# Patient Record
Sex: FEMALE | Race: WHITE | NOT HISPANIC OR LATINO | Employment: OTHER | URBAN - METROPOLITAN AREA
[De-identification: names, ages, dates, MRNs, and addresses within clinical notes are randomized per-mention and may not be internally consistent; named-entity substitution may affect disease eponyms.]

---

## 2017-02-22 ENCOUNTER — HOSPITAL ENCOUNTER (OUTPATIENT)
Dept: BEHAVIORAL HEALTH | Facility: MEDICAL CENTER | Age: 47
End: 2017-02-22
Attending: PSYCHIATRY & NEUROLOGY
Payer: MEDICARE

## 2017-02-22 DIAGNOSIS — F25.0 SCHIZOAFFECTIVE DISORDER, BIPOLAR TYPE (HCC): ICD-10-CM

## 2017-02-22 DIAGNOSIS — F41.9 ANXIETY: ICD-10-CM
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
## 2017-02-27 ENCOUNTER — HOSPITAL ENCOUNTER (OUTPATIENT)
Dept: BEHAVIORAL HEALTH | Facility: MEDICAL CENTER | Age: 47
End: 2017-02-27
Attending: PSYCHIATRY & NEUROLOGY
Payer: MEDICARE

## 2017-02-27 DIAGNOSIS — F41.9 ANXIETY: ICD-10-CM

## 2017-02-27 PROCEDURE — 90853 GROUP PSYCHOTHERAPY: CPT

## 2017-02-27 NOTE — BH THERAPY
Group Therapy Checklist  Attendance: Attended  Attendance Duration (min): 46-60  Number of Participants: 10  Program/Group: Intensive Outpatient Program  Topics Covered: Recovery Planning  Participation: Active verbal participation  Affect/Mood Range: Normal range, Flexible  Affect/Mood Display: Congruent w/content  Cognition: Alert, Oriented  Evidence of Imminent Suicide Risk: No  Evidence of imminent homicide risk: No  Therapeutic Interventions: Relapse prevention  Progress Toward Treatment Goal: Mild improvement

## 2017-03-01 ENCOUNTER — HOSPITAL ENCOUNTER (OUTPATIENT)
Dept: BEHAVIORAL HEALTH | Facility: MEDICAL CENTER | Age: 47
End: 2017-03-01
Attending: PSYCHIATRY & NEUROLOGY
Payer: MEDICARE

## 2017-03-01 DIAGNOSIS — F41.9 ANXIETY: ICD-10-CM

## 2017-03-01 PROCEDURE — 90834 PSYTX W PT 45 MINUTES: CPT | Mod: XU

## 2017-03-01 PROCEDURE — 90853 GROUP PSYCHOTHERAPY: CPT

## 2017-03-01 NOTE — BH THERAPY
Group Therapy Checklist  Attendance: Attended  Attendance Duration (min): Greater than 60  Number of Participants: 9  Program/Group: Intensive Outpatient Program  Topics Covered: Other (Comment): (process group)  Participation: Active verbal participation, Attentive, Supportive to other group members, Open to feedback  Affect/Mood Range: Normal range, Flexible  Affect/Mood Display: Congruent w/content  Cognition: Alert, Oriented  Evidence of Imminent Suicide Risk: No  Evidence of imminent homicide risk: No  Therapeutic Interventions: Emotion clarification, Supportive psychotherapy  Progress Toward Treatment Goal: Mild improvement  Rianna processed the loss of her step-mother on Monday. She states she is distracted with details as she will be traveling to [place] to support her father at this time. She shared with group her struggles as a binge drinker and her recovery; she is attending AA. Receptive to peer support and sympathies.

## 2017-03-01 NOTE — BH THERAPY
" [facility]  Therapy Progress Note    Patient Name: Rianna Solis  Patient MRN: [id number]  Today's Date: 3/1/2017     Type of session:Individual psychotherapy  Length of session: 45  Persons in attendance:Patient    Subjective/New Info: initial individual session. Rianna states her step-mother  on Monday; she is flying back to [place] to support her father. She states she was not particularly close to her father's wife. Her daughter is flying back with her; her  will be staying in [place] for work. She will take a week away from group. Processed early recovery and attending meetings in another city; strategies for sobriety addressed. She denies that flying is a trigger.     Objective/Observations:   Participation: Active verbal participation, Attentive, Open to feedback and distracted by making arrangements to leave town.    Grooming: Casual and Neat   Cognition: Alert and Fully Oriented   Eye contact: Limited   Mood: Anxious   Affect: Flexible, Full range, Congruent with content and Anxious   Thought process: Logical and Goal-directed   Speech: Rate within normal limits and Volume within normal limits   Other:     Diagnoses: No diagnosis found.     Current risk:   SUICIDE: Low   Homicide: Not applicable   Self-harm: Not applicable   Relapse: Low   Other:    Safety Plan reviewed? Not Indicated   If evidence of imminent risk is present, intervention/plan:     Therapeutic Intervention(s): Clarify:  Clarify feelings and Clarify thoughts, Develop/modify treatment plan, Distress tolerance skills, Establish rapport, Parenting/familial roles addressed, Self-care skills, Stressors assessed and Supportive psychotherapy    Treatment Goal(s)/Objective(s) addressed: ineffective coping     Progress toward Treatment Goals: Return to baseline    Plan:  - Continue Intensive Outpatient Program  - \"Homework\" recommendation: ACT intro  - Next appointment scheduled:  3/13/2017  - Patient is in agreement " with the above plan:  YES    Patricia Abrams R.N.  3/1/2017

## 2017-03-01 NOTE — BH THERAPY
Group Therapy Checklist  Attendance: Attended  Attendance Duration (min): 46-60  Number of Participants: 9  Program/Group: Intensive Outpatient Program  Topics Covered: Cognitive distortions  Participation: Active verbal participation  Affect/Mood Range: Normal range, Flexible  Affect/Mood Display: Congruent w/content  Evidence of Imminent Suicide Risk: No  Evidence of imminent homicide risk: No  Therapeutic Interventions: Cognitive clarification, Values clarification  Progress Toward Treatment Goal: Mild improvement

## 2017-03-01 NOTE — CARE PLAN
Problem: Ineffective Coping  Goal: Improved mood and functioning  Intervention: Intensive Outpatient Program  Started 2/27/17  Intervention: Relapse triggers  Processed travel triggers; pt denied triggers. Strategies discussed.   Intervention: Grief and Loss  Processed loss of her step-mother, not close; she feels more needed to support her father at the time of his loss.   Intervention: ACT concepts  Intro to ACT assigned as homework piece as she will not be here on Friday for the education forum.   Intervention: AA  Continue to attend AA twice weekly, consider a meeting while on the road; processed strategies to look up meetings on the Internet.   Intervention: Grief Support  Community support group information provided; will reinforce upon return to [place].

## 2017-03-03 ENCOUNTER — APPOINTMENT (OUTPATIENT)
Dept: BEHAVIORAL HEALTH | Facility: MEDICAL CENTER | Age: 47
End: 2017-03-03
Attending: PSYCHIATRY & NEUROLOGY
Payer: MEDICARE

## 2017-03-09 ENCOUNTER — TELEPHONE (OUTPATIENT)
Dept: BEHAVIORAL HEALTH | Facility: MEDICAL CENTER | Age: 47
End: 2017-03-09

## 2017-03-09 NOTE — TELEPHONE ENCOUNTER
[facility]    Treatment Team Staffing    Patient Name: Rianna Solis Program: IOP Date: 3/9/2017     Attendees: Aleshia Marquez, RN; Cici Griffith, RN; Patricia Abrams, RN and Jarred Carson MD    Patient's Progress toward Goals Listed on the Treatment Plan: engaging in therapies; needed a week off for bereavement as her step-mother  and her father needed her in [place]    1. Client's Participation When in Attendance Was: Active in a Positive Way    2. Counselor's Evaluation of Client's Progress: Positive Movement    3. Patient is attending group and individual sessions and is progressing well toward the treatment goals: yes      YES NO   A. Relapse During Program []  [x]    B. Requires physician review []  [x]    C. Referral to program inappropriate []  [x]    D. Non compliance with Treatment Plan []  []    E. Early treatment termination (lack of attendance) []  [x]     []  []      Comments: will return next week    Treatment Plan Review: - Continue Intensive Outpatient Program  - Patient is in agreement with the above plan:  YES

## 2017-03-10 ENCOUNTER — TELEPHONE (OUTPATIENT)
Dept: BEHAVIORAL HEALTH | Facility: MEDICAL CENTER | Age: 47
End: 2017-03-10

## 2017-03-13 ENCOUNTER — HOSPITAL ENCOUNTER (OUTPATIENT)
Dept: BEHAVIORAL HEALTH | Facility: MEDICAL CENTER | Age: 47
End: 2017-03-13
Attending: PSYCHIATRY & NEUROLOGY
Payer: MEDICARE

## 2017-03-13 DIAGNOSIS — F41.9 ANXIETY: ICD-10-CM

## 2017-03-13 PROCEDURE — 90853 GROUP PSYCHOTHERAPY: CPT

## 2017-03-13 NOTE — BH THERAPY
Group Therapy Checklist  Attendance: Attended  Attendance Duration (min): 46-60  Number of Participants: 12  Program/Group: Intensive Outpatient Program  Topics Covered: Addiction behaviors  Participation: Active verbal participation  Affect/Mood Range: Normal range, Flexible  Affect/Mood Display: Congruent w/content  Cognition: Alert, Oriented  Evidence of Imminent Suicide Risk: No  Evidence of imminent homicide risk: No  Therapeutic Interventions: Cognitive clarification  Progress Toward Treatment Goal: Mild improvement

## 2017-03-14 ENCOUNTER — TELEPHONE (OUTPATIENT)
Dept: BEHAVIORAL HEALTH | Facility: MEDICAL CENTER | Age: 47
End: 2017-03-14

## 2017-03-14 NOTE — TELEPHONE ENCOUNTER
[facility]  TRANSFER/DISCHARGE SUMMARY FORM    PI / SCP: no Other Ins.: Medicare     Patient Name: Rianna Solis  Admission Date: 17  Level of Care Attended:  Intens.OP : 1970  Transfer/Discharge Date: MRN: [id number]  3/14/17       SIGNIFICANT FINDINGS/CLINICAL IMPRESSION:   DSM Codes:   F33.2    ICD10 Codes:   No diagnosis found.    Additional problems identified via assessment: bereavement    Treatment Components in Which Patient Participated (check all that apply):  Education group(s), 1:1 teaching/therapy and Group Therapy    Summary of Course of Treatment: attendance interrupted for bereavement; when she returned, she stated her  said the program was too expensive for them now.     Condition at Time of Transfer/Discharge: Case administratively closed due to non-compliance with treatment plan.    [] Medications Reviewed with Copy to Patient    Referred to: Refer to Primary Care Physician and private therapist     Patient is in agreement with discharge plan: yes    Patricia Abrams R.N.

## 2017-03-15 ENCOUNTER — APPOINTMENT (OUTPATIENT)
Dept: BEHAVIORAL HEALTH | Facility: MEDICAL CENTER | Age: 47
End: 2017-03-15
Attending: PSYCHIATRY & NEUROLOGY
Payer: MEDICARE

## 2017-03-17 ENCOUNTER — APPOINTMENT (OUTPATIENT)
Dept: BEHAVIORAL HEALTH | Facility: MEDICAL CENTER | Age: 47
End: 2017-03-17
Attending: PSYCHIATRY & NEUROLOGY
Payer: MEDICARE

## 2017-05-04 ENCOUNTER — HOSPITAL ENCOUNTER (OUTPATIENT)
Dept: RADIOLOGY | Facility: MEDICAL CENTER | Age: 47
End: 2017-05-04
Attending: NURSE PRACTITIONER
Payer: MEDICARE

## 2017-05-04 DIAGNOSIS — Z13.9 SCREENING: ICD-10-CM

## 2017-05-04 PROCEDURE — 77063 BREAST TOMOSYNTHESIS BI: CPT

## 2017-06-28 ENCOUNTER — OFFICE VISIT (OUTPATIENT)
Dept: URGENT CARE | Facility: PHYSICIAN GROUP | Age: 47
End: 2017-06-28
Payer: MEDICARE

## 2017-06-28 ENCOUNTER — TELEPHONE (OUTPATIENT)
Dept: URGENT CARE | Facility: PHYSICIAN GROUP | Age: 47
End: 2017-06-28

## 2017-06-28 VITALS
HEART RATE: 71 BPM | OXYGEN SATURATION: 98 % | SYSTOLIC BLOOD PRESSURE: 114 MMHG | HEIGHT: 62 IN | RESPIRATION RATE: 16 BRPM | DIASTOLIC BLOOD PRESSURE: 62 MMHG | WEIGHT: 182 LBS | BODY MASS INDEX: 33.49 KG/M2

## 2017-06-28 DIAGNOSIS — S39.012A BACK STRAIN, INITIAL ENCOUNTER: ICD-10-CM

## 2017-06-28 PROCEDURE — 99213 OFFICE O/P EST LOW 20 MIN: CPT | Performed by: EMERGENCY MEDICINE

## 2017-06-28 RX ORDER — KETOROLAC TROMETHAMINE 30 MG/ML
60 INJECTION, SOLUTION INTRAMUSCULAR; INTRAVENOUS ONCE
Status: COMPLETED | OUTPATIENT
Start: 2017-06-28 | End: 2017-06-28

## 2017-06-28 RX ORDER — HYDROCODONE BITARTRATE AND ACETAMINOPHEN 5; 325 MG/1; MG/1
1 TABLET ORAL EVERY 6 HOURS PRN
Qty: 12 TAB | Refills: 0 | Status: SHIPPED | OUTPATIENT
Start: 2017-06-28 | End: 2017-11-21

## 2017-06-28 RX ORDER — METHOCARBAMOL 750 MG/1
1500 TABLET, FILM COATED ORAL 3 TIMES DAILY
Qty: 60 TAB | Refills: 0 | Status: SHIPPED | OUTPATIENT
Start: 2017-06-28 | End: 2018-03-08

## 2017-06-28 RX ADMIN — KETOROLAC TROMETHAMINE 60 MG: 30 INJECTION, SOLUTION INTRAMUSCULAR; INTRAVENOUS at 13:51

## 2017-06-28 ASSESSMENT — ENCOUNTER SYMPTOMS
FALLS: 0
VOMITING: 0
ABDOMINAL PAIN: 0
COUGH: 0
NERVOUS/ANXIOUS: 0
CHILLS: 0
BACK PAIN: 1
LEG PAIN: 0
BOWEL INCONTINENCE: 0
PERIANAL NUMBNESS: 0
NECK PAIN: 0
NAUSEA: 0
SENSORY CHANGE: 0
SPEECH CHANGE: 0
MYALGIAS: 0
FEVER: 0
HEADACHES: 0
NUMBNESS: 0
WEAKNESS: 0

## 2017-06-28 NOTE — MR AVS SNAPSHOT
"        Rianna Solis   2017 1:05 PM   Office Visit   MRN: [id number]    Department:  [facility]   Dept Phone:  [phone]    Description:  Female : 1970   Provider:  NIRALI Suarez M.D.           Reason for Visit     Back Pain Lower back pain following strain while lifting her dog onto his chair yesterday       Allergies as of 2017     Allergen Noted Reactions    Zoloft 2010   Unspecified    \"Blows Up\"      You were diagnosed with     Back strain, initial encounter   [[id number]]         Vital Signs     Blood Pressure Pulse Respirations Height Weight Body Mass Index    114/62 mmHg 71 16 1.575 m (5' 2\") 82.555 kg (182 lb) 33.28 kg/m2    Oxygen Saturation Smoking Status                98% Former Smoker          Basic Information     Date Of Birth Sex Race Ethnicity Preferred Language    1970 Female White Non- English      Problem List              ICD-10-CM Priority Class Noted - Resolved    Anxiety F41.9   2010 - Present    Schizoaffective disorder, bipolar type (CMS-[facility]) F25.0   2011 - Present    Partial seizure disorder (CMS-HCC) G40.109   Unknown - Present    Obesity (BMI 30-39.9) E66.9   2014 - Present    Elevated blood pressure (not hypertension) R03.0   2014 - Present    Hx of amenorrhea Z87.42   2014 - Present    History of suicide attempt Z91.5   2016 - Present      Health Maintenance        Date Due Completion Dates    IMM DTaP/Tdap/Td Vaccine (1 - Tdap) 1989 ---    MAMMOGRAM 2018, 2016, 2015, 3/20/2012    PAP SMEAR 2019, 3/21/2012            Current Immunizations     Influenza TIV (IM) 2011    Influenza Vaccine Quad Inj (Preserved) 10/2/2015, 2014      Below and/or attached are the medications your provider expects you to take. Review all of your home medications and newly ordered medications with your provider and/or pharmacist. Follow medication instructions as directed by " your provider and/or pharmacist. Please keep your medication list with you and share with your provider. Update the information when medications are discontinued, doses are changed, or new medications (including over-the-counter products) are added; and carry medication information at all times in the event of emergency situations     Allergies:  ZOLOFT - Unspecified               Medications  Valid as of: June 28, 2017 -  1:58 PM    Generic Name Brand Name Tablet Size Instructions for use    Cholecalciferol (Cap) Cholecalciferol 2000 UNIT Take  by mouth.        Hydrocodone-Acetaminophen (Tab) NORCO 5-325 MG Take 1 Tab by mouth every 6 hours as needed.        Methocarbamol (Tab) ROBAXIN 750 MG Take 2 Tabs by mouth 3 times a day.        .                 Medicines prescribed today were sent to:     [facility] #[id number] - [facility] [place] - [address]    [address] [address]    Phone: [phone] Fax: [phone]    Open 24 Hours?: No      Medication refill instructions:       If your prescription bottle indicates you have medication refills left, it is not necessary to call your provider’s office. Please contact your pharmacy and they will refill your medication.    If your prescription bottle indicates you do not have any refills left, you may request refills at any time through one of the following ways: The online [employer] system (except Urgent Care), by calling your provider’s office, or by asking your pharmacy to contact your provider’s office with a refill request. Medication refills are processed only during regular business hours and may not be available until the next business day. Your provider may request additional information or to have a follow-up visit with you prior to refilling your medication.   *Please Note: Medication refills are assigned a new Rx number when refilled electronically. Your pharmacy may indicate that no refills were authorized even though a new  prescription for the same medication is available at the pharmacy. Please request the medicine by name with the pharmacy before contacting your provider for a refill.           [employer] Access Code: [email or web address]  Expires: 2017  1:58 PM    [employer]  A secure, online tool to manage your health information     [employer]® is a secure, online tool that connects you to your personalized health information from the privacy of your home -- day or night - making it very easy for you to manage your healthcare. Once the activation process is completed, you can even access your medical information using the [employer] meng, which is available for free in the [employer] Meng store or Google Play store.     [employer] provides the following levels of access (as shown below):   My Chart Features   [facility]  Urgent  Care [facility] Care  Doctor   Email your healthcare team securely and privately  X X X    Manage appointments: schedule your next appointment; view details of past/upcoming appointments X      Request prescription refills. X      View recent personal medical records, including lab and immunizations X X X X   View health record, including health history, allergies, medications X X X X   Read reports about your outpatient visits, procedures, consult and ER notes X X X X   See your discharge summary, which is a recap of your hospital and/or ER visit that includes your diagnosis, lab results, and care plan. X X       How to register for [employer]:  1. Go to  [email or web address].  2. Click on the Sign Up Now box, which takes you to the New Member Sign Up page. You will need to provide the following information:  a. Enter your [employer] Access Code exactly as it appears at the top of this page. (You will not need to use this code after you’ve completed the sign-up process. If you do not sign up before the expiration date, you must request a new code.)    b. Enter your date of birth.   c. Enter your home email address.   d. Click Submit, and follow the next screen’s instructions.  3. Create a [employer] ID. This will be your [employer] login ID and cannot be changed, so think of one that is secure and easy to remember.  4. Create a [email or web address] password. You can change your password at any time.  5. Enter your Password Reset Question and Answer. This can be used at a later time if you forget your password.   6. Enter your e-mail address. This allows you to receive e-mail notifications when new information is available in [employer].  7. Click Sign Up. You can now view your health information.    For assistance activating your [employer] account, call [phone]
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
## 2017-11-21 ENCOUNTER — OFFICE VISIT (OUTPATIENT)
Dept: MEDICAL GROUP | Facility: CLINIC | Age: 47
End: 2017-11-21
Payer: MEDICARE

## 2017-11-21 VITALS
SYSTOLIC BLOOD PRESSURE: 118 MMHG | HEART RATE: 72 BPM | RESPIRATION RATE: 16 BRPM | DIASTOLIC BLOOD PRESSURE: 62 MMHG | TEMPERATURE: 97.7 F | OXYGEN SATURATION: 98 % | HEIGHT: 62 IN

## 2017-11-21 DIAGNOSIS — E78.5 MILD HYPERLIPIDEMIA: ICD-10-CM

## 2017-11-21 DIAGNOSIS — Z13.21 ENCOUNTER FOR VITAMIN DEFICIENCY SCREENING: ICD-10-CM

## 2017-11-21 DIAGNOSIS — E06.3 AUTOIMMUNE THYROIDITIS: ICD-10-CM

## 2017-11-21 DIAGNOSIS — R42 VERTIGO: ICD-10-CM

## 2017-11-21 PROCEDURE — 99214 OFFICE O/P EST MOD 30 MIN: CPT | Performed by: NURSE PRACTITIONER

## 2017-11-21 RX ORDER — TRAMADOL HYDROCHLORIDE 50 MG/1
50 TABLET ORAL
Refills: 0 | COMMUNITY
Start: 2017-11-04 | End: 2018-03-08

## 2017-11-21 ASSESSMENT — PATIENT HEALTH QUESTIONNAIRE - PHQ9: CLINICAL INTERPRETATION OF PHQ2 SCORE: 0

## 2017-11-21 NOTE — PROGRESS NOTES
"CC: Vertigo (x wk off and on )        HPI:     Rianna presents today for the followin. Vertigo  Here with concerns stated that she's had vertigo that's triggered by movement off-and-on for the last week or so. She had difficulty completing her physical therapy exercises today when she was lying down on her side and they encouraged her to follow-up here for treatment. She describes this as being triggered if she bends down the touch her feet or lays down a certain side. She describes it as being very intense however resolving quickly on its own. Mild nausea occasionally associated. Denies any ear pain or changes in hearing. No associated fevers. No associated head trauma.    2. Mild hyperlipidemia  History high cholesterol. This was a mild per the patient. She is not a car medication for.    3. Encounter for vitamin deficiency screening  History of low vitamin D    4. Autoimmune thyroiditis   Diagnosed previously with Hashimoto's. I have her thyroid checked.    Current Outpatient Prescriptions   Medication Sig Dispense Refill   • tramadol (ULTRAM) 50 MG Tab Take 50 mg by mouth.  0   • methocarbamol (ROBAXIN) 750 MG Tab Take 2 Tabs by mouth 3 times a day. 60 Tab 0   • Cholecalciferol 2000 UNIT Cap Take  by mouth.       No current facility-administered medications for this visit.      Social History   Substance Use Topics   • Smoking status: Former Smoker     Packs/day: 0.25     Years: 25.00     Types: Cigarettes     Start date: 2016     Quit date: 2017   • Smokeless tobacco: Never Used   • Alcohol use 42.0 oz/week     60 Glasses of wine, 10 Shots of liquor per week      Comment: in remission;occasional     I reviewed patients allergies, problem list and medications today in [facility].    ROS: Any/all pertinent positives listed in the HPI, otherwise all others reviewed are negative today.      /62   Pulse 72   Temp 36.5 °C (97.7 °F)   Resp 16   Ht 1.575 m (5' 2\")   LMP 10/16/2017   SpO2 98%   " Breastfeeding? No     Exam:    Gen: Alert and oriented, No apparent distress. WDWN  Psych: A+Ox3, normal affect and mood  Skin: Warm, dry and intact. Good turgor   No rashes in visible areas.  Eye: Conjunctiva clear, lids normal  ENMT: Lips without lesions, good dentition   Oropharynx clear.TMs unremarkable bilaterally Neck: No Lymphadenopathy, Thyromegaly, Bruits.   Trachea midline, no masses  Lungs: Clear to auscultation bilaterally, no rales or rhonchi   Unlabored respiratory effort.   CV: Regular rate and rhythm, S1, S2. No murmurs.   No Edema  Intermittent exacerbations of vertigo during the appointment when patient went from sitting to standing.      Assessment and Plan.   47 y.o. female with the following issues.    1. Vertigo  Suspect BPPV. Did discuss with the patient medication treatment options such as meclizine however Patient wishes to avoid medications as necessary and I feel this is fine. Labs as below. Did discuss home vertigo exercises to do on the floor. She'll notify me if this doesn't resolve and we will get her to ENT  - COMP METABOLIC PANEL; Future  - CBC WITH DIFFERENTIAL; Future  - TSH; Future  - T3 FREE; Future  - FREE THYROXINE; Future  - THYROID PEROXIDASE  (TPO) AB; Future    2. Mild hyperlipidemia  Stable monitor lab  - LIPID PROFILE; Future    3. Encounter for vitamin deficiency screening  Monitor lab  - VITAMIN D,25 HYDROXY; Future    4. Autoimmune thyroiditis   Monitor lab  - TSH; Future

## 2017-11-25 ENCOUNTER — HOSPITAL ENCOUNTER (OUTPATIENT)
Dept: LAB | Facility: MEDICAL CENTER | Age: 47
End: 2017-11-25
Attending: NURSE PRACTITIONER
Payer: MEDICARE

## 2017-11-25 DIAGNOSIS — Z13.21 ENCOUNTER FOR VITAMIN DEFICIENCY SCREENING: ICD-10-CM

## 2017-11-25 DIAGNOSIS — R42 VERTIGO: ICD-10-CM

## 2017-11-25 DIAGNOSIS — E78.5 MILD HYPERLIPIDEMIA: ICD-10-CM

## 2017-11-25 LAB
25(OH)D3 SERPL-MCNC: 27 NG/ML (ref 30–100)
ALBUMIN SERPL BCP-MCNC: 4.4 G/DL (ref 3.2–4.9)
ALBUMIN/GLOB SERPL: 1.5 G/DL
ALP SERPL-CCNC: 57 U/L (ref 30–99)
ALT SERPL-CCNC: 43 U/L (ref 2–50)
ANION GAP SERPL CALC-SCNC: 7 MMOL/L (ref 0–11.9)
AST SERPL-CCNC: 24 U/L (ref 12–45)
BASOPHILS # BLD AUTO: 0.6 % (ref 0–1.8)
BASOPHILS # BLD: 0.04 K/UL (ref 0–0.12)
BILIRUB SERPL-MCNC: 0.7 MG/DL (ref 0.1–1.5)
BUN SERPL-MCNC: 14 MG/DL (ref 8–22)
CALCIUM SERPL-MCNC: 9.7 MG/DL (ref 8.5–10.5)
CHLORIDE SERPL-SCNC: 106 MMOL/L (ref 96–112)
CHOLEST SERPL-MCNC: 204 MG/DL (ref 100–199)
CO2 SERPL-SCNC: 24 MMOL/L (ref 20–33)
CREAT SERPL-MCNC: 0.59 MG/DL (ref 0.5–1.4)
EOSINOPHIL # BLD AUTO: 0.06 K/UL (ref 0–0.51)
EOSINOPHIL NFR BLD: 0.9 % (ref 0–6.9)
ERYTHROCYTE [DISTWIDTH] IN BLOOD BY AUTOMATED COUNT: 39.2 FL (ref 35.9–50)
GFR SERPL CREATININE-BSD FRML MDRD: >60 ML/MIN/1.73 M 2
GLOBULIN SER CALC-MCNC: 2.9 G/DL (ref 1.9–3.5)
GLUCOSE SERPL-MCNC: 84 MG/DL (ref 65–99)
HCT VFR BLD AUTO: 42 % (ref 37–47)
HDLC SERPL-MCNC: 48 MG/DL
HGB BLD-MCNC: 14.4 G/DL (ref 12–16)
IMM GRANULOCYTES # BLD AUTO: 0.02 K/UL (ref 0–0.11)
IMM GRANULOCYTES NFR BLD AUTO: 0.3 % (ref 0–0.9)
LDLC SERPL CALC-MCNC: 139 MG/DL
LYMPHOCYTES # BLD AUTO: 1.86 K/UL (ref 1–4.8)
LYMPHOCYTES NFR BLD: 28.4 % (ref 22–41)
MCH RBC QN AUTO: 31 PG (ref 27–33)
MCHC RBC AUTO-ENTMCNC: 34.3 G/DL (ref 33.6–35)
MCV RBC AUTO: 90.5 FL (ref 81.4–97.8)
MONOCYTES # BLD AUTO: 0.39 K/UL (ref 0–0.85)
MONOCYTES NFR BLD AUTO: 6 % (ref 0–13.4)
NEUTROPHILS # BLD AUTO: 4.18 K/UL (ref 2–7.15)
NEUTROPHILS NFR BLD: 63.8 % (ref 44–72)
NRBC # BLD AUTO: 0 K/UL
NRBC BLD AUTO-RTO: 0 /100 WBC
PLATELET # BLD AUTO: 357 K/UL (ref 164–446)
PMV BLD AUTO: 9.6 FL (ref 9–12.9)
POTASSIUM SERPL-SCNC: 4 MMOL/L (ref 3.6–5.5)
PROT SERPL-MCNC: 7.3 G/DL (ref 6–8.2)
RBC # BLD AUTO: 4.64 M/UL (ref 4.2–5.4)
SODIUM SERPL-SCNC: 137 MMOL/L (ref 135–145)
T3FREE SERPL-MCNC: 3.48 PG/ML (ref 2.4–4.2)
T4 FREE SERPL-MCNC: 0.82 NG/DL (ref 0.53–1.43)
THYROPEROXIDASE AB SERPL-ACNC: 355.9 IU/ML (ref 0–9)
TRIGL SERPL-MCNC: 85 MG/DL (ref 0–149)
TSH SERPL DL<=0.005 MIU/L-ACNC: 0.44 UIU/ML (ref 0.3–3.7)
WBC # BLD AUTO: 6.6 K/UL (ref 4.8–10.8)

## 2017-11-25 PROCEDURE — 86376 MICROSOMAL ANTIBODY EACH: CPT

## 2017-11-25 PROCEDURE — 80053 COMPREHEN METABOLIC PANEL: CPT

## 2017-11-25 PROCEDURE — 80061 LIPID PANEL: CPT

## 2017-11-25 PROCEDURE — 82306 VITAMIN D 25 HYDROXY: CPT | Mod: GA

## 2017-11-25 PROCEDURE — 85025 COMPLETE CBC W/AUTO DIFF WBC: CPT

## 2017-11-25 PROCEDURE — 84439 ASSAY OF FREE THYROXINE: CPT

## 2017-11-25 PROCEDURE — 84481 FREE ASSAY (FT-3): CPT

## 2017-11-25 PROCEDURE — 36415 COLL VENOUS BLD VENIPUNCTURE: CPT

## 2017-11-25 PROCEDURE — 84443 ASSAY THYROID STIM HORMONE: CPT

## 2017-12-01 ENCOUNTER — PATIENT MESSAGE (OUTPATIENT)
Dept: MEDICAL GROUP | Facility: CLINIC | Age: 47
End: 2017-12-01

## 2018-01-15 ENCOUNTER — OFFICE VISIT (OUTPATIENT)
Dept: MEDICAL GROUP | Facility: CLINIC | Age: 48
End: 2018-01-15
Payer: MEDICARE

## 2018-01-15 VITALS
WEIGHT: 174 LBS | BODY MASS INDEX: 32.02 KG/M2 | OXYGEN SATURATION: 96 % | DIASTOLIC BLOOD PRESSURE: 76 MMHG | RESPIRATION RATE: 16 BRPM | HEIGHT: 62 IN | TEMPERATURE: 97.9 F | HEART RATE: 74 BPM | SYSTOLIC BLOOD PRESSURE: 118 MMHG

## 2018-01-15 DIAGNOSIS — R06.83 SNORING: ICD-10-CM

## 2018-01-15 DIAGNOSIS — G56.03 BILATERAL CARPAL TUNNEL SYNDROME: ICD-10-CM

## 2018-01-15 DIAGNOSIS — Z23 NEED FOR VACCINATION: ICD-10-CM

## 2018-01-15 PROBLEM — Z86.32 HISTORY OF GESTATIONAL DIABETES: Status: ACTIVE | Noted: 2018-01-15

## 2018-01-15 PROCEDURE — 90715 TDAP VACCINE 7 YRS/> IM: CPT | Performed by: NURSE PRACTITIONER

## 2018-01-15 PROCEDURE — 90472 IMMUNIZATION ADMIN EACH ADD: CPT | Performed by: NURSE PRACTITIONER

## 2018-01-15 PROCEDURE — 99214 OFFICE O/P EST MOD 30 MIN: CPT | Mod: 25 | Performed by: NURSE PRACTITIONER

## 2018-01-15 PROCEDURE — G0008 ADMIN INFLUENZA VIRUS VAC: HCPCS | Performed by: NURSE PRACTITIONER

## 2018-01-15 PROCEDURE — 90686 IIV4 VACC NO PRSV 0.5 ML IM: CPT | Performed by: NURSE PRACTITIONER

## 2018-01-15 RX ORDER — IBUPROFEN 800 MG/1
800 TABLET ORAL EVERY 8 HOURS PRN
Qty: 60 TAB | Refills: 0 | Status: SHIPPED | OUTPATIENT
Start: 2018-01-15 | End: 2018-02-01 | Stop reason: SDUPTHER

## 2018-01-15 NOTE — PROGRESS NOTES
CC: Follow-Up (Crapal tunnel; vaccines; snoring)        HPI:     Rianna presents today for the followin. Snoring  Here today stating that she's been having some issues with snoring. For the last year or so she feels her basic snoring is become something worse. Her  states that she has apneic-like episodes at home. She's never had any testing for sleep apnea    2.  Bilateral carpal tunnel syndrome  States she's had episodes or exacerbations of carpal tunnel bilaterally for 20 something years. Initially started in her pregnancies and would resolve. She previously has had great relief with cortisol injections in the wrists. Currently right is worse than left. She just approximately 2 weeks ago by wrist splints and has been using these at night. This is helping.  She feels that this has worsened since she gained weight. She does occasionally take ibuprofen up to 800 mg. This does help temporarily        Current Outpatient Prescriptions   Medication Sig Dispense Refill   • ibuprofen (MOTRIN) 800 MG Tab Take 1 Tab by mouth every 8 hours as needed (with food) for up to 7 days. 60 Tab 0   • tramadol (ULTRAM) 50 MG Tab Take 50 mg by mouth.  0   • methocarbamol (ROBAXIN) 750 MG Tab Take 2 Tabs by mouth 3 times a day. 60 Tab 0   • Cholecalciferol 2000 UNIT Cap Take  by mouth.       No current facility-administered medications for this visit.      Social History   Substance Use Topics   • Smoking status: Former Smoker     Packs/day: 0.25     Years: 25.00     Types: Cigarettes     Start date: 2016     Quit date: 2017   • Smokeless tobacco: Never Used   • Alcohol use 42.0 oz/week     60 Glasses of wine, 10 Shots of liquor per week      Comment: in remission;occasional     I reviewed patients allergies, problem list and medications today in [facility].    ROS: Any/all pertinent positives listed in the HPI, otherwise all others reviewed are negative today.      /76   Pulse 74   Temp 36.6 °C (97.9 °F)    "Resp 16   Ht 1.575 m (5' 2\")   Wt 78.9 kg (174 lb)   SpO2 96%   BMI 31.83 kg/m²     Exam:   Gen: Alert and oriented, No apparent distress. WDWN  Psych: A+Ox3, normal affect and mood  Skin: Warm, dry and intact. Good turgor   No rashes in visible areas.  Eye: Conjunctiva clear, lids normal  ENMT: Lips without lesions, good dentition  Lungs: Clear to auscultation bilaterally, no rales or rhonchi   Unlabored respiratory effort.   CV: Regular rate and rhythm, S1, S2. No murmurs.   No Edema  Negative Phalmichelle's      Assessment and Plan.   47 y.o. female with the following issues.    1. Snoring  Discussed thoroughly with patient. OPO on RA ordered    2. Bilateral carpal tunnel syndrome  Stable. Continue splints bilaterally at night. We'll do a one-week trial of high-dose ibuprofen. She is historically tolerated ibuprofen well. If she doesn't get significant improvement at that point she will contact me and I will place referral to orthopedics for injections  - ibuprofen (MOTRIN) 800 MG Tab; Take 1 Tab by mouth every 8 hours as needed (with food) for up to 7 days.  Dispense: 60 Tab; Refill: 0    4. Need for vaccination  I have placed the below orders and discussed them with an approved delegating provider. The MA is performing the below orders under the direction of an office MD.  -Given today.    - INFLUENZA VACCINE QUAD INJ >3Y(PF)  - TDAP VACCINE =>8YO IM      "

## 2018-01-19 ENCOUNTER — TELEPHONE (OUTPATIENT)
Dept: MEDICAL GROUP | Facility: CLINIC | Age: 48
End: 2018-01-19

## 2018-01-19 NOTE — TELEPHONE ENCOUNTER
----- Message from Rianna Solis sent at 1/18/2018  7:53 PM PST -----  Regarding: Non-Urgent Medical Question  Contact: [phone]  Edda Howe.  At my appointment, you mentioned that you would check on my insurance as to a home testing machine for my snoring.  Will someone be calling me regarding how this works?  Please advise if I should expect a call, or if I need to follow-up with someone specific.  Thank you...have a good evening.

## 2018-01-19 NOTE — TELEPHONE ENCOUNTER
Please make sure her OPO on RA order from last week was sent and give the patient the DME contact info

## 2018-01-25 NOTE — TELEPHONE ENCOUNTER
I had faxed this to [facility]. They didn't seem to get it so I faxed again on Monday, 1/23/18. I followed up with phone call this morning. Per [employer], they just received fax 30 minutes ago and are processing request. I will send pt a [employer] message.

## 2018-02-01 DIAGNOSIS — G56.03 BILATERAL CARPAL TUNNEL SYNDROME: ICD-10-CM

## 2018-02-01 RX ORDER — IBUPROFEN 800 MG/1
800 TABLET ORAL EVERY 8 HOURS PRN
Qty: 60 TAB | Refills: 0 | Status: SHIPPED | OUTPATIENT
Start: 2018-02-01 | End: 2018-05-19 | Stop reason: SDUPTHER

## 2018-02-12 ENCOUNTER — PATIENT MESSAGE (OUTPATIENT)
Dept: MEDICAL GROUP | Facility: CLINIC | Age: 48
End: 2018-02-12

## 2018-02-12 DIAGNOSIS — G47.33 OSA (OBSTRUCTIVE SLEEP APNEA): ICD-10-CM

## 2018-02-15 NOTE — TELEPHONE ENCOUNTER
I spoke to Kristal @ [place] [facility]. With normal private insurances, they will pay for patient's to be put on nocturnal O2 after sleep study shows HERLINDA. Medicare however absolutely will not pay for the oxygen because they dictate pt must have sleep study completed.   I let Kristal @ [facility] know that pt has a referral and it looks like it is pending still. I will send [employer] message to pt.

## 2018-03-07 ENCOUNTER — HOSPITAL ENCOUNTER (EMERGENCY)
Facility: MEDICAL CENTER | Age: 48
End: 2018-03-08
Attending: EMERGENCY MEDICINE
Payer: MEDICARE

## 2018-03-07 DIAGNOSIS — F69 BEHAVIOR PROBLEM, ADULT: ICD-10-CM

## 2018-03-07 DIAGNOSIS — R44.3 HALLUCINATIONS: ICD-10-CM

## 2018-03-07 LAB
APPEARANCE UR: CLEAR
BILIRUB UR QL STRIP.AUTO: NEGATIVE
COLOR UR: YELLOW
CULTURE IF INDICATED INDCX: NO UA CULTURE
GLUCOSE UR STRIP.AUTO-MCNC: NEGATIVE MG/DL
KETONES UR STRIP.AUTO-MCNC: 15 MG/DL
LEUKOCYTE ESTERASE UR QL STRIP.AUTO: NEGATIVE
MICRO URNS: ABNORMAL
NITRITE UR QL STRIP.AUTO: NEGATIVE
PH UR STRIP.AUTO: 5 [PH]
POC BREATHALIZER: 0 PERCENT (ref 0–0.01)
PROT UR QL STRIP: NEGATIVE MG/DL
RBC UR QL AUTO: NEGATIVE
SP GR UR STRIP.AUTO: 1.01
UROBILINOGEN UR STRIP.AUTO-MCNC: 0.2 MG/DL

## 2018-03-07 PROCEDURE — 80307 DRUG TEST PRSMV CHEM ANLYZR: CPT

## 2018-03-07 PROCEDURE — 81003 URINALYSIS AUTO W/O SCOPE: CPT

## 2018-03-07 PROCEDURE — 302970 POC BREATHALIZER: Performed by: EMERGENCY MEDICINE

## 2018-03-07 PROCEDURE — 99284 EMERGENCY DEPT VISIT MOD MDM: CPT

## 2018-03-08 ENCOUNTER — HOSPITAL ENCOUNTER (EMERGENCY)
Facility: MEDICAL CENTER | Age: 48
End: 2018-03-13
Attending: EMERGENCY MEDICINE
Payer: MEDICARE

## 2018-03-08 VITALS
OXYGEN SATURATION: 94 % | RESPIRATION RATE: 18 BRPM | HEIGHT: 62 IN | WEIGHT: 170 LBS | BODY MASS INDEX: 31.28 KG/M2 | HEART RATE: 90 BPM

## 2018-03-08 DIAGNOSIS — F20.9 SCHIZOPHRENIA, UNSPECIFIED TYPE (HCC): ICD-10-CM

## 2018-03-08 LAB
AMPHET UR QL SCN: NEGATIVE
AMPHET UR QL SCN: NEGATIVE
BARBITURATES UR QL SCN: NEGATIVE
BARBITURATES UR QL SCN: NEGATIVE
BENZODIAZ UR QL SCN: POSITIVE
BENZODIAZ UR QL SCN: POSITIVE
BZE UR QL SCN: NEGATIVE
BZE UR QL SCN: NEGATIVE
CANNABINOIDS UR QL SCN: NEGATIVE
CANNABINOIDS UR QL SCN: NEGATIVE
METHADONE UR QL SCN: NEGATIVE
METHADONE UR QL SCN: NEGATIVE
OPIATES UR QL SCN: NEGATIVE
OPIATES UR QL SCN: NEGATIVE
OXYCODONE UR QL SCN: NEGATIVE
OXYCODONE UR QL SCN: NEGATIVE
PCP UR QL SCN: NEGATIVE
PCP UR QL SCN: NEGATIVE
POC BREATHALIZER: 0 PERCENT (ref 0–0.01)
PROPOXYPH UR QL SCN: NEGATIVE
PROPOXYPH UR QL SCN: NEGATIVE

## 2018-03-08 PROCEDURE — 99285 EMERGENCY DEPT VISIT HI MDM: CPT

## 2018-03-08 PROCEDURE — 90791 PSYCH DIAGNOSTIC EVALUATION: CPT

## 2018-03-08 PROCEDURE — 80307 DRUG TEST PRSMV CHEM ANLYZR: CPT

## 2018-03-08 PROCEDURE — 700102 HCHG RX REV CODE 250 W/ 637 OVERRIDE(OP): Performed by: EMERGENCY MEDICINE

## 2018-03-08 PROCEDURE — A9270 NON-COVERED ITEM OR SERVICE: HCPCS | Performed by: EMERGENCY MEDICINE

## 2018-03-08 PROCEDURE — 302970 POC BREATHALIZER: Performed by: EMERGENCY MEDICINE

## 2018-03-08 PROCEDURE — 36415 COLL VENOUS BLD VENIPUNCTURE: CPT

## 2018-03-08 RX ORDER — DIPHENHYDRAMINE HCL 25 MG
50 TABLET ORAL ONCE
Status: COMPLETED | OUTPATIENT
Start: 2018-03-08 | End: 2018-03-08

## 2018-03-08 RX ORDER — HYDROXYZINE PAMOATE 25 MG/1
25-50 CAPSULE ORAL 2 TIMES DAILY PRN
COMMUNITY
End: 2018-11-05

## 2018-03-08 RX ORDER — LURASIDONE HYDROCHLORIDE 80 MG/1
80 TABLET, FILM COATED ORAL
COMMUNITY
End: 2018-11-05

## 2018-03-08 RX ORDER — OXCARBAZEPINE 300 MG/1
300 TABLET, FILM COATED ORAL 2 TIMES DAILY
COMMUNITY
End: 2018-11-05 | Stop reason: CLARIF

## 2018-03-08 RX ORDER — LORAZEPAM 1 MG/1
1 TABLET ORAL ONCE
Status: COMPLETED | OUTPATIENT
Start: 2018-03-08 | End: 2018-03-08

## 2018-03-08 RX ORDER — LORAZEPAM 1 MG/1
2 TABLET ORAL ONCE
Status: COMPLETED | OUTPATIENT
Start: 2018-03-08 | End: 2018-03-08

## 2018-03-08 RX ADMIN — LORAZEPAM 1 MG: 1 TABLET ORAL at 10:12

## 2018-03-08 RX ADMIN — LORAZEPAM 2 MG: 1 TABLET ORAL at 15:38

## 2018-03-08 RX ADMIN — DIPHENHYDRAMINE HCL 50 MG: 25 TABLET ORAL at 20:51

## 2018-03-08 RX ADMIN — LORAZEPAM 1 MG: 1 TABLET ORAL at 20:51

## 2018-03-08 ASSESSMENT — PAIN SCALES - GENERAL
PAINLEVEL_OUTOF10: 0

## 2018-03-08 NOTE — ED PROVIDER NOTES
"ED Provider Note    Scribed for Shivani Pretty M.D. by Julius Nolen. 3/7/2018, 11:19 PM.    Primary care provider: SHAY Lyman  Means of arrival: Ambulance  History obtained from: Patient  History limited by: None    CHIEF COMPLAINT  Chief Complaint   Patient presents with   • Psych Eval       HPI  Rianna Solis is a 47 y.o. female with schizophrenia who presents to the Emergency Department for a psychiatric evaluation. Per patient's , She is reported to have had a schizophrenic episode after not taking her newly prescribed psychiatric medication. The patient began speaking in tongues and police was called at that time. Patient is reported to have been tazered. She endorses hallucinations in the form of voices. The patient claims she was recently discharged from [facility] 2 days ago. When asked of suicidal ideations, patient states \"I don't know but I dont think so, and I dont want to hurt anyone else.\"     REVIEW OF SYSTEMS  See HPI for further details. All other systems are negative.   C.     PAST MEDICAL HISTORY   has a past medical history of Abnormal mammogram (2006); Acromioclavicular joint separation (5/24/2012); Anxiety; Depression; History of cervical dysplasia (1990); History of suicidal ideation; Homicidal ideations (2014); Hyperthyroidism; Hyperthyroidism; Migraine; Pelvic exam (2008); Polysubstance dependence (CMS-HCC); Schizoaffective disorder (CMS-HCC); Schizoaffective disorder, bipolar type (CMS-HCC) (4/7/2011); Seizure (CMS-HCC) (1999); Seizure disorder (CMS-HCC); and Wrist fracture (2010).    SURGICAL HISTORY   has a past surgical history that includes other; cervical conization (1990); eye surgery (1973,1979); and primary c section (2008).    SOCIAL HISTORY  Social History   Substance Use Topics   • Smoking status: Former Smoker     Packs/day: 0.25     Years: 25.00     Types: Cigarettes     Start date: 6/22/2016     Quit date: 2/22/2017   • Smokeless tobacco: " "Never Used   • Alcohol use 42.0 oz/week     60 Glasses of wine, 10 Shots of liquor per week      Comment: in remission;occasional      History   Drug Use No     Comment: in remission       FAMILY HISTORY  Family History   Problem Relation Age of Onset   • Cancer Mother      breast cancer   • Diabetes Mother    • Hypertension Mother    • Cancer Maternal Grandmother      stomach   • Stroke Maternal Grandfather    • Cancer Paternal Grandfather      lung   • Hypertension Father        CURRENT MEDICATIONS  Reviewed. See Encounter Summary.     ALLERGIES  Allergies   Allergen Reactions   • Zoloft Unspecified     \"Blows Up\"       PHYSICAL EXAM  VITAL SIGNS: Pulse 84   Resp 18   Ht 1.575 m (5' 2\")   Wt 77.1 kg (170 lb)   SpO2 94%   BMI 31.09 kg/m²    Pulse ox interpretation: I interpret this pulse ox as normal.  Constitutional: Alert in no apparent distress.  HENT: Normocephalic atraumatic, MMM  Eyes: PERR, Conjunctiva normal, Non-icteric.   Neck: Normal range of motion, No tenderness, Supple, No stridor.   Lymphatic: No lymphadenopathy noted.   Cardiovascular: Regular rate and rhythm, no murmurs.   Thorax & Lungs: Normal breath sounds, No respiratory distress, No wheezing, No chest tenderness.   Abdomen: Bowel sounds normal, Soft, No tenderness, No pulsatile masses. No peritoneal signs.  Skin: Warm, Dry, No erythema, No rash. No signs of taser marks.   Back: No bony tenderness, No CVA tenderness.   Extremities: Intact distal pulses, No edema, No tenderness, No cyanosis  Musculoskeletal: Good range of motion in all major joints. No tenderness to palpation or major deformities noted.   Neurologic: Alert and oriented, No focal deficits noted.   Psychiatric: denies SI or HI, calm and cooperative, not responding to internal stimuli    DIFFERENTIAL DIAGNOSIS AND WORK UP PLAN    11:19 PM Patient seen and examined at bedside. The patient presents for psychiatric evaluation and the differential diagnosis includes but is not " "limited to schizophrenia, medication non compliance vs malingering, behavioral problems . Ordered for Urine Drug Screen, Urinalysis, and POC Breathalizer to evaluate.     DIAGNOSTIC STUDIES / PROCEDURES     LABS  Urinalysis without signs of infection, positive for benzos which she was given by EMS prior to arrival and a negative breathalyzer  All labs were reviewed by me.    COURSE & MEDICAL DECISION MAKING  Pertinent Labs & Imaging studies reviewed. (See chart for details)    11:26 PM - Obtained and reviewed past medical records which indicate the patient has prior history of schizoaffective disorder and polysubstance abuse.     12:05 AM - Consulted with [facility]    2:59 AM - Consult with [facility] who states the patient began hallucinating after having sexual intercourse with her . She claims her heard voices telling her she had HIV. Patient however since her arrival to the emergency department has been calm and cooperative not responding to internal stimuli she actually has follow-up with Bald[facility] b[facility]rand later today with an appointment at 5 PM. Her  is indeed  her medications and does feel comfortable taking her home is going to come pick her up. They were given strict return precautions for any new or worsening severe behavior changes suicidal or homicidal ideation. At this time however she does not meet criteria for a psychiatric hold     105/70  Pulse 90   Resp 18   Ht 1.575 m (5' 2\")   Wt 77.1 kg (170 lb)   SpO2 94%   BMI 31.09 kg/m²     The patient will return for new or worsening symptoms and is stable at the time of discharge.    DISPOSITION:  Patient will be discharged home in stable condition.    FOLLOW UP:  [facility] - Psych ([facility] POS)  [address] [facility] [address]  [phone]  Go to  for your appt at 5 pm      OUTPATIENT MEDICATIONS:  New Prescriptions    No medications on file         FINAL IMPRESSION  1. Behavior problem, adult    2. " Hallucinations          Julius MICHAEL (Scribe), am scribing for, and in the presence of, Shivani Pretty M.D..    Electronically signed by: Julius Nolen (Brunilda), 3/7/2018    Shivani MICHAEL M.D. personally performed the services described in this documentation, as scribed by Julius Nolen in my presence, and it is both accurate and complete.    The note accurately reflects work and decisions made by me.  Shivani Pretty  3/8/2018  4:34 AM    This dictation has been created using voice recognition software and/or scribes. The accuracy of the dictation is limited by the abilities of the software and the expertise of the scribes. I expect there may be some errors of grammar and possibly content. I made every attempt to manually correct the errors within my dictation. However, errors related to voice recognition software and/or scribes may still exist and should be interpreted within the appropriate context.

## 2018-03-08 NOTE — ED NOTES
Pt more awake at this time. Notified behavorial health RN that pt should be able to be interviewed more clearly at this time.

## 2018-03-08 NOTE — ED NOTES
Discharge instructions given to pt. Questions answered. Prescriptions unchanged.  picked pt up to take home. Pt ambulated out of ED with steady and stable gait.
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
## 2018-03-08 NOTE — ED PROVIDER NOTES
ED Provider Note    Scribed for Leonard Wilkins M.D. by Leslie Schwarz. 3/8/2018, 6:34 AM.    Primary care provider: SHAY Lyman  Means of arrival: Ambulance  History obtained from: Nursing Staff  History limited by: Altered Mental Status    CHIEF COMPLAINT  Chief Complaint   Patient presents with   • Psych Eval       HPI  Rinana Solis is a 47 y.o. female who presents to the Emergency Department by ambulance for a psychiatric evaluation. History of schizophrenia and was recently at [facility]. Patient was found on someone's porch this morning and was yelling for someone to save her. She was administered Versed en route by EMS. Further history is unobtainable at this time secondary to the patient's altered mental status secondary to medication.       REVIEW OF SYSTEMS  Review of Systems   Unable to perform ROS: Mental status change      See HPI for further details. C.      PAST MEDICAL HISTORY  Patient has a past medical history of Abnormal mammogram (2006); Acromioclavicular joint separation (5/24/2012); Anxiety; Depression; History of cervical dysplasia (1990); History of suicidal ideation; Homicidal ideations (2014); Hyperthyroidism; Hyperthyroidism; Migraine; Pelvic exam (2008); Polysubstance dependence (CMS-[facility]); Schizoaffective disorder (CMS-[facility]); Schizoaffective disorder, bipolar type (CMS-[facility]) (4/7/2011); Seizure (CMS-[facility]) (1999); Seizure disorder (CMS-HCC); and Wrist fracture (2010).      SURGICAL HISTORY  Patient has a past surgical history that includes other; cervical conization (1990); eye surgery (1973,1979); and primary c section (2008).      SOCIAL HISTORY  Social History   Substance Use Topics   • Smoking status: Former Smoker     Packs/day: 0.25     Years: 25.00     Types: Cigarettes     Start date: 6/22/2016     Quit date: 2/22/2017   • Smokeless tobacco: Never Used   • Alcohol use 42.0 oz/week     60 Glasses of wine, 10 Shots of liquor per week      Comment: in  "remission;occasional      History   Drug Use No     Comment: in remission       FAMILY HISTORY  Family History   Problem Relation Age of Onset   • Cancer Mother      breast cancer   • Diabetes Mother    • Hypertension Mother    • Cancer Maternal Grandmother      stomach   • Stroke Maternal Grandfather    • Cancer Paternal Grandfather      lung   • Hypertension Father        CURRENT MEDICATIONS  Home Medications     Reviewed by Consuelo Galeano R.N. (Registered Nurse) on 03/08/18 at 0756  Med List Status: Partial   Medication Last Dose Status   Cholecalciferol 2000 UNIT Cap Not Taking Active   hydrOXYzine pamoate (VISTARIL) 25 MG Cap 3/7/2018 Active   lurasidone (LATUDA) 80 MG Tab 3/7/2018 Active   methocarbamol (ROBAXIN) 750 MG Tab  Active   Misc. Devices Misc  Active   OXcarbazepine (TRILEPTAL) 300 MG Tab 3/7/2018 Active   tramadol (ULTRAM) 50 MG Tab  Active                ALLERGIES  Allergies   Allergen Reactions   • Zoloft Unspecified     \"Blows Up\"       PHYSICAL EXAM  VITAL SIGNS: BP (!) 96/59   Pulse 88   Temp 36.1 °C (97 °F)   Resp 18   Ht 1.575 m (5' 2\")   Wt 80.7 kg (178 lb)   BMI 32.56 kg/m²     Constitutional: Mild distress  HENT:  Moist mucous membranes  Eyes:  Conjunctivitis but no drainage. No icterus  Neck:  trachea is midline, no palpable thyroid  Lymphatic:  No cervical lymphadenopathy  Cardiovascular: Regular rate and rhythm, no murmurs  Thorax & Lungs:  Normal breath sounds, no rhonchi  Abdomen:  Soft, Non-tender  Skin:.  no rash  Back:  Non-tender, no CVA tenderness  Extremities:   no edema  Vascular:  symmetric radial pulse  Neurologic: Arousable with clear speech but falls asleep due to medication. Normal gross motor      LABS  Labs Reviewed   URINE DRUG SCREEN - Abnormal; Notable for the following:        Result Value    Benzodiazepines Positive (*)     All other components within normal limits   POC BREATHALIZER - Normal     All labs reviewed by me.      COURSE & MEDICAL DECISION " MAKING  Pertinent Labs & Imaging studies reviewed. (See chart for details)    6:30 AM Obtained and reviewed patient's electronic medical record which indicates an ED visit yesterday for a psychiatric evaluation as the patient would not take her psychiatric medication for schizophrenia. She has auditory hallucinations and had been discharged from [place] two days prior.     6:34 AM Patient seen and examined at bedside. Patient presents for a psychiatric evaluation.  Exam indicates the patient is arousable with clear speech but falls asleep due to medication. She will reassesses when she is alert.      Initial orders in the Emergency Department included laboratory testing: POC breathalizer and urine drug screen.      10:10 AM Patient will be treated with 1 mg of Ativan for anxiety.    11:10 AM Legal hold was initiated by [employer]. The appropriate paperwork was completed.         DISPOSITION  Patient will be discharged home in stable condition.      FOLLOW UP  No follow-up provider specified.    The patient is referred to a primary physician for blood pressure management, diabetic screening, and for all other preventative health concerns.      OUTPATIENT MEDICATIONS  New Prescriptions    No medications on file       DIAGNOSIS  1. Schizophrenia, unspecified type (CMS-[facility])           The note accurately reflects work and decisions made by me.  Leonard Wilkins  3/8/2018  2:27 PM     Leslie MICHAEL (Scribe), am scribing for, and in the presence of, Leonard Wilkins M.D.    Electronically signed by: Leslie Schwarz (Ryanibe), 3/8/2018    Leonard MICHAEL M.D. personally performed the services described in this documentation, as scribed by Leslie Schwarz in my presence, and it is both accurate and complete.

## 2018-03-08 NOTE — DISCHARGE INSTRUCTIONS
Hallucinations and Delusions  You seem to be having hallucinations and/or delusions. You may be hearing voices that no one else can hear. This can seem very real to you. You may be having thoughts and fears that do not make sense to others. This condition can be due to mental disease like schizophrenia. It may be caused by a medical condition, such as an infection or electrolyte disturbance. These symptoms are also seen in drug abusers, especially those who use crack cocaine and amphetamines. Drugs like PCP, LSD, MDMA, peyote, and psilocybin can also cause frightening hallucinations and loss of control.  If your symptoms are due to drug abuse, your mental state should improve as the drug(s) leave your system. Someone you trust should be with you until you are better to protect you and calm your fears. Often tranquilizers are very helpful at controlling hallucinations, anxiety, and destructive behavior. Getting a proper diet and enough sleep is important to recovery. If your symptoms are not due to drugs, or do not improve over several days after stopping drug use, you need further medical or mental health care.  SEEK IMMEDIATE MEDICAL CARE IF:   · Your symptoms get worse, especially if you think your life is in danger  · You have violent or destructive thoughts.  Recovery is possible, but you have to get proper treatment and avoid drugs that are known to cause you trouble.  Document Released: 01/25/2006 Document Revised: 03/11/2013 Document Reviewed: 12/18/2006  [employer]® Patient Information ©2014 [employer].

## 2018-03-08 NOTE — ED NOTES
Called pt's  to update on pt status of being discharged. No answer at this time. Updated pt. Will retry  shortly.

## 2018-03-08 NOTE — CONSULTS
"[facility]   TRIAGE ASSESSMENT    Name: Rianna Solis  MRN: [id number]  : 1970  Age: 47 y.o.  Date of assessment: 3/8/2018  PCP: SHAY Lyman  Persons in attendance: Patient    CHIEF COMPLAINT/PRESENTING ISSUE (as stated by patient): Patient laying in bed.  Pleasant upon approach.  Denies suicidal ideation.  Denies homicidal ideation.  Admits to auditory hallucinations.  Stated she was upset and scared earlier because, \"the voices told me my  gave me AIDS.\"  Patient said she knows it is not true, but, stated \"it felt so real at the time.\"  Patient recently discharged from [facility].  Has IOP appointment today at 5pm (3/8/18).  Agreed she will call [facility] and inform them of tonight's occurrence.  Patient feels safe to go home. Patient does not meet legal hold criteria at this time.  Educated patient to return to the ER if she feels she is a danger to herself or others. Patient verbalized undrstanding  Chief Complaint   Patient presents with   • Psych Eval        CURRENT LIVING SITUATION/SOCIAL SUPPORT: Patient live with .   is caregiver to patient.  Makes sure patient is taking her medication.  Strong family support system.      BEHAVIORAL HEALTH TREATMENT HISTORY  Does patient/parent report a history of prior behavioral health treatment for patient?   Yes:  Long history of psychiatric treatment.  Most recent inpatient hospitalization on 3/5/18 at [facility].  Actively involved in outpatient treatment at [facility].  Has scheduled IOP appointment on 3/8/18 at 5 pm.        SAFETY ASSESSMENT - SELF  Does patient acknowledge current or past symptoms of dangerousness to self? yes  Does parent/significant other report patient has current or past symptoms of dangerousness to self? N\A  Does presenting problem suggest symptoms of dangerousness to self? No    SAFETY ASSESSMENT - OTHERS  Does patient acknowledge current or past symptoms of aggressive behavior " "or risk to others? no  Does parent/significant other report patient has current or past symptoms of aggressive behavior or risk to others?  N\A  Does presenting problem suggest symptoms of dangerousness to others? No    Crisis Safety Plan completed and copy given to patient? yes    ABUSE/NEGLECT SCREENING  Does patient report feeling “unsafe” in his/her home, or afraid of anyone?  no  Does patient report any history of physical, sexual, or emotional abuse?  no  Does parent or significant other report any of the above? N\A  Is there evidence of neglect by self?  no  Is there evidence of neglect by a caregiver? no  Does the patient/parent report any history of CPS/APS/police involvement related to suspected abuse/neglect or domestic violence? no  Based on the information provided during the current assessment, is a mandated report of suspected abuse/neglect being made?  No    SUBSTANCE USE SCREENING  Yes:  Jesus all substances used in the past 30 days: Denies substance use      Last Use Amount   []   Alcohol     []   Marijuana     []   Heroin     []   Prescription Opioids  (used without prescription, for    recreation, or in excess of prescribed amount)     []   Other Prescription  (used without prescription, for    recreation, or in excess of prescribed amount)     []   Cocaine      []   Methamphetamine     []   \"\" drugs (ectasy, MDMA)     []   Other substances        UDS results: Pending  Breathalyzer results:0.0    What consequences does the patient associate with any of the above substance use and or addictive behaviors? None    Risk factors for detox (check all that apply):  []  Seizures   []  Diaphoretic (sweating)   []  Tremors   []  Hallucinations   []  Increased blood pressure   []  Decreased blood pressure   []  Other   []  None      [] Patient education on risk factors for detoxification and instructed to return to ER as needed.      MENTAL STATUS   Participation: Active verbal " participation  Grooming: Neat  Orientation: Alert and Drowsy/Somnolent  Behavior: Calm  Eye contact: Good  Mood: Anxious  Affect: Anxious  Thought process: Logical and Goal-directed  Thought content: Paranoia  Speech: Rate within normal limits and Volume within normal limits  Perception: Evidence of auditory hallucination  Stated she hears voices but has insight into disease.  Memory:  No gross evidence of memory deficits  Insight: Adequate  Judgment:  Limited  Other:    Collateral information:   Source:  [] Significant other present in person:   [] Significant other by telephone  [] [facility]   [] [facility] Nursing Staff  [] [facility]  [] Other:     [] Unable to complete full assessment due to:  [] Acute intoxication  [] Patient declined to participate/engage  [] Patient verbally unresponsive  [] Significant cognitive deficits  [] Significant perceptual distortions or behavioral disorganization  [] Other:      CLINICAL IMPRESSIONS:  Primary:  Paranoid Schizophrenia  Secondary:         IDENTIFIED NEEDS/PLAN:  [Trigger DISPOSITION list for any items marked]    []  Imminent safety risk - self [] Imminent safety risk - others   []  Acute substance withdrawal [x]  Psychosis/Impaired reality testing   []  Mood/anxiety []  Substance use/Addictive behavior   []  Maladaptive behaviro []  Parent/child conflict   []  Family/Couples conflict []  Biomedical   []  Housing []  Financial   []   Legal  Occupational/Educational   []  Domestic violence []  Other:     Disposition: Refer to [facility]    Does patient express agreement with the above plan? yes    Referral appointment(s) scheduled? N\A Patient has appointment scheduled  Alert team only:   I have discussed findings and recommendations with Dr. Burns who is in agreement with these recommendations.     Referral information sent to the following community providers :          Ariel Arreguin R.N.  3/8/2018

## 2018-03-08 NOTE — ED TRIAGE NOTES
Pt BIB EMS for psych eval. Per EMS pt is schizophrenic with recent discharge from [facility]. Reported pt was not taking psych medications at home and had reported episode from significant other. Reported pt was speaking in tongues, was combative with EMS and PD. No SI or HI reported. Pt given 5mg versed and 5mg haldol IM. Pt sedated on arrival, with arousal to verbal communications.

## 2018-03-08 NOTE — ED NOTES
Spoke with pt's  on the phone. Per pt's  pt called 911 herself. Pt was combative at home, believes she is possessed per . Pt was released from [facility] 2days ago per , and  states he has been giving pt her meds at home.

## 2018-03-08 NOTE — ED NOTES
"Pt pacing back & forth to bathroom, covering her ears yelling \"make it stop!\"  Now crying & laying on floor in room.   "

## 2018-03-08 NOTE — ED NOTES
Legal 2000 initiated by ishBowl.  2 bags of belongings removed, labeled & placed in designated area.  All unnecessary medical equipment removed from room.  Pt in direct view of nurses station.  Calm & cooperative at this time, but remains tearful.

## 2018-03-08 NOTE — ED NOTES
Pt continuing to sleep soundly. Pt arouses to verbal, though falls back to sleep while talking with her. VSS. Even and unlabored respirations noted.

## 2018-03-08 NOTE — ED NOTES
Pt has been pacing & intermittently crying, appears to be increasingly anxious.  Medicated as ordered.

## 2018-03-09 LAB
HCG UR QL: NEGATIVE
SP GR UR REFRACTOMETRY: 1

## 2018-03-09 PROCEDURE — 700102 HCHG RX REV CODE 250 W/ 637 OVERRIDE(OP): Performed by: EMERGENCY MEDICINE

## 2018-03-09 PROCEDURE — A9270 NON-COVERED ITEM OR SERVICE: HCPCS | Performed by: EMERGENCY MEDICINE

## 2018-03-09 PROCEDURE — 81025 URINE PREGNANCY TEST: CPT

## 2018-03-09 RX ORDER — OXCARBAZEPINE 300 MG/1
300 TABLET, FILM COATED ORAL 2 TIMES DAILY
Status: DISCONTINUED | OUTPATIENT
Start: 2018-03-09 | End: 2018-03-13 | Stop reason: HOSPADM

## 2018-03-09 RX ORDER — HYDROXYZINE PAMOATE 25 MG/1
25-50 CAPSULE ORAL 2 TIMES DAILY PRN
Status: DISCONTINUED | OUTPATIENT
Start: 2018-03-09 | End: 2018-03-13 | Stop reason: HOSPADM

## 2018-03-09 RX ORDER — NICOTINE 21 MG/24HR
14 PATCH, TRANSDERMAL 24 HOURS TRANSDERMAL
Status: DISCONTINUED | OUTPATIENT
Start: 2018-03-09 | End: 2018-03-13 | Stop reason: HOSPADM

## 2018-03-09 RX ORDER — IBUPROFEN 600 MG/1
600 TABLET ORAL EVERY 6 HOURS PRN
Status: DISCONTINUED | OUTPATIENT
Start: 2018-03-09 | End: 2018-03-13 | Stop reason: HOSPADM

## 2018-03-09 RX ORDER — DIPHENHYDRAMINE HCL 25 MG
50 TABLET ORAL ONCE
Status: COMPLETED | OUTPATIENT
Start: 2018-03-09 | End: 2018-03-09

## 2018-03-09 RX ORDER — DIAZEPAM 5 MG/1
5 TABLET ORAL ONCE
Status: COMPLETED | OUTPATIENT
Start: 2018-03-09 | End: 2018-03-09

## 2018-03-09 RX ORDER — LORAZEPAM 1 MG/1
2 TABLET ORAL ONCE
Status: COMPLETED | OUTPATIENT
Start: 2018-03-09 | End: 2018-03-09

## 2018-03-09 RX ORDER — ZOLPIDEM TARTRATE 5 MG/1
5 TABLET ORAL ONCE
Status: COMPLETED | OUTPATIENT
Start: 2018-03-09 | End: 2018-03-09

## 2018-03-09 RX ORDER — DIPHENHYDRAMINE HCL 25 MG
50 TABLET ORAL EVERY 8 HOURS PRN
Status: DISCONTINUED | OUTPATIENT
Start: 2018-03-09 | End: 2018-03-09

## 2018-03-09 RX ORDER — ZIPRASIDONE MESYLATE 20 MG/ML
20 INJECTION, POWDER, LYOPHILIZED, FOR SOLUTION INTRAMUSCULAR ONCE
Status: DISPENSED | OUTPATIENT
Start: 2018-03-09 | End: 2018-03-10

## 2018-03-09 RX ORDER — LURASIDONE HYDROCHLORIDE 80 MG/1
80 TABLET, FILM COATED ORAL
Status: DISCONTINUED | OUTPATIENT
Start: 2018-03-09 | End: 2018-03-13 | Stop reason: HOSPADM

## 2018-03-09 RX ADMIN — OXCARBAZEPINE 300 MG: 300 TABLET, FILM COATED ORAL at 14:31

## 2018-03-09 RX ADMIN — DIPHENHYDRAMINE HCL 50 MG: 25 TABLET ORAL at 10:29

## 2018-03-09 RX ADMIN — LORAZEPAM 2 MG: 1 TABLET ORAL at 23:00

## 2018-03-09 RX ADMIN — NICOTINE POLACRILEX 2 MG: 2 GUM, CHEWING BUCCAL at 01:42

## 2018-03-09 RX ADMIN — OXCARBAZEPINE 300 MG: 300 TABLET, FILM COATED ORAL at 21:00

## 2018-03-09 RX ADMIN — IBUPROFEN 600 MG: 600 TABLET, FILM COATED ORAL at 10:29

## 2018-03-09 RX ADMIN — LURASIDONE HYDROCHLORIDE 80 MG: 80 TABLET, FILM COATED ORAL at 18:00

## 2018-03-09 RX ADMIN — DIAZEPAM 5 MG: 5 TABLET ORAL at 05:41

## 2018-03-09 RX ADMIN — HYDROXYZINE PAMOATE 25 MG: 25 CAPSULE ORAL at 15:22

## 2018-03-09 RX ADMIN — ZOLPIDEM TARTRATE 5 MG: 5 TABLET, FILM COATED ORAL at 02:49

## 2018-03-09 RX ADMIN — DIPHENHYDRAMINE HCL 50 MG: 25 TABLET ORAL at 23:00

## 2018-03-09 NOTE — ED NOTES
Med Rec complete per  per phone call @[phone]  Allergies Reviewed  No ABX in the last 30 days      reports pt has only been taking her medicationa for 1 week

## 2018-03-09 NOTE — ED NOTES
Pt requesting to talk with her .  Spoke to life skills and agrees pt okay to talk with .    Called pt  and talking with him on phone.

## 2018-03-09 NOTE — ED NOTES
Pt c/o left pinky toe pain and swelling, requesting pain medication for it.  Pt also requesting something for anxiety.  ERP informed and orders rec'd.   Pt ambulated to restroom.  Pt in direct view of sitter at all times.

## 2018-03-09 NOTE — DISCHARGE PLANNING
"Alert team rounding  Pt alert and oriented x4.  Her affect is much improved from yesterday; broad and flexible.  She was able to communicate effectively without thought blocking, panic, or shaking.  A bit labile, she was both tearful and bright during the assessment.  She remembered me from yesterday and requested a hug.  Her speech is pressured and she appears more manic today.  She describes her feeling of santos; bright lights coming together to form oneness of ideas in the universe, like everyone suddenly knows who she is and is staring at her, like she has too much energy and she gets overwhelmed.  \"I almost couldn't stand it yesterday, trying to communicate with you, look you in the eye, without everything closing in on me.\"    She continues to pace and try to entertain herself in her room.  Reading, laying on the floor, watching relaxing [facility] TV channel, ambulating often to the bathroom.  She still endorses AH, but says she feels like \"they are in their place now.\"    She reports poor sleep last night, despite PRN.  Pt was allowed to speak to her  on the phone, but she grew upset when he told her he wasn't trying to get her out of here.  He apparently told her to follow through on our staff's plan for her, and this upset her at that time.  By the time I talked to her, she had calmed and realized that going to an inpt facility could be a good plan for her healing.  She is open to the idea of [facility], saying she has been there before and she trusts them.  "

## 2018-03-09 NOTE — PROGRESS NOTES
Patient's home medications have been reviewed by the pharmacy team.     Past Medical History:   Diagnosis Date   • Abnormal mammogram 2006    nodule in left breast, no follow up   • Acromioclavicular joint separation 5/24/2012   • Anxiety     No [facility] mental health; klonopin, coping mechanisms   • Depression    • History of cervical dysplasia 1990    had cryo tx, resolved with nl paps now   • History of suicidal ideation    • Homicidal ideations 2014    [facility] ER hold   • Hyperthyroidism    • Hyperthyroidism     since age 16, NL labs 03/2012   • Migraine    • Pelvic exam 2008    Dysplasia at age 20s, had cryotherapy   • Polysubstance dependence (CMS-[facility])     in remission as of 11/26/2014   • Schizoaffective disorder (CMS-[facility])    • Schizoaffective disorder, bipolar type (CMS-[facility]) 4/7/2011   • Seizure (CMS-[facility]) 1999    partial complex-no motor seizure; managed by [facility]   • Seizure disorder (CMS-[facility])     partial complex seizure d/o   • Wrist fracture 2010    casted by [facility]       Patient's Medications   New Prescriptions    No medications on file   Previous Medications    HYDROXYZINE PAMOATE (VISTARIL) 25 MG CAP    Take 25-50 mg by mouth 2 times a day as needed for Anxiety.    LURASIDONE (LATUDA) 80 MG TAB    Take 80 mg by mouth with dinner.    OXCARBAZEPINE (TRILEPTAL) 300 MG TAB    Take 300 mg by mouth 2 times a day.   Modified Medications    No medications on file   Discontinued Medications    CHOLECALCIFEROL 2000 UNIT CAP    Take  by mouth.    METHOCARBAMOL (ROBAXIN) 750 MG TAB    Take 2 Tabs by mouth 3 times a day.    MISC. DEVICES MISC    1 Units by Does not apply route every day.    TRAMADOL (ULTRAM) 50 MG TAB    Take 50 mg by mouth.          A:  Medications do not appear to be contributing to current complaints.     P:    No recommendations at this time. Home medications have been reordered as appropriate per discussion with ERP. Requests for any additional orders to be addressed with current ERP  as needed.      Nata Pineda, PharmD

## 2018-03-09 NOTE — ED PROVIDER NOTES
ED PROVIDER NOTE    Scribed for Ulises Sarmiento M.D. by Evin Wilson. 3/9/2018, 11:13 AM.    This is an addendum to the note on Rianna Solis. For further details and full chart entry, see the previously signed ED Provider Note written by Dr. Wilkins ([facility]).      11:13 AM - The patient is resting comfortably and has had no significant issues during the time of my care. Patient is awaiting transfer to psychiatric facility for further psych evaluation.        Evin MICHAEL (Scribe), am scribing for, and in the presence of, Ulises Sarmiento M.D..    Electronically signed by: Evin Wilson (Ryanibjono), 3/9/2018    Ulises MICHAEL M.D. personally performed the services described in this documentation, as scribed by Evin Wilson in my presence, and it is both accurate and complete.    The note accurately reflects work and decisions made by me.  Ulises Sarmiento  3/9/2018  12:54 PM

## 2018-03-09 NOTE — DISCHARGE PLANNING
Medical Social Work     Referral: Legal Hold     Intervention: Legal Hold Paperwork given to SW by Life Skills RN [place]     Legal Hold Initiated: Date: 3/8/2018 Time: 1109     Patient’s Insurance Listed on Face Sheet: Medicare     [facility]     This referral contains the following information:  1. Face sheet _X___  2. Page 1 and Page 2 of Legal Hold __X__  3. Alert Team Assessment/Psych Assessment _X___  4. Head to toe physical exam _X___  5. Urine Drug Screen __X__  6. Blood Alcohol ___X_  7. Vital signs _X___  8. Pregnancy test when applicable _X__  9. Medications list __X__     Plan: Patient will transfer to mental health facility once acceptance is obtained.

## 2018-03-09 NOTE — ED NOTES
Report rec'd from HARSH Teixeira.  Pt up in room and talking with sitter.  No needs voiced at this time.

## 2018-03-09 NOTE — ED NOTES
Pt complaining of difficulty sleeping, ERP notified, orders received, pt medicated per MAR.  Pt given hot tea and called for hospital bed.

## 2018-03-09 NOTE — ED NOTES
Pt resting in hospital bed.  No needs noted at this time.    Pt in direct obs of sitter at all times.

## 2018-03-09 NOTE — ED NOTES
Pt complaining of not being able to sleep, ERP notified, orders received, pt medicated per MAR.  Pt in view of sitter.

## 2018-03-09 NOTE — ED NOTES
Vitals taken.  Pt continues to pace & frequently goes to the bathroom, however calm & cooperative.

## 2018-03-09 NOTE — ED NOTES
"Pt amb out of room. Pt states, \"I'm at my wits end. I don't know what to do. I haven't slept. I can't sleep. I have to go to the bathroom.\"  "

## 2018-03-10 PROCEDURE — A9270 NON-COVERED ITEM OR SERVICE: HCPCS | Performed by: EMERGENCY MEDICINE

## 2018-03-10 PROCEDURE — 700102 HCHG RX REV CODE 250 W/ 637 OVERRIDE(OP): Performed by: EMERGENCY MEDICINE

## 2018-03-10 RX ORDER — LORAZEPAM 1 MG/1
1 TABLET ORAL ONCE
Status: COMPLETED | OUTPATIENT
Start: 2018-03-10 | End: 2018-03-10

## 2018-03-10 RX ORDER — TRAZODONE HYDROCHLORIDE 50 MG/1
100 TABLET ORAL ONCE
Status: COMPLETED | OUTPATIENT
Start: 2018-03-10 | End: 2018-03-10

## 2018-03-10 RX ORDER — LORAZEPAM 1 MG/1
1 TABLET ORAL EVERY 6 HOURS PRN
Status: DISCONTINUED | OUTPATIENT
Start: 2018-03-10 | End: 2018-03-13 | Stop reason: HOSPADM

## 2018-03-10 RX ADMIN — LORAZEPAM 1 MG: 1 TABLET ORAL at 14:35

## 2018-03-10 RX ADMIN — LORAZEPAM 1 MG: 1 TABLET ORAL at 05:45

## 2018-03-10 RX ADMIN — TRAZODONE HYDROCHLORIDE 100 MG: 50 TABLET ORAL at 19:53

## 2018-03-10 RX ADMIN — OXCARBAZEPINE 300 MG: 300 TABLET, FILM COATED ORAL at 10:00

## 2018-03-10 RX ADMIN — HYDROXYZINE PAMOATE 25 MG: 25 CAPSULE ORAL at 13:26

## 2018-03-10 RX ADMIN — OXCARBAZEPINE 300 MG: 300 TABLET, FILM COATED ORAL at 19:53

## 2018-03-10 RX ADMIN — HYDROXYZINE PAMOATE 25 MG: 25 CAPSULE ORAL at 10:03

## 2018-03-10 RX ADMIN — LURASIDONE HYDROCHLORIDE 80 MG: 80 TABLET, FILM COATED ORAL at 18:49

## 2018-03-10 RX ADMIN — IBUPROFEN 600 MG: 600 TABLET, FILM COATED ORAL at 11:03

## 2018-03-10 NOTE — ED NOTES
0900  Received report from [facility] assumed care done.  Pt calm and cooperative. Eating breakfast.   Pt medicated per mar order. Denies any new complaints.

## 2018-03-10 NOTE — DISCHARGE PLANNING
Discussed pt's request for additional phone call with her.  Pt had a phone call earlier with her , per nurse's discretion, as she is on a hold.  The phone call upset her.  Suggested that she focus on herself this evening and that I would suggest to her nurse tomorrow that she be allowed a phone call tomorrow to a friend for support.  Pt agreeable to this plan and thanked me.  Saturday staff should allow, if still appropriate, one phone call tomorrow to a friend.

## 2018-03-10 NOTE — ED NOTES
Pt requesting to talk to  regarding her placement, Martínez from  notified. States will talk to the pt when she gets the chance.  Report to [facility]. Pt transferred to G33 accompanied by staff, pt calm and cooperative.

## 2018-03-10 NOTE — DISCHARGE PLANNING
[facility]  Crisis/Safety Plan    Name:  Rianna Solis  MRN:  [id number]  Date:  3/9/2018    Warning signs that a crisis may be developing for me or I may be at risk:  1) insomnia, nightmares, flashbacks  2) hallucinations, command hallucinations, visual, olfactory, tactile  3) santos, panic    Coping strategies I can use on my own (relaxation, physical activity, etc):  1) prayer/meditation  2) honest and pure communication  3) calling on support    Ways I can make my environment safe:  1) organize  2)the bible  3)soothing myself with prayer    Things I want to tell myself when I feel a crisis developin) this will pass  2) everything is going to return to baseline  3)call for help from support    People I can contact for support or distraction (and their phone numbers):  1) [employer]  2) AA  3)family/friends    If I’m not able to reach my support people, or the above strategies don’t help, I can contact the following professionals, agencies, or hotlines:  1) Crisis Call Center ():  9-866-622-9574 -OR- (273) [phone]  2) Crisis Text Line ():  Text START to 684654  3)   4)     Meghna Castro R.N.

## 2018-03-10 NOTE — DISCHARGE PLANNING
"Alert team note:  Awake Alert orientated to time, place and purpose. Hyperverbal.  Good insight to her psychiatric disorder and how she sees her  as  \"satan\" when she is psychotic.  Labile vacillates between laughing hysterically and being tearful and sobbing in same minute.  Cooperative, tangential, flight of ideas.  Staff and patient report minimal sleep for the past 2 nights.  Up in room with papers taped to walls and doors with inspirational information.  Continue to monitor psychotic behavior.  Waiting for placement.    "

## 2018-03-10 NOTE — ED NOTES
Pt trying to go to sleep in room and work through it still without taking medication as sje really does not want to

## 2018-03-10 NOTE — ED NOTES
Pt requested to call  to update him on plan of care, she was provided with a phone and verbalized understanding that if the conversation should turn inappropriate the phone would be taken away and she had 5 min to speak. Pt started conversation well then began to escalate pt was given a warning she remained calm, then continued to escalate phone was taken and conversation ended. Pt is in room now.

## 2018-03-10 NOTE — ED NOTES
"RN assist- Pt refusing IV Geodon at this time,  Stating that she wants \"Dr Hussein to talk to god about it\" and \"I want to try to sleep on my own first, if I can't I will ask for it\"- informed pt she could not refuse and she can try for 5 minutes.   Pt turned lights out and laid down in bed.    "

## 2018-03-10 NOTE — ED NOTES
Patient updated on plan of care.  Water and crackers provided.  Patient alert, walking around room.  Denies needs at this time

## 2018-03-10 NOTE — ED PROVIDER NOTES
ED PROVIDER NOTE    Scribed for Savage Nelson M.D. by David Joseph. 3/10/2018, 12:00 PM.    This is an addendum to the note on Rianna Solis. For further details and full chart entry, see the previously signed ED Provider Note written by Dr. Wilkins ([facility]).     MEDICAL DECISION MAKIN:00 PM - Patient was reevaluated at bedside. She is very pleasant, happy, and finished coloring a picture to give to the ER staff. She denies any medical complaints at this time.    Disposition:  Patient will be transferred to an appropriate psychiatric facility in stable condition for further psychiatric care and evaluation.  Patient will be placed under the care of my partner awaiting transfer.    FINAL IMPRESSION   1. Schizophrenia, unspecified type (CMS-[facility])         David MICHAEL (Scribe), am scribing for, and in the presence of, Savage Nelson M.D..    Electronically signed by: David Joseph (Scribe), 3/10/2018    ISavage M.D. personally performed the services described in this documentation, as scribed by David Joseph in my presence, and it is both accurate and complete.    The note accurately reflects work and decisions made by me.  Savage Nelson  3/10/2018  2:45 PM

## 2018-03-11 PROCEDURE — A9270 NON-COVERED ITEM OR SERVICE: HCPCS | Performed by: EMERGENCY MEDICINE

## 2018-03-11 PROCEDURE — 700102 HCHG RX REV CODE 250 W/ 637 OVERRIDE(OP): Performed by: EMERGENCY MEDICINE

## 2018-03-11 RX ADMIN — OXCARBAZEPINE 300 MG: 300 TABLET, FILM COATED ORAL at 10:15

## 2018-03-11 RX ADMIN — LORAZEPAM 1 MG: 1 TABLET ORAL at 15:55

## 2018-03-11 RX ADMIN — OXCARBAZEPINE 300 MG: 300 TABLET, FILM COATED ORAL at 21:00

## 2018-03-11 RX ADMIN — NICOTINE POLACRILEX 2 MG: 2 GUM, CHEWING BUCCAL at 00:15

## 2018-03-11 RX ADMIN — LURASIDONE HYDROCHLORIDE 80 MG: 80 TABLET, FILM COATED ORAL at 18:00

## 2018-03-11 RX ADMIN — IBUPROFEN 600 MG: 600 TABLET, FILM COATED ORAL at 19:15

## 2018-03-11 RX ADMIN — HYDROXYZINE PAMOATE 25 MG: 25 CAPSULE ORAL at 10:15

## 2018-03-11 RX ADMIN — NICOTINE POLACRILEX 2 MG: 2 GUM, CHEWING BUCCAL at 16:00

## 2018-03-11 ASSESSMENT — PAIN SCALES - GENERAL: PAINLEVEL_OUTOF10: 0

## 2018-03-11 NOTE — DISCHARGE PLANNING
ALERT team note:  Brief 1:1 with Ms Solis. Still somewhat labile with emotions, but believes she has more control now.  She still believes she needs help to get through this episode.  Has some good insights about her 12 step involvement and plans to continue those connections.  Still awaiting psychiatric hospitalization.

## 2018-03-11 NOTE — ED NOTES
Pt asking when she will be going home and who will come and talk to her about when she can go home. Pt updated on the status of her stay

## 2018-03-11 NOTE — ED NOTES
Patient allowed one time phone call to  for 5 minutes, understands that this is an exception to the rule.  Phone call monitored at RN station.

## 2018-03-11 NOTE — ED NOTES
Patient awake and sitting up in bed; VSS at this time. Pt given decaf coffee; calm and cooperative at this time.

## 2018-03-11 NOTE — ED NOTES
Late entry. Patient walked with staff x1 and security x1 to er [facility] area for shower. Gave her all clean linens and gown.

## 2018-03-11 NOTE — ED NOTES
called for update, would like to speak with SW, SW notified and will return husbands phone call.

## 2018-03-11 NOTE — ED NOTES
Patient up to restroom and back to room with steady gate at this time.  She is requesting a shower

## 2018-03-11 NOTE — ED NOTES
Pt has been remaining calm she has mostly stayed in her room occasionally steps out to use bathroom. Security remains in view of pt at door.

## 2018-03-11 NOTE — DISCHARGE PLANNING
"Alert Team:  Met with patient and she describes in detail the paranoid and delusional thoughts that she had been having since Thursday. States that she thought that her  was the devil and he was trying to kill her or get her pregnant with an \"anti-kareen\". Patient states that she was wishing that someone would kill but on the other hand was concerned that she might stab her  as she thought he was \" Satan himself.\"  Many delusional, auditory and visual hallucinations.  Patient appears to be clearing but still some incongruent behaviors and actions to current events.  Patient aware that she is waiting for bed at [facility].  Spoke with Social  Services and [facility] will take her tomorrow.  Information given to patient and she expresses that she is happy with outcome.   "

## 2018-03-11 NOTE — ED NOTES
Patient requesting nicotine gum; medicated per MAR as requested. Pt denies further needs at this time.

## 2018-03-11 NOTE — ED PROVIDER NOTES
ED Provider Note    Addendum:    The patient has had no complaints, she is awaiting transfer to the psychiatric facility. The patient will be signed out to the oncoming ERP in stable condition.    Electronically signed by Dr. Satish Patel

## 2018-03-11 NOTE — ED NOTES
Pt has ambulated to restroom and back twice w/ steady gait; resting comfortably in bed at this time w/ even, unlabored respirations observed. Sitter in view of pt.

## 2018-03-11 NOTE — ED NOTES
VSS at this time; pt crying and stating she hasn't slept in days and needs medication to help her sleep; ERP notified; will continue to monitor.

## 2018-03-12 LAB
EKG IMPRESSION: NORMAL
TSH SERPL DL<=0.005 MIU/L-ACNC: 0.35 UIU/ML (ref 0.38–5.33)

## 2018-03-12 PROCEDURE — A9270 NON-COVERED ITEM OR SERVICE: HCPCS | Performed by: PSYCHIATRY & NEUROLOGY

## 2018-03-12 PROCEDURE — 84443 ASSAY THYROID STIM HORMONE: CPT

## 2018-03-12 PROCEDURE — 36415 COLL VENOUS BLD VENIPUNCTURE: CPT

## 2018-03-12 PROCEDURE — 93005 ELECTROCARDIOGRAM TRACING: CPT | Performed by: PSYCHIATRY & NEUROLOGY

## 2018-03-12 PROCEDURE — 700102 HCHG RX REV CODE 250 W/ 637 OVERRIDE(OP): Performed by: EMERGENCY MEDICINE

## 2018-03-12 PROCEDURE — 700102 HCHG RX REV CODE 250 W/ 637 OVERRIDE(OP): Performed by: PSYCHIATRY & NEUROLOGY

## 2018-03-12 PROCEDURE — A9270 NON-COVERED ITEM OR SERVICE: HCPCS | Performed by: EMERGENCY MEDICINE

## 2018-03-12 RX ORDER — ARIPIPRAZOLE 10 MG/1
5 TABLET ORAL DAILY
Status: DISCONTINUED | OUTPATIENT
Start: 2018-03-12 | End: 2018-03-13 | Stop reason: HOSPADM

## 2018-03-12 RX ADMIN — HYDROXYZINE PAMOATE 25 MG: 25 CAPSULE ORAL at 11:37

## 2018-03-12 RX ADMIN — LURASIDONE HYDROCHLORIDE 80 MG: 80 TABLET, FILM COATED ORAL at 18:25

## 2018-03-12 RX ADMIN — OXCARBAZEPINE 300 MG: 300 TABLET, FILM COATED ORAL at 21:17

## 2018-03-12 RX ADMIN — LORAZEPAM 1 MG: 1 TABLET ORAL at 22:12

## 2018-03-12 RX ADMIN — OXCARBAZEPINE 300 MG: 300 TABLET, FILM COATED ORAL at 08:29

## 2018-03-12 RX ADMIN — ARIPIPRAZOLE 5 MG: 10 TABLET ORAL at 10:59

## 2018-03-12 ASSESSMENT — PAIN SCALES - GENERAL: PAINLEVEL_OUTOF10: 0

## 2018-03-12 NOTE — ED NOTES
"Pt requesting Visteril bc \"I am feeling a little overwhelmed.\" Pt remains calm, cooperative, and pleasant. Medicated with visteril per PRN orders- see MAR for details. Ongoing monitoring.   "

## 2018-03-12 NOTE — ED NOTES
Pt resting in stretcher. Resp even and unlabored. NAD. Pt in view of sitter. Ongoing monitoring.
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
## 2018-03-12 NOTE — ED NOTES
Pt's  called. Pt made aware and would like to speak with him. Pt has not spoken to him since admission to ER and did not want to last week. Pt has been calm and pleasant all day. Allowed a 5 min phone call monitored at nursing station. Pt aware this is an exception to rule and verbalized understanding. Pt remained calm and pleasant throughout phone call. After phone call, pt gave verbal permission for all staff to release information to . Social work and Alert team made aware

## 2018-03-12 NOTE — ED PROVIDER NOTES
"ED Provider Note Addendum    Scribed for Ramsey Hernandez DO by Jonathan Orr on 3/12/2018 at 8:31 AM.     This is an addendum to the note on Rianna Solis.  For further details and full chart information, see patient's initial note.       6:31 AM - I discussed the patient's case with Dr. Hernandez ([facility]) who will transfer care of the patient to me at this time.        8:32 AM  - Patient evaluated by myself.  Patient is resting comfortably at this time with no complaints.  1315: Patient resting completely.  /77   Pulse 81   Temp 36 °C (96.8 °F)   Resp 18   Ht 1.575 m (5' 2\")   Wt 80.7 kg (178 lb)   SpO2 99%   BMI 32.56 kg/m²     Disposition:  Patient will be transferred to an appropriate psychiatric facility in stable condition for further psychiatric care and evaluation.  Patient will be placed under the care of my partner awaiting transfer.    FINAL IMPRESSION  1. Schizophrenia, unspecified type (CMS-[facility] ED)         Jonathan MICHAEL (Scribjono), am scribing for, and in the presence of, Ramsey Hernandez DO.    Electronically signed by: Jonathan Orr (Scribe), 3/12/2018    Ramsey MICHAEL DO personally performed the services described in this documentation, as scribed by Jonathan Orr in my presence, and it is both accurate and complete.    The note accurately reflects work and decisions made by me.  Ramsey Hernandez  3/12/2018  1:18 PM      "

## 2018-03-12 NOTE — DISCHARGE PLANNING
SW received verified extension petition. Copy provided to ER SW and sent to [facility]. Pt awaiting transfer to psychiatric facility.

## 2018-03-12 NOTE — ED NOTES
Pt sitting on floor in room, calm, cooperative, pleasant. Medicated per orders- see MAR for details. Declined nicotine patch at this time  - states she doesn't feel she needs it. Ongoing monitoring.

## 2018-03-12 NOTE — DISCHARGE PLANNING
"Alert team  Pt's  connected to Alert team phone.  No STEFANI on chart for him.  Pt did request last week that  not be contacted or allowed calls/visit with her \"til I'm ready.\"  Pt given SW phone number.    Will discuss him getting info/STEFANI with pt when I round.  "

## 2018-03-12 NOTE — ED NOTES
Assumed care of pt from HARSH Bella . Pt sleeping in bed. Resp even and unlabored. NAD. Ongoing monitoring.

## 2018-03-12 NOTE — DISCHARGE PLANNING
SW received order to extend pt's legal hold. Extension petition filed with the Court via e[employer]. Waiting on verified petition. Pt awaiting transfer to psychiatric facility.

## 2018-03-13 VITALS
TEMPERATURE: 97.9 F | BODY MASS INDEX: 32.76 KG/M2 | OXYGEN SATURATION: 95 % | SYSTOLIC BLOOD PRESSURE: 137 MMHG | DIASTOLIC BLOOD PRESSURE: 80 MMHG | HEIGHT: 62 IN | HEART RATE: 89 BPM | RESPIRATION RATE: 18 BRPM | WEIGHT: 178 LBS

## 2018-03-13 ASSESSMENT — PAIN SCALES - GENERAL: PAINLEVEL_OUTOF10: 0

## 2018-03-13 NOTE — DISCHARGE PLANNING
Medical Social Work    Referral: Legal hold Transfer to Mental Health Facility    Intervention: FARIDA received call from Shena at [facility] stating that Dr. Mayfield has accepted the patient for admission.     FARIDA arranged for transportation to be set up through [place] with [facility].    The pt will be picked up at 2315.     FARIDA notified the RN of the departure time as well as accepting facility.     FARIDA created transfer packet and placed on chart. Original Legal Hold placed in packet.     Plan: Pt will transfer to [facility] at 2315.

## 2018-03-13 NOTE — DISCHARGE PLANNING
Medical Social Work    MSW received a call from Norma with [facility] stating that they will be late for transport.  Per [facility] pt will be picked up around 0000.  MSW notified Shena with [facility] and unit clerk who will inform bedside RN of transport time.    Plan: Pt to transfer to [facility] via [facility] at 0000.

## 2018-03-13 NOTE — ED NOTES
"Nurse introduced self to pt. Updated on plan of care, what was to be expected. Pt expressed understanding. Pt said, \"I only need to know one thing, whose bed am I sleeping in tonight?\" Nurse notified pt she would be sleeping in the bed in room for now but she is awaiting a bed at [facility]. Pt expressed understanding. Sitter within direct view of pt.      "

## 2018-03-13 NOTE — ED NOTES
Pt wandered out of room and standing in hallway. Pt had asked for some tea, nurse was making some for pt. Nurse asked that pt wait in room for tea because she can't be standing in hallway. Pt agreed without incident.
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
## 2018-03-13 NOTE — DISCHARGE PLANNING
Patient laying in bed.  Pleasant when spoken with.  Pressured speech.  Poor historian.  Feels she has been overwhelmed lately.  Complains of poor sleep and feels it has contributed to her hospitalizations.  Reports being medication and treatment compliant.  Denies suicidal/ homicidal ideation.  Reports auditory hallucination.  No command hallucinations at this time.  Verbally contracted for safety.  Agreed to inform staff if mood or auditory hallucinations become worse.  Awaiting placement to [facility] for further treatment.  Patient sitter outside of room and in view of patient.  Will continue to monitor.

## 2018-03-13 NOTE — ED NOTES
Gave report to Ilda, receiving nurse at [facility]. She will be in touch with SW when pt can be transferred.

## 2018-03-13 NOTE — ED NOTES
"Pt came to nursing station stating. \"I think it's time for another ativan, I'm feeling a little stressed.\" It's been 6 hours since last administration. Nurse went to administer med, pt sitting in bed crying. Nurse encouraged pt to rest, pt expressed understanding.   "

## 2018-03-13 NOTE — DISCHARGE PLANNING
Medical Social Work     MSW placed a copy of pt's legal hold extension in pt's chart.     Pending acceptance to a mental health facility.

## 2018-03-13 NOTE — ED NOTES
"EMS here now to transport pt to [place]. Last set of VS given, paperwork given, two bags of belongings given. Explained situation to pt. Pt said \"that ativan is working!\" Pt has no complaints at this time.   "

## 2018-03-13 NOTE — DISCHARGE PLANNING
SW received verified notice of transfer. Copy of notice sent to medical records to scan into pt's chart.

## 2018-04-10 ENCOUNTER — PATIENT MESSAGE (OUTPATIENT)
Dept: MEDICAL GROUP | Facility: CLINIC | Age: 48
End: 2018-04-10

## 2018-04-10 NOTE — TELEPHONE ENCOUNTER
From: Rianna Solis  To: SHAY Lyman  Sent: 4/10/2018 3:13 AM PDT  Subject: Procedure Question    Good morning, Edda:    I have just returned home from the hospital after a rather horrific psychotic break. I am still struggling, but I am confident that I am, finally, at the tail end of a 2-3 month ortiz. Sweet success!!!  This being said, I believe I need to begin again for a referral to arrange a pulmonary sleep test and oxygen...both for my newly diagnosed sleep apnea.   Please confirm/direct me on what I must do to address this health issue; i.e., must I schedule an appointment with you, wait for a new referral, etc.  Thank you so much for your time and consideration of these matters!  Have a blessed day,  Rianna Solis

## 2018-05-18 ENCOUNTER — HOSPITAL ENCOUNTER (OUTPATIENT)
Dept: RADIOLOGY | Facility: MEDICAL CENTER | Age: 48
End: 2018-05-18
Attending: NURSE PRACTITIONER
Payer: MEDICARE

## 2018-05-18 DIAGNOSIS — Z12.31 VISIT FOR SCREENING MAMMOGRAM: ICD-10-CM

## 2018-05-18 PROCEDURE — 77063 BREAST TOMOSYNTHESIS BI: CPT

## 2018-05-19 DIAGNOSIS — G56.03 BILATERAL CARPAL TUNNEL SYNDROME: ICD-10-CM

## 2018-05-20 RX ORDER — IBUPROFEN 800 MG/1
800 TABLET ORAL EVERY 8 HOURS PRN
Qty: 60 TAB | Refills: 0 | Status: SHIPPED | OUTPATIENT
Start: 2018-05-20 | End: 2018-05-27

## 2018-11-05 ENCOUNTER — OFFICE VISIT (OUTPATIENT)
Dept: MEDICAL GROUP | Facility: MEDICAL CENTER | Age: 48
End: 2018-11-05
Payer: MEDICARE

## 2018-11-05 VITALS
DIASTOLIC BLOOD PRESSURE: 70 MMHG | BODY MASS INDEX: 35.7 KG/M2 | HEIGHT: 62 IN | TEMPERATURE: 98.6 F | OXYGEN SATURATION: 98 % | RESPIRATION RATE: 14 BRPM | WEIGHT: 194 LBS | HEART RATE: 87 BPM | SYSTOLIC BLOOD PRESSURE: 124 MMHG

## 2018-11-05 DIAGNOSIS — Z13.220 SCREENING, LIPID: ICD-10-CM

## 2018-11-05 DIAGNOSIS — F31.9 BIPOLAR 1 DISORDER (HCC): ICD-10-CM

## 2018-11-05 DIAGNOSIS — E66.9 OBESITY (BMI 35.0-39.9 WITHOUT COMORBIDITY): ICD-10-CM

## 2018-11-05 DIAGNOSIS — Z86.32 HISTORY OF GESTATIONAL DIABETES: ICD-10-CM

## 2018-11-05 DIAGNOSIS — R79.89 ABNORMAL THYROID BLOOD TEST: ICD-10-CM

## 2018-11-05 DIAGNOSIS — G47.33 OSA (OBSTRUCTIVE SLEEP APNEA): ICD-10-CM

## 2018-11-05 PROCEDURE — 99213 OFFICE O/P EST LOW 20 MIN: CPT | Performed by: NURSE PRACTITIONER

## 2018-11-05 RX ORDER — BREXPIPRAZOLE 4 MG/1
1 TABLET ORAL
Refills: 3 | COMMUNITY
Start: 2018-08-08 | End: 2018-11-05

## 2018-11-05 RX ORDER — GABAPENTIN 300 MG/1
300 CAPSULE ORAL 3 TIMES DAILY
Refills: 3 | COMMUNITY
Start: 2018-10-10 | End: 2019-10-08

## 2018-11-05 RX ORDER — ZIPRASIDONE HYDROCHLORIDE 80 MG/1
80 CAPSULE ORAL EVERY EVENING
Refills: 3 | COMMUNITY
Start: 2018-10-10 | End: 2022-09-16

## 2018-11-05 RX ORDER — QUETIAPINE FUMARATE 300 MG/1
150-300 TABLET, FILM COATED ORAL
Refills: 3 | COMMUNITY
Start: 2018-10-10 | End: 2021-04-01

## 2018-11-05 RX ORDER — CARBAMAZEPINE 100 MG/1
TABLET, CHEWABLE ORAL
Refills: 3 | Status: ON HOLD | COMMUNITY
Start: 2018-10-14 | End: 2019-03-04

## 2018-11-05 RX ORDER — CLONAZEPAM 1 MG/1
1 TABLET ORAL
Refills: 3 | COMMUNITY
Start: 2018-10-03 | End: 2022-08-17

## 2018-11-05 NOTE — PROGRESS NOTES
CC: Follow-Up (request sleep apnea referral)        HPI:     Rianna presents today for the followin. HERLINDA (obstructive sleep apnea)    Here for referral to sleep clinic to work on her sleep apnea.  She did have a positive O p.o. on room air done approximately 8 months ago.  There was an order placed for supplemental oxygen however patient never received it.  He still states that her  reports her snoring loudly and holding her breath while she sleeps    2. Bipolar 1 disorder with psychosis episodes  New psychiatrist.  Dr. Mayfield.  Medication adjustment.  She is gained 20 pounds with her antipsychotics and then weaning her down on these in hopes of helping her with her issues with weight gain.  She states her mood has been stable and no more psychosis    3. Abnormal thyroid blood test  Did have a slightly low TSH when she was psychotic in the ER.    4. History of gestational diabetes  History of gestational diabetes.  20 pound weight gain.  No recent labs to check sugar lipids    5. Obesity (BMI 35.0-39.9 without comorbidity)  Patient has gained 20 pounds with her psychiatric medications.  She is working on this with psychiatry    Current Outpatient Prescriptions   Medication Sig Dispense Refill   • Misc. Devices Misc 1 Units by Does not apply route every day. 1 Units 0   • carBAMazepine (TEGRETOL) 200 MG Tab TAKE 1 TABLET BY MOUTH IN THE MORNING TAKE 2 TABLETS AT BEDTIME  3   • clonazePAM (KLONOPIN) 1 MG Tab Take 1 mg by mouth every day.  3   • gabapentin (NEURONTIN) 300 MG Cap Take 300 mg by mouth 3 times a day.  3   • quetiapine (SEROQUEL) 300 MG tablet Take 300 mg by mouth.  3   • ziprasidone (GEODON) 80 MG Cap Take 80 mg by mouth.  3     No current facility-administered medications for this visit.      Social History   Substance Use Topics   • Smoking status: Former Smoker     Packs/day: 0.25     Years: 25.00     Types: Cigarettes     Start date: 2016     Quit date: 2017   • Smokeless  "tobacco: Never Used   • Alcohol use 42.0 oz/week     60 Glasses of wine, 10 Shots of liquor per week      Comment: in remission;occasional     I reviewed patients allergies, problem list and medications today in [facility].    ROS: Any/all pertinent positives listed in the HPI, otherwise all others reviewed are negative today.      /70 (BP Location: Left arm, Patient Position: Sitting, BP Cuff Size: Adult)   Pulse 87   Temp 37 °C (98.6 °F) (Temporal)   Resp 14   Ht 1.575 m (5' 2\")   Wt 88 kg (194 lb)   SpO2 98%   BMI 35.48 kg/m²     Exam:    Gen: Alert and oriented, No apparent distress. WDWN  Psych: A+Ox3, normal affect and mood  Skin: Warm, dry and intact. Good turgor   No rashes in visible areas.  Eye: Conjunctiva clear, lids normal  ENMT: Lips without lesions, good dentition  Lungs: Clear to auscultation bilaterally, no rales or rhonchi   Unlabored respiratory effort.   CV: Regular rate and rhythm, S1, S2. No murmurs.   No Edema        Assessment and Plan.   48 y.o. female with the following issues.    1. HERLINDA (obstructive sleep apnea)  We will get her some overnight oxygen to use as well as referral for sleep study.  We did discuss PFTs  - REFERRAL TO SLEEP STUDIES  - Misc. Devices Misc; 1 Units by Does not apply route every day.  Dispense: 1 Units; Refill: 0    2. Bipolar 1 disorder with psychosis episodes  Stable.  Compliant with medications.  Followed by outside psychiatry, Dr. Mayfield.    3. Abnormal thyroid blood test  Monitor level  - THYROID CASCADE PROFILE; Future    4. History of gestational diabetes  Monitor sugars  - COMP METABOLIC PANEL; Future  - HEMOGLOBIN A1C; Future    5. Screening, lipid  Ordered  - LIPID PROFILE; Future    6. Obesity (BMI 35.0-39.9 without comorbidity)  Healthy lifestyle encouraged  - Patient identified as having weight management issue.  Appropriate orders and counseling given.      "

## 2018-11-06 ENCOUNTER — PATIENT MESSAGE (OUTPATIENT)
Dept: MEDICAL GROUP | Facility: MEDICAL CENTER | Age: 48
End: 2018-11-06

## 2018-11-06 RX ORDER — IBUPROFEN 800 MG/1
800 TABLET ORAL
Qty: 30 TAB | Refills: 2 | Status: SHIPPED | OUTPATIENT
Start: 2018-11-06 | End: 2019-02-06 | Stop reason: SDUPTHER

## 2018-11-06 NOTE — TELEPHONE ENCOUNTER
From: Rianna Solis  To: SHAY Lyman  Sent: 11/6/2018 11:02 AM PST  Subject: Prescription Question    Is it possible to get a refill on my Ibuprofen 800mg?

## 2018-11-09 ENCOUNTER — HOSPITAL ENCOUNTER (OUTPATIENT)
Dept: LAB | Facility: MEDICAL CENTER | Age: 48
End: 2018-11-09
Attending: NURSE PRACTITIONER
Payer: MEDICARE

## 2018-11-09 DIAGNOSIS — Z13.220 SCREENING, LIPID: ICD-10-CM

## 2018-11-09 DIAGNOSIS — E06.3 AUTOIMMUNE THYROIDITIS: ICD-10-CM

## 2018-11-09 DIAGNOSIS — Z86.32 HISTORY OF GESTATIONAL DIABETES: ICD-10-CM

## 2018-11-09 DIAGNOSIS — R79.89 ABNORMAL THYROID BLOOD TEST: ICD-10-CM

## 2018-11-09 LAB
ALBUMIN SERPL BCP-MCNC: 4.7 G/DL (ref 3.2–4.9)
ALBUMIN/GLOB SERPL: 1.6 G/DL
ALP SERPL-CCNC: 65 U/L (ref 30–99)
ALT SERPL-CCNC: 24 U/L (ref 2–50)
ANION GAP SERPL CALC-SCNC: 4 MMOL/L (ref 0–11.9)
AST SERPL-CCNC: 18 U/L (ref 12–45)
BILIRUB SERPL-MCNC: 0.3 MG/DL (ref 0.1–1.5)
BUN SERPL-MCNC: 14 MG/DL (ref 8–22)
CALCIUM SERPL-MCNC: 9.4 MG/DL (ref 8.5–10.5)
CHLORIDE SERPL-SCNC: 104 MMOL/L (ref 96–112)
CHOLEST SERPL-MCNC: 233 MG/DL (ref 100–199)
CO2 SERPL-SCNC: 28 MMOL/L (ref 20–33)
CREAT SERPL-MCNC: 0.62 MG/DL (ref 0.5–1.4)
EST. AVERAGE GLUCOSE BLD GHB EST-MCNC: 120 MG/DL
FASTING STATUS PATIENT QL REPORTED: NORMAL
GLOBULIN SER CALC-MCNC: 2.9 G/DL (ref 1.9–3.5)
GLUCOSE SERPL-MCNC: 94 MG/DL (ref 65–99)
HBA1C MFR BLD: 5.8 % (ref 0–5.6)
HDLC SERPL-MCNC: 62 MG/DL
LDLC SERPL CALC-MCNC: 140 MG/DL
POTASSIUM SERPL-SCNC: 4.3 MMOL/L (ref 3.6–5.5)
PROT SERPL-MCNC: 7.6 G/DL (ref 6–8.2)
SODIUM SERPL-SCNC: 136 MMOL/L (ref 135–145)
TRIGL SERPL-MCNC: 155 MG/DL (ref 0–149)
TSH SERPL DL<=0.005 MIU/L-ACNC: 0.37 UIU/ML (ref 0.38–5.33)

## 2018-11-09 PROCEDURE — 80053 COMPREHEN METABOLIC PANEL: CPT

## 2018-11-09 PROCEDURE — 80061 LIPID PANEL: CPT

## 2018-11-09 PROCEDURE — 84443 ASSAY THYROID STIM HORMONE: CPT

## 2018-11-09 PROCEDURE — 36415 COLL VENOUS BLD VENIPUNCTURE: CPT

## 2018-11-09 PROCEDURE — 83036 HEMOGLOBIN GLYCOSYLATED A1C: CPT | Mod: GA

## 2018-11-13 ENCOUNTER — TELEPHONE (OUTPATIENT)
Dept: MEDICAL GROUP | Facility: MEDICAL CENTER | Age: 48
End: 2018-11-13

## 2018-11-13 NOTE — TELEPHONE ENCOUNTER
Per Kayce Mendez @ [facility], since patient has Medicare, it is required that patient have a sleep study completed in order to pay for nocturnal oxygen. I let her know I would reach out to patient. Ref was placed but I don't see that patient has been contacted.  Left voicemail for patient regarding the need to have her schedule with Pulmonary & Sleep Medicine + phone number to schedule with them.

## 2018-11-17 LAB — TEST NAME 95000: NORMAL

## 2018-12-06 ENCOUNTER — SLEEP CENTER VISIT (OUTPATIENT)
Dept: SLEEP MEDICINE | Facility: MEDICAL CENTER | Age: 48
End: 2018-12-06
Payer: MEDICARE

## 2018-12-06 VITALS
HEART RATE: 93 BPM | OXYGEN SATURATION: 95 % | WEIGHT: 197 LBS | RESPIRATION RATE: 16 BRPM | BODY MASS INDEX: 36.25 KG/M2 | SYSTOLIC BLOOD PRESSURE: 118 MMHG | HEIGHT: 62 IN | DIASTOLIC BLOOD PRESSURE: 80 MMHG

## 2018-12-06 DIAGNOSIS — G47.10 HYPERSOMNOLENCE: ICD-10-CM

## 2018-12-06 DIAGNOSIS — F17.200 SMOKER: ICD-10-CM

## 2018-12-06 DIAGNOSIS — G47.30 SLEEP APNEA, UNSPECIFIED TYPE: ICD-10-CM

## 2018-12-06 DIAGNOSIS — R06.83 SNORING: ICD-10-CM

## 2018-12-06 PROCEDURE — 99204 OFFICE O/P NEW MOD 45 MIN: CPT | Performed by: INTERNAL MEDICINE

## 2018-12-06 RX ORDER — ZOLPIDEM TARTRATE 5 MG/1
TABLET ORAL
Qty: 3 TAB | Refills: 0 | Status: SHIPPED | OUTPATIENT
Start: 2018-12-06 | End: 2018-12-06

## 2018-12-16 ENCOUNTER — SLEEP STUDY (OUTPATIENT)
Dept: SLEEP MEDICINE | Facility: MEDICAL CENTER | Age: 48
End: 2018-12-16
Attending: INTERNAL MEDICINE
Payer: MEDICARE

## 2018-12-16 DIAGNOSIS — G47.10 HYPERSOMNOLENCE: ICD-10-CM

## 2018-12-16 DIAGNOSIS — G47.30 SLEEP APNEA, UNSPECIFIED TYPE: ICD-10-CM

## 2018-12-16 PROCEDURE — 95810 POLYSOM 6/> YRS 4/> PARAM: CPT | Performed by: INTERNAL MEDICINE

## 2018-12-17 NOTE — PROCEDURES
Clinical Comments:  The patient underwent a comprehensive polysomnogram using the standard montage for measurement of parameters of sleep, respiratory events, movement abnormalities, heart rate and rhythm. A microphone was used to monitor snoring.    ANALYSIS:  Testing began at 8:43:49 PM.  The total recording time was 477.8 minutes with a sleep period of 427.9 minutes and the total sleep time was 413.0 minutes with a sleep efficiency of 86.4%.  The sleep latency was 49.9 minutes, and REM latency was 70.5 minutes.  The patient experienced 71 arousals in total, for an arousal index of 10.3    RESPIRATORY: The patient had 62 apneas in total.  Of these, 53 were obstructive apneas, and 8 were central apneas.  This resulted in an apnea index (AI) of 9.0.  The patient had 109 hypopneas, for a hypopnea index of 15.8.  The overall AHI was 24.8, while the AHI during Stage R sleep was 56.1.  AHI while supine was N/A.    OXIMETRY: Oxygen saturation monitoring showed a mean SpO2 of 92.4%, with a minimum oxygen saturation of 85.0%.  Oxygen saturations were less than or = 89% for 4.7 minutes of sleep time.    CARDIAC: The highest heart rate during the recording was 108.0 beats per minute.  The average heart rate during sleep was 89.9 bpm.    LIMB MOVEMENTS: There were a total of 6 PLMs during sleep, of which 0 were PLMs arousals.  This resulted in a PLMS index of 0.9.      Interpretation:    Ms. Solis resented with symptoms suggesting sleep disordered breathing.  This is a diagnostic study.    There is some fragmentation of sleep with prolonged sleep onset latency but significant slow-wave sleep and REM sleep time is included.  There are rare periodic limb movements that do not impact sleep continuity.    The apnea hypopnea index is 24.8 events per hour including hypopnea and obstructive apnea episodes with rare central apneas.  The index rises to 56.1 events per hour in rem sleep and no supine sleep time was recorded.  The  lowest arterial oxygen saturation is 85% on room air and she spends about 7% of the study with a saturation below 90%.  The heart rate varies from  bpm in sinus rhythm.  Frequent snoring was noted.    Assessment:  Moderately severe obstructive sleep apnea hypopnea with an apnea hypopnea index of 24.8 events per hour and a lowest arterial oxygen saturation of 85% on room air.  Fragmentation of sleep related to the breathing events and probably to laboratory effect as well.    Recommendations:  CPAP therapy, either initiated through a titration polysomnogram or with the use of auto titrating CPAP. Alternative treatments for sleep-disordered breathing include reconstructive otolaryngologic surgery, dental appliances and weight loss. If any these latter treatment options are selected, a follow-up polysomnogram is suggested to assess the efficacy of therapy. Behavioral measures including avoidance of sedatives, alcohol and supine body position may be of additional benefit. Patients with severe sleep apnea are typically advised not to drive a motor vehicle or operate hazardous machinery until their daytime somnolence has been corrected.

## 2018-12-28 NOTE — PROGRESS NOTES
CC:  Snoring, witnessed nocturnal apnea, nocturnal hypoxemia and daytime hypersomnolence, suspected sleep apnea hypopnea syndrome.    HPI:   Ms. Solis is a 48-year-old woman referred by JASMEET Shi, to assist in the evaluation and management of suspected sleep-disordered breathing.    She is followed for history of hypothyroidism and bipolar disorder.  In the course of her ongoing evaluation, symptoms suggesting sleep disordered breathing were identified, prompting the current consultation.    She has a fairly regular sleep schedule with bedtime between 8 and 10 PM and she falls asleep in a variable amount of time ranging from 10-60 minutes.  She may awaken several times on average night and arises between 4 and 6 AM without grogginess or morning headaches.  The spousal questionnaire identifies loud snoring, pauses in breathing and sleep talking.  She is quite sleepy during the day and regularly dozes off during quiet moments while reading or watching television.  Her [place] sleepiness score is 11 points.  She naps when the opportunity presents.  She drinks caffeinated beverages moderately and does not use substances to induce sleep or to maintain wakefulness.  She is on Klonopin 1 mg a day as well as Geodon, gabapentin and Tegretol.  She does not have symptoms suggesting narcolepsy, parasomnia or restless leg syndrome.  She notes that her father and brother both snore and have some daytime somnolence but there is no documented family history of sleep apnea hypopnea.    Nocturnal oximetry on February 8, 2018 demonstrated a basal arterial oxygen saturation of 92% on room air but cyclic drops in saturation to a minimum of 83%.  She spent 26 minutes of the study with a saturation below 90%.        Patient Active Problem List    Diagnosis Date Noted   • Partial seizure disorder (HCC)      Priority: Medium   • History of suicide attempt 02/18/2016     Priority: Low   • Obesity (BMI 30-39.9) 12/05/2014      "Priority: Low   • Elevated blood pressure (not hypertension) 12/05/2014     Priority: Low   • Hx of amenorrhea 12/05/2014     Priority: Low   • Bipolar 1 disorder with psychosis episodes 04/07/2011     Priority: Low   • Anxiety 04/30/2010     Priority: Low   • Obesity (BMI 35.0-39.9 without comorbidity) ([facility]) 11/05/2018   • HERLINDA (obstructive sleep apnea) 02/12/2018   • History of gestational diabetes 01/15/2018       Past Medical History:   Diagnosis Date   • Abnormal mammogram 2006    nodule in left breast, no follow up   • Acromioclavicular joint separation 5/24/2012   • Anxiety     No [facility] mental health; klonopin, coping mechanisms   • Depression    • History of cervical dysplasia 1990    had cryo tx, resolved with nl paps now   • History of suicidal ideation    • Homicidal ideations 2014    [facility] hold   • Hyperthyroidism    • Hyperthyroidism     since age 16, NL labs 03/2012   • Migraine    • Pelvic exam 2008    Dysplasia at age 20s, had cryotherapy   • Polysubstance dependence ([facility])     in remission as of 11/26/2014   • Schizoaffective disorder ([facility])    • Schizoaffective disorder, bipolar type ([facility]) 4/7/2011   • Seizure ([facility]) 1999    partial complex-no motor seizure; managed by [facility]   • Seizure disorder ([facility])     partial complex seizure d/o   • Wrist fracture 2010    casted by [facility]       Past Surgical History:   Procedure Laterality Date   • PRIMARY C SECTION  2008   • CERVICAL CONIZATION  1990    cryo for abnl pap   • EYE SURGERY  1973,1979    to repair \"lazy eye\"   • OTHER      eye surgeries as a child       Family History   Problem Relation Age of Onset   • Cancer Mother         breast cancer   • Diabetes Mother    • Hypertension Mother    • Cancer Maternal Grandmother         stomach   • Stroke Maternal Grandfather    • Cancer Paternal Grandfather         lung   • Hypertension Father        Social History     Social History   • Marital status:      Spouse name: N/A   • Number of " "children: N/A   • Years of education: N/A     Occupational History   • not working Other     Social History Main Topics   • Smoking status: Current Every Day Smoker     Packs/day: 0.25     Years: 25.00     Types: Cigarettes     Start date: 6/22/2016     Last attempt to quit: 2/22/2017   • Smokeless tobacco: Never Used   • Alcohol use 42.0 oz/week     60 Glasses of wine, 10 Shots of liquor per week      Comment: in remission;occasional   • Drug use: No      Comment: in remission   • Sexual activity: Yes     Partners: Male     Birth control/ protection: Condom      Comment:  x5 years     Other Topics Concern   • Not on file     Social History Narrative    ** Merged History Encounter **            Current Outpatient Prescriptions   Medication Sig Dispense Refill   • carBAMazepine (TEGRETOL) 200 MG Tab TAKE 1 TABLET BY MOUTH IN THE MORNING TAKE 2 TABLETS AT BEDTIME  3   • clonazePAM (KLONOPIN) 1 MG Tab Take 1 mg by mouth every day.  3   • gabapentin (NEURONTIN) 300 MG Cap Take 300 mg by mouth 3 times a day.  3   • ziprasidone (GEODON) 80 MG Cap Take 80 mg by mouth.  3   • ibuprofen (MOTRIN) 800 MG Tab Take 1 Tab by mouth 1 time daily as needed (do not take daily. take with food). 30 Tab 2   • quetiapine (SEROQUEL) 300 MG tablet Take 300 mg by mouth.  3   • Misc. Devices Misc 1 Units by Does not apply route every day. 1 Units 0     No current facility-administered medications for this visit.     \"CURRENT RX\"      Allergies: Zoloft      ROS  Positive for the sleep, neurologic and mood issues reviewed above.  She wears corrective lenses but has no diplopia.  She denies tinnitus or rhinitis, chest pain or palpitations, unusual cough or dyspnea, heartburn or abdominal discomfort.  She does have some chronic back pain.  All other aspects of the CPT review of systems process are negative as documented in the attached self history form.      Physical Exam:   /80 (BP Location: Right arm, Patient Position: Sitting, " "BP Cuff Size: Large adult)   Pulse 93   Resp 16   Ht 1.575 m (5' 2\")   Wt 89.4 kg (197 lb)   SpO2 95%   BMI 36.03 kg/m²    Head and neck examination demonstrates no mucosal lesion, purulent drainage or evident polyps. The pharynx is benign with a Mallampati III presentation. The neck is supple without thyromegaly. On chest examination there are symmetrical bilateral breath sounds without rales, wheezing or consolidation. On cardiac examination, the apical impulse and heart sounds are normal and the rhythm is regular. There is no murmur, gallop or rub and no jugular venous distention. The abdomen is soft with active bowel sounds and no palpable hepatosplenomegaly, mass, guarding or rebound. The extremities show no clubbing, cyanosis or edema and no signs of deep venous thrombosis. There is no warmth, redness, tenderness or palpable venous cord in the calves. The skin is clear, warm and dry. There is no unusual peripheral lymphadenopathy. Peripheral pulses are palpable in all 4 extremities. On neurologic examination, cranial nerve function is intact, motor tone is symmetrical, and the patient is alert, oriented and responsive.       Problems:  1. Sleep apnea, unspecified type  She presents with snoring, witnessed nocturnal apnea, excessive daytime somnolence and documented cyclic nocturnal hypoxemia.  She has a crowded posterior pharyngeal airway and a body mass index of 36.  The clinical probability of sleep apnea hypopnea syndrome is significant. Accurate diagnosis and effective treatment are required not only to improve levels of daytime alertness but also to reduce the cardiac and neurologic risks associated with untreated sleep apnea. We have discussed diagnostic options including in-laboratory, attended polysomnography and home sleep testing. We have also discussed treatment options including airway pressurization, reconstructive otolaryngologic surgery, dental appliances and weight management.      2. " Hypersomnolence  Probably related to the suspected sleep-disordered breathing.  She is spending sufficient nightly time in bed and does not seem to have problems with sleep hygiene.    3. Snoring    4. Smoker  She is encouraged to discontinue smoking completely and permanently and we talked about the ways in which that might be accomplished.    5. BMI 36.0-36.9,adult  We have also talked about the role of weight management in the treatment of sleep-disordered breathing and ways in which that might be done as well.  - Height And Weight      Plan:   Polysomnogram, possible split-night study.    Return visit afterwards to discuss test results and treatment options.    We appreciate the opportunity to assist in her care.

## 2019-01-04 ENCOUNTER — SLEEP CENTER VISIT (OUTPATIENT)
Dept: SLEEP MEDICINE | Facility: MEDICAL CENTER | Age: 49
End: 2019-01-04
Payer: MEDICARE

## 2019-01-04 VITALS
RESPIRATION RATE: 16 BRPM | HEIGHT: 62 IN | SYSTOLIC BLOOD PRESSURE: 120 MMHG | BODY MASS INDEX: 36.07 KG/M2 | DIASTOLIC BLOOD PRESSURE: 64 MMHG | WEIGHT: 196 LBS | OXYGEN SATURATION: 97 % | HEART RATE: 85 BPM

## 2019-01-04 DIAGNOSIS — R53.83 FATIGUE, UNSPECIFIED TYPE: ICD-10-CM

## 2019-01-04 DIAGNOSIS — E66.9 OBESITY (BMI 35.0-39.9 WITHOUT COMORBIDITY): ICD-10-CM

## 2019-01-04 DIAGNOSIS — G47.33 OSA (OBSTRUCTIVE SLEEP APNEA): ICD-10-CM

## 2019-01-04 PROCEDURE — 99213 OFFICE O/P EST LOW 20 MIN: CPT | Performed by: NURSE PRACTITIONER

## 2019-01-04 RX ORDER — ZOLPIDEM TARTRATE 5 MG/1
TABLET ORAL
Refills: 0 | COMMUNITY
Start: 2018-12-10 | End: 2019-01-03

## 2019-01-04 ASSESSMENT — ENCOUNTER SYMPTOMS
WEAKNESS: 0
NERVOUS/ANXIOUS: 0
CARDIOVASCULAR NEGATIVE: 1
MEMORY LOSS: 0
DIAPHORESIS: 0
BRUISES/BLEEDS EASILY: 0
CHILLS: 0
INSOMNIA: 1
HALLUCINATIONS: 0
WEIGHT LOSS: 0
GASTROINTESTINAL NEGATIVE: 1
DEPRESSION: 0
EYE PAIN: 0
MUSCULOSKELETAL NEGATIVE: 1
RESPIRATORY NEGATIVE: 1
FEVER: 0
EYE DISCHARGE: 0
NEUROLOGICAL NEGATIVE: 1

## 2019-01-04 ASSESSMENT — LIFESTYLE VARIABLES: SUBSTANCE_ABUSE: 0

## 2019-01-04 NOTE — PROGRESS NOTES
Chief Complaint   Patient presents with   • Results     sleep study results         HPI: This patient is a 48 y.o. female, who presents for sleep study results.    She was seen in consultation with Dr. Reid December 6, 2018.  Please see his note for details.  She reports loud snoring, her  notes apneic events, she reports daytime fatigue and frequent nocturnal awakenings.    PSG indicates severe sleep apnea with an AHI of 24.8, REM index 56.1, minimum oxygen saturation of 85 %.  Most of her events occurred in the early morning hours thus titration was not able to be completed.  I reviewed testing in detail with the patient.      Past Medical History:   Diagnosis Date   • Abnormal mammogram 2006    nodule in left breast, no follow up   • Acromioclavicular joint separation 5/24/2012   • Anxiety     No [facility] mental health; klonopin, coping mechanisms   • Depression    • History of cervical dysplasia 1990    had cryo tx, resolved with nl paps now   • History of suicidal ideation    • Homicidal ideations 2014    [facility] hold   • Hyperthyroidism    • Hyperthyroidism     since age 16, NL labs 03/2012   • Migraine    • Pelvic exam 2008    Dysplasia at age 20s, had cryotherapy   • Polysubstance dependence (HCC)     in remission as of 11/26/2014   • Schizoaffective disorder (HCC)    • Schizoaffective disorder, bipolar type (HCC) 4/7/2011   • Seizure (HCC) 1999    partial complex-no motor seizure; managed by [facility] mental health   • Seizure disorder (HCC)     partial complex seizure d/o   • Wrist fracture 2010    casted by [facility]       Social History   Substance Use Topics   • Smoking status: Former Smoker     Packs/day: 0.25     Years: 25.00     Types: Cigarettes     Start date: 6/22/2016     Quit date: 12/28/2018   • Smokeless tobacco: Never Used   • Alcohol use 42.0 oz/week     60 Glasses of wine, 10 Shots of liquor per week      Comment: in remission;occasional       Family History   Problem Relation Age of  "Onset   • Cancer Mother         breast cancer   • Diabetes Mother    • Hypertension Mother    • Cancer Maternal Grandmother         stomach   • Stroke Maternal Grandfather    • Cancer Paternal Grandfather         lung   • Hypertension Father        Immunization History   Administered Date(s) Administered   • Influenza (IM) Preservative Free 10/04/2018   • Influenza Seasonal Injectable 11/05/2011   • Influenza TIV (IM) 11/05/2011   • Influenza Vaccine Quad Inj (Pf) 01/15/2018, 10/04/2018   • Influenza Vaccine Quad Inj (Preserved) 12/05/2014, 10/02/2015   • Tdap Vaccine 01/15/2018       Current medications as of today   Current Outpatient Prescriptions   Medication Sig Dispense Refill   • carBAMazepine (TEGRETOL) 200 MG Tab TAKE 1 TABLET BY MOUTH IN THE MORNING TAKE 2 TABLETS AT BEDTIME  3   • gabapentin (NEURONTIN) 300 MG Cap Take 300 mg by mouth 3 times a day.  3   • ziprasidone (GEODON) 80 MG Cap Take 80 mg by mouth.  3   • Misc. Devices Misc 1 Units by Does not apply route every day. 1 Units 0   • ibuprofen (MOTRIN) 800 MG Tab Take 1 Tab by mouth 1 time daily as needed (do not take daily. take with food). 30 Tab 2   • clonazePAM (KLONOPIN) 1 MG Tab Take 1 mg by mouth every day.  3   • quetiapine (SEROQUEL) 300 MG tablet Take 300 mg by mouth.  3     No current facility-administered medications for this visit.        Allergies: Zoloft    Blood pressure 120/64, pulse 85, resp. rate 16, height 1.575 m (5' 2\"), weight 88.9 kg (196 lb), SpO2 97 %, not currently breastfeeding.      Review of Systems   Constitutional: Positive for malaise/fatigue. Negative for chills, diaphoresis, fever and weight loss.   HENT: Negative.    Eyes: Negative for pain and discharge.   Respiratory: Negative.    Cardiovascular: Negative.    Gastrointestinal: Negative.    Musculoskeletal: Negative.    Skin: Negative.    Neurological: Negative.  Negative for weakness.   Endo/Heme/Allergies: Negative for environmental allergies. Does not " bruise/bleed easily.   Psychiatric/Behavioral: Negative for depression, hallucinations, memory loss, substance abuse and suicidal ideas. The patient has insomnia. The patient is not nervous/anxious.        Physical Exam   Constitutional: She is oriented to person, place, and time and well-developed, well-nourished, and in no distress.   HENT:   Head: Normocephalic and atraumatic.   Eyes: Pupils are equal, round, and reactive to light.   Neck: Normal range of motion. Neck supple. No tracheal deviation present.   Pulmonary/Chest: Effort normal.   Musculoskeletal: Normal range of motion.   Neurological: She is alert and oriented to person, place, and time.   Skin: Skin is warm and dry.   Psychiatric: Mood, memory, affect and judgment normal.   Vitals reviewed.      Diagnoses/Plan:    1. HERLINDA (obstructive sleep apnea)  We reviewed testing.  I reviewed treatment options for sleep apnea including CPAP therapy, ENT referral, dental appliance.  Given the severity of her apnea CPAP therapy is recommended.  She is agreeable to this.  She will require an in lab titration study.  She has Ambien from her previous study that she will bring with her to this study.  - Polysomnography Titration; Future    2. Obesity (BMI 35.0-39.9 without comorbidity) ([facility])  She is actively working on weight loss and has started exercising again.Patient's body mass index is 35.85 kg/m². Exercise and nutrition counseling were performed at this visit.    3. Fatigue, unspecified type  Most likely related to untreated severe sleep apnea and should improve on therapy.    She will follow-up after titration study to review results.    This dictation was created using voice recognition software. The accuracy of the dictation is limited to the abilities of the software. I expect there may be some errors of grammar and possibly content.

## 2019-01-21 ENCOUNTER — APPOINTMENT (OUTPATIENT)
Dept: RADIOLOGY | Facility: IMAGING CENTER | Age: 49
End: 2019-01-21
Attending: PHYSICIAN ASSISTANT
Payer: MEDICARE

## 2019-01-21 ENCOUNTER — OFFICE VISIT (OUTPATIENT)
Dept: MEDICAL GROUP | Facility: PHYSICIAN GROUP | Age: 49
End: 2019-01-21
Payer: MEDICARE

## 2019-01-21 VITALS
SYSTOLIC BLOOD PRESSURE: 142 MMHG | HEART RATE: 136 BPM | DIASTOLIC BLOOD PRESSURE: 70 MMHG | WEIGHT: 194 LBS | HEIGHT: 62 IN | BODY MASS INDEX: 35.7 KG/M2 | OXYGEN SATURATION: 95 % | TEMPERATURE: 97.5 F

## 2019-01-21 DIAGNOSIS — M79.671 ACUTE FOOT PAIN, RIGHT: ICD-10-CM

## 2019-01-21 DIAGNOSIS — M25.571 ACUTE RIGHT ANKLE PAIN: ICD-10-CM

## 2019-01-21 DIAGNOSIS — S93.401A SPRAIN OF RIGHT ANKLE, UNSPECIFIED LIGAMENT, INITIAL ENCOUNTER: ICD-10-CM

## 2019-01-21 PROCEDURE — 73610 X-RAY EXAM OF ANKLE: CPT | Mod: 26,RT | Performed by: PHYSICIAN ASSISTANT

## 2019-01-21 PROCEDURE — 99214 OFFICE O/P EST MOD 30 MIN: CPT | Performed by: PHYSICIAN ASSISTANT

## 2019-01-21 PROCEDURE — 73630 X-RAY EXAM OF FOOT: CPT | Mod: 26,RT | Performed by: PHYSICIAN ASSISTANT

## 2019-01-21 RX ORDER — IBUPROFEN 200 MG
800 TABLET ORAL ONCE
Status: COMPLETED | OUTPATIENT
Start: 2019-01-21 | End: 2019-01-21

## 2019-01-21 RX ORDER — IBUPROFEN 200 MG
800 TABLET ORAL ONCE
Status: DISCONTINUED | OUTPATIENT
Start: 2019-01-21 | End: 2019-01-21

## 2019-01-21 RX ADMIN — Medication 800 MG: at 15:44

## 2019-01-21 NOTE — PROGRESS NOTES
Subjective:   Rianna Solis is a 48 y.o. female here today for right ankle/foot pain. Is an established patient of JASMEET Shi.    HPI:    Patient presents to the office today with complaints of right ankle and foot pain with swelling. Onset this morning after slipping on the ice. Was going up some steps and fell onto bilateral elbows and twisted right foot. Denies any head trauma. Denies any elbow pain but having severe pain of ankle/foot since the fall. She is especially worried given that she has history of surgery on that foot a little over a year ago (Dr. Lyon - [facility]). States has Lisfranc separation and fracture of toes 3-5.     After incident today, she states that she couldn't initially weight bear on that foot, so put old post-op boot on right away. Pain has slightly improved since that time. Is now able to bear slight weight, but still very painful. Has most pain on the side of the ankle which radiates to the top of her foot and back of her calf. She has been elevating/icing for 20 minutes at a time. Took one dose of ibuprofen this morning.      Current medicines (including changes today)  Current Outpatient Prescriptions   Medication Sig Dispense Refill   • ibuprofen (MOTRIN) 800 MG Tab Take 1 Tab by mouth 1 time daily as needed (do not take daily. take with food). 30 Tab 2   • carBAMazepine (TEGRETOL) 200 MG Tab TAKE 1 TABLET BY MOUTH IN THE MORNING TAKE 2 TABLETS AT BEDTIME  3   • clonazePAM (KLONOPIN) 1 MG Tab Take 1 mg by mouth every day.  3   • gabapentin (NEURONTIN) 300 MG Cap Take 300 mg by mouth 3 times a day.  3   • quetiapine (SEROQUEL) 300 MG tablet Take 300 mg by mouth.  3   • ziprasidone (GEODON) 80 MG Cap Take 80 mg by mouth.  3   • Misc. Devices Misc 1 Units by Does not apply route every day. 1 Units 0     No current facility-administered medications for this visit.      She  has a past medical history of Abnormal mammogram (2006); Acromioclavicular joint  "separation (5/24/2012); Anxiety; Depression; History of cervical dysplasia (1990); History of suicidal ideation; Homicidal ideations (2014); Hyperthyroidism; Hyperthyroidism; Lisfranc's dislocation (10/2017); Migraine; Pelvic exam (2008); Polysubstance dependence ([facility]); Schizoaffective disorder ([facility]); Schizoaffective disorder, bipolar type ([facility]) (4/7/2011); Seizure ([facility]) (1999); Seizure disorder ([facility]); Toe fracture (10/2017); and Wrist fracture (2010). She also has no past medical history of Arrhythmia; Asthma; Blood transfusion, without reported diagnosis; CHF (congestive heart failure) ([facility]); Chronic airway obstruction, not elsewhere classified; Diabetic neuropathy ([facility]); Emphysema; GERD (gastroesophageal reflux disease); Heart attack ([facility]); Heart murmur; Hyperlipidemia; Hypertension; Kidney disease; Stroke ([facility]); Type II or unspecified type diabetes mellitus without mention of complication, not stated as uncontrolled; or Ulcer.    ROS  As per HPI.       Objective:     Blood pressure 142/70, pulse (!) 136, temperature 36.4 °C (97.5 °F), height 1.575 m (5' 2\"), weight 88 kg (194 lb), SpO2 95 %, not currently breastfeeding. Body mass index is 35.48 kg/m².     Physical Exam:  Constitutional: Alert, appears anxious/in pain  Skin: Warm, dry, good turgor, no rashes in visible areas.  Eye: Pupils are equal and round, conjunctiva clear, lids normal.  ENMT: Lips without lesions, moist mucus membranes.  Musculoskeletal: Focused exam performed to the right lower extremity. There is visible edema noted to the right foot/lateral ankle. Maximum tenderness just inferior to the lateral malleolus but is tender over distal dorsal foot as well. She is able to slightly dorsal/plantar flex, abduct/adduct but limited secondary to pain.      Assessment and Plan:   The following treatment plan was discussed    1. Sprain of right ankle, unspecified ligament, initial encounter  New problem, uncontrolled. Foot x-rays obtained today which " show postsurgical and degenerative change but no evidence of acute fracture. Ankle x-rays were negative for fracture as well. Patient advised of these findings. Discussed that she suffered ankle sprain. We discussed conservative management of this. I recommend that she continues wearing post-op shoe for comfort until is able to weight bear without significant pain. Should continue frequent icing/elevating with NSAIDs as needed. She was given 800 mg dose of ibuprofen today in the office. Discussed that sprains can take 6-8 weeks or longer to fully heal. I recommend that she follow-up with her PCP if no significant improvement by 4-week ada.    2. Acute foot pain, right  - DX-ANKLE 3+ VIEWS RIGHT; Future  - DX-FOOT-COMPLETE 3+ RIGHT; Future  - ibuprofen (MOTRIN) tablet 800 mg; Take 4 Tabs by mouth Once.    3. Acute right ankle pain  - DX-ANKLE 3+ VIEWS RIGHT; Future  - DX-FOOT-COMPLETE 3+ RIGHT; Future  - ibuprofen (MOTRIN) tablet 800 mg; Take 4 Tabs by mouth Once.      Followup: Return if symptoms worsen or fail to improve.    Norma Hahn P.A.-C.

## 2019-01-22 ENCOUNTER — OFFICE VISIT (OUTPATIENT)
Dept: NEUROLOGY | Facility: MEDICAL CENTER | Age: 49
End: 2019-01-22
Payer: MEDICARE

## 2019-01-22 VITALS
DIASTOLIC BLOOD PRESSURE: 88 MMHG | OXYGEN SATURATION: 98 % | WEIGHT: 201 LBS | TEMPERATURE: 97.6 F | BODY MASS INDEX: 36.99 KG/M2 | RESPIRATION RATE: 14 BRPM | SYSTOLIC BLOOD PRESSURE: 132 MMHG | HEIGHT: 62 IN | HEART RATE: 94 BPM

## 2019-01-22 DIAGNOSIS — H53.9 VISUAL CHANGES: ICD-10-CM

## 2019-01-22 DIAGNOSIS — Z13.31 SCREENING FOR DEPRESSION: ICD-10-CM

## 2019-01-22 DIAGNOSIS — R51.9 CHRONIC DAILY HEADACHE: ICD-10-CM

## 2019-01-22 DIAGNOSIS — G40.209: ICD-10-CM

## 2019-01-22 DIAGNOSIS — F39 MOOD DISORDER (HCC): ICD-10-CM

## 2019-01-22 PROCEDURE — 99215 OFFICE O/P EST HI 40 MIN: CPT | Performed by: PSYCHIATRY & NEUROLOGY

## 2019-01-23 ENCOUNTER — TELEPHONE (OUTPATIENT)
Dept: NEUROLOGY | Facility: MEDICAL CENTER | Age: 49
End: 2019-01-23

## 2019-01-24 NOTE — PROGRESS NOTES
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 She is here because her episodes are worse. She has a h/o abnormal EEG but has never had a convulsion. She states her past visual spells have gotten more frequent and florid. She describes having a change in visual perception, then a recurrent thought or parts of words repeat in her head and has trouble with words at times. She has not have episode of confusion or loss of consciousness. Has about 4 headaches per month, described as moderate, migraine type, not necessarily associated with other spells.     Her mood is stable.     She is on Tegretol and Neurontin prescribed by psych for her bipolar. Denies hallucinations.     She is concerned about these spells and would like further work up.     She is driving and denies any issues with that.       Past medical history:   Past Medical History:   Diagnosis Date   • Abnormal mammogram 2006    nodule in left breast, no follow up   • Acromioclavicular joint separation 5/24/2012   • Anxiety     No [place] mental health; klonopin, coping mechanisms   • Depression    • History of cervical dysplasia 1990    had cryo tx, resolved with nl paps now   • History of suicidal ideation    • [facility]ations [facility]   • Hyperthyroidism    • Hyperthyroidism     since age 16, NL labs 03/2012   • Lisfranc's dislocation 10/2017    right   • Migraine    • Pelvic exam 2008    Dysplasia at age 20s, had cryotherapy   • Polysubstance dependence (HCC)     in remission as of 11/26/2014   • Schizoaffective disorder (HCC)    • Schizoaffective disorder, bipolar type (HCC) 4/7/2011   • Seizure (HCC) 1999    partial complex-no motor seizure; managed by [facility]   • Seizure disorder (HCC)     partial complex seizure d/o   • Toe fracture 10/2017    right 3rd-5th toes   • Wrist fracture 2010    casted by [facility]       Past surgical history:   Past Surgical History:   Procedure Laterality Date   • PRIMARY C SECTION  2008   • CERVICAL CONIZATION  1990    cryo for abnl pap   • EYE SURGERY  1973,1979    to repair \"lazy eye\"   • OTHER      eye surgeries as a child       Family history:   Family History   Problem Relation Age of Onset   • Cancer Mother         breast cancer   • Diabetes Mother    • Hypertension Mother    • Cancer Maternal Grandmother         stomach   • Stroke Maternal Grandfather    • Cancer Paternal Grandfather         lung   • Hypertension Father        Social history:   Social History     Social History   • Marital status:      Spouse name: N/A   • Number of children: N/A   • Years of education: N/A     Occupational History   • not working Other     Social History Main Topics   • Smoking status: Former Smoker     Packs/day: 0.25     Years: 25.00     Types: Cigarettes     Start date: 6/22/2016     Quit date: 12/28/2018   • Smokeless tobacco: Never Used   • Alcohol use 42.0 oz/week     60 Glasses of wine, 10 Shots of liquor per week      Comment: in remission;occasional   • Drug use: No      Comment: in remission   • Sexual activity: Yes     Partners: Male     Birth control/ protection: Condom      Comment:  x5 years     Other Topics Concern   • Not on file     Social History Narrative    ** Merged " "History Encounter **            Current medications:   Current Outpatient Prescriptions   Medication   • carBAMazepine (TEGRETOL) 200 MG Tab   • gabapentin (NEURONTIN) 300 MG Cap   • ziprasidone (GEODON) 80 MG Cap   • ibuprofen (MOTRIN) 800 MG Tab   • clonazePAM (KLONOPIN) 1 MG Tab   • quetiapine (SEROQUEL) 300 MG tablet   • Misc. Devices Misc     No current facility-administered medications for this visit.        Medication Allergy:  Allergies   Allergen Reactions   • Zoloft Unspecified     \"Blows Up\"       Review of systems:   Constitutional: denies fever, night sweats, weight loss.   Eyes: denies acute eye pain or secretion.   Ears, Nose, Mouth, Throat: denies nasal secretion, nasal bleeding, difficulty swallowing, hearing loss, tinnitus, vertigo, ear pain, acute dental problems, oral ulcers or lesions.   Endocrine: denies recent weight changes, heat or cold intolerance, polyuria, polydypsia, polyphagia,abnormal hair growth.  Cardiovascular: denies new onset of chest pain, palpitations, syncope, or dyspnea of exertion.  Pulmonary: denies shortness of breath, new onset of cough, hemoptysis, wheezing, chest pain or flu-like symptoms.   GI: denies nausea, vomiting, diarrhea, GI bleeding, change in appetite, abdominal pain, and change in bowel habits.  : denies dysuria, urinary incontinence, hematuria.  Heme/oncology: denies history of easy bruising or bleeding. No history of cancer, DVTor PE.  Allergy/immunology: denies hives/urticaria, or itching.   Dermatologic: denies new rash, or new skin lesions.  Musculoskeletal:denies joint swelling or pain, muscle pain, neck and back pain.   Neurologic: denies facial droopiness, muscle weakness (focal or generalized), paresthesias, anesthesia, ataxia, change in speech or language, memory loss, abnormal movements, seizures, loss of consciousness, or episodes of confusion.   Psychiatric: denies symptoms of worsening depression, hallucinations, mood swings or changes, " "suicidal or homicidal thoughts.     Physical examination:   Vitals:    01/22/19 0935   BP: 132/88   BP Location: Left arm   Patient Position: Sitting   BP Cuff Size: Adult   Pulse: 94   Resp: 14   Temp: 36.4 °C (97.6 °F)   TempSrc: Temporal   SpO2: 98%   Weight: 91.2 kg (201 lb)   Height: 1.575 m (5' 2\")     General: Patient in no acute distress, pleasant and cooperative.  HEENT: Normocephalic, no signs of acute trauma.   Neck: supple, no meningeal signs or carotid bruits. There is normal range of motion. No tenderness on exam.   Chest: clear to auscultation. No cough.   CV: RRR, no murmurs.   Abdomen: soft, non distended or tender.   Skin: no signs of acute rashes or trauma.   Musculoskeletal: joints exhibit full range of motion, without any pain to palpation. There are no signs of joint or muscle swelling. There is no tenderness to deep palpation of muscles.   Psychiatric: No hallucinatory behavior. Denies symptoms of depression or suicidal ideation. Mood and affect appear normal on exam.      NEUROLOGICAL EXAM:   Mental status, orientation: Awake, alert and fully oriented.   Speech and language: speech is clear and fluent. The patient is able to name, repeat and comprehend.   Memory: There is intact recollection of recent and remote events.   Cranial nerve exam: Pupils are 3-4 mm bilaterally and equally reactive to light and accommodation. Visual fields are intact by confrontation. Fundoscopic exam was unremarkable. There is no nystagmus on primary or secondary gaze. Intact full EOM in all directions of gaze. Face appears symmetric. Sensation in the face is intact to light touch. Uvula is midline. Palate elevates symmetrically. Tongue is midline and without any signs of tongue biting or fasciculations.Sternocleidomastoid muscles exhibit is normal strength bilaterally. Shoulder shrug is intact bilaterally.   Motor exam: Strength is 5/5 in all extremities. Tone is normal. No abnormal movements were seen on exam. "   Sensory exam reveals normal sense of light touch in all extremities.   Deep tendon reflexes: 2+ throughout. Plantar responses are flexor. There is no clonus.   Coordination: shows a normal finger-nose-finger. Normal rapidly alternating movements.   Gait: The patient was able to get up from seated position on first attempt without requiring assistance. Found to be steady when walking. Movements were fluid with normal arm swing. The patient was able to turn without difficulties or tendency to fall. Romberg exam unremarkable.       ANCILLARY DATA REVIEWED:     Lab Data Review:  Reviewed in chart.       Records reviewed:   Chart reviewed.       Imaging:   MRI brain w/wo, 5/21/15:   1.  There is no evidence of acute tube microgram macroadenoma.  2.  Unremarkable MR examination of the brain except for a punctate nonspecific right frontal white matter T2 hyperintensity of no clinical significance.        EEG:   EMU/VEEG 5/9/16-5/13/16:   This is an abnormal 5 days continuous video electroencephalogram recording (epilepsy monitoring unit admission) in the awake, drowsy and sleep state. Rare left temporal sharps (T3) were seen post sleep deprivation. The findings increase risk for seizures, however no seizures or typical events were captured during this prolonged admission. An underlying area of cortical irritability and or structural abnormality is suggested. The nature of her events remains unknown. Clinical and imaging correlation is recommended.      ASSESSMENT AND PLAN:    1. 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 Visual changes  - REFERRAL TO NEURODIAGNOSTICS (EEG,EP,EMG/NCS/DBS) Modality Requested: EEG-Video (5 days emu / veeg )  - MR-BRAIN-WITH & W/O; Future    3. Mood disorder (HCC)    4. Screening for depression    5. Chronic daily  headache  - MR-BRAIN-WITH & W/O; Future        CLINICAL DISCUSSION:   Complex visual representations of unclear etiology. Not clearly associated with headaches. Has now more frequent spells and also part of words repeat in her head during the spells and states she has trouble talking too during spells.    Abnormal EEG but no seizures captured during last EMU. No clear evidence for epilepsy.   Headaches are infrequent.   She would like to continue to defer any AED trials at this time.   She wants to return to the EMU for further monitoring, perhaps we can capture the spells and provide management recommendations. The spells may not be epileptic but rather psychiatric in nature. She understands that and is excited about finding out cause.   She has no driving restrictions as spells do not impair her awareness or consciousness and the etiology is not clear. She understands she must stop driving immediately and report to us should she has any changes in awareness or consciousness. She agrees.         FOLLOW-UP:   Return in about 3 months (around 4/22/2019).          EDUCATION AND COUNSELING:  -Discussed regular exercise program and prevention of cardiovascular disease, including stroke.   -Discussed healthy lifestyle, including: healthy diet (rich in fruits, vegetables, nuts and healthy oils); proper hydration, and adequate sleep hygiene (allowing 7-8 hrs of overnight sleep).    The patient understands and agrees that due to the complexity of his/her diagnosis, results of any testing and further recommendations will typically be discussed/made during a face to face encounter in my office. The patient and/or family further understands it is their responsibility to keep proper follow up.       Venkata Reid MD   Epilepsy and Neurodiagnostics.   Clinical  of Neurology [facility] [place] [facility].   Diplomate in Neurology, Epilepsy, and Electrodiagnostic Medicine.   Office:  [phone]  Fax: [phone]      o BILLING DOCUMENTATION:   Counseling:  I spent greater than 50% time face-to-face time of a total of 57 mins visit. Over 50% of the time of the visit today was spent on counseling and or coordination of care wtih the patient/family, with greater than 50% of the total time discussing:   o Diagnostic results, impressions, and/or recommended diagnostic studies, and coordination of care.   o Prognosis.  o Treatment recommendations, including risks, benefits, & alternatives.  o Instructions for treatment/management and/or follow-up.  o Importance of compliance with chosen treatment/management options.  o Risk factor modification.   o Patient & family education.  o Provided business card and/or instructions for follow-up & emergencies.

## 2019-02-03 ENCOUNTER — SLEEP STUDY (OUTPATIENT)
Dept: SLEEP MEDICINE | Facility: MEDICAL CENTER | Age: 49
End: 2019-02-03
Attending: NURSE PRACTITIONER
Payer: MEDICARE

## 2019-02-03 DIAGNOSIS — G47.33 OSA (OBSTRUCTIVE SLEEP APNEA): ICD-10-CM

## 2019-02-03 PROCEDURE — 95811 POLYSOM 6/>YRS CPAP 4/> PARM: CPT | Performed by: FAMILY MEDICINE

## 2019-02-04 NOTE — PROCEDURES
Technical summary: The patient underwent a CPAP titration.  This was a 16 channel montage study to include a 6 channel EEG, a 2 channel EOG, and chin EMG, left and right leg EMG, a snore channel, and a CFLOW pressure transducer.   Respiratory effort was assessed with the use of a thoracic and abdominal monitor and overnight oximetry was obtained. Audio and video recordings were reviewed. This was a fully attended study and sleep stage scoring was performed. The test was technically adequate.    General sleep summary:  During the overnight study, the sleep latency was 3 min which is normal. The REM latency was 187 min which is increased. The total sleep time was 385 min and sleep efficiency was 963 % which is increased. Sleep stage proportions showed decreased REM and increased WASO.  In regards to sleep quality there was a normal degree of sleep fragmentation as shown by the arousal index of 4 an hour. The arousals were due to spontaneous arousal.    CPAP Titration:  The PAP titration was initiated with CPAP 5 cm of water and the pressure which was slowly titrated up in an attempt to eliminate sleep disordered breathing and snoring. CPAP was increased to 15 cm before switching to BiPAP. The BiPAP was titrated between 16/12 cm to 15/11 cm. At this final pressure the patient was observed in non supine REM sleep stage.The apnea hypopnea index improved to 0.4 per hour and O2 deshaun 89%. The average O2 stauration was 86%. He spent 9 min of sleep time below 89% O2 saturation. Snoring was resolved. There were no significant periodic limb movements.  The patient demonstrated NSR and an average heart rate of 87 beats per minute.  There was no ventricular ectopy or tachyarrhythmias. The patient utilized medium dreamwear FFM with heated humidification. The CPAP was well-tolerated and there were minimal air leaks. No supplemental oxygen was required.    Impression:  1.  HERLINDA  2.  Successful BIPAP titration        Recommendations:  I recommend BiPAP 15/11 cm with dreamwear FFM. Recommended 30 day compliance download to assess the efficacy of the recommended pressure and compliance for further outpatient monitoring and management of CPAP therapy. In some cases alternative treatment options may prove effective in resolving sleep apnea and these options include upper airway surgery, the use of a dental orthotic or weight loss and positional therapy. Clinical correlation is required. In general patients with sleep apnea are advised to avoid alcohol and sedatives and to not operate a motor vehicle while drowsy and are at a greater risk for cardiovascular disease.

## 2019-02-07 RX ORDER — IBUPROFEN 800 MG/1
800 TABLET ORAL
Qty: 90 TAB | Refills: 0 | Status: ON HOLD | OUTPATIENT
Start: 2019-02-07 | End: 2019-03-04 | Stop reason: CLARIF

## 2019-02-12 ENCOUNTER — OFFICE VISIT (OUTPATIENT)
Dept: MEDICAL GROUP | Facility: MEDICAL CENTER | Age: 49
End: 2019-02-12
Payer: MEDICARE

## 2019-02-12 VITALS
SYSTOLIC BLOOD PRESSURE: 132 MMHG | RESPIRATION RATE: 14 BRPM | WEIGHT: 202 LBS | OXYGEN SATURATION: 98 % | DIASTOLIC BLOOD PRESSURE: 72 MMHG | TEMPERATURE: 97.8 F | BODY MASS INDEX: 37.17 KG/M2 | HEIGHT: 62 IN | HEART RATE: 88 BPM

## 2019-02-12 DIAGNOSIS — R07.9 CHEST PAIN, UNSPECIFIED TYPE: ICD-10-CM

## 2019-02-12 DIAGNOSIS — K21.9 GASTROESOPHAGEAL REFLUX DISEASE, ESOPHAGITIS PRESENCE NOT SPECIFIED: ICD-10-CM

## 2019-02-12 PROCEDURE — 99214 OFFICE O/P EST MOD 30 MIN: CPT | Performed by: NURSE PRACTITIONER

## 2019-02-12 RX ORDER — OMEPRAZOLE 20 MG/1
20 CAPSULE, DELAYED RELEASE ORAL DAILY
Qty: 90 CAP | Refills: 1 | Status: ON HOLD | OUTPATIENT
Start: 2019-02-12 | End: 2019-03-04 | Stop reason: CLARIF

## 2019-02-12 NOTE — PROGRESS NOTES
CC: Heartburn (has been having it since beginning of last month, with chest tightness at night. Not chest pain but uncomfortable.In early November had only 1 episode where she had chest pain radiating to left arm)        HPI:     Rianna presents today for the followin. Chest pain, unspecified type/Gastroesophageal reflux disease, esophagitis presence not specified  Here today stating for about the last 6 weeks she is been having some pressure in the substernal area.  She states it feels like she swallowed food and it got stuck.  Often times this sensation will improve if she drinks a couple sips of water.  It also improves that she takes over-the-counter Tums.  She has not been able to notice a correlation between if it is more prevalent before she eats or after she eats.  She has not tried anything other than Tums.  It is not specifically present with physical exertion in fact often times when she is at the gym she does not have any symptoms at all.  Symptoms may last up to 30 minutes at a time.    Reminds her of heartburn she had when she was pregnant    Current Outpatient Prescriptions   Medication Sig Dispense Refill   • ibuprofen (MOTRIN) 800 MG Tab Take 1 Tab by mouth 1 time daily as needed (do not take daily. take with food). 90 Tab 0   • carBAMazepine (TEGRETOL) 200 MG Tab TAKE 1 TABLET BY MOUTH IN THE MORNING TAKE 2 TABLETS AT BEDTIME  3   • clonazePAM (KLONOPIN) 1 MG Tab Take 1 mg by mouth every day.  3   • gabapentin (NEURONTIN) 300 MG Cap Take 300 mg by mouth 3 times a day.  3   • quetiapine (SEROQUEL) 300 MG tablet Take 300 mg by mouth.  3   • ziprasidone (GEODON) 80 MG Cap Take 80 mg by mouth.  3   • Misc. Devices Misc 1 Units by Does not apply route every day. 1 Units 0     No current facility-administered medications for this visit.      Social History   Substance Use Topics   • Smoking status: Former Smoker     Packs/day: 0.25     Years: 25.00     Types: Cigarettes     Start date: 2016  "    Quit date: 12/28/2018   • Smokeless tobacco: Never Used   • Alcohol use 42.0 oz/week     60 Glasses of wine, 10 Shots of liquor per week      Comment: in remission;occasional     I reviewed patients allergies, problem list and medications today in [facility].    ROS: Any/all pertinent positives listed in the HPI, otherwise all others reviewed are negative today.      /72 (BP Location: Left arm, Patient Position: Sitting, BP Cuff Size: Adult)   Pulse 88   Temp 36.6 °C (97.8 °F) (Temporal)   Resp 14   Ht 1.575 m (5' 2\")   Wt 91.6 kg (202 lb)   SpO2 98%   BMI 36.95 kg/m²      Exam:    Gen: Alert and oriented, No apparent distress. WDWN  Psych: A+Ox3, normal affect and mood  Skin: Warm, dry and intact. Good turgor   No rashes in visible areas.  Eye: Conjunctiva clear, lids normal  ENMT: Lips without lesions, good dentition  Lungs: Clear to auscultation bilaterally, no rales or rhonchi   Unlabored respiratory effort.   CV: Regular rate and rhythm, S1, S2. No murmurs.   No Edema  Abd: Soft non tender, non distended. Normal active bowel sounds.    No Hepatosplenomegaly, No pulsatile masses.   Ext: No clubbing, cyanosis, edema.   EKG Interpretation-HR is 78 normal EKG, normal sinus rhythm, unchanged from previous tracings          Assessment and Plan.   48 y.o. female with the following issues.    1. Chest pain, unspecified type// Gastroesophageal reflux disease, esophagitis presence not specified  Clinically stable.  She is also currently asymptomatic.  I do suspect this is related to GERD.  We will do a trial of a PPI, we did discuss diet changes including reducing acidic foods, greasy foods and caffeinated beverages.  Encourage small frequent meals.  Encourage no food several hours before bedtime.  Elevation of the head of the bed.  If no improvement she will go for the H. pylori test-and was given a printout of this order today.  We discussed that the symptoms may be present because she had a change in weight " recently.  She is actively working on reducing her weight  Given patient information on GERD from up-to-date.  Here with a low titer and was told that states after 2 or so weeks with the PPI and diet changes she can do a trial without  - EKG - Clinic Performed  - omeprazole (PRILOSEC) 20 MG delayed-release capsule; Take 1 Cap by mouth every day.  Dispense: 90 Cap; Refill: 1  -h pylori

## 2019-02-14 ENCOUNTER — HOSPITAL ENCOUNTER (OUTPATIENT)
Dept: RADIOLOGY | Facility: MEDICAL CENTER | Age: 49
End: 2019-02-14
Attending: PSYCHIATRY & NEUROLOGY
Payer: MEDICARE

## 2019-02-14 DIAGNOSIS — H53.9 VISUAL CHANGES: ICD-10-CM

## 2019-02-14 DIAGNOSIS — R51.9 CHRONIC DAILY HEADACHE: ICD-10-CM

## 2019-02-14 DIAGNOSIS — G40.209: ICD-10-CM

## 2019-02-14 PROCEDURE — A9585 GADOBUTROL INJECTION: HCPCS | Performed by: PSYCHIATRY & NEUROLOGY

## 2019-02-14 PROCEDURE — 70553 MRI BRAIN STEM W/O & W/DYE: CPT

## 2019-02-14 PROCEDURE — 700117 HCHG RX CONTRAST REV CODE 255: Performed by: PSYCHIATRY & NEUROLOGY

## 2019-02-14 RX ORDER — GADOBUTROL 604.72 MG/ML
10 INJECTION INTRAVENOUS ONCE
Status: COMPLETED | OUTPATIENT
Start: 2019-02-14 | End: 2019-02-14

## 2019-02-14 RX ADMIN — GADOBUTROL 10 ML: 604.72 INJECTION INTRAVENOUS at 11:16

## 2019-02-26 ENCOUNTER — TELEPHONE (OUTPATIENT)
Dept: NEUROLOGY | Facility: MEDICAL CENTER | Age: 49
End: 2019-02-26

## 2019-02-26 NOTE — TELEPHONE ENCOUNTER
Left VM for pt to remind pt of her EMU admission Monday 3/4 at 6 am. Left direct line for pt to call with any questions.

## 2019-02-28 DIAGNOSIS — G40.109 PARTIAL EPILEPSY (HCC): ICD-10-CM

## 2019-02-28 RX ORDER — ACETAMINOPHEN 325 MG/1
650 TABLET ORAL EVERY 6 HOURS PRN
Status: CANCELLED | OUTPATIENT
Start: 2019-02-28

## 2019-02-28 RX ORDER — LORAZEPAM 2 MG/ML
1 INJECTION INTRAMUSCULAR
Status: CANCELLED | OUTPATIENT
Start: 2019-02-28

## 2019-02-28 RX ORDER — LORAZEPAM 2 MG/ML
2 INJECTION INTRAMUSCULAR
Status: CANCELLED | OUTPATIENT
Start: 2019-02-28

## 2019-03-01 NOTE — PROGRESS NOTES
Future Direct Admit from [facility]. Dr. Reid is the referring and Accepting Physician for Epilepsy. ADT signed and held and will need to be released upon patient arrival on 3/4/19 at 0600. Patient arriving via personal vehicle.

## 2019-03-01 NOTE — ED TRIAGE NOTES
"Chief Complaint   Patient presents with   • Psych Eval     Found on someone's porch screaming for someone to save her. Per report patient was recently at [facility] and was diagnosed schizophrenia.      BIB with ems for above complaint. Patient continued to scream during transport.  Patient given 2.5 versed IM and 2.5 versed IV. Patient placed on monitor.     Blood Pressure: (!) 96/59, Pulse: 88, Respiration: 18, Temperature: 36.1 °C (97 °F), Height: 157.5 cm (5' 2\"), Weight: 80.7 kg (178 lb), BMI (Calculated): 32.56, BSA (Calculated): 1.9, O2 Delivery: None (Room Air)      " Cardiology Office Visit 3/1/2019    Chief Complaint/Reason for Visit:   Chief Complaint   Patient presents with   • Follow-up       HISTORY OF PRESENT ILLNESS:     Olayinka Dozier is a 74 year old male who presents for outpatient follow up and cardiovascular evaluation.    He is well known to us with known history of coronary artery disease, stroke, orthostatic hypotension, abnormal tilt study in the past. He had blood test done on 9/10/2018. Blood sugar was 182, cholesterol is 108, LDL was 44, HDL was 35, and triglycerides 144. He is taking care of his wife who recently had CVA and has residual memory issues.     He reports experiencing palpitations.  BP was 120/60, HR was 110, and weight was 176 lb in office today. He missed morning dose of metoprolol  and drank one 8 ounce cup of coffee today.      EKG done today showed sinus tachycardia. No evidence of ischemia or SVT noted. Results were discussed with patient.     Patient has no other complaints. No chest pain,  shortness of breath, PND, syncope, presyncope, dizziness, or orthopnea. No leg edema.     There is no overt evidence for ischemia, heart failure, or VT.    REVIEW OF SYSTEMS:  Review of Systems   Constitution: Negative.   HENT: Negative.    Eyes: Negative.    Cardiovascular: Negative for chest pain, dyspnea on exertion, irregular heartbeat, leg swelling, orthopnea, palpitations, paroxysmal nocturnal dyspnea and syncope.   Respiratory: Negative.  Negative for cough and shortness of breath.    Endocrine: Negative.    Hematologic/Lymphatic: Negative.    Skin: Negative.    Musculoskeletal: Negative.    Gastrointestinal: Negative.    Genitourinary: Negative.    Neurological: Negative.    Psychiatric/Behavioral: Negative.    Allergic/Immunologic: Negative.        PAST MEDICAL HISTORY:   No past medical history on file.    PAST SURGICAL HISTORY:   Past Surgical History:   Procedure Laterality Date   • Cyst removal      Buttock cyst removal   • Hemorrhoidectomy      • Pancreatic cyst excision     • Removal gallbladder         FAMILY HISTORY:   Family History   Problem Relation Age of Onset   • Myocardial Infarction Other    • Coronary Artery Disease Other    • Cancer Other        SOCIAL HISTORY:   Social History     Tobacco Use   • Smoking status: Former Smoker     Last attempt to quit:      Years since quittin.1   • Smokeless tobacco: Never Used   Substance Use Topics   • Alcohol use: No     Frequency: Never   • Drug use: No       Drug Use:    No                ALLERGIES:   ALLERGIES:  No Known Allergies    MEDICATIONS:   Current Outpatient Medications   Medication Sig Dispense Refill   • finasteride (PROSCAR) 5 MG tablet 1 tablet.     • glipiZIDE (GLUCOTROL) 5 MG tablet Take two tablets twice daily with food     • metFORMIN (GLUCOPHAGE) 500 MG tablet TAKE 2 TABLETS BY MOUTH TWICE DAILY     • rosuvastatin (CRESTOR) 5 MG tablet TAKE 1 TABLET BY MOUTH DAILY     • aspirin 81 MG tablet Take by mouth daily.     • Omega-3 Fatty Acids (FISH OIL) 1200 MG capsule Take by mouth daily.      • clopidogrel (PLAVIX) 75 MG tablet      • famotidine (PEPCID) 20 MG tablet Take by mouth every 12 hours.     • traMADol (ULTRAM) 50 MG tablet      • HYDROcodone-acetaminophen (NORCO) 7.5-325 MG per tablet TAKE 1 TABLET EVERY 6 HOURS AS NEEDED FOR PAIN.     • metoPROLOL tartrate (LOPRESSOR) 25 MG tablet 50 mg q AM and 25 mg q PM     • nitroGLYcerin (NITROSTAT) 0.4 MG sublingual tablet Dissolve one tablet under tongue   as needed for pain       No current facility-administered medications for this visit.        PHYSICAL EXAMINATION  Visit Vitals  /60   Pulse 110   Ht 6' (1.829 m)   Wt 79.8 kg (176 lb)   BMI 23.87 kg/m²       Physical Exam   Constitutional: He appears well-developed and well-nourished.   HENT:   Head: Normocephalic.   Eyes: Conjunctivae and EOM are normal. Pupils are equal, round, and reactive to light.   Neck: Normal range of motion. Neck supple.   Cardiovascular:  Normal rate, regular rhythm, normal heart sounds and intact distal pulses. Exam reveals no gallop and no friction rub.   No murmur heard.  Pulmonary/Chest: Effort normal and breath sounds normal.   Abdominal: Soft. Bowel sounds are normal. He exhibits no mass. There is no tenderness.   Musculoskeletal: Normal range of motion. He exhibits no edema or tenderness.       Testing: The following were personally visualized and interpreted by me:    EKG performed in office today - Findings: Sinus tachycardia       Labs:IMPRESSION/PLAN:    Olayinka was seen today for follow-up.    Diagnoses and all orders for this visit:    Cerebrovascular accident (CVA), unspecified mechanism (CMS/HCC)    Coronary artery disease involving native heart without angina pectoris, unspecified vessel or lesion type    Orthostatic hypotension    Atrial tachycardia (CMS/HCC)  -     ELECTROCARDIOGRAM 12-LEAD    Hyperlipidemia, unspecified hyperlipidemia type         1. Patient's medications and most recent lab work was reviewed with patient and family.   2. Continue current management.  3. Discussed risk factor reduction.   4. EKG performed in office today was interpreted and discussed with patient  5. Increase metoprolol to 75 mg from 50 mg for better HR control   6. Continue to closely  monitor BP at home. If BP is <100, he should call office and cut down metoprolol dose  7. May need stress test/ Holter monitor if HR remains high.  8. Recommended to increase fluid intake.    Close clinical follow up is recommended.    Scribe Attestation: Entered by Kylie Ross, acting as scribe for Dr. Edwin Lizarraga    Provider Attestation: The documentation recorded by the scribe accurately reflects the service I personally performed and the decisions made by me, MD Edwin Littlejohn MD  Cardiology

## 2019-03-04 ENCOUNTER — HOSPITAL ENCOUNTER (OUTPATIENT)
Facility: MEDICAL CENTER | Age: 49
DRG: 101 | End: 2019-03-04
Admitting: PSYCHIATRY & NEUROLOGY
Payer: MEDICARE

## 2019-03-04 ENCOUNTER — HOSPITAL ENCOUNTER (INPATIENT)
Facility: MEDICAL CENTER | Age: 49
LOS: 4 days | DRG: 101 | End: 2019-03-08
Attending: PSYCHIATRY & NEUROLOGY | Admitting: PSYCHIATRY & NEUROLOGY
Payer: MEDICARE

## 2019-03-04 DIAGNOSIS — R56.9 SEIZURES (HCC): ICD-10-CM

## 2019-03-04 DIAGNOSIS — G40.109 PARTIAL EPILEPSY (HCC): ICD-10-CM

## 2019-03-04 DIAGNOSIS — F39 MOOD DISORDER (HCC): ICD-10-CM

## 2019-03-04 PROCEDURE — 95951 PR EEG MONITORING/VIDEORECORD: CPT | Mod: 26 | Performed by: PSYCHIATRY & NEUROLOGY

## 2019-03-04 PROCEDURE — 4A10X4Z MONITORING OF CENTRAL NERVOUS ELECTRICAL ACTIVITY, EXTERNAL APPROACH: ICD-10-PCS | Performed by: PSYCHIATRY & NEUROLOGY

## 2019-03-04 PROCEDURE — 700102 HCHG RX REV CODE 250 W/ 637 OVERRIDE(OP): Performed by: PSYCHIATRY & NEUROLOGY

## 2019-03-04 PROCEDURE — A9270 NON-COVERED ITEM OR SERVICE: HCPCS | Performed by: PSYCHIATRY & NEUROLOGY

## 2019-03-04 PROCEDURE — 99222 1ST HOSP IP/OBS MODERATE 55: CPT | Mod: 25,AI | Performed by: PSYCHIATRY & NEUROLOGY

## 2019-03-04 PROCEDURE — 95951 HCHG EEG-VIDEO-24HR: CPT | Performed by: PSYCHIATRY & NEUROLOGY

## 2019-03-04 PROCEDURE — 770020 HCHG ROOM/CARE - TELE (206)

## 2019-03-04 RX ORDER — LORAZEPAM 2 MG/ML
2 INJECTION INTRAMUSCULAR
Status: DISCONTINUED | OUTPATIENT
Start: 2019-03-04 | End: 2019-03-08 | Stop reason: HOSPADM

## 2019-03-04 RX ORDER — CARBAMAZEPINE 200 MG/1
200 TABLET ORAL 2 TIMES DAILY
Status: DISCONTINUED | OUTPATIENT
Start: 2019-03-04 | End: 2019-03-06

## 2019-03-04 RX ORDER — ZIPRASIDONE HYDROCHLORIDE 80 MG/1
80 CAPSULE ORAL
Status: DISCONTINUED | OUTPATIENT
Start: 2019-03-04 | End: 2019-03-08 | Stop reason: HOSPADM

## 2019-03-04 RX ORDER — ACETAMINOPHEN 325 MG/1
650 TABLET ORAL EVERY 6 HOURS PRN
Status: DISCONTINUED | OUTPATIENT
Start: 2019-03-04 | End: 2019-03-08 | Stop reason: HOSPADM

## 2019-03-04 RX ORDER — ZIPRASIDONE HYDROCHLORIDE 80 MG/1
80 CAPSULE ORAL DAILY
Status: DISCONTINUED | OUTPATIENT
Start: 2019-03-04 | End: 2019-03-04

## 2019-03-04 RX ORDER — IBUPROFEN 800 MG/1
800 TABLET ORAL
Status: DISCONTINUED | OUTPATIENT
Start: 2019-03-04 | End: 2019-03-04

## 2019-03-04 RX ORDER — CARBAMAZEPINE 200 MG/1
200 TABLET ORAL 2 TIMES DAILY
COMMUNITY
End: 2021-04-01

## 2019-03-04 RX ORDER — LORAZEPAM 2 MG/ML
1 INJECTION INTRAMUSCULAR
Status: DISCONTINUED | OUTPATIENT
Start: 2019-03-04 | End: 2019-03-08 | Stop reason: HOSPADM

## 2019-03-04 RX ORDER — CLONAZEPAM 1 MG/1
1 TABLET ORAL
Status: DISCONTINUED | OUTPATIENT
Start: 2019-03-04 | End: 2019-03-08 | Stop reason: HOSPADM

## 2019-03-04 RX ADMIN — ZIPRASIDONE HYDROCHLORIDE 80 MG: 80 CAPSULE ORAL at 19:37

## 2019-03-04 RX ADMIN — CARBAMAZEPINE 200 MG: 200 TABLET ORAL at 17:17

## 2019-03-04 RX ADMIN — CLONAZEPAM 1 MG: 1 TABLET ORAL at 19:37

## 2019-03-04 ASSESSMENT — COGNITIVE AND FUNCTIONAL STATUS - GENERAL
SUGGESTED CMS G CODE MODIFIER DAILY ACTIVITY: CH
DAILY ACTIVITIY SCORE: 24
SUGGESTED CMS G CODE MODIFIER MOBILITY: CH
MOBILITY SCORE: 24

## 2019-03-04 ASSESSMENT — LIFESTYLE VARIABLES: EVER_SMOKED: YES

## 2019-03-04 NOTE — CARE PLAN
Problem: Safety  Goal: Will remain free from falls  Appropriate precautions in place.    Problem: Knowledge Deficit  Goal: Knowledge of disease process/condition, treatment plan, diagnostic tests, and medications will improve  Pt updated on plan of care.

## 2019-03-04 NOTE — PROCEDURES
VIDEO ELECTROENCEPHALOGRAM / EPILEPSY MONITORING UNIT REPORT      Referring provider: Dr. Vane Hines.     DOS:   3/4/2019 - 3/8/2019.    INDICATION:  Rianna Solis 48 y.o. female presenting with recurrent seizure like spells.     CURRENT ANTIEPILEPTIC REGIMEN: Carbamazepine 200 mg po bid, Clonazepam 1 mg po qhs, Gabapentin 300 mg po tid.      TECHNIQUE: A 30-channel, 5 days video electroencephalogram (VEEG) was performed in accordance with the international 10-20 system. This digital study was reviewed in bipolar and referential montages. The recording examined the patient during wakeful, drowsy and sleep states.     The patient was sleep deprived to 4 hrs overnight sleep only and no day time naps on: 3/4/2019, 3/5/2019, 3/6/2019.  There was supervised withdrawal of the following medications: Gabapentin held since admission. Carbamazepine halved on day two and held since day three of admission.     DESCRIPTION OF THE RECORD:  During the awake state, background shows symmetrical 10 Hz alpha activity posteriorly with amplitude of 70 mV.  There was reactivity with eye opening/closure.  Normal anterior-posterior gradient was noted with faster beta frequencies seen anteriorly.  During drowsiness, high-amplitude delta frequencies were seen.    During the sleep state, background shows diffuse high-amplitude 4-5 Hz delta activity.  Symmetrical high-amplitude sleep spindles and vertex sharp activities were seen in the central leads.    ACTIVATION PROCEDURES:   Hyperventilation was performed by the patient for a total of 3 minutes. The technician performing the test noted good effort. This technique failed to produce any significant electroencephalographic changes.     Intermittent Photic stimulation was performed in a stepwise fashion from 1 to 30 Hz and elicited a normal response (photic driving), most noticeable in the posterior leads.      ICTAL AND/OR INTERICTAL FINDINGS:   Rare left temporal sharps were  seen. No seizures were recorded during the study.     EVENT(S):    3/4/2019: Patient marked event for review. Family member in room with patient. Felt a heavy feeling to have a spell. Remained interactive with family, able to talk, laugh. Had some mild visual changes. No VEEG changes associated with spell.     EKG: sampling review of EKG recording demonstrated sinus rhythm.       INTERPRETATION:   This is an abnormal 5 days video electroencephalogram recording in the awake, drowsy and sleep state.  Rare left temporal sharps were seen. The findings increase risk for seizures, however no seizures were captured during this prolonged admission. An underlying area of cortical irritability and or structural abnormality is suggested. The nature of her events remains unknown, however one event of mild visual changes was captured during the study, and it had no eeg correlation and was felt to not be epileptic in nature.  Clinical and imaging correlation is recommended.      Venkata Reid MD   Epilepsy and Neurodiagnostics.   Clinical  of Neurology [facility].   Diplomate in Neurology, Epilepsy, and Electrodiagnostic Medicine.   Office: [phone]  Fax: [phone]    Total daily recordings:   3/4/2019: 22 hours and 5 minutes.   3/5/2019: 23 hours and 43 minutes.   3/6/2019: 23 hours and 7 minutes.   3/7/2019: 23 hours and 30 minutes.   3/8/2019: 6 hours and 23 minutes.

## 2019-03-04 NOTE — PROGRESS NOTES
Pt A&Ox4, denies any numbness/tingling/pain.    Bed alarm on, call light within reach, pt educated on importance of using the call light when she needs assistance, pt verbalized understanding.

## 2019-03-04 NOTE — H&P
Cc: seizures.       Problem List Items Addressed This Visit     None      Visit Diagnoses     Partial epilepsy (HCC)        Relevant Medications    LORazepam (ATIVAN) injection 1 mg    LORazepam (ATIVAN) injection 2 mg    acetaminophen (TYLENOL) tablet 650 mg    clonazePAM (KLONOPIN) tablet 1 mg (Start on 3/4/2019  6:00 PM)    carBAMazepine (TEGRETOL) tablet 200 mg          History of present illness:  Rianna Solis is a 48 y.o. female presents today for elective admission to the EMU. She has history of recurrent spells with visual changes, and sometimes confusion. She follows with psychiatry for a mood disorder and takes Geodon, Tegretol and neurontin for that reason. She is also on daily clonazepam 1 mg qhs.     She continues to have spells despite being on multiple meds which serve as antiepileptics as well. Denies hallucinations.     Her mood is stable and denies suicidal or homicidal ideation.     Last spell on a few days ago, had bright visual representations, states she could not talk and may have been confused.     Had repeat MRI brain.       Past medical history:   Past Medical History:   Diagnosis Date   • Abnormal mammogram 2006    nodule in left breast, no follow up   • Acromioclavicular joint separation 5/24/2012   • Anxiety     [facility] mental health; klonopin, coping mechanisms   • Depression    • History of cervical dysplasia 1990    had cryo tx, resolved with nl paps now   • History of suicidal ideation    • Homicidal ideations 2014    [facility] hold   • Hyperthyroidism    • Hyperthyroidism     since age 16, NL labs 03/2012   • Lisfranc's dislocation 10/2017    right   • Migraine    • Pelvic exam 2008    Dysplasia at age 20s, had cryotherapy   • Polysubstance dependence (HCC)     in remission as of 11/26/2014   • Schizoaffective disorder (HCC)    • Schizoaffective disorder, bipolar type (HCC) 4/7/2011   • Seizure ([facility]) 1999    partial complex-no motor seizure; managed by [facility] mental  "health   • Seizure disorder (HCC)     partial complex seizure d/o   • Toe fracture 10/2017    right 3rd-5th toes   • Wrist fracture 2010    casted by [facility]       Past surgical history:   Past Surgical History:   Procedure Laterality Date   • PRIMARY C SECTION  2008   • CERVICAL CONIZATION  1990    cryo for abnl pap   • EYE SURGERY  1973,1979    to repair \"lazy eye\"   • OTHER      eye surgeries as a child       Family history:   Family History   Problem Relation Age of Onset   • Cancer Mother         breast cancer   • Diabetes Mother    • Hypertension Mother    • Cancer Maternal Grandmother         stomach   • Stroke Maternal Grandfather    • Cancer Paternal Grandfather         lung   • Hypertension Father        Social history:   Social History     Social History   • Marital status:      Spouse name: N/A   • Number of children: N/A   • Years of education: N/A     Occupational History   • not working Other     Social History Main Topics   • Smoking status: Former Smoker     Packs/day: 0.25     Years: 25.00     Types: Cigarettes     Start date: 6/22/2016     Quit date: 12/28/2018   • Smokeless tobacco: Never Used   • Alcohol use 42.0 oz/week     60 Glasses of wine, 10 Shots of liquor per week      Comment: in remission;occasional   • Drug use: No      Comment: in remission   • Sexual activity: Yes     Partners: Male     Birth control/ protection: Condom      Comment:  x5 years     Other Topics Concern   • Not on file     Social History Narrative    ** Merged History Encounter **            Current medications:   Current Facility-Administered Medications   Medication Dose   • LORazepam (ATIVAN) injection 1 mg  1 mg    Or   • LORazepam (ATIVAN) injection 2 mg  2 mg   • acetaminophen (TYLENOL) tablet 650 mg  650 mg   • clonazePAM (KLONOPIN) tablet 1 mg  1 mg   • carBAMazepine (TEGRETOL) tablet 200 mg  200 mg   • ibuprofen (MOTRIN) tablet 800 mg  800 mg   • ziprasidone (GEODON) capsule 80 mg  80 mg " "      Medication Allergy:  Allergies   Allergen Reactions   • Zoloft Unspecified     \"Blows Up\"       Review of systems:   Constitutional: denies fever, night sweats, weight loss.   Eyes: denies acute eye pain or secretion.   Ears, Nose, Mouth, Throat: denies nasal secretion, nasal bleeding, difficulty swallowing, hearing loss, tinnitus, vertigo, ear pain, acute dental problems, oral ulcers or lesions.   Endocrine: denies recent weight changes, heat or cold intolerance, polyuria, polydypsia, polyphagia,abnormal hair growth.  Cardiovascular: denies new onset of chest pain, palpitations, syncope, or dyspnea of exertion.  Pulmonary: denies shortness of breath, new onset of cough, hemoptysis, wheezing, chest pain or flu-like symptoms.   GI: denies nausea, vomiting, diarrhea, GI bleeding, change in appetite, abdominal pain, and change in bowel habits.  : denies dysuria, urinary incontinence, hematuria.  Heme/oncology: denies history of easy bruising or bleeding. No history of cancer, DVTor PE.  Allergy/immunology: denies hives/urticaria, or itching.   Dermatologic: denies new rash, or new skin lesions.  Musculoskeletal:denies joint swelling or pain, muscle pain, neck and back pain.   Neurologic: denies facial droopiness, muscle weakness (focal or generalized), paresthesias, anesthesia, ataxia, change in speech or language, memory loss, abnormal movements, seizures, loss of consciousness, or episodes of confusion.   Psychiatric: denies symptoms of worsening depression, hallucinations, mood swings or changes, suicidal or homicidal thoughts.     Physical examination:   Vitals:    03/04/19 0700   BP: 139/95   Pulse: 82   Resp: 16   Temp: 36.8 °C (98.2 °F)   TempSrc: Temporal   SpO2: 100%   Weight: 92.4 kg (203 lb 11.3 oz)   Height: 1.575 m (5' 2\")     General: Patient in no acute distress, pleasant and cooperative.  HEENT: Normocephalic, no signs of acute trauma.   Neck: supple, no meningeal signs or carotid bruits. There " is normal range of motion. No tenderness on exam.   Chest: clear to auscultation. No cough.   CV: RRR, no murmurs.   Abdomen: soft, non distended or tender.   Skin: no signs of acute rashes or trauma.   Musculoskeletal: joints exhibit full range of motion, without any pain to palpation. There are no signs of joint or muscle swelling. There is no tenderness to deep palpation of muscles.   Psychiatric: No hallucinatory behavior. Denies symptoms of depression or suicidal ideation. Mood and affect appear normal on exam.      NEUROLOGICAL EXAM:   Mental status, orientation: Awake, alert and fully oriented.   Speech and language: speech is clear and fluent. The patient is able to name, repeat and comprehend.   Memory: There is intact recollection of recent and remote events.   Cranial nerve exam: Pupils are 3-4 mm bilaterally and equally reactive to light and accommodation. Visual fields are intact by confrontation. Fundoscopic exam was unremarkable. There is no nystagmus on primary or secondary gaze. Intact full EOM in all directions of gaze. Face appears symmetric. Sensation in the face is intact to light touch. Uvula is midline. Palate elevates symmetrically. Tongue is midline and without any signs of tongue biting or fasciculations.Sternocleidomastoid muscles exhibit is normal strength bilaterally. Shoulder shrug is intact bilaterally.   Motor exam: Strength is 5/5 in all extremities. Tone is normal. No abnormal movements were seen on exam.   Sensory exam reveals normal sense of light touch in all extremities.   Deep tendon reflexes: 2+ throughout. Plantar responses are flexor. There is no clonus.   Coordination: shows a normal finger-nose-finger. Normal rapidly alternating movements.   Gait: The patient was able to get up from seated position on first attempt without requiring assistance. Found to be steady when walking. Movements were fluid with normal arm swing. The patient was able to turn without difficulties or  tendency to fall. Romberg exam unremarkable.         ANCILLARY DATA REVIEWED:      Lab Data Review:  Reviewed in chart.         Records reviewed:   Chart reviewed.         Imaging:   MRI brain w/wo, 5/21/15:   1.  There is no evidence of acute tube microgram macroadenoma.  2.  Unremarkable MR examination of the brain except for a punctate nonspecific right frontal white matter T2 hyperintensity of no clinical significance.     MRI brain w/wo, 2/14/2019:  1.  Minimal supratentorial white matter disease most consistent with microvascular ischemic change versus demyelination or gliosis.  2.  No evidence of heterotopic gray matter, gross cortical dysplasia, hippocampal sclerosis, or mass lesion. No epileptogenic FOCUS is identified.          EEG:   EMU/VEEG 5/9/16-5/13/16:   This is an abnormal 5 days continuous video electroencephalogram recording (epilepsy monitoring unit admission) in the awake, drowsy and sleep state. Rare left temporal sharps (T3) were seen post sleep deprivation. The findings increase risk for seizures, however no seizures or typical events were captured during this prolonged admission. An underlying area of cortical irritability and or structural abnormality is suggested. The nature of her events remains unknown. Clinical and imaging correlation is recommended.            ASSESSMENT AND PLAN:     1. Partial epileptic seizure of occipital lobe with impairment of consciousness (HCC)  - REFERRAL TO NEURODIAGNOSTICS (EEG,EP,EMG/NCS/DBS) Modality Requested: EEG-Video (5 days emu / veeg )  - MR-BRAIN-WITH & W/O; Future     2. Visual changes  - REFERRAL TO NEURODIAGNOSTICS (EEG,EP,EMG/NCS/DBS) Modality Requested: EEG-Video (5 days emu / veeg )  - MR-BRAIN-WITH & W/O; Future     3. Mood disorder (HCC)     4. Screening for depression     5. Chronic daily headache  - MR-BRAIN-WITH & W/O; Future           CLINICAL DISCUSSION:   Complex visual representations of unclear etiology. Not clearly associated with  headaches. Has now more frequent spells and also part of words repeat in her head during the spells and states she has trouble talking too during spells. Has visual changes during spells.    Abnormal EEG but no seizures captured during last EMU. No clear evidence for epilepsy. Agrees to readmit to EMU, will attempt to capture spells.    Headaches are infrequent and not associated with spells.   On multiple AEDs from psych standpoint, bipolar.   We will attempt to capture the spells and provide management recommendations. The spells may not be epileptic but rather psychiatric in nature. She understands that and is excited about finding out cause.   She has no driving restrictions as spells do not impair her awareness or consciousness and the etiology is not clear. She understands she must stop driving immediately and report to us should she has any changes in awareness or consciousness. She agrees.            FOLLOW-UP:   In my office after discharge.               EDUCATION AND COUNSELING:  -Discussed regular exercise program and prevention of cardiovascular disease, including stroke.   -Discussed healthy lifestyle, including: healthy diet (rich in fruits, vegetables, nuts and healthy oils); proper hydration, and adequate sleep hygiene (allowing 7-8 hrs of overnight sleep).     The patient understands and agrees that due to the complexity of his/her diagnosis, results of any testing and further recommendations will typically be discussed/made during a face to face encounter in my office. The patient and/or family further understands it is their responsibility to keep proper follow up.         Venkata Reid MD   Epilepsy and Neurodiagnostics.   Clinical  of Neurology [facility].   Diplomate in Neurology, Epilepsy, and Electrodiagnostic Medicine.   Office: [phone]  Fax: [phone]       I spent a total of 60 minutes in the care of this patient  today.

## 2019-03-05 PROCEDURE — A9270 NON-COVERED ITEM OR SERVICE: HCPCS | Performed by: PSYCHIATRY & NEUROLOGY

## 2019-03-05 PROCEDURE — 700102 HCHG RX REV CODE 250 W/ 637 OVERRIDE(OP): Performed by: PSYCHIATRY & NEUROLOGY

## 2019-03-05 PROCEDURE — 95951 PR EEG MONITORING/VIDEORECORD: CPT | Mod: 26 | Performed by: PSYCHIATRY & NEUROLOGY

## 2019-03-05 PROCEDURE — 99231 SBSQ HOSP IP/OBS SF/LOW 25: CPT | Mod: 25 | Performed by: PSYCHIATRY & NEUROLOGY

## 2019-03-05 PROCEDURE — 770020 HCHG ROOM/CARE - TELE (206)

## 2019-03-05 PROCEDURE — 95951 HCHG EEG-VIDEO-24HR: CPT | Performed by: PSYCHIATRY & NEUROLOGY

## 2019-03-05 RX ADMIN — CARBAMAZEPINE 200 MG: 200 TABLET ORAL at 05:59

## 2019-03-05 RX ADMIN — CLONAZEPAM 1 MG: 1 TABLET ORAL at 19:57

## 2019-03-05 RX ADMIN — ZIPRASIDONE HYDROCHLORIDE 80 MG: 80 CAPSULE ORAL at 19:57

## 2019-03-05 NOTE — DISCHARGE PLANNING
Care Transition Team Assessment    Information Source  Orientation : Oriented x 4  Information Given By: Patient  Informant's Name: Luisa  Who is responsible for making decisions for patient? : Spouse         Elopement Risk  Legal Hold: No  Ambulatory or Self Mobile in Wheelchair: Yes  Disoriented: No  Psychiatric Symptoms: None  History of Wandering: No  Elopement this Admit: No  Vocalizing Wanting to Leave: No  Displays Behaviors, Body Language Wanting to Leave: No-Not at Risk for Elopement  Elopement Risk: Not at Risk for Elopement    Interdisciplinary Discharge Planning  Does Admitting Nurse Feel This Could be a Complex Discharge?: No  Primary Care Physician: JASMEET Hines  Lives with - Patient's Self Care Capacity: Spouse  Support Systems: Spouse / Significant Other  Housing / Facility: 1 Story House (5 steps to go in from front of house and 6 steps to go back.)  Do You Take your Prescribed Medications Regularly: No  Able to Return to Previous ADL's: Yes  Mobility Issues: No  Prior Services: None  Patient Expects to be Discharged to:: Home  Assistance Needed: No  Durable Medical Equipment: Not Applicable    Discharge Preparedness  What is your plan after discharge?: Home with help  What are your discharge supports?: Spouse  Prior Functional Level: Ambulatory, Drives Self, Independent with Activities of Daily Living  Difficulity with ADLs: None  Difficulity with IADLs: Driving    Functional Assesment  Prior Functional Level: Ambulatory, Drives Self, Independent with Activities of Daily Living    Finances  Financial Barriers to Discharge: No  Prescription Coverage: Yes    Vision / Hearing Impairment  Vision Impairment : Yes  Right Eye Vision: Impaired, Wears Glasses  Left Eye Vision: Impaired, Wears Glasses  Hearing Impairment : No              Domestic Abuse  Have you ever been the victim of abuse or violence?: No  Physical Abuse or Sexual Abuse: No  Verbal Abuse or Emotional Abuse: No  Possible Abuse Reported  to:: Not Applicable              Anticipated Discharge Information  Anticipated discharge disposition: Home

## 2019-03-05 NOTE — DISCHARGE PLANNING
Anticipated Discharge Disposition:   Home    Action:   Met with pt .  She lives with .   She normally does not need his assistance.     Assessment completed.     Barriers to Discharge:   Medical clearance    Plan:   RN kindly notify CM if there are any discharge concerns.

## 2019-03-05 NOTE — PROGRESS NOTES
Cc: seizures.       Problem List Items Addressed This Visit     None      Visit Diagnoses     Partial epilepsy (HCC)        Relevant Medications    LORazepam (ATIVAN) injection 1 mg    LORazepam (ATIVAN) injection 2 mg    acetaminophen (TYLENOL) tablet 650 mg    clonazePAM (KLONOPIN) tablet 1 mg    carBAMazepine (TEGRETOL) tablet 200 mg    carBAMazepine (TEGRETOL) 200 MG Tab          Interim history:  Rianna Solis was admitted to the emu. She had a mild spell last night of feeling heavy and mild visual spots but not full blown episode. She was not confused. We are holding neurontin but she remains on Tegretol and is willing to hold it tonight. Mood is great. Denies any complaints. Eating and drinking fluids no issues. Going to bathroom.     She was sleep deprived last night.       Past medical history:   Past Medical History:   Diagnosis Date   • Abnormal mammogram 2006    nodule in left breast, no follow up   • Acromioclavicular joint separation 5/24/2012   • Anxiety     No [facility] mental health; klonopin, coping mechanisms   • Depression    • History of cervical dysplasia 1990    had cryo tx, resolved with nl paps now   • History of suicidal ideation    • Homicidal ideations 2014    [facility] hold   • Hyperthyroidism    • Hyperthyroidism     since age 16, NL labs 03/2012   • Lisfranc's dislocation 10/2017    right   • Migraine    • Pelvic exam 2008    Dysplasia at age 20s, had cryotherapy   • Polysubstance dependence (HCC)     in remission as of 11/26/2014   • Schizoaffective disorder (HCC)    • Schizoaffective disorder, bipolar type (HCC) 4/7/2011   • Seizure (HCC) 1999    partial complex-no motor seizure; managed by [facility]   • Seizure disorder (HCC)     partial complex seizure d/o   • Toe fracture 10/2017    right 3rd-5th toes   • Wrist fracture 2010    casted by [facility]       Past surgical history:   Past Surgical History:   Procedure Laterality Date   • PRIMARY C SECTION  2008   • CERVICAL  "CONIZATION  1990    cryo for abnl pap   • EYE SURGERY  1973,1979    to repair \"lazy eye\"   • OTHER      eye surgeries as a child       Family history:   Family History   Problem Relation Age of Onset   • Cancer Mother         breast cancer   • Diabetes Mother    • Hypertension Mother    • Cancer Maternal Grandmother         stomach   • Stroke Maternal Grandfather    • Cancer Paternal Grandfather         lung   • Hypertension Father        Social history:   Social History     Social History   • Marital status:      Spouse name: N/A   • Number of children: N/A   • Years of education: N/A     Occupational History   • not working Other     Social History Main Topics   • Smoking status: Former Smoker     Packs/day: 0.25     Years: 25.00     Types: Cigarettes     Start date: 6/22/2016     Quit date: 12/28/2018   • Smokeless tobacco: Never Used   • Alcohol use 42.0 oz/week     60 Glasses of wine, 10 Shots of liquor per week      Comment: in remission;occasional   • Drug use: No      Comment: in remission   • Sexual activity: Yes     Partners: Male     Birth control/ protection: Condom      Comment:  x5 years     Other Topics Concern   • Not on file     Social History Narrative    ** Merged History Encounter **            Current medications:   Current Facility-Administered Medications   Medication Dose   • LORazepam (ATIVAN) injection 1 mg  1 mg    Or   • LORazepam (ATIVAN) injection 2 mg  2 mg   • acetaminophen (TYLENOL) tablet 650 mg  650 mg   • clonazePAM (KLONOPIN) tablet 1 mg  1 mg   • carBAMazepine (TEGRETOL) tablet 200 mg  200 mg   • ziprasidone (GEODON) capsule 80 mg  80 mg       Medication Allergy:  Allergies   Allergen Reactions   • Zoloft Unspecified     \"Blows Up\"       Review of systems:   Constitutional: denies fever, night sweats, weight loss.   Eyes: denies acute eye pain or secretion.   Ears, Nose, Mouth, Throat: denies nasal secretion, nasal bleeding, difficulty swallowing, hearing loss, " tinnitus, vertigo, ear pain, acute dental problems, oral ulcers or lesions.   Endocrine: denies recent weight changes, heat or cold intolerance, polyuria, polydypsia, polyphagia,abnormal hair growth.  Cardiovascular: denies new onset of chest pain, palpitations, syncope, or dyspnea of exertion.  Pulmonary: denies shortness of breath, new onset of cough, hemoptysis, wheezing, chest pain or flu-like symptoms.   GI: denies nausea, vomiting, diarrhea, GI bleeding, change in appetite, abdominal pain, and change in bowel habits.  : denies dysuria, urinary incontinence, hematuria.  Heme/oncology: denies history of easy bruising or bleeding. No history of cancer, DVTor PE.  Allergy/immunology: denies hives/urticaria, or itching.   Dermatologic: denies new rash, or new skin lesions.  Musculoskeletal:denies joint swelling or pain, muscle pain, neck and back pain.   Neurologic: denies facial droopiness, muscle weakness (focal or generalized), paresthesias, anesthesia, ataxia, change in speech or language, memory loss, abnormal movements, seizures, loss of consciousness, or episodes of confusion.   Psychiatric: denies symptoms of worsening depression, hallucinations, mood swings or changes, suicidal or homicidal thoughts.       Physical examination:   Vitals:    03/05/19 0000 03/05/19 0400 03/05/19 0750 03/05/19 1200   BP: 139/94 123/69 (!) 178/98 128/82   Pulse: 87 81 88 79   Resp: 17 17 16 18   Temp: 36.4 °C (97.5 °F) 36.4 °C (97.5 °F) 36.1 °C (97 °F) 37.4 °C (99.4 °F)   TempSrc: Temporal Temporal Temporal    SpO2: 95% 95% 94% 100%   Weight:       Height:         General: Patient in no acute distress, pleasant and cooperative.  HEENT: Normocephalic, no signs of acute trauma.   Neck: supple, no meningeal signs or carotid bruits. There is normal range of motion. No tenderness on exam.   Chest: clear to auscultation. No cough.   CV: RRR, no murmurs.   Abdomen: soft, non distended or tender.   Skin: no signs of acute rashes or  trauma.   Musculoskeletal: joints exhibit full range of motion, without any pain to palpation. There are no signs of joint or muscle swelling. There is no tenderness to deep palpation of muscles.   Psychiatric: No hallucinatory behavior. Denies symptoms of depression or suicidal ideation. Mood and affect appear normal on exam.      NEUROLOGICAL EXAM:   Mental status, orientation: Awake, alert and fully oriented.   Speech and language: speech is clear and fluent. The patient is able to name, repeat and comprehend.   Memory: There is intact recollection of recent and remote events.   Cranial nerve exam: Pupils are 3-4 mm bilaterally and equally reactive to light and accommodation. Visual fields are intact by confrontation. Fundoscopic exam was unremarkable. There is no nystagmus on primary or secondary gaze. Intact full EOM in all directions of gaze. Face appears symmetric. Sensation in the face is intact to light touch. Uvula is midline. Palate elevates symmetrically. Tongue is midline and without any signs of tongue biting or fasciculations.Sternocleidomastoid muscles exhibit is normal strength bilaterally. Shoulder shrug is intact bilaterally.   Motor exam: Strength is 5/5 in all extremities. Tone is normal. No abnormal movements were seen on exam.   Sensory exam reveals normal sense of light touch in all extremities.   Deep tendon reflexes: 2+ throughout. Plantar responses are flexor. There is no clonus.   Coordination: shows a normal finger-nose-finger. Normal rapidly alternating movements.   Gait: The patient was able to get up from seated position on first attempt without requiring assistance. Found to be steady when walking. Movements were fluid with normal arm swing. The patient was able to turn without difficulties or tendency to fall. Romberg exam unremarkable.         ANCILLARY DATA REVIEWED:      Lab Data Review:  Reviewed in chart.         Records reviewed:   Chart reviewed.         Imaging:   MRI brain  w/wo, 5/21/15:   1.  There is no evidence of acute tube microgram macroadenoma.  2.  Unremarkable MR examination of the brain except for a punctate nonspecific right frontal white matter T2 hyperintensity of no clinical significance.     MRI brain w/wo, 2/14/2019:  1.  Minimal supratentorial white matter disease most consistent with microvascular ischemic change versus demyelination or gliosis.  2.  No evidence of heterotopic gray matter, gross cortical dysplasia, hippocampal sclerosis, or mass lesion. No epileptogenic FOCUS is identified.           EEG:   EMU/VEEG 5/9/16-5/13/16:   This is an abnormal 5 days continuous video electroencephalogram recording (epilepsy monitoring unit admission) in the awake, drowsy and sleep state. Rare left temporal sharps (T3) were seen post sleep deprivation. The findings increase risk for seizures, however no seizures or typical events were captured during this prolonged admission. An underlying area of cortical irritability and or structural abnormality is suggested. The nature of her events remains unknown. Clinical and imaging correlation is recommended.              ASSESSMENT AND PLAN:     1. 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 Visual changes     3. Mood disorder (HCC)     4. Screening for depression     5. Chronic daily headache           CLINICAL DISCUSSION:   Complex visual representations of unclear etiology. Not clearly associated with headaches. Has now more frequent spells and also part of words repeat in her head during the spells and states she has trouble talking too during spells. Has visual changes during spells.    Abnormal EEG but no seizures captured during last EMU. No clear evidence for epilepsy. Agrees to readmit to EMU, will attempt to capture spells, so far a minor spell yesterday was not associated with any eeg changes. She agrees on sleep deprivation tonight and holding Tegretol tonight.    Headaches  are infrequent and not associated with spells.   On multiple AEDs from psych standpoint, bipolar.   We will attempt to capture the spells and provide management recommendations. The spells may not be epileptic but rather psychiatric in nature. She understands that and is excited about finding out cause.   She has no driving restrictions as spells do not impair her awareness or consciousness and the etiology is not clear. She understands she must stop driving immediately and report to us should she has any changes in awareness or consciousness. She agrees.            FOLLOW-UP:   In my office after discharge.               EDUCATION AND COUNSELING:  -Discussed regular exercise program and prevention of cardiovascular disease, including stroke.   -Discussed healthy lifestyle, including: healthy diet (rich in fruits, vegetables, nuts and healthy oils); proper hydration, and adequate sleep hygiene (allowing 7-8 hrs of overnight sleep).     The patient understands and agrees that due to the complexity of his/her diagnosis, results of any testing and further recommendations will typically be discussed/made during a face to face encounter in my office. The patient and/or family further understands it is their responsibility to keep proper follow up.         Venkata Reid MD   Epilepsy and Neurodiagnostics.   Clinical  of Neurology [employer].   Diplomate in Neurology, Epilepsy, and Electrodiagnostic Medicine.   Office: [phone]  Fax: [phone]        I spent a total of 37 minutes in the care of this patient today.

## 2019-03-05 NOTE — CARE PLAN
Problem: Safety  Goal: Will remain free from falls    Intervention: Assess risk factors for falls  Pt instructed to use call light when needing to get out of bed.       Problem: Venous Thromboembolism (VTW)/Deep Vein Thrombosis (DVT) Prevention:  Goal: Patient will participate in Venous Thrombosis (VTE)/Deep Vein Thrombosis (DVT)Prevention Measures    Intervention: Ensure patient wears graduated elastic stockings (ZULEYKA hose) and/or SCDs, if ordered, when in bed or chair (Remove at least once per shift for skin check)  Pt has SCD's on to prevent DVT's

## 2019-03-05 NOTE — PROGRESS NOTES
RN MOBILITY NOTE    Surgery patient?: n/a  Date of surgery: n/a  Ambulated 50 ft on day of surgery? (N/A if today is not date of surgery): n/a  Number of times ambulated 50 feet or greater today: 3  Patient has been up to chair, edge of bed or HOB 90 degrees for all meals?: 2/2  Goal met? (goal is ambulating at least 50 feet 2 times on day shift, one time on night shift): yes  If patient did not meet mobility goal, why?: n/a

## 2019-03-06 PROCEDURE — 95951 HCHG EEG-VIDEO-24HR: CPT | Performed by: PSYCHIATRY & NEUROLOGY

## 2019-03-06 PROCEDURE — 95951 PR EEG MONITORING/VIDEORECORD: CPT | Mod: 26 | Performed by: PSYCHIATRY & NEUROLOGY

## 2019-03-06 PROCEDURE — A9270 NON-COVERED ITEM OR SERVICE: HCPCS | Performed by: PSYCHIATRY & NEUROLOGY

## 2019-03-06 PROCEDURE — 770020 HCHG ROOM/CARE - TELE (206)

## 2019-03-06 PROCEDURE — 99231 SBSQ HOSP IP/OBS SF/LOW 25: CPT | Mod: 25 | Performed by: PSYCHIATRY & NEUROLOGY

## 2019-03-06 PROCEDURE — 700102 HCHG RX REV CODE 250 W/ 637 OVERRIDE(OP): Performed by: PSYCHIATRY & NEUROLOGY

## 2019-03-06 RX ADMIN — CARBAMAZEPINE 200 MG: 200 TABLET ORAL at 05:58

## 2019-03-06 RX ADMIN — ZIPRASIDONE HYDROCHLORIDE 80 MG: 80 CAPSULE ORAL at 21:23

## 2019-03-06 NOTE — PROGRESS NOTES
Pt is A&Ox4. Denies any n/v, n/t, or pain. No seizure activity reported over night. Pt updated on POC. All needs met at this time. Pt is instructed to call when in need of assistance. Bed in lowest and locked position. Call light within reach. Bed alarm and hourly rounding in place.

## 2019-03-06 NOTE — PROGRESS NOTES
Received report, no seizure event, pt calm, no distress, breaths normal, continent B/B, swallows normal, precautions in place, bed alarm ref, calls appropriately, call light in reach, pt educated, will continue to educate

## 2019-03-06 NOTE — CARE PLAN
Problem: Communication  Goal: The ability to communicate needs accurately and effectively will improve  Outcome: PROGRESSING AS EXPECTED  Pt communicates needs effectively.   Call light w/in reach. Hourly rounding in place.     Problem: Venous Thromboembolism (VTW)/Deep Vein Thrombosis (DVT) Prevention:  Goal: Patient will participate in Venous Thrombosis (VTE)/Deep Vein Thrombosis (DVT)Prevention Measures  Outcome: PROGRESSING AS EXPECTED  SCDs in place.

## 2019-03-06 NOTE — CARE PLAN
Problem: Venous Thromboembolism (VTW)/Deep Vein Thrombosis (DVT) Prevention:  Goal: Patient will participate in Venous Thrombosis (VTE)/Deep Vein Thrombosis (DVT)Prevention Measures  SCDs in place.    Problem: Knowledge Deficit  Goal: Knowledge of disease process/condition, treatment plan, diagnostic tests, and medications will improve  Pt updated on plan of care.

## 2019-03-07 PROCEDURE — 700102 HCHG RX REV CODE 250 W/ 637 OVERRIDE(OP): Performed by: PSYCHIATRY & NEUROLOGY

## 2019-03-07 PROCEDURE — 95951 PR EEG MONITORING/VIDEORECORD: CPT | Mod: 26 | Performed by: PSYCHIATRY & NEUROLOGY

## 2019-03-07 PROCEDURE — A9270 NON-COVERED ITEM OR SERVICE: HCPCS | Performed by: PSYCHIATRY & NEUROLOGY

## 2019-03-07 PROCEDURE — 95951 HCHG EEG-VIDEO-24HR: CPT | Performed by: PSYCHIATRY & NEUROLOGY

## 2019-03-07 PROCEDURE — 770020 HCHG ROOM/CARE - TELE (206)

## 2019-03-07 PROCEDURE — 99231 SBSQ HOSP IP/OBS SF/LOW 25: CPT | Mod: 25 | Performed by: PSYCHIATRY & NEUROLOGY

## 2019-03-07 RX ADMIN — CLONAZEPAM 1 MG: 1 TABLET ORAL at 22:03

## 2019-03-07 RX ADMIN — ZIPRASIDONE HYDROCHLORIDE 80 MG: 80 CAPSULE ORAL at 22:03

## 2019-03-07 ASSESSMENT — PATIENT HEALTH QUESTIONNAIRE - PHQ9
2. FEELING DOWN, DEPRESSED, IRRITABLE, OR HOPELESS: NOT AT ALL
1. LITTLE INTEREST OR PLEASURE IN DOING THINGS: NOT AT ALL
SUM OF ALL RESPONSES TO PHQ9 QUESTIONS 1 AND 2: 0

## 2019-03-07 ASSESSMENT — LIFESTYLE VARIABLES: ALCOHOL_USE: NO

## 2019-03-07 NOTE — PROGRESS NOTES
Assumed care of patient from night shift RN  48 year old female  CODE: full  ALLERGIES: zoloft  ADMITTED: 3/4 for video EEG monitoring  PAIN: 2/10 denying pain medications at this time, repositioned  A&O: 4  RESP: RA, suction and O2 available at bedside  CARDIAC: WNL, tele monitoring  LBM: 3/7  DIET: regular  : voiding  SKIN: intact  PIV: 20 g R arm- SL  MOBILITY: standby  FALL RISK: none, calls appropriately  PLAN: continuous EEG monitoring, d/c friday  All questions answered and all needs met at this time. Bed in low, locked position. Call light within reach. Patient educated on importance of calling before getting OOB. Seizure precautions in place. RN will implement hourly rounding and answer call lights appropriately.

## 2019-03-07 NOTE — PROGRESS NOTES
"Cc: seizures      Problem List Items Addressed This Visit     None      Visit Diagnoses     Partial epilepsy (HCC)        Relevant Medications    LORazepam (ATIVAN) injection 1 mg    LORazepam (ATIVAN) injection 2 mg    acetaminophen (TYLENOL) tablet 650 mg    clonazePAM (KLONOPIN) tablet 1 mg    carBAMazepine (TEGRETOL) 200 MG Tab             Interim history:  Rianna Solis remains in the EMU. No more spells since first day of admission. We are holding gabapentin and tegretol. Had a nightmare last night but no spells. She was sleep deprived last night to only 4 hrs of sleep. She is tired. Mood is great. She is eating and drinking fluids without any issues.      She has no complaints.        Past medical history:   Past Medical History:   Diagnosis Date   • Abnormal mammogram 2006    nodule in left breast, no follow up   • Acromioclavicular joint separation 5/24/2012   • Anxiety     No [place] mental health; klonopin, coping mechanisms   • Depression    • History of cervical dysplasia 1990    had cryo tx, resolved with nl paps now   • History of suicidal ideation    • Homicidal ideations 2014    [facility] hold   • Hyperthyroidism    • Hyperthyroidism     since age 16, NL labs 03/2012   • Lisfranc's dislocation 10/2017    right   • Migraine    • Pelvic exam 2008    Dysplasia at age 20s, had cryotherapy   • Polysubstance dependence (HCC)     in remission as of 11/26/2014   • Schizoaffective disorder (HCC)    • Schizoaffective disorder, bipolar type (HCC) 4/7/2011   • Seizure (HCC) 1999    partial complex-no motor seizure; managed by [facility]   • Seizure disorder (HCC)     partial complex seizure d/o   • Toe fracture 10/2017    right 3rd-5th toes   • Wrist fracture 2010    casted by [facility]       Past surgical history:   Past Surgical History:   Procedure Laterality Date   • PRIMARY C SECTION  2008   • CERVICAL CONIZATION  1990    cryo for abnl pap   • EYE SURGERY  1973,1979    to repair \"lazy eye\" " "  • OTHER      eye surgeries as a child       Family history:   Family History   Problem Relation Age of Onset   • Cancer Mother         breast cancer   • Diabetes Mother    • Hypertension Mother    • Cancer Maternal Grandmother         stomach   • Stroke Maternal Grandfather    • Cancer Paternal Grandfather         lung   • Hypertension Father        Social history:   Social History     Social History   • Marital status:      Spouse name: N/A   • Number of children: N/A   • Years of education: N/A     Occupational History   • not working Other     Social History Main Topics   • Smoking status: Former Smoker     Packs/day: 0.25     Years: 25.00     Types: Cigarettes     Start date: 6/22/2016     Quit date: 12/28/2018   • Smokeless tobacco: Never Used   • Alcohol use 42.0 oz/week     60 Glasses of wine, 10 Shots of liquor per week      Comment: in remission;occasional   • Drug use: No      Comment: in remission   • Sexual activity: Yes     Partners: Male     Birth control/ protection: Condom      Comment:  x5 years     Other Topics Concern   • Not on file     Social History Narrative    ** Merged History Encounter **            Current medications:   Current Facility-Administered Medications   Medication Dose   • LORazepam (ATIVAN) injection 1 mg  1 mg    Or   • LORazepam (ATIVAN) injection 2 mg  2 mg   • acetaminophen (TYLENOL) tablet 650 mg  650 mg   • clonazePAM (KLONOPIN) tablet 1 mg  1 mg   • ziprasidone (GEODON) capsule 80 mg  80 mg       Medication Allergy:  Allergies   Allergen Reactions   • Zoloft Unspecified     \"Blows Up\"       Review of systems:   Constitutional: denies fever, night sweats, weight loss.   Eyes: denies acute eye pain or secretion.   Ears, Nose, Mouth, Throat: denies nasal secretion, nasal bleeding, difficulty swallowing, hearing loss, tinnitus, vertigo, ear pain, acute dental problems, oral ulcers or lesions.   Endocrine: denies recent weight changes, heat or cold " intolerance, polyuria, polydypsia, polyphagia,abnormal hair growth.  Cardiovascular: denies new onset of chest pain, palpitations, syncope, or dyspnea of exertion.  Pulmonary: denies shortness of breath, new onset of cough, hemoptysis, wheezing, chest pain or flu-like symptoms.   GI: denies nausea, vomiting, diarrhea, GI bleeding, change in appetite, abdominal pain, and change in bowel habits.  : denies dysuria, urinary incontinence, hematuria.  Heme/oncology: denies history of easy bruising or bleeding. No history of cancer, DVTor PE.  Allergy/immunology: denies hives/urticaria, or itching.   Dermatologic: denies new rash, or new skin lesions.  Musculoskeletal:denies joint swelling or pain, muscle pain, neck and back pain.   Neurologic: denies facial droopiness, muscle weakness (focal or generalized), paresthesias, anesthesia, ataxia, change in speech or language, memory loss, abnormal movements, seizures, loss of consciousness, or episodes of confusion.   Psychiatric: denies symptoms of worsening depression, hallucinations, mood swings or changes, suicidal or homicidal thoughts.          Physical examination:   Vitals:    03/06/19 2015 03/07/19 0000 03/07/19 0400 03/07/19 0800   BP: 129/70 123/82 130/88 120/82   Pulse: 71 81 77 78   Resp: 17 16 17 17   Temp: 36.7 °C (98 °F) 36.1 °C (97 °F) 36.1 °C (97 °F) 36.4 °C (97.5 °F)   TempSrc: Temporal Temporal Temporal Temporal   SpO2: 94% 94% 96% 98%   Weight:       Height:         General: Patient in no acute distress, pleasant and cooperative.  HEENT: Normocephalic, no signs of acute trauma.   Neck: supple, no meningeal signs or carotid bruits. There is normal range of motion. No tenderness on exam.   Chest: clear to auscultation. No cough.   CV: RRR, no murmurs.   Abdomen: soft, non distended or tender.   Skin: no signs of acute rashes or trauma.   Musculoskeletal: joints exhibit full range of motion, without any pain to palpation. There are no signs of joint or  muscle swelling. There is no tenderness to deep palpation of muscles.   Psychiatric: No hallucinatory behavior. Denies symptoms of depression or suicidal ideation. Mood and affect appear normal on exam.      NEUROLOGICAL EXAM:   Mental status, orientation: Awake, alert and fully oriented.   Speech and language: speech is clear and fluent. The patient is able to name, repeat and comprehend.   Memory: There is intact recollection of recent and remote events.   Cranial nerve exam: Pupils are 3-4 mm bilaterally and equally reactive to light and accommodation. Visual fields are intact by confrontation. Fundoscopic exam was unremarkable. There is no nystagmus on primary or secondary gaze. Intact full EOM in all directions of gaze. Face appears symmetric. Sensation in the face is intact to light touch. Uvula is midline. Palate elevates symmetrically. Tongue is midline and without any signs of tongue biting or fasciculations.Sternocleidomastoid muscles exhibit is normal strength bilaterally. Shoulder shrug is intact bilaterally.   Motor exam: Strength is 5/5 in all extremities. Tone is normal. No abnormal movements were seen on exam.   Sensory exam reveals normal sense of light touch in all extremities.   Deep tendon reflexes: 2+ throughout. Plantar responses are flexor. There is no clonus.   Coordination: shows a normal finger-nose-finger. Normal rapidly alternating movements.   Gait: The patient was able to get up from seated position on first attempt without requiring assistance. Found to be steady when walking. Movements were fluid with normal arm swing. The patient was able to turn without difficulties or tendency to fall. Romberg exam unremarkable.         ANCILLARY DATA REVIEWED:      Lab Data Review:  Reviewed in chart.         Records reviewed:   Chart reviewed.         Imaging:   MRI brain w/wo, 5/21/15:   1.  There is no evidence of acute tube microgram macroadenoma.  2.  Unremarkable MR examination of the brain  except for a punctate nonspecific right frontal white matter T2 hyperintensity of no clinical significance.     MRI brain w/wo, 2/14/2019:  1.  Minimal supratentorial white matter disease most consistent with microvascular ischemic change versus demyelination or gliosis.  2.  No evidence of heterotopic gray matter, gross cortical dysplasia, hippocampal sclerosis, or mass lesion. No epileptogenic FOCUS is identified.           EEG:   EMU/VEEG 5/9/16-5/13/16:   This is an abnormal 5 days continuous video electroencephalogram recording (epilepsy monitoring unit admission) in the awake, drowsy and sleep state. Rare left temporal sharps (T3) were seen post sleep deprivation. The findings increase risk for seizures, however no seizures or typical events were captured during this prolonged admission. An underlying area of cortical irritability and or structural abnormality is suggested. The nature of her events remains unknown. Clinical and imaging correlation is recommended.              ASSESSMENT AND PLAN:     1. 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 Visual changes     3. Mood disorder (HCC)     4. Screening for depression     5. Chronic daily headache           CLINICAL DISCUSSION:   Complex visual representations of unclear etiology. Not clearly associated with headaches. Reports more frequent spells over the last few months, and also part of words repeat in her head during the spells and states she has trouble talking too during spells. Has visual changes during spells.    Abnormal EEG but no seizures captured during last EMU. No clear evidence for epilepsy. Continue with VEEG/EMU, will attempt to capture spells, so far a minor spell first day of admission was not associated with any eeg changes. She has been sleep deprived for 3 nights in a row, ok to sleep tonight, anticipate d/c home tomorrow. We are holding neurontin and Tegretol for now, but will resume  tomorrow. She continues on Clonazepam.    Headaches are infrequent and not associated with spells.   On multiple AEDs from psych standpoint, bipolar.   We will attempt to capture the spells and provide management recommendations. The spells may not be epileptic but rather psychiatric in nature. She understands that and is excited about finding out cause.   She has no driving restrictions as spells do not impair her awareness or consciousness and the etiology is not clear. She understands she must stop driving immediately and report to us should she has any changes in awareness or consciousness. She agrees.      Fall precautions.      D/w rn.         FOLLOW-UP:   In my office after discharge.               EDUCATION AND COUNSELING:  -Discussed regular exercise program and prevention of cardiovascular disease, including stroke.   -Discussed healthy lifestyle, including: healthy diet (rich in fruits, vegetables, nuts and healthy oils); proper hydration, and adequate sleep hygiene (allowing 7-8 hrs of overnight sleep).     The patient understands and agrees that due to the complexity of his/her diagnosis, results of any testing and further recommendations will typically be discussed/made during a face to face encounter in my office. The patient and/or family further understands it is their responsibility to keep proper follow up.         Venkata Reid MD   Epilepsy and Neurodiagnostics.   Clinical  of Neurology [employer] [facility] [employer].   Diplomate in Neurology, Epilepsy, and Electrodiagnostic Medicine.   Office: [phone]  Fax: [phone]        I spent a total of 36 minutes in the care of this patient today.

## 2019-03-07 NOTE — PROGRESS NOTES
"Cc: seizures.       Problem List Items Addressed This Visit     None      Visit Diagnoses     Partial epilepsy (HCC)        Relevant Medications    LORazepam (ATIVAN) injection 1 mg    LORazepam (ATIVAN) injection 2 mg    acetaminophen (TYLENOL) tablet 650 mg    clonazePAM (KLONOPIN) tablet 1 mg    carBAMazepine (TEGRETOL) 200 MG Tab          Interim history:  Rianna Solis remains in the EMU. No more spells. We are holding gabapentin and held tegretol last night. She was sleep deprived last night to only 4 hrs of sleep. She is tired. Mood is great. She has a friend visiting her today. She is eating and drinking fluids without any issues.     She has no complaints.       Past medical history:   Past Medical History:   Diagnosis Date   • Abnormal mammogram 2006    nodule in left breast, no follow up   • Acromioclavicular joint separation 5/24/2012   • Anxiety     No [place] mental health; klonopin, coping mechanisms   • Depression    • History of cervical dysplasia 1990    had cryo tx, resolved with nl paps now   • History of suicidal ideation    • Homicidal ideations 2014    [facility] hold   • Hyperthyroidism    • Hyperthyroidism     since age 16, NL labs 03/2012   • Lisfranc's dislocation 10/2017    right   • Migraine    • Pelvic exam 2008    Dysplasia at age 20s, had cryotherapy   • Polysubstance dependence (HCC)     in remission as of 11/26/2014   • Schizoaffective disorder (HCC)    • Schizoaffective disorder, bipolar type (HCC) 4/7/2011   • Seizure (HCC) 1999    partial complex-no motor seizure; managed by [facility] mental health   • Seizure disorder (HCC)     partial complex seizure d/o   • Toe fracture 10/2017    right 3rd-5th toes   • Wrist fracture 2010    casted by [facility]       Past surgical history:   Past Surgical History:   Procedure Laterality Date   • PRIMARY C SECTION  2008   • CERVICAL CONIZATION  1990    cryo for abnl pap   • EYE SURGERY  1973,1979    to repair \"lazy eye\"   • OTHER      eye " "surgeries as a child       Family history:   Family History   Problem Relation Age of Onset   • Cancer Mother         breast cancer   • Diabetes Mother    • Hypertension Mother    • Cancer Maternal Grandmother         stomach   • Stroke Maternal Grandfather    • Cancer Paternal Grandfather         lung   • Hypertension Father        Social history:   Social History     Social History   • Marital status:      Spouse name: N/A   • Number of children: N/A   • Years of education: N/A     Occupational History   • not working Other     Social History Main Topics   • Smoking status: Former Smoker     Packs/day: 0.25     Years: 25.00     Types: Cigarettes     Start date: 6/22/2016     Quit date: 12/28/2018   • Smokeless tobacco: Never Used   • Alcohol use 42.0 oz/week     60 Glasses of wine, 10 Shots of liquor per week      Comment: in remission;occasional   • Drug use: No      Comment: in remission   • Sexual activity: Yes     Partners: Male     Birth control/ protection: Condom      Comment:  x5 years     Other Topics Concern   • Not on file     Social History Narrative    ** Merged History Encounter **            Current medications:   Current Facility-Administered Medications   Medication Dose   • LORazepam (ATIVAN) injection 1 mg  1 mg    Or   • LORazepam (ATIVAN) injection 2 mg  2 mg   • acetaminophen (TYLENOL) tablet 650 mg  650 mg   • clonazePAM (KLONOPIN) tablet 1 mg  1 mg   • ziprasidone (GEODON) capsule 80 mg  80 mg       Medication Allergy:  Allergies   Allergen Reactions   • Zoloft Unspecified     \"Blows Up\"       Review of systems:   Constitutional: denies fever, night sweats, weight loss.   Eyes: denies acute eye pain or secretion.   Ears, Nose, Mouth, Throat: denies nasal secretion, nasal bleeding, difficulty swallowing, hearing loss, tinnitus, vertigo, ear pain, acute dental problems, oral ulcers or lesions.   Endocrine: denies recent weight changes, heat or cold intolerance, polyuria, " polydypsia, polyphagia,abnormal hair growth.  Cardiovascular: denies new onset of chest pain, palpitations, syncope, or dyspnea of exertion.  Pulmonary: denies shortness of breath, new onset of cough, hemoptysis, wheezing, chest pain or flu-like symptoms.   GI: denies nausea, vomiting, diarrhea, GI bleeding, change in appetite, abdominal pain, and change in bowel habits.  : denies dysuria, urinary incontinence, hematuria.  Heme/oncology: denies history of easy bruising or bleeding. No history of cancer, DVTor PE.  Allergy/immunology: denies hives/urticaria, or itching.   Dermatologic: denies new rash, or new skin lesions.  Musculoskeletal:denies joint swelling or pain, muscle pain, neck and back pain.   Neurologic: denies facial droopiness, muscle weakness (focal or generalized), paresthesias, anesthesia, ataxia, change in speech or language, memory loss, abnormal movements, seizures, loss of consciousness, or episodes of confusion.   Psychiatric: denies symptoms of worsening depression, hallucinations, mood swings or changes, suicidal or homicidal thoughts.       Physical examination:   Vitals:    03/06/19 0000 03/06/19 0400 03/06/19 0751 03/06/19 1228   BP: 135/80 132/95 133/86 135/86   Pulse: 84 83 76 71   Resp: 17 17 16 17   Temp: 36.6 °C (97.8 °F) 36.2 °C (97.2 °F) 36.1 °C (96.9 °F) 36.1 °C (97 °F)   TempSrc: Temporal Temporal Temporal Temporal   SpO2: 94% 93% 94% 93%   Weight:       Height:         General: Patient in no acute distress, pleasant and cooperative.  HEENT: Normocephalic, no signs of acute trauma.   Neck: supple, no meningeal signs or carotid bruits. There is normal range of motion. No tenderness on exam.   Chest: clear to auscultation. No cough.   CV: RRR, no murmurs.   Abdomen: soft, non distended or tender.   Skin: no signs of acute rashes or trauma.   Musculoskeletal: joints exhibit full range of motion, without any pain to palpation. There are no signs of joint or muscle swelling. There is  no tenderness to deep palpation of muscles.   Psychiatric: No hallucinatory behavior. Denies symptoms of depression or suicidal ideation. Mood and affect appear normal on exam.      NEUROLOGICAL EXAM:   Mental status, orientation: Awake, alert and fully oriented.   Speech and language: speech is clear and fluent. The patient is able to name, repeat and comprehend.   Memory: There is intact recollection of recent and remote events.   Cranial nerve exam: Pupils are 3-4 mm bilaterally and equally reactive to light and accommodation. Visual fields are intact by confrontation. Fundoscopic exam was unremarkable. There is no nystagmus on primary or secondary gaze. Intact full EOM in all directions of gaze. Face appears symmetric. Sensation in the face is intact to light touch. Uvula is midline. Palate elevates symmetrically. Tongue is midline and without any signs of tongue biting or fasciculations.Sternocleidomastoid muscles exhibit is normal strength bilaterally. Shoulder shrug is intact bilaterally.   Motor exam: Strength is 5/5 in all extremities. Tone is normal. No abnormal movements were seen on exam.   Sensory exam reveals normal sense of light touch in all extremities.   Deep tendon reflexes: 2+ throughout. Plantar responses are flexor. There is no clonus.   Coordination: shows a normal finger-nose-finger. Normal rapidly alternating movements.   Gait: The patient was able to get up from seated position on first attempt without requiring assistance. Found to be steady when walking. Movements were fluid with normal arm swing. The patient was able to turn without difficulties or tendency to fall. Romberg exam unremarkable.         ANCILLARY DATA REVIEWED:      Lab Data Review:  Reviewed in chart.         Records reviewed:   Chart reviewed.         Imaging:   MRI brain w/wo, 5/21/15:   1.  There is no evidence of acute tube microgram macroadenoma.  2.  Unremarkable MR examination of the brain except for a punctate  nonspecific right frontal white matter T2 hyperintensity of no clinical significance.     MRI brain w/wo, 2/14/2019:  1.  Minimal supratentorial white matter disease most consistent with microvascular ischemic change versus demyelination or gliosis.  2.  No evidence of heterotopic gray matter, gross cortical dysplasia, hippocampal sclerosis, or mass lesion. No epileptogenic FOCUS is identified.           EEG:   EMU/VEEG 5/9/16-5/13/16:   This is an abnormal 5 days continuous video electroencephalogram recording (epilepsy monitoring unit admission) in the awake, drowsy and sleep state. Rare left temporal sharps (T3) were seen post sleep deprivation. The findings increase risk for seizures, however no seizures or typical events were captured during this prolonged admission. An underlying area of cortical irritability and or structural abnormality is suggested. The nature of her events remains unknown. Clinical and imaging correlation is recommended.              ASSESSMENT AND PLAN:     1. 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 Visual changes     3. Mood disorder (HCC)     4. Screening for depression     5. Chronic daily headache           CLINICAL DISCUSSION:   Complex visual representations of unclear etiology. Not clearly associated with headaches. Reports more frequent spells over the last few months, and also part of words repeat in her head during the spells and states she has trouble talking too during spells. Has visual changes during spells.    Abnormal EEG but no seizures captured during last EMU. No clear evidence for epilepsy. Continue with VEEG/EMU, will attempt to capture spells, so far a minor spell first day of admission was not associated with any eeg changes. She agrees on sleep deprivation tonight and holding Tegretol for now. We are also holding neurontin.    Headaches are infrequent and not associated with spells.   On multiple AEDs from psych  standpoint, bipolar.   We will attempt to capture the spells and provide management recommendations. The spells may not be epileptic but rather psychiatric in nature. She understands that and is excited about finding out cause.   She has no driving restrictions as spells do not impair her awareness or consciousness and the etiology is not clear. She understands she must stop driving immediately and report to us should she has any changes in awareness or consciousness. She agrees.      Fall precautions.     D/w rn.        FOLLOW-UP:   In my office after discharge.               EDUCATION AND COUNSELING:  -Discussed regular exercise program and prevention of cardiovascular disease, including stroke.   -Discussed healthy lifestyle, including: healthy diet (rich in fruits, vegetables, nuts and healthy oils); proper hydration, and adequate sleep hygiene (allowing 7-8 hrs of overnight sleep).     The patient understands and agrees that due to the complexity of his/her diagnosis, results of any testing and further recommendations will typically be discussed/made during a face to face encounter in my office. The patient and/or family further understands it is their responsibility to keep proper follow up.         Venkata Reid MD   Epilepsy and Neurodiagnostics.   Clinical  of Neurology [facility].   Diplomate in Neurology, Epilepsy, and Electrodiagnostic Medicine.   Office: [phone]  Fax: [phone]        I spent a total of 35 minutes in the care of this patient today.

## 2019-03-08 VITALS
HEIGHT: 62 IN | HEART RATE: 76 BPM | OXYGEN SATURATION: 93 % | SYSTOLIC BLOOD PRESSURE: 138 MMHG | RESPIRATION RATE: 18 BRPM | DIASTOLIC BLOOD PRESSURE: 88 MMHG | BODY MASS INDEX: 37.49 KG/M2 | TEMPERATURE: 97.3 F | WEIGHT: 203.71 LBS

## 2019-03-08 PROCEDURE — 95951 EEG: CPT | Mod: 26,52 | Performed by: PSYCHIATRY & NEUROLOGY

## 2019-03-08 PROCEDURE — 95951 EEG: CPT | Mod: 52 | Performed by: PSYCHIATRY & NEUROLOGY

## 2019-03-08 PROCEDURE — 99239 HOSP IP/OBS DSCHRG MGMT >30: CPT | Mod: 25 | Performed by: PSYCHIATRY & NEUROLOGY

## 2019-03-08 PROCEDURE — A9270 NON-COVERED ITEM OR SERVICE: HCPCS | Performed by: PSYCHIATRY & NEUROLOGY

## 2019-03-08 PROCEDURE — 700102 HCHG RX REV CODE 250 W/ 637 OVERRIDE(OP): Performed by: PSYCHIATRY & NEUROLOGY

## 2019-03-08 RX ORDER — CARBAMAZEPINE 200 MG/1
200 TABLET ORAL 2 TIMES DAILY
Status: DISCONTINUED | OUTPATIENT
Start: 2019-03-08 | End: 2019-03-08 | Stop reason: HOSPADM

## 2019-03-08 RX ORDER — GABAPENTIN 300 MG/1
300 CAPSULE ORAL 3 TIMES DAILY
Status: DISCONTINUED | OUTPATIENT
Start: 2019-03-08 | End: 2019-03-08 | Stop reason: HOSPADM

## 2019-03-08 RX ADMIN — GABAPENTIN 300 MG: 300 CAPSULE ORAL at 11:45

## 2019-03-08 RX ADMIN — CARBAMAZEPINE 200 MG: 200 TABLET ORAL at 08:27

## 2019-03-08 RX ADMIN — GABAPENTIN 300 MG: 300 CAPSULE ORAL at 08:27

## 2019-03-08 NOTE — DISCHARGE INSTRUCTIONS
Discharge Instructions    Discharged to home by car with relative. Discharged via wheelchair, hospital escort: Yes.  Special equipment needed: Not Applicable    Be sure to schedule a follow-up appointment with your primary care doctor or any specialists as instructed.     Discharge Plan:   Diet Plan: Discussed  Activity Level: Discussed  Smoking Cessation Offered: Patient Refused  Confirmed Follow up Appointment: Appointment Scheduled  Confirmed Symptoms Management: Discussed  Medication Reconciliation Updated: Yes  Influenza Vaccine Indication: Not indicated: Previously immunized this influenza season and > 8 years of age    I understand that a diet low in cholesterol, fat, and sodium is recommended for good health. Unless I have been given specific instructions below for another diet, I accept this instruction as my diet prescription.   Other diet: Regular    Special Instructions: None    · Is patient discharged on Warfarin / Coumadin?   No       Seizure, Adult  A seizure is a sudden burst of abnormal electrical activity in the brain. The abnormal activity temporarily interrupts normal brain function, causing a person to experience any of the following:  · Involuntary movements.  · Changes in awareness or consciousness.  · Uncontrollable shaking (convulsions).  Seizures usually last from 30 seconds to 2 minutes. They usually do not cause permanent brain damage unless they are prolonged.  What can cause a seizure to happen?  Seizures can happen for many reasons including:  · A fever.  · Low blood sugar.  · A medicine.  · An illnesses.  · A brain injury.  Some people who have a seizure never have another one. People who have repeated seizures have a condition called epilepsy.  What are the symptoms of a seizure?  Symptoms of a seizure vary greatly from person to person. They include:  · Convulsions.  · Stiffening of the body.  · Involuntary movements of the arms or legs.  · Loss of consciousness.  · Breathing  problems.  · Falling suddenly.  · Confusion.  · Head nodding.  · Eye blinking or fluttering.  · Lip smacking.  · Drooling.  · Rapid eye movements.  · Grunting.  · Loss of bladder control and bowel control.  · Staring.  · Unresponsiveness.  Some people have symptoms right before a seizure happens (aura) and right after a seizure happens. Symptoms of an aura include:  · Fear or anxiety.  · Nausea.  · Feeling like the room is spinning (vertigo).  · A feeling of having seen or heard something before (marisol vu).  · Odd tastes or smells.  · Changes in vision, such as seeing flashing lights or spots.  Symptoms that may follow a seizure include:  · Confusion.  · Sleepiness.  · Headache.  · Weakness of one side of the body.  Follow these instructions at home:  Medicines  · Take over-the-counter and prescription medicines only as told by your health care provider.  · Avoid any substances that may prevent your medicine from working properly, such as alcohol.  Activity  · Do not drive, swim, or do any other activities that would be dangerous if you had another seizure. Wait until your health care provider approves.  · If you live in the [place] check with your local DMV (department of motor vehicles) to find out about the local driving laws. Each state has specific rules about when you can legally return to driving.  · Get enough rest. Lack of sleep can make seizures more likely to occur.  Educating others  Teach friends and family what to do if you have a seizure. They should:  · Lay you on the ground to prevent a fall.  · Cushion your head and body.  · Loosen any tight clothing around your neck.  · Turn you on your side. If vomiting occurs, this helps keep your airway clear.  · Stay with you until you recover.  · Not hold you down. Holding you down will not stop the seizure.  · Not put anything in your mouth.  · Know whether or not you need emergency care.  General instructions  · Contact your health care provider each time you  have a seizure.  · Avoid anything that has ever triggered a seizure for you.  · Keep a seizure diary. Record what you remember about each seizure, especially anything that might have triggered the seizure.  · Keep all follow-up visits as told by your health care provider. This is important.  Contact a health care provider if:  · You have another seizure.  · You have seizures more often.  · Your seizure symptoms change.  · You continue to have seizures with treatment.  · You have symptoms of an infection or illness. They might increase your risk of having a seizure.  Get help right away if:  · You have a seizure:  ¨ That lasts longer than 5 minutes.  ¨ That is different than previous seizures.  ¨ That leaves you unable to speak or use a part of your body.  ¨ That makes it harder to breathe.  ¨ After a head injury.  · You have:  ¨ Multiple seizures in a row.  ¨ Confusion or a severe headache right after a seizure.  · You are having seizures more often.  · You do not wake up immediately after a seizure.  · You injure yourself during a seizure.  These symptoms may represent a serious problem that is an emergency. Do not wait to see if the symptoms will go away. Get medical help right away. Call your local emergency services (911 in the [place] Do not drive yourself to the hospital.   This information is not intended to replace advice given to you by your health care provider. Make sure you discuss any questions you have with your health care provider.  Document Released: 12/15/2001 Document Revised: 08/13/2017 Document Reviewed: 07/21/2017  [employer] Interactive Patient Education © 2017 [employer]      Depression / Suicide Risk    As you are discharged from this [facility] facility, it is important to learn how to keep safe from harming yourself.    Recognize the warning signs:  · Abrupt changes in personality, positive or negative- including increase in energy   · Giving away possessions  · Change in eating  patterns- significant weight changes-  positive or negative  · Change in sleeping patterns- unable to sleep or sleeping all the time   · Unwillingness or inability to communicate  · Depression  · Unusual sadness, discouragement and loneliness  · Talk of wanting to die  · Neglect of personal appearance   · Rebelliousness- reckless behavior  · Withdrawal from people/activities they love  · Confusion- inability to concentrate     If you or a loved one observes any of these behaviors or has concerns about self-harm, here's what you can do:  · Talk about it- your feelings and reasons for harming yourself  · Remove any means that you might use to hurt yourself (examples: pills, rope, extension cords, firearm)  · Get professional help from the community (Mental Health, Substance Abuse, psychological counseling)  · Do not be alone:Call your Safe Contact- someone whom you trust who will be there for you.  · Call your local CRISIS HOTLINE 919-2463 or 361-576-8405  · Call your local Children's Mobile Crisis Response Team [place] (745) 543-6856 or www.VOSS  · Call the toll free National Suicide Prevention Hotlines   · National Suicide Prevention Lifeline 953-738-TMPW (1559)  · National Hope Line Network 800-SUICIDE (702-5723)

## 2019-03-08 NOTE — PROGRESS NOTES
Monitor summary: SR:74-97, NV:.16, QRS:.08, QT:.40 with a 1.2 second pause per strip from monitor room.

## 2019-03-08 NOTE — PROGRESS NOTES
Surgery patient?: No  Date of surgery: N/A  Ambulated 50 ft on day of surgery? (N/A if today is not date of surgery): N/A  Number of times ambulated 50 feet or greater today: 2  Patient has been up to chair, edge of bed or HOB 90 degrees for all meals?: Yes  Goal met? (goal is ambulating at least 50 feet 2 times on day shift, one time on night shift): No  If patient did not meet mobility goal, why?: Only 1 walk on dayshift and 1 on nightshift

## 2019-03-08 NOTE — DISCHARGE SUMMARY
Discharge Summary    CHIEF COMPLAINT ON ADMISSION  Seizures.     Reason for Admission  Epilepsy     Admission Date  3/4/2019    CODE STATUS  Full Code    HPI & HOSPITAL COURSE  This is a 48 y.o. female who was electively admitted to the emu from 3/4/19 through 3/8/2019. The patient complaints of recurrent spells that include visual changes, aphasia, maybe confusion. She was sleep deprived three nights in a row to a maximum of 4 hrs overnight sleep. We held Neurontin since admission and Tegretol on day two of admission. She remained on Clonazepam which she uses for psychiatric reasons. Despite sleep deprivation and holding of AEDs, only one mild spell of visual changes was captured, which was not epileptic in nature. Her EEG is otherwise abnormal with rare left temporal sharps but no seizures were captured despite the provoking maneuvers employed.     There were no complications of her stay.     She will remain on Neurontin, Tegretol and clonazepam which she uses for her bipolar. She will continue to fu with psychiatry.     She will entertain obtaining a 48 hrs amb eeg should spells recur.     I have no evidence so far that she suffers from epilepsy. Spell captured was not epileptic. She has no driving restrictions at this time.     She will keep us posted of any further spells and will fu with me in the near future.        Therefore, she is discharged in good and stable condition to home with close outpatient follow-up.    Discharge Date  3/8/2019    FOLLOW UP ITEMS POST DISCHARGE  In my office.     DISCHARGE DIAGNOSES  Active Problems:    * No active hospital problems. *  Resolved Problems:    * No resolved hospital problems. *      FOLLOW UP  Future Appointments  Date Time Provider Department Center   4/26/2019 1:40 PM SHAY BrownR None   7/22/2019 9:40 AM JAVID Liao None     No follow-up provider specified.    MEDICATIONS ON DISCHARGE     Medication List      CONTINUE taking  "these medications      Instructions   carBAMazepine 200 MG Tabs  Commonly known as:  TEGRETOL   Take 200 mg by mouth 2 Times a Day.  Dose:  200 mg     clonazePAM 1 MG Tabs  Commonly known as:  KLONOPIN   Take 1 mg by mouth 1 time daily as needed (seizures).  Dose:  1 mg     gabapentin 300 MG Caps  Commonly known as:  NEURONTIN   Take 300 mg by mouth 3 times a day.  Dose:  300 mg     quetiapine 300 MG tablet  Commonly known as:  SEROQUEL   Take 150-300 mg by mouth 1 time daily as needed (psychosis).  Dose:  150-300 mg     ziprasidone 80 MG Caps  Commonly known as:  GEODON   Take 80 mg by mouth every evening.  Dose:  80 mg            Allergies  Allergies   Allergen Reactions   • Zoloft Unspecified     \"Blows Up\"       DIET  Orders Placed This Encounter   Procedures   • Diet Order Regular     Standing Status:   Standing     Number of Occurrences:   1     Order Specific Question:   Diet:     Answer:   Regular [1]       ACTIVITY  As tolerated.  Weight bearing as tolerated    CONSULTATIONS  None.     PROCEDURES  Veeg.     LABORATORY  Lab Results   Component Value Date    SODIUM 136 11/09/2018    POTASSIUM 4.3 11/09/2018    CHLORIDE 104 11/09/2018    CO2 28 11/09/2018    GLUCOSE 94 11/09/2018    BUN 14 11/09/2018    CREATININE 0.62 11/09/2018        Lab Results   Component Value Date    WBC 6.6 11/25/2017    HEMOGLOBIN 14.4 11/25/2017    HEMATOCRIT 42.0 11/25/2017    PLATELETCT 357 11/25/2017        Total time of the discharge process exceeds 43 minutes.  "

## 2019-03-11 ENCOUNTER — PATIENT OUTREACH (OUTPATIENT)
Dept: HEALTH INFORMATION MANAGEMENT | Facility: OTHER | Age: 49
End: 2019-03-11

## 2019-03-11 NOTE — PROGRESS NOTES
3/11/19 11:00am:  post discharge outreach call first attempt.   left requesting return call to  at [phone].

## 2019-04-03 ENCOUNTER — PATIENT MESSAGE (OUTPATIENT)
Dept: MEDICAL GROUP | Facility: MEDICAL CENTER | Age: 49
End: 2019-04-03

## 2019-04-03 DIAGNOSIS — R10.13 DYSPEPSIA: ICD-10-CM

## 2019-04-03 NOTE — TELEPHONE ENCOUNTER
From: Rianna Solis  To: SHAY Lyman  Sent: 4/3/2019 8:45 AM PDT  Subject: Non-Urgent Medical Question    Good morning! My  had an endoscopy recently, and the results came back positive for h-pylori. His doctor says that it is very contagious, and that I may need to be treated with antibiotics as well. Is this something for which I would need to come in for a visit to test, or could you simply prescribe medication? I notice that I need to come in for a pap smear...should I just schedule a visit for both? Please advise...thank you.

## 2019-04-15 ENCOUNTER — HOSPITAL ENCOUNTER (OUTPATIENT)
Dept: LAB | Facility: MEDICAL CENTER | Age: 49
End: 2019-04-15
Attending: NURSE PRACTITIONER
Payer: MEDICARE

## 2019-04-15 PROCEDURE — 83013 H PYLORI (C-13) BREATH: CPT

## 2019-04-16 LAB — UREA BREATH TEST QL: NEGATIVE

## 2019-04-26 ENCOUNTER — SLEEP CENTER VISIT (OUTPATIENT)
Dept: SLEEP MEDICINE | Facility: MEDICAL CENTER | Age: 49
End: 2019-04-26
Payer: MEDICARE

## 2019-04-26 VITALS
DIASTOLIC BLOOD PRESSURE: 74 MMHG | WEIGHT: 210.3 LBS | SYSTOLIC BLOOD PRESSURE: 124 MMHG | HEIGHT: 62 IN | RESPIRATION RATE: 16 BRPM | BODY MASS INDEX: 38.7 KG/M2 | OXYGEN SATURATION: 95 % | HEART RATE: 110 BPM

## 2019-04-26 DIAGNOSIS — G47.33 OSA (OBSTRUCTIVE SLEEP APNEA): ICD-10-CM

## 2019-04-26 PROBLEM — Z87.891 FORMER SMOKER: Status: ACTIVE | Noted: 2019-04-26

## 2019-04-26 PROCEDURE — 99213 OFFICE O/P EST LOW 20 MIN: CPT | Performed by: NURSE PRACTITIONER

## 2019-04-26 ASSESSMENT — ENCOUNTER SYMPTOMS
EYE DISCHARGE: 0
GASTROINTESTINAL NEGATIVE: 1
INSOMNIA: 0
NEUROLOGICAL NEGATIVE: 1
DIAPHORESIS: 0
FEVER: 0
MUSCULOSKELETAL NEGATIVE: 1
DEPRESSION: 0
CARDIOVASCULAR NEGATIVE: 1
WEIGHT LOSS: 0
HALLUCINATIONS: 0
MEMORY LOSS: 0
EYE PAIN: 0
WEAKNESS: 0
NERVOUS/ANXIOUS: 1
RESPIRATORY NEGATIVE: 1
BRUISES/BLEEDS EASILY: 0
CHILLS: 0

## 2019-04-26 ASSESSMENT — LIFESTYLE VARIABLES: SUBSTANCE_ABUSE: 0

## 2019-04-26 NOTE — PROGRESS NOTES
Chief Complaint   Patient presents with   • Apnea     Last Seen 01/04/19   • Results     Sleep Study 02/03/19         HPI: This patient is a 48 y.o. female, who presents for titration results.     She was seen in consultation with Dr. Reid December 6, 2018.  She reports loud snoring, her  notes apneic events, she reports daytime fatigue and frequent nocturnal awakenings.     PSG indicates severe sleep apnea with an AHI of 24.8, REM index 56.1, minimum oxygen saturation of 85 %.  Most of her events occurred in the early morning hours thus titration was not able to be completed.  She return for dedicated night titration.  She did best on BiPAP 15/11 cm H2O.  This resulted in a brief period of REM, AHI is reduced to 0.4 and mean saturation was 94%.  Testing reviewed with the patient today.  She is eager to initiate therapy.      She has a significant psychiatric history including depression, anxiety, schizoaffective disorder/bipolar.      Past Medical History:   Diagnosis Date   • Abnormal mammogram 2006    nodule in left breast, no follow up   • Acromioclavicular joint separation 5/24/2012   • Anxiety     [facility]; klonopin, coping mechanisms   • Depression    • History of cervical dysplasia 1990    had cryo tx, resolved with nl paps now   • History of suicidal ideation    • Homicidal ideations 2014    [facility] hold   • Hyperthyroidism    • Hyperthyroidism     since age 16, NL labs 03/2012   • Lisfranc's dislocation 10/2017    right   • Migraine    • Pelvic exam 2008    Dysplasia at age 20s, had cryotherapy   • Polysubstance dependence (HCC)     in remission as of 11/26/2014   • Schizoaffective disorder (HCC)    • Schizoaffective disorder, bipolar type (HCC) 4/7/2011   • Seizure (HCC) 1999    partial complex-no motor seizure; managed by [facility]   • Seizure disorder (HCC)     partial complex seizure d/o   • Toe fracture 10/2017    right 3rd-5th toes   • Wrist fracture 2010     "casted by [facility]       Social History   Substance Use Topics   • Smoking status: Former Smoker     Packs/day: 0.25     Years: 25.00     Types: Cigarettes     Start date: 6/22/2016     Quit date: 12/28/2018   • Smokeless tobacco: Never Used   • Alcohol use Yes      Comment: in remission;occasional       Family History   Problem Relation Age of Onset   • Cancer Mother         breast cancer   • Diabetes Mother    • Hypertension Mother    • Cancer Maternal Grandmother         stomach   • Stroke Maternal Grandfather    • Cancer Paternal Grandfather         lung   • Hypertension Father        Immunization History   Administered Date(s) Administered   • Influenza (IM) Preservative Free 10/04/2018   • Influenza Seasonal Injectable 11/05/2011   • Influenza TIV (IM) 11/05/2011   • Influenza Vaccine Quad Inj (Pf) 01/15/2018, 10/04/2018   • Influenza Vaccine Quad Inj (Preserved) 12/05/2014, 10/02/2015   • Tdap Vaccine 01/15/2018       Current medications as of today   Current Outpatient Prescriptions   Medication Sig Dispense Refill   • carBAMazepine (TEGRETOL) 200 MG Tab Take 200 mg by mouth 2 Times a Day.     • clonazePAM (KLONOPIN) 1 MG Tab Take 1 mg by mouth 1 time daily as needed (seizures).  3   • gabapentin (NEURONTIN) 300 MG Cap Take 300 mg by mouth 3 times a day.  3   • quetiapine (SEROQUEL) 300 MG tablet Take 150-300 mg by mouth 1 time daily as needed (psychosis).  3   • ziprasidone (GEODON) 80 MG Cap Take 80 mg by mouth every evening.  3     No current facility-administered medications for this visit.        Allergies: Zoloft    /74 (BP Location: Left arm, Patient Position: Sitting, BP Cuff Size: Adult)   Pulse (!) 110   Resp 16   Ht 1.575 m (5' 2\")   Wt 95.4 kg (210 lb 4.8 oz)   SpO2 95%       Review of Systems   Constitutional: Positive for malaise/fatigue. Negative for chills, diaphoresis, fever and weight loss.   HENT: Negative.    Eyes: Negative for pain and discharge.   Respiratory: Negative.  "   Cardiovascular: Negative.    Gastrointestinal: Negative.    Musculoskeletal: Negative.    Skin: Negative.    Neurological: Negative.  Negative for weakness.   Endo/Heme/Allergies: Negative for environmental allergies. Does not bruise/bleed easily.   Psychiatric/Behavioral: Negative for depression, hallucinations, memory loss, substance abuse and suicidal ideas. The patient is nervous/anxious. The patient does not have insomnia.        Physical Exam   Constitutional: She is oriented to person, place, and time and well-developed, well-nourished, and in no distress.   HENT:   Head: Normocephalic and atraumatic.   Eyes: Pupils are equal, round, and reactive to light.   Neck: Normal range of motion. Neck supple. No tracheal deviation present.   Cardiovascular: Normal rate, regular rhythm and normal heart sounds.    Pulmonary/Chest: Effort normal and breath sounds normal. No respiratory distress. She has no wheezes. She has no rales.   Musculoskeletal: Normal range of motion.   Neurological: She is alert and oriented to person, place, and time.   Skin: Skin is warm and dry.   Psychiatric: Mood, memory, affect and judgment normal.   Vitals reviewed.      Diagnoses/Plan:    1. HERLINDA (obstructive sleep apnea)  [place] BiPAP 15/11 cm H2O.  I would like her to follow-up in 3 months time to review complaints, sooner if needed.  - [facility] BiPAP      This dictation was created using voice recognition software. The accuracy of the dictation is limited to the abilities of the software. I expect there may be some errors of grammar and possibly content.

## 2019-05-06 ENCOUNTER — HOSPITAL ENCOUNTER (OUTPATIENT)
Facility: MEDICAL CENTER | Age: 49
End: 2019-05-06
Attending: NURSE PRACTITIONER
Payer: MEDICARE

## 2019-05-06 ENCOUNTER — OFFICE VISIT (OUTPATIENT)
Dept: MEDICAL GROUP | Facility: MEDICAL CENTER | Age: 49
End: 2019-05-06
Payer: MEDICARE

## 2019-05-06 VITALS
TEMPERATURE: 96.8 F | WEIGHT: 205.25 LBS | HEIGHT: 61 IN | OXYGEN SATURATION: 95 % | HEART RATE: 100 BPM | SYSTOLIC BLOOD PRESSURE: 102 MMHG | DIASTOLIC BLOOD PRESSURE: 58 MMHG | BODY MASS INDEX: 38.75 KG/M2

## 2019-05-06 DIAGNOSIS — Z01.419 WELL WOMAN EXAM: ICD-10-CM

## 2019-05-06 DIAGNOSIS — E66.9 OBESITY (BMI 35.0-39.9 WITHOUT COMORBIDITY): ICD-10-CM

## 2019-05-06 DIAGNOSIS — Z12.4 CERVICAL CANCER SCREENING: ICD-10-CM

## 2019-05-06 DIAGNOSIS — F31.9 BIPOLAR 1 DISORDER (HCC): ICD-10-CM

## 2019-05-06 DIAGNOSIS — R79.89 LOW TSH LEVEL: ICD-10-CM

## 2019-05-06 DIAGNOSIS — R73.03 PREDIABETES: ICD-10-CM

## 2019-05-06 DIAGNOSIS — R73.02 IGT (IMPAIRED GLUCOSE TOLERANCE): ICD-10-CM

## 2019-05-06 DIAGNOSIS — Z11.51 ENCOUNTER FOR SCREENING FOR HUMAN PAPILLOMAVIRUS (HPV): ICD-10-CM

## 2019-05-06 PROCEDURE — 369999 HCHG MISC LAB CHARGE

## 2019-05-06 PROCEDURE — G0101 CA SCREEN;PELVIC/BREAST EXAM: HCPCS | Performed by: NURSE PRACTITIONER

## 2019-05-06 PROCEDURE — 88142 CYTOPATH C/V THIN LAYER: CPT

## 2019-05-06 ASSESSMENT — ENCOUNTER SYMPTOMS: GENERAL WELL-BEING: GOOD

## 2019-05-06 ASSESSMENT — ACTIVITIES OF DAILY LIVING (ADL): BATHING_REQUIRES_ASSISTANCE: 0

## 2019-05-06 ASSESSMENT — PATIENT HEALTH QUESTIONNAIRE - PHQ9: CLINICAL INTERPRETATION OF PHQ2 SCORE: 0

## 2019-05-06 NOTE — PROGRESS NOTES
Chief Complaint   Patient presents with   • Annual Exam     pap         HPI:  Rianna is a 48 y.o. here for Medicare Annual Wellness Visit    Patient Active Problem List    Diagnosis Date Noted   • Partial seizure disorder (HCC)      Priority: Medium   • History of suicide attempt 02/18/2016     Priority: Low   • Elevated blood pressure (not hypertension) 12/05/2014     Priority: Low   • Hx of amenorrhea 12/05/2014     Priority: Low   • Bipolar 1 disorder with psychosis episodes 04/07/2011     Priority: Low   • Anxiety 04/30/2010     Priority: Low   • Former smoker 04/26/2019   • Obesity (BMI 35.0-39.9 without comorbidity) (HCC) 11/05/2018   • HERLINDA (obstructive sleep apnea) 02/12/2018   • History of gestational diabetes 01/15/2018       Current Outpatient Prescriptions   Medication Sig Dispense Refill   • carBAMazepine (TEGRETOL) 200 MG Tab Take 200 mg by mouth 2 Times a Day.     • clonazePAM (KLONOPIN) 1 MG Tab Take 1 mg by mouth 1 time daily as needed (seizures).  3   • gabapentin (NEURONTIN) 300 MG Cap Take 300 mg by mouth 3 times a day.  3   • quetiapine (SEROQUEL) 300 MG tablet Take 150-300 mg by mouth 1 time daily as needed (psychosis).  3   • ziprasidone (GEODON) 80 MG Cap Take 80 mg by mouth every evening.  3     No current facility-administered medications for this visit.         Patient is taking medications as noted in medication list.  Current supplements as per medication list.     Allergies: Zoloft    Current social contact/activities: PT living with  and a cat, visit with family and friends, Jewish, lunch with friends, movies, hiking, camping, swiming     Is patient current with immunizations? Yes.    She  reports that she quit smoking about 4 months ago. Her smoking use included Cigarettes. She started smoking about 2 years ago. She has a 15.00 pack-year smoking history. She has never used smokeless tobacco. She reports that she does not drink alcohol or use drugs.  Counseling given: Not  Answered        DPA/Advanced directive: Patient does not have an Advanced Directive.  A packet and workshop information was given on Advanced Directives.    ROS:    Gait: Uses no assistive device   Ostomy: No   Other tubes: No   Amputations: No   Chronic oxygen use No   Last eye exam 2017   Wears hearing aids: No   : Reports urinary leakage during the last 6 months that has somewhat interfered with their daily activities or sleep.     Screening:    DEPRESSION     Is patient seeing a counselor, psychiatrist or other healthcare provider regarding their mental health? Yes, CareTeam was updated.    Depression Screen (PHQ-2/PHQ-9) 11/21/2017 3/7/2019 5/6/2019   PHQ-2 Total Score - 0 -   PHQ-2 Total Score - - -   PHQ-2 Total Score 0 - 0   PHQ-9 Total Score - - -       Depression Screening    Little interest or pleasure in doing things?  0 - not at all  Feeling down, depressed, or hopeless? 0 - not at all  Patient Health Questionnaire Score: 0    If depressive symptoms identified deferred to follow up visit unless specifically addressed in assessment and plan.    Interpretation of PHQ-9 Total Score   Score Severity   1-4 No Depression   5-9 Mild Depression   10-14 Moderate Depression   15-19 Moderately Severe Depression   20-27 Severe Depression    Screening for Cognitive Impairment    Three Minute Recall (village, kitchen, baby)   3/3    Delmer clock face with all 12 numbers and set the hands to show 10 past 10.  Yes 5/5  If cognitive concerns identified, deferred for follow up unless specifically addressed in assessment and plan.    Fall Risk Assessment    Has the patient had two or more falls in the last year or any fall with injury in the last year?  Yes  If fall risk identified, deferred for follow up unless specifically addressed in assessment and plan.    Safety Assessment    Throw rugs on floor.  No  Handrails on all stairs.  Yes  Good lighting in all hallways.  Yes  Difficulty hearing.  No  Patient counseled about  all safety risks that were identified.    Functional Assessment ADLs    Are there any barriers preventing you from cooking for yourself or meeting nutritional needs?  No.    Are there any barriers preventing you from driving safely or obtaining transportation?  No.    Are there any barriers preventing you from using a telephone or calling for help?  No.    Are there any barriers preventing you from shopping?  No.    Are there any barriers preventing you from taking care of your own finances?  No.    Are there any barriers preventing you from managing your medications?  No.    Are there any barriers preventing you from showering, bathing or dressing yourself?  No.    Are you currently engaging in any exercise or physical activity?  Yes.     What is your perception of your health?  Good.    Health Maintenance Summary                Annual Wellness Visit Overdue 12/15/2017      Done 12/14/2016 Visit Dx: Medicare annual wellness visit, subsequent     Patient has more history with this topic...    PAP SMEAR Overdue 2/12/2019      Done 2/12/2016 PATHOLOGY GYN SPECIMEN     Patient has more history with this topic...    MAMMOGRAM Next Due 5/18/2019      Done 5/18/2018 MA-MAMMO SCREENING BILAT W/TOMOSYNTHESIS W/CAD     Patient has more history with this topic...    IMM DTaP/Tdap/Td Vaccine Next Due 1/15/2028      Done 1/15/2018 Imm Admin: Tdap Vaccine          Patient Care Team:  SHAY Lyman as PCP - General (Family Medicine)    Social History   Substance Use Topics   • Smoking status: Former Smoker     Packs/day: 0.50     Years: 30.00     Types: Cigarettes     Start date: 6/22/2016     Quit date: 12/28/2018   • Smokeless tobacco: Never Used      Comment: started at 18   • Alcohol use No      Comment: quit 01/27/2018     Family History   Problem Relation Age of Onset   • Cancer Mother         breast cancer   • Diabetes Mother    • Hypertension Mother    • Cancer Maternal Grandmother         stomach   • Stroke  "Maternal Grandfather    • Cancer Paternal Grandfather         lung   • Hypertension Father      She  has a past medical history of Abnormal mammogram (2006); Acromioclavicular joint separation (5/24/2012); Anxiety; Depression; History of cervical dysplasia (1990); History of suicidal ideation; Homicidal ideations (2014); Hyperthyroidism; Hyperthyroidism; Lisfranc's dislocation (10/2017); Migraine; Pelvic exam (2008); Polysubstance dependence ([facility]); Schizoaffective disorder ([facility]); Schizoaffective disorder, bipolar type ([facility]) (4/7/2011); Seizure ([facility]) (1999); Seizure disorder ([facility]); Toe fracture (10/2017); and Wrist fracture (2010). She also has no past medical history of Arrhythmia; Asthma; Blood transfusion, without reported diagnosis; CHF (congestive heart failure) ([facility]); Chronic airway obstruction, not elsewhere classified; Diabetic neuropathy ([facility]); Emphysema; GERD (gastroesophageal reflux disease); Heart attack ([facility]); Heart murmur; Hyperlipidemia; Hypertension; Kidney disease; Stroke ([facility]); Type II or unspecified type diabetes mellitus without mention of complication, not stated as uncontrolled; or Ulcer.   Past Surgical History:   Procedure Laterality Date   • PRIMARY C SECTION  2008   • CERVICAL CONIZATION  1990    cryo for abnl pap   • EYE SURGERY  1973,1979    to repair \"lazy eye\"   • OTHER      eye surgeries as a child           Exam:     /58 (BP Location: Left arm, Patient Position: Sitting, BP Cuff Size: Adult)   Pulse 100   Temp 36 °C (96.8 °F) (Temporal)   Ht 1.55 m (5' 1.02\")   Wt 93.1 kg (205 lb 4 oz)   SpO2 95%  Body mass index is 38.75 kg/m².    Hearing excellent.    Dentition good  Alert, oriented in no acute distress.  Eye contact is good, speech goal directed, affect calm  RRR  CTAB  Breasts: Patient declined reviewed SBEs  Vulva:       No lesions/erythema Vault: No erythema/odor/discharge  Cervix:     No lesions/ No CMT      Uterus: 7 cm; midline; nontender to bimanual; no adnexal " masses or tenderness      Assessment and Plan. The following treatment and monitoring plan is recommended:      Services suggested: No services needed at this time  Health Care Screening recommendations as per orders if indicated.  Referrals offered: PT/OT/Nutrition counseling/Behavioral Health/Smoking cessation as per orders if indicated.    Discussion today about general wellness and lifestyle habits:    · Prevent falls and reduce trip hazards; Cautioned about securing or removing rugs.  · Have a working fire alarm and carbon monoxide detector;   · Engage in regular physical activity and social activities       Follow-up: No Follow-up on file.

## 2019-05-08 DIAGNOSIS — Z11.51 ENCOUNTER FOR SCREENING FOR HUMAN PAPILLOMAVIRUS (HPV): ICD-10-CM

## 2019-05-08 DIAGNOSIS — Z01.419 WELL WOMAN EXAM: ICD-10-CM

## 2019-05-08 DIAGNOSIS — Z12.4 CERVICAL CANCER SCREENING: ICD-10-CM

## 2019-05-15 ENCOUNTER — HOSPITAL ENCOUNTER (OUTPATIENT)
Dept: LAB | Facility: MEDICAL CENTER | Age: 49
End: 2019-05-15
Attending: NURSE PRACTITIONER
Payer: MEDICARE

## 2019-05-15 DIAGNOSIS — F31.9 BIPOLAR 1 DISORDER (HCC): ICD-10-CM

## 2019-05-15 DIAGNOSIS — R73.02 IGT (IMPAIRED GLUCOSE TOLERANCE): ICD-10-CM

## 2019-05-15 DIAGNOSIS — R79.89 LOW TSH LEVEL: ICD-10-CM

## 2019-05-15 DIAGNOSIS — R73.03 PREDIABETES: ICD-10-CM

## 2019-05-15 LAB
ALBUMIN SERPL BCP-MCNC: 4.1 G/DL (ref 3.2–4.9)
ALBUMIN/GLOB SERPL: 1.5 G/DL
ALP SERPL-CCNC: 56 U/L (ref 30–99)
ALT SERPL-CCNC: 21 U/L (ref 2–50)
ANION GAP SERPL CALC-SCNC: 9 MMOL/L (ref 0–11.9)
AST SERPL-CCNC: 16 U/L (ref 12–45)
BASOPHILS # BLD AUTO: 0.7 % (ref 0–1.8)
BASOPHILS # BLD: 0.06 K/UL (ref 0–0.12)
BILIRUB SERPL-MCNC: 0.2 MG/DL (ref 0.1–1.5)
BUN SERPL-MCNC: 14 MG/DL (ref 8–22)
CALCIUM SERPL-MCNC: 9.1 MG/DL (ref 8.5–10.5)
CHLORIDE SERPL-SCNC: 106 MMOL/L (ref 96–112)
CO2 SERPL-SCNC: 23 MMOL/L (ref 20–33)
CREAT SERPL-MCNC: 0.62 MG/DL (ref 0.5–1.4)
EOSINOPHIL # BLD AUTO: 0.05 K/UL (ref 0–0.51)
EOSINOPHIL NFR BLD: 0.6 % (ref 0–6.9)
ERYTHROCYTE [DISTWIDTH] IN BLOOD BY AUTOMATED COUNT: 41.7 FL (ref 35.9–50)
EST. AVERAGE GLUCOSE BLD GHB EST-MCNC: 128 MG/DL
FASTING STATUS PATIENT QL REPORTED: NORMAL
GLOBULIN SER CALC-MCNC: 2.7 G/DL (ref 1.9–3.5)
GLUCOSE SERPL-MCNC: 114 MG/DL (ref 65–99)
HBA1C MFR BLD: 6.1 % (ref 0–5.6)
HCT VFR BLD AUTO: 42.3 % (ref 37–47)
HGB BLD-MCNC: 14.4 G/DL (ref 12–16)
IMM GRANULOCYTES # BLD AUTO: 0.02 K/UL (ref 0–0.11)
IMM GRANULOCYTES NFR BLD AUTO: 0.2 % (ref 0–0.9)
LYMPHOCYTES # BLD AUTO: 2.7 K/UL (ref 1–4.8)
LYMPHOCYTES NFR BLD: 29.9 % (ref 22–41)
MCH RBC QN AUTO: 32.4 PG (ref 27–33)
MCHC RBC AUTO-ENTMCNC: 34 G/DL (ref 33.6–35)
MCV RBC AUTO: 95.1 FL (ref 81.4–97.8)
MONOCYTES # BLD AUTO: 0.57 K/UL (ref 0–0.85)
MONOCYTES NFR BLD AUTO: 6.3 % (ref 0–13.4)
NEUTROPHILS # BLD AUTO: 5.64 K/UL (ref 2–7.15)
NEUTROPHILS NFR BLD: 62.3 % (ref 44–72)
NRBC # BLD AUTO: 0 K/UL
NRBC BLD-RTO: 0 /100 WBC
PLATELET # BLD AUTO: 291 K/UL (ref 164–446)
PMV BLD AUTO: 9.3 FL (ref 9–12.9)
POTASSIUM SERPL-SCNC: 3.7 MMOL/L (ref 3.6–5.5)
PROT SERPL-MCNC: 6.8 G/DL (ref 6–8.2)
RBC # BLD AUTO: 4.45 M/UL (ref 4.2–5.4)
SODIUM SERPL-SCNC: 138 MMOL/L (ref 135–145)
T3FREE SERPL-MCNC: 3.48 PG/ML (ref 2.4–4.2)
T4 FREE SERPL-MCNC: 0.75 NG/DL (ref 0.53–1.43)
TSH SERPL DL<=0.005 MIU/L-ACNC: 0.39 UIU/ML (ref 0.38–5.33)
WBC # BLD AUTO: 9 K/UL (ref 4.8–10.8)

## 2019-05-15 PROCEDURE — 84443 ASSAY THYROID STIM HORMONE: CPT

## 2019-05-15 PROCEDURE — 80053 COMPREHEN METABOLIC PANEL: CPT

## 2019-05-15 PROCEDURE — 84439 ASSAY OF FREE THYROXINE: CPT

## 2019-05-15 PROCEDURE — 83036 HEMOGLOBIN GLYCOSYLATED A1C: CPT | Mod: GA

## 2019-05-15 PROCEDURE — 36415 COLL VENOUS BLD VENIPUNCTURE: CPT

## 2019-05-15 PROCEDURE — 84481 FREE ASSAY (FT-3): CPT

## 2019-05-15 PROCEDURE — 85025 COMPLETE CBC W/AUTO DIFF WBC: CPT

## 2019-05-24 ENCOUNTER — TELEPHONE (OUTPATIENT)
Dept: MEDICAL GROUP | Facility: MEDICAL CENTER | Age: 49
End: 2019-05-24

## 2019-05-24 DIAGNOSIS — R87.810 ASCUS WITH POSITIVE HIGH RISK HPV CERVICAL: ICD-10-CM

## 2019-05-24 DIAGNOSIS — R87.610 ASCUS WITH POSITIVE HIGH RISK HPV CERVICAL: ICD-10-CM

## 2019-05-24 LAB — TEST NAME 95000: NORMAL

## 2019-05-24 NOTE — TELEPHONE ENCOUNTER
Please call patient regarding abnl pap smear    (I also sent this on her mychart so she may already have received the message)    Shyam Blanca-    Your pap smear showed a mild  abnormality with some atypical cells and that you are positive for HPV.  This is not uncommon however does warrant further evaluation (colposcopy) with Gynecology. I will place a referral so you can make an appointment at your convenience.

## 2019-05-29 ENCOUNTER — APPOINTMENT (OUTPATIENT)
Dept: RADIOLOGY | Facility: MEDICAL CENTER | Age: 49
End: 2019-05-29
Attending: NURSE PRACTITIONER
Payer: MEDICARE

## 2019-05-30 NOTE — TELEPHONE ENCOUNTER
Cannot reach patient. Phone has been disconnected/ changed according to automated recording. I tried calling her spouse and it says his voicemail is full and cannot accept new messages.

## 2019-07-10 ENCOUNTER — HOSPITAL ENCOUNTER (OUTPATIENT)
Dept: RADIOLOGY | Facility: MEDICAL CENTER | Age: 49
End: 2019-07-10
Attending: NURSE PRACTITIONER
Payer: MEDICARE

## 2019-07-10 ENCOUNTER — PATIENT MESSAGE (OUTPATIENT)
Dept: OBGYN | Facility: CLINIC | Age: 49
End: 2019-07-10

## 2019-07-10 ENCOUNTER — GYNECOLOGY VISIT (OUTPATIENT)
Dept: OBGYN | Facility: CLINIC | Age: 49
End: 2019-07-10
Payer: MEDICARE

## 2019-07-10 VITALS
DIASTOLIC BLOOD PRESSURE: 90 MMHG | SYSTOLIC BLOOD PRESSURE: 138 MMHG | RESPIRATION RATE: 20 BRPM | WEIGHT: 200 LBS | HEIGHT: 61 IN | HEART RATE: 84 BPM | BODY MASS INDEX: 37.76 KG/M2

## 2019-07-10 DIAGNOSIS — R87.810 ASCUS WITH POSITIVE HIGH RISK HPV CERVICAL: ICD-10-CM

## 2019-07-10 DIAGNOSIS — R87.610 ASCUS WITH POSITIVE HIGH RISK HPV CERVICAL: ICD-10-CM

## 2019-07-10 DIAGNOSIS — Z12.31 VISIT FOR SCREENING MAMMOGRAM: ICD-10-CM

## 2019-07-10 PROCEDURE — 99212 OFFICE O/P EST SF 10 MIN: CPT | Performed by: ADVANCED PRACTICE MIDWIFE

## 2019-07-10 PROCEDURE — 77063 BREAST TOMOSYNTHESIS BI: CPT

## 2019-07-10 NOTE — PROGRESS NOTES
Rianna Solis is a 49 y.o. female who presents for consultation regarding abnormal pap.        HPI Comments: Pt presents for consultation regarding abnormal pap testing in 6/2019.  Patient's last menstrual period was 06/15/2019 (approximate).. She reports history of abnormal paps over 20 years ago. She had cryotherapy and has had normal annual paps since that time. During a prior colposcopy, she did pass out due to pain.     Review of Systems    Pertinent positives documented in HPI and all other systems reviewed & are negative      Past Medical History:   Diagnosis Date   • Abnormal mammogram 2006    nodule in left breast, no follow up   • Acromioclavicular joint separation 5/24/2012   • Anxiety     No [place] mental health; klonopin, coping mechanisms   • Depression    • History of cervical dysplasia 1990    had cryo tx, resolved with nl paps now   • History of suicidal ideation    • Homicidal ideations 2014    [facility] hold   • Hyperthyroidism    • Hyperthyroidism     since age 16, NL labs 03/2012   • Lisfranc's dislocation 10/2017    right   • Migraine    • Pelvic exam 2008    Dysplasia at age 20s, had cryotherapy   • Polysubstance dependence (HCC)     in remission as of 11/26/2014   • Schizoaffective disorder (HCC)    • Schizoaffective disorder, bipolar type (HCC) 4/7/2011   • Seizure (HCC) 1999    partial complex-no motor seizure; managed by [facility]   • Seizure disorder (HCC)     partial complex seizure d/o   • Toe fracture 10/2017    right 3rd-5th toes   • Urinary tract infection    • Wrist fracture 2010    casted by [facility]       Medications:   Current Outpatient Prescriptions Ordered in [facility]   Medication Sig Dispense Refill   • carBAMazepine (TEGRETOL) 200 MG Tab Take 200 mg by mouth 2 Times a Day.     • clonazePAM (KLONOPIN) 1 MG Tab Take 1 mg by mouth 1 time daily as needed (seizures).  3   • gabapentin (NEURONTIN) 300 MG Cap Take 300 mg by mouth 3 times a day.  3   • quetiapine  "(SEROQUEL) 300 MG tablet Take 150-300 mg by mouth 1 time daily as needed (psychosis).  3   • ziprasidone (GEODON) 80 MG Cap Take 80 mg by mouth every evening.  3     No current [facility]-ordered facility-administered medications on file.           Objective:   Vital measurements:  /90   Pulse 84   Resp 20   Ht 1.549 m (5' 1\")   Wt 90.7 kg (200 lb)   Body mass index is 37.79 kg/m². (Goal BM I>18 <25)    Physical Exam   Nursing note and vitals reviewed.  Constitutional: She is oriented to person, place, and time. She appears well-developed and well-nourished. No distress.     Musculoskeletal: Normal range of motion. She exhibits no edema and no tenderness.     Skin: Skin is warm and dry. No rash noted. She is not diaphoretic. No erythema. No pallor.     Psychiatric: She has a normal mood and affect. Her behavior is normal. Judgment and thought content normal.               Assessment:     1. ASCUS with positive high risk HPV cervical  REFERRAL TO GYNECOLOGY       Plan:   1. Discussed colposcopy in detail with patient. All questions answered. I have discussed possible complication post colposcopy including moderate pain and discomfort as well as bleeding. Will send procedure authorization request for patient. We did briefly discuss that results will help dictate plan of care regarding additional interventions.   2. Due to her history of syncopal episode, may not be unreasonable for block but it seems that patient just needs anticipatory touch.         "

## 2019-07-26 ENCOUNTER — APPOINTMENT (OUTPATIENT)
Dept: SLEEP MEDICINE | Facility: MEDICAL CENTER | Age: 49
End: 2019-07-26
Payer: MEDICARE

## 2019-08-09 ENCOUNTER — GYNECOLOGY VISIT (OUTPATIENT)
Dept: OBGYN | Facility: CLINIC | Age: 49
End: 2019-08-09
Payer: MEDICARE

## 2019-08-09 ENCOUNTER — HOSPITAL ENCOUNTER (OUTPATIENT)
Dept: LAB | Facility: MEDICAL CENTER | Age: 49
End: 2019-08-09
Attending: OBSTETRICS & GYNECOLOGY
Payer: MEDICARE

## 2019-08-09 VITALS — WEIGHT: 203 LBS | DIASTOLIC BLOOD PRESSURE: 80 MMHG | BODY MASS INDEX: 38.36 KG/M2 | SYSTOLIC BLOOD PRESSURE: 122 MMHG

## 2019-08-09 DIAGNOSIS — Z32.02 PREGNANCY EXAMINATION OR TEST, NEGATIVE RESULT: ICD-10-CM

## 2019-08-09 DIAGNOSIS — R87.810 ASCUS WITH POSITIVE HIGH RISK HPV CERVICAL: ICD-10-CM

## 2019-08-09 DIAGNOSIS — R87.610 ASCUS WITH POSITIVE HIGH RISK HPV CERVICAL: ICD-10-CM

## 2019-08-09 LAB
INT CON NEG: NEGATIVE
INT CON POS: POSITIVE
PATHOLOGY CONSULT NOTE: NORMAL
POC URINE PREGNANCY TEST: NEGATIVE

## 2019-08-09 PROCEDURE — 81025 URINE PREGNANCY TEST: CPT | Performed by: OBSTETRICS & GYNECOLOGY

## 2019-08-09 PROCEDURE — 88305 TISSUE EXAM BY PATHOLOGIST: CPT

## 2019-08-09 PROCEDURE — 57454 BX/CURETT OF CERVIX W/SCOPE: CPT | Performed by: OBSTETRICS & GYNECOLOGY

## 2019-08-09 NOTE — LETTER
COLPOSCOPY / LEEP DATA SHEET    Rianna Solis     1970      49 y.o.      No LMP recorded. Patient is perimenopausal.     @EDC@       Medical history:   o MVP    o Blood dyscrasia    o Rh     o Other: .    STD history:    o HPV     o HSV     o HIV     o GC     o Chlamydia     o None     o Other: .    HIV:   o Positive     o Negative                            IV Drug User:   o  Yes    o  No .    Age of first coitus:                                                Number of sex partners in lifetime:  .    Reason for exam:.    Prior abnormal PAPS?    o  Yes  o  No        Treatment: .    Prior history of condyloma?    o  Yes  o  No        Treatment:.     Colposcopic view - description:                                 Satisfactory?    o  Yes  o  No                    Squamo-columnar junction seen?  o  Yes  o  No.    Entire lesion seen?     o  Yes  o  No          PAP done?  o  Yes  o  No           Biopsies done?  o  Yes  o  No.    Biopsy sites:.    ECC done?    o  Yes  o  No      If no, reason:.    Impression:.    Recommendations:.             Colposcopist: ______________________________________________ Date: ___________________

## 2019-08-09 NOTE — PROGRESS NOTES
Pt here for Colposcopy   # [phone]  Pt states no complaints.   Last Mammo 7/10/19  Negative pregnancy test obtained in clinic today.

## 2019-08-09 NOTE — PROGRESS NOTES
Patient here for colposcopy. States she fainted with her last colposcopy. Will use lidocaine today before biopsy is taken.

## 2019-08-09 NOTE — PROCEDURES
Rianna Solis  49 y.o.  Female  here today for colposcopy to evaluate her for abnormal pap:     ASCUS.    HPV+.       Sexual hx:   denies history of gc.      external genitalia normal, no cervical lesions, vault clean with normal discharge        External genitalia,urethral meatus, urethra, bladder and vagina normal. Cervix, uterus and adnexa intact and normal.  Anus and perineum normal. Bimanual and rectovaginal without masses or tenderness.}        cervix swabbed with acetic acid and then with Lugols to further delineate any abnormalities. Cervical block performed using 1% lidocaine approx 3 cc. Very small area of acetowhite change noted closet o endocervical canal at 7 o clock. Biopsy taken of this area. ECC performed as well. Silver nitrate used for hemostasis.           Ass:   ASCUS  Colposcopy assessment: : Adequate: unknown    FRANCO I : yes    CINII: no    CINIIIno  doing well without problems  P.   Cervical Bx  Performed: 0700  ECC done  Pelvic Rest  Anticipate Vaginal discharge and spotting  RTC: will call patient with results and determine next steps.

## 2019-08-13 ENCOUNTER — OFFICE VISIT (OUTPATIENT)
Dept: MEDICAL GROUP | Facility: MEDICAL CENTER | Age: 49
End: 2019-08-13
Payer: MEDICARE

## 2019-08-13 VITALS
BODY MASS INDEX: 37.95 KG/M2 | OXYGEN SATURATION: 98 % | HEIGHT: 61 IN | DIASTOLIC BLOOD PRESSURE: 80 MMHG | TEMPERATURE: 97.8 F | WEIGHT: 201 LBS | SYSTOLIC BLOOD PRESSURE: 130 MMHG | HEART RATE: 80 BPM | RESPIRATION RATE: 14 BRPM

## 2019-08-13 DIAGNOSIS — G47.00 INSOMNIA, UNSPECIFIED TYPE: ICD-10-CM

## 2019-08-13 DIAGNOSIS — G43.809 OTHER MIGRAINE WITHOUT STATUS MIGRAINOSUS, NOT INTRACTABLE: ICD-10-CM

## 2019-08-13 DIAGNOSIS — G40.109 PARTIAL SEIZURE DISORDER (HCC): ICD-10-CM

## 2019-08-13 DIAGNOSIS — R03.0 ELEVATED BLOOD PRESSURE READING WITHOUT DIAGNOSIS OF HYPERTENSION: ICD-10-CM

## 2019-08-13 DIAGNOSIS — Z86.32 HISTORY OF GESTATIONAL DIABETES: ICD-10-CM

## 2019-08-13 DIAGNOSIS — R73.02 IGT (IMPAIRED GLUCOSE TOLERANCE): ICD-10-CM

## 2019-08-13 DIAGNOSIS — F31.9 BIPOLAR 1 DISORDER (HCC): ICD-10-CM

## 2019-08-13 DIAGNOSIS — Z00.00 MEDICARE ANNUAL WELLNESS VISIT, SUBSEQUENT: ICD-10-CM

## 2019-08-13 DIAGNOSIS — Z13.220 SCREENING, LIPID: ICD-10-CM

## 2019-08-13 PROCEDURE — G0439 PPPS, SUBSEQ VISIT: HCPCS | Performed by: NURSE PRACTITIONER

## 2019-08-13 ASSESSMENT — ENCOUNTER SYMPTOMS: GENERAL WELL-BEING: GOOD

## 2019-08-13 ASSESSMENT — PATIENT HEALTH QUESTIONNAIRE - PHQ9
5. POOR APPETITE OR OVEREATING: 1 - SEVERAL DAYS
CLINICAL INTERPRETATION OF PHQ2 SCORE: 1
SUM OF ALL RESPONSES TO PHQ QUESTIONS 1-9: 9

## 2019-08-13 ASSESSMENT — ACTIVITIES OF DAILY LIVING (ADL): BATHING_REQUIRES_ASSISTANCE: 0

## 2019-08-13 NOTE — PROGRESS NOTES
CC: Annual Exam        HPI:   Rianna presents today for the followin. Medicare annual wellness visit, subsequent      2. Partial seizure disorder (HCC)  Followed [facility], Dr. Reid, this is currently controlled.  Recent MRI done approximately 6 months ago normal    3. Other migraine without status migrainosus, not intractable  States since she started having some issues with insomnia and waking up early he has had a recurrence in terms of her migraines.  Describes them as coming in the daytime that on occasion she will get a visual aura which she describes as distortion and sometimes seeing lights, then that will be followed by nausea and occasional vomiting and then a followed by pain over the right eye that moves posteriorly.  She does get good relief with over-the-counter Excedrin and states that she takes it as early as she can when her symptoms present it will resolve it.  Equally it helps that she lays down with her BiPAP machine and takes a nap.  No reports of new symptoms  MRI normal this year  Historically has done injections, Botox, different prescription medications for migraines.  Currently wishes to continue just with Excedrin    4. Insomnia, unspecified type  Has been having some issues with insomnia.  She states she sleeps about 5 hours number wake up between the hours of 2 and 4.  Typically she will get up and go to the gym and exercise and she is been unable historically to go back to sleep at this point.  In the past this has occurred in spells and usually will resolve after some time.  Does not feel like her BiPAP is causing any issues    5. Bipolar 1 disorder with psychosis episodes  Managed by her outside psychiatrist, stable    6. Elevated blood pressure (not hypertension)  Normotensive today    7. IGT (impaired glucose tolerance)/ History of gestational diabetes  With Mediterranean diet and gym she has lost about 12 pounds.  She is continuing these activities.  Last A1c 6.1  done in May of this year      Current Outpatient Medications   Medication Sig Dispense Refill   • carBAMazepine (TEGRETOL) 200 MG Tab Take 200 mg by mouth 2 Times a Day.     • clonazePAM (KLONOPIN) 1 MG Tab Take 1 mg by mouth 1 time daily as needed (seizures).  3   • gabapentin (NEURONTIN) 300 MG Cap Take 300 mg by mouth 3 times a day.  3   • quetiapine (SEROQUEL) 300 MG tablet Take 150-300 mg by mouth 1 time daily as needed (psychosis).  3   • ziprasidone (GEODON) 80 MG Cap Take 80 mg by mouth every evening.  3     No current facility-administered medications for this visit.      Social History     Tobacco Use   • Smoking status: Former Smoker     Packs/day: 0.50     Years: 30.00     Pack years: 15.00     Types: Cigarettes     Start date: 2016     Last attempt to quit: 2018     Years since quittin.6   • Smokeless tobacco: Never Used   • Tobacco comment: started at 18   Substance Use Topics   • Alcohol use: No     Comment: quit 2018   • Drug use: No     I reviewed patients allergies, problem list and medications today in EPIC.    Depression Screening    Little interest or pleasure in doing things?  0 - not at all  Feeling down, depressed , or hopeless? 1 - several days  Trouble falling or staying asleep, or sleeping too much?  3 - nearly every day  Feeling tired or having little energy?  3 - nearly every day  Poor appetite or overeating?  1 - several days  Feeling bad about yourself - or that you are a failure or have let yourself or your family down? 0 - not at all  Trouble concentrating on things, such as reading the newspaper or watching television? 1 - several days  Moving or speaking so slowly that other people could have noticed.  Or the opposite - being so fidgety or restless that you have been moving around a lot more than usual?  0 - not at all  Thoughts that you would be better off dead, or of hurting yourself?  0 - not at all  Patient Health Questionnaire Score: 9    If depressive  symptoms identified deferred to follow up visit unless specifically addressed in assessment and plan.    Interpretation of PHQ-9 Total Score   Score Severity   1-4 No Depression   5-9 Mild Depression   10-14 Moderate Depression   15-19 Moderately Severe Depression   20-27 Severe Depression      Screening for Cognitive Impairment    Three Minute Recall (village, kitchen, baby) 3/3    Delmer clock face with all 12 numbers and set the hands to show 10 past 10.  Yes    Cognitive concerns identified deferred for follow up unless specifically addressed in assessment and plan.    Fall Risk Assessment    Has the patient had two or more falls in the last year or any fall with injury in the last year?  No    Safety Assessment    Throw rugs on floor.     Handrails on all stairs.  Yes  Good lighting in all hallways.  Yes  Difficulty hearing.  No  Patient counseled about all safety risks that were identified.    Functional Assessment ADLs    Are there any barriers preventing you from cooking for yourself or meeting nutritional needs?  No.    Are there any barriers preventing you from driving safely or obtaining transportation?  No.    Are there any barriers preventing you from using a telephone or calling for help?  No.    Are there any barriers preventing you from shopping?  No.    Are there any barriers preventing you from taking care of your own finances?  No.    Are there any barriers preventing you from managing your medications?  No.    Are there any barriers preventing you from showering, bathing or dressing yourself? No.    Are you currently engaging in any exercise or physical activity?  Yes.     What is your perception of your health?  Good.      Health Maintenance Summary                IMM INFLUENZA Next Due 9/1/2019      Done 10/4/2018 Imm Admin: Influenza Vaccine Quad Inj (Pf)     Patient has more history with this topic...    Annual Wellness Visit Next Due 5/6/2020      Done 5/6/2019      Patient has more history  "with this topic...    MAMMOGRAM Next Due 7/10/2020      Done 7/10/2019 MA-SCREENING MAMMO BILAT W/TOMOSYNTHESIS W/CAD     Patient has more history with this topic...    PAP SMEAR Next Due 2022      Done 2019 PAP IG (IMAGE GUIDED)     Patient has more history with this topic...    IMM DTaP/Tdap/Td Vaccine Next Due 1/15/2028      Done 1/15/2018 Imm Admin: Tdap Vaccine          Patient Care Team:  SHAY Lyman as PCP - General (Family Medicine)  Villa Mayfield M.D. as Consulting Physician (Psychiatry)  Venkata Reid M.D. as Consulting Physician (Neurology)        ROS: Any/all pertinent positives listed in the HPI, otherwise all others reviewed are negative today.        /80 (BP Location: Left arm, Patient Position: Sitting, BP Cuff Size: Adult)   Pulse 80   Temp 36.6 °C (97.8 °F) (Temporal)   Resp 14   Ht 1.549 m (5' 1\")   Wt 91.2 kg (201 lb)   SpO2 98%   BMI 37.98 kg/m²   Physical Exam:    Gen:         Alert and oriented, No apparent distress. WDWN  Psych:     A+Ox3, normal affect and mood  Skin:     Warm, dry and intact. Good turgor       No rashes in visible areas.  Eye:         Conjunctiva clear, lids normal  ENMT:     Lips without lesions, good dentition  Lungs:     Clear to auscultation bilaterally, no rales or rhonchi       Unlabored respiratory effort  CV:          Regular rate and rhythm, S1, S2. No murmurs.       No Edema          Assessment and Plan.   49 y.o. female here for the followin. Medicare annual wellness visit, subsequent  Reviewed screenings the patient.    - Subsequent Annual Wellness Visit - Includes PPPS ()    2. Partial seizure disorder (HCC)  Controlled, continue care with neurology    3. Other migraine without status migrainosus, not intractable  Current exacerbation.  Does well with over-the-counter Excedrin and a nap.  No changes in symptoms.    4. Insomnia, unspecified type  We will follow-up with her psychiatrist for medication intervention " she feels like if needed.  Currently she is not interested in any medication intervention.    5. Bipolar 1 disorder with psychosis episodes  Continue follow-up with outside psychiatrist and medication compliance    6. Elevated blood pressure (not hypertension)  Normotensive    7. IGT (impaired glucose tolerance)/ History of gestational diabetes  Continue diet and exercise  - HEMOGLOBIN A1C; Future  - Comp Metabolic Panel; Future    9. Screening, lipid  Ordered for me  - Lipid Profile; Future

## 2019-09-17 ENCOUNTER — OFFICE VISIT (OUTPATIENT)
Dept: URGENT CARE | Facility: PHYSICIAN GROUP | Age: 49
End: 2019-09-17
Payer: MEDICARE

## 2019-09-17 VITALS
RESPIRATION RATE: 16 BRPM | DIASTOLIC BLOOD PRESSURE: 98 MMHG | TEMPERATURE: 98.3 F | WEIGHT: 207 LBS | BODY MASS INDEX: 39.08 KG/M2 | SYSTOLIC BLOOD PRESSURE: 146 MMHG | HEART RATE: 113 BPM | HEIGHT: 61 IN | OXYGEN SATURATION: 96 %

## 2019-09-17 DIAGNOSIS — J20.9 ACUTE BRONCHITIS, UNSPECIFIED ORGANISM: ICD-10-CM

## 2019-09-17 PROCEDURE — 99214 OFFICE O/P EST MOD 30 MIN: CPT | Performed by: PHYSICIAN ASSISTANT

## 2019-09-17 RX ORDER — ALBUTEROL SULFATE 90 UG/1
1-2 AEROSOL, METERED RESPIRATORY (INHALATION) EVERY 6 HOURS PRN
Qty: 1 INHALER | Refills: 0 | Status: SHIPPED | OUTPATIENT
Start: 2019-09-17 | End: 2021-04-01

## 2019-09-17 RX ORDER — AZITHROMYCIN 250 MG/1
TABLET, FILM COATED ORAL
Qty: 6 TAB | Refills: 0 | Status: SHIPPED | OUTPATIENT
Start: 2019-09-17 | End: 2019-10-08

## 2019-09-17 RX ORDER — BENZONATATE 100 MG/1
100 CAPSULE ORAL 3 TIMES DAILY PRN
Qty: 20 CAP | Refills: 0 | Status: SHIPPED | OUTPATIENT
Start: 2019-09-17 | End: 2019-10-08

## 2019-09-17 ASSESSMENT — ENCOUNTER SYMPTOMS
DIZZINESS: 1
HEADACHES: 1
NAUSEA: 0
DOUBLE VISION: 0
DIARRHEA: 0
VOMITING: 0
COUGH: 1
BLURRED VISION: 0
SPUTUM PRODUCTION: 1
SINUS PAIN: 1
SHORTNESS OF BREATH: 1
SORE THROAT: 0
MYALGIAS: 1
ABDOMINAL PAIN: 0
FEVER: 1
CHILLS: 1
EYE REDNESS: 0

## 2019-09-17 NOTE — PROGRESS NOTES
Subjective:      Rianna Solis is a 49 y.o. female who presents with Cough (productive, chest burns, short of breath, eyes running, fever, chills, body aches, sore throat from coughing x4days)            HPI   49-year-old female presents to urgent care with new problem of productive cough and congestion   Onset 4 days ago.  Reports associated subjective fevers and chills, sinus pain, and generalized fatigue.  Patient also reports productive cough with associated shortness of breath and burning chest pain.  OTC ibuprofen and cold medications with some symptoms relief.   Sick contacts at home with similar symptoms.   Denies other associated aggravating or alleviating factors.     Review of Systems   Constitutional: Positive for chills, fever and malaise/fatigue.   HENT: Positive for congestion and sinus pain. Negative for ear pain and sore throat.    Eyes: Negative for blurred vision, double vision and redness.   Respiratory: Positive for cough, sputum production and shortness of breath.    Cardiovascular: Negative for leg swelling.        Burning chest discomfort    Gastrointestinal: Negative for abdominal pain, diarrhea, nausea and vomiting.   Musculoskeletal: Positive for myalgias.   Skin: Negative for rash.   Neurological: Positive for dizziness and headaches.   Endo/Heme/Allergies: Positive for environmental allergies.       Past Medical History:   Diagnosis Date   • Abnormal mammogram 2006    nodule in left breast, no follow up   • Acromioclavicular joint separation 5/24/2012   • Anxiety     No [place] mental health; klonopin, coping mechanisms   • Depression    • History of cervical dysplasia 1990    had cryo tx, resolved with nl paps now   • History of suicidal ideation    • Homicidal ideations 2014    [facility]   • Hyperthyroidism    • Hyperthyroidism     since age 16, NL labs 03/2012   • Lisfranc's dislocation 10/2017    right   • Migraine    • Pelvic exam 2008    Dysplasia at age 20s, had  "cryotherapy   • Polysubstance dependence (HCC)     in remission as of 2014   • Schizoaffective disorder (HCC)    • Schizoaffective disorder, bipolar type (HCC) 2011   • Seizure (HCC)     partial complex-no motor seizure; managed by [facility]   • Seizure disorder (HCC)     partial complex seizure d/o   • Toe fracture 10/2017    right 3rd-5th toes   • Urinary tract infection    • Wrist fracture     casted by [facility]     Current Outpatient Medications on File Prior to Visit   Medication Sig Dispense Refill   • carBAMazepine (TEGRETOL) 200 MG Tab Take 200 mg by mouth 2 Times a Day.     • clonazePAM (KLONOPIN) 1 MG Tab Take 1 mg by mouth 1 time daily as needed (seizures).  3   • gabapentin (NEURONTIN) 300 MG Cap Take 300 mg by mouth 3 times a day.  3   • quetiapine (SEROQUEL) 300 MG tablet Take 150-300 mg by mouth 1 time daily as needed (psychosis).  3   • ziprasidone (GEODON) 80 MG Cap Take 80 mg by mouth every evening.  3     No current facility-administered medications on file prior to visit.      Allergies   Allergen Reactions   • Zoloft Unspecified     \"Blows Up\"     Social History     Tobacco Use   • Smoking status: Former Smoker     Packs/day: 0.50     Years: 30.00     Pack years: 15.00     Types: Cigarettes     Start date: 2016     Last attempt to quit: 2018     Years since quittin.7   • Smokeless tobacco: Never Used   • Tobacco comment: started at 18   Substance Use Topics   • Alcohol use: No     Comment: quit 2018      Objective:     /98 (BP Location: Left arm, Patient Position: Sitting, BP Cuff Size: Adult)   Pulse (!) 113   Temp 36.8 °C (98.3 °F) (Temporal)   Resp 16   Ht 1.549 m (5' 1\")   Wt 93.9 kg (207 lb)   SpO2 96%   BMI 39.11 kg/m²      Physical Exam   Constitutional: She is oriented to person, place, and time. She appears well-developed and well-nourished.   HENT:   Head: Normocephalic and atraumatic.   Right Ear: Tympanic membrane and ear " canal normal.   Left Ear: Tympanic membrane and ear canal normal.   Nose: Mucosal edema and rhinorrhea present. Right sinus exhibits no maxillary sinus tenderness and no frontal sinus tenderness. Left sinus exhibits no maxillary sinus tenderness and no frontal sinus tenderness.   Mouth/Throat: Uvula is midline and mucous membranes are normal. Posterior oropharyngeal erythema present. No oropharyngeal exudate. No tonsillar exudate.   Eyes: Pupils are equal, round, and reactive to light. Conjunctivae and EOM are normal.   Neck: Normal range of motion. Neck supple.   Cardiovascular: Normal rate, regular rhythm and normal heart sounds.   Pulmonary/Chest: Effort normal and breath sounds normal. No respiratory distress.   Abdominal: Soft.   Musculoskeletal: Normal range of motion.   Lymphadenopathy:     She has no cervical adenopathy.   Neurological: She is alert and oriented to person, place, and time.   Skin: Skin is warm and dry. No rash noted.   Psychiatric: She has a normal mood and affect. Her behavior is normal. Judgment and thought content normal.   Vitals reviewed.              Assessment/Plan:     1. Acute bronchitis, unspecified organism  benzonatate (TESSALON) 100 MG Cap    albuterol 108 (90 Base) MCG/ACT Aero Soln inhalation aerosol    azithromycin (ZITHROMAX) 250 MG Tab     Advised patient symptoms are most likely viral in etiology, recommend supportive care. Increased fluids and rest. Discussed use of nedi-pot, humidifier, and Flonase nasal spray for symptomatic relief. Contingent course of antibiotics given if symptoms persist/worsen.  Patient given instructions and precautions on use of albuterol inhaler.  Follow-up with PCP or return for reevaluation if symptoms persist or worsen.  The patient demonstrated a good understanding and agreed with the treatment plan.

## 2019-10-08 ENCOUNTER — SLEEP CENTER VISIT (OUTPATIENT)
Dept: SLEEP MEDICINE | Facility: MEDICAL CENTER | Age: 49
End: 2019-10-08
Payer: MEDICARE

## 2019-10-08 VITALS
RESPIRATION RATE: 16 BRPM | BODY MASS INDEX: 36.62 KG/M2 | WEIGHT: 199 LBS | OXYGEN SATURATION: 94 % | HEIGHT: 62 IN | DIASTOLIC BLOOD PRESSURE: 74 MMHG | HEART RATE: 103 BPM | SYSTOLIC BLOOD PRESSURE: 120 MMHG

## 2019-10-08 DIAGNOSIS — G47.33 OSA (OBSTRUCTIVE SLEEP APNEA): ICD-10-CM

## 2019-10-08 PROCEDURE — 99213 OFFICE O/P EST LOW 20 MIN: CPT | Performed by: NURSE PRACTITIONER

## 2019-10-08 ASSESSMENT — ENCOUNTER SYMPTOMS
PSYCHIATRIC NEGATIVE: 1
EYE DISCHARGE: 0
RESPIRATORY NEGATIVE: 1
EYE PAIN: 0
CONSTITUTIONAL NEGATIVE: 1
BRUISES/BLEEDS EASILY: 0
CARDIOVASCULAR NEGATIVE: 1

## 2019-10-08 NOTE — PROGRESS NOTES
Chief Complaint   Patient presents with   • Apnea     Last seen 04/26/19         HPI: This patient is a 49 y.o. female, who presents for six-month follow-up of HERLINDA.     She was seen in consultation with Dr. Reid December 6, 2018.  She reports loud snoring, her  notes apneic events, she reports daytime fatigue and frequent nocturnal awakenings.     PSG indicates severe sleep apnea with an AHI of 24.8, REM index 56.1, minimum oxygen saturation of 85 %.  She return for dedicated night titration.  She did best on BiPAP 15/11 cm H2O.  This resulted in a brief period of REM, AHI is reduced to 0.4 and mean saturation was 94%.    She was ordered therapy after her last visit in April.   Compliance download over the past 30 days indicates 90 % compliance, average use of 6 hours 59 minutes per night, AHI of 2.6. Patient reports benefiting greatly from therapy.  She is sleeping well through the night.  At first it was difficult for her to adjust to but she feels she is fully acclimated.  She still reports headaches but not morning headaches.  She feels severity and duration of her headaches has improved.  She is frustrated with lack of weight.  She has been working out more regularly.  She feels her weight gain is attributed to her psychiatric meds.    She has a significant psychiatric history including depression, anxiety, schizoaffective disorder/bipolar.        Past Medical History:   Diagnosis Date   • Abnormal mammogram 2006    nodule in left breast, no follow up   • Acromioclavicular joint separation 5/24/2012   • Anxiety     No [place] mental health; klonopin, coping mechanisms   • Depression    • History of cervical dysplasia 1990    had cryo tx, resolved with nl paps now   • History of suicidal ideation    • Homicidal ideations 2014    [facility]   • Hyperthyroidism    • Hyperthyroidism     since age 16, NL labs 03/2012   • Lisfranc's dislocation 10/2017    right   • Migraine    • Pelvic exam 2008    Dysplasia  at age 20s, had cryotherapy   • Polysubstance dependence (HCC)     in remission as of 2014   • Schizoaffective disorder (HCC)    • Schizoaffective disorder, bipolar type (HCC) 2011   • Seizure (HCC)     partial complex-no motor seizure; managed by [facility]   • Seizure disorder (HCC)     partial complex seizure d/o   • Toe fracture 10/2017    right 3rd-5th toes   • Urinary tract infection    • Wrist fracture     casted by [facility]       Social History     Tobacco Use   • Smoking status: Former Smoker     Packs/day: 0.50     Years: 30.00     Pack years: 15.00     Types: Cigarettes     Start date: 2016     Last attempt to quit: 2018     Years since quittin.7   • Smokeless tobacco: Never Used   • Tobacco comment: started at 18   Substance Use Topics   • Alcohol use: No     Comment: quit 2018   • Drug use: No       Family History   Problem Relation Age of Onset   • Cancer Mother         breast cancer   • Diabetes Mother    • Hypertension Mother    • Cancer Maternal Grandmother         stomach   • Stroke Maternal Grandfather    • Cancer Paternal Grandfather         lung   • Hypertension Father        Immunization History   Administered Date(s) Administered   • Influenza (IM) Preservative Free 10/04/2018   • Influenza Seasonal Injectable 2011   • Influenza TIV (IM) 2011   • Influenza Vaccine Quad Inj (Pf) 01/15/2018, 10/04/2018   • Influenza Vaccine Quad Inj (Preserved) 2014, 10/02/2015   • Tdap Vaccine 01/15/2018       Current medications as of today   Current Outpatient Medications   Medication Sig Dispense Refill   • albuterol 108 (90 Base) MCG/ACT Aero Soln inhalation aerosol Inhale 1-2 Puffs by mouth every 6 hours as needed for Shortness of Breath. 1 Inhaler 0   • carBAMazepine (TEGRETOL) 200 MG Tab Take 200 mg by mouth 2 Times a Day.     • clonazePAM (KLONOPIN) 1 MG Tab Take 1 mg by mouth 1 time daily as needed (seizures).  3   • quetiapine  (SEROQUEL) 300 MG tablet Take 150-300 mg by mouth 1 time daily as needed (psychosis).  3   • ziprasidone (GEODON) 80 MG Cap Take 80 mg by mouth every evening.  3     No current facility-administered medications for this visit.        Allergies: Zoloft    There were no vitals taken for this visit.      Review of Systems   Constitutional: Negative.    HENT: Negative.    Eyes: Negative for pain and discharge.   Respiratory: Negative.    Cardiovascular: Negative.    Endo/Heme/Allergies: Negative for environmental allergies. Does not bruise/bleed easily.   Psychiatric/Behavioral: Negative.        Physical Exam   Constitutional: She is oriented to person, place, and time and well-developed, well-nourished, and in no distress.   HENT:   Head: Normocephalic and atraumatic.   Eyes: Pupils are equal, round, and reactive to light.   Neck: Normal range of motion. Neck supple. No tracheal deviation present.   Cardiovascular: Normal rate, regular rhythm and normal heart sounds.   Pulmonary/Chest: Effort normal and breath sounds normal.   Neurological: She is alert and oriented to person, place, and time.   Skin: Skin is warm and dry.   Psychiatric: Mood, memory, affect and judgment normal.       Diagnoses/Plan:    1. HERLINDA (obstructive sleep apnea)   Continue BIPAP nightly, Clean mask & tubing weekly, Replace supplies as insurance will allow, RX for new supplies to DME.  Follow-up annually.  - DME Mask and Supplies    This dictation was created using voice recognition software. The accuracy of the dictation is limited to the abilities of the software. I expect there may be some errors of grammar and possibly content.

## 2020-05-29 ENCOUNTER — PATIENT OUTREACH (OUTPATIENT)
Dept: HEALTH INFORMATION MANAGEMENT | Facility: OTHER | Age: 50
End: 2020-05-29

## 2020-05-29 NOTE — PROGRESS NOTES
Called patient to schedule for AWV.    Outcome:   Left Message Please transfer to Patient Outreach Team at [phone] when patient returns call.     Attempt # 1    -aep

## 2020-08-17 ENCOUNTER — HOSPITAL ENCOUNTER (OUTPATIENT)
Dept: RADIOLOGY | Facility: MEDICAL CENTER | Age: 50
End: 2020-08-17
Attending: NURSE PRACTITIONER
Payer: MEDICARE

## 2020-08-17 DIAGNOSIS — Z12.31 VISIT FOR SCREENING MAMMOGRAM: ICD-10-CM

## 2020-08-17 DIAGNOSIS — N63.20 LEFT BREAST MASS: ICD-10-CM

## 2020-08-17 PROCEDURE — 77067 SCR MAMMO BI INCL CAD: CPT

## 2020-08-24 ENCOUNTER — HOSPITAL ENCOUNTER (OUTPATIENT)
Dept: RADIOLOGY | Facility: MEDICAL CENTER | Age: 50
End: 2020-08-24
Attending: NURSE PRACTITIONER
Payer: MEDICARE

## 2020-08-24 DIAGNOSIS — N63.20 LEFT BREAST MASS: ICD-10-CM

## 2020-08-24 PROCEDURE — 76642 ULTRASOUND BREAST LIMITED: CPT | Mod: LT

## 2021-04-01 ENCOUNTER — OFFICE VISIT (OUTPATIENT)
Dept: MEDICAL GROUP | Facility: PHYSICIAN GROUP | Age: 51
End: 2021-04-01
Payer: MEDICARE

## 2021-04-01 VITALS
RESPIRATION RATE: 18 BRPM | BODY MASS INDEX: 38.83 KG/M2 | TEMPERATURE: 98.1 F | WEIGHT: 211 LBS | OXYGEN SATURATION: 95 % | HEIGHT: 62 IN | HEART RATE: 95 BPM | DIASTOLIC BLOOD PRESSURE: 82 MMHG | SYSTOLIC BLOOD PRESSURE: 128 MMHG

## 2021-04-01 DIAGNOSIS — F31.9 BIPOLAR 1 DISORDER (HCC): ICD-10-CM

## 2021-04-01 DIAGNOSIS — Z00.00 WELLNESS EXAMINATION: ICD-10-CM

## 2021-04-01 DIAGNOSIS — G40.109 PARTIAL SEIZURE DISORDER (HCC): ICD-10-CM

## 2021-04-01 DIAGNOSIS — Z12.11 SCREENING FOR COLON CANCER: ICD-10-CM

## 2021-04-01 DIAGNOSIS — F41.9 ANXIETY: ICD-10-CM

## 2021-04-01 DIAGNOSIS — N63.20 LUMP OF LEFT BREAST: ICD-10-CM

## 2021-04-01 PROBLEM — Z86.32 HISTORY OF GESTATIONAL DIABETES: Status: RESOLVED | Noted: 2018-01-15 | Resolved: 2021-04-01

## 2021-04-01 PROBLEM — Z87.891 FORMER SMOKER: Status: RESOLVED | Noted: 2019-04-26 | Resolved: 2021-04-01

## 2021-04-01 PROCEDURE — 99213 OFFICE O/P EST LOW 20 MIN: CPT | Performed by: NURSE PRACTITIONER

## 2021-04-01 RX ORDER — ZOLPIDEM TARTRATE 10 MG/1
10 TABLET ORAL
COMMUNITY
Start: 2021-03-09 | End: 2022-01-13

## 2021-04-01 RX ORDER — QUETIAPINE FUMARATE 200 MG/1
200 TABLET, FILM COATED ORAL
COMMUNITY
Start: 2021-03-13 | End: 2022-02-12

## 2021-04-01 RX ORDER — TOPIRAMATE 100 MG/1
100 TABLET, FILM COATED ORAL 2 TIMES DAILY
COMMUNITY
Start: 2021-03-22 | End: 2023-01-01

## 2021-04-01 RX ORDER — CITALOPRAM 20 MG/1
20 TABLET ORAL
COMMUNITY
Start: 2021-02-04 | End: 2021-04-01

## 2021-04-01 ASSESSMENT — FIBROSIS 4 INDEX: FIB4 SCORE: 0.6

## 2021-04-01 NOTE — ASSESSMENT & PLAN NOTE
Chronic and ongoing. Currently taking Clonazepam 1 mg daily. She states that the medication does help a little bit because of her other health issues. She is fine with the current medication regimen. She does she a psychiatrist.

## 2021-04-01 NOTE — PROGRESS NOTES
Subjective  Chief Complaint  Establish care to manage her chronic conditions    History of Present Illness  Rianna Solis is a 50 y.o. female. This patient is here today to establish care.  Her prior PCP was Vane ALAMO.    Anxiety  Chronic and ongoing. Currently taking Clonazepam 1 mg daily. She states that the medication does help a little bit because of her other health issues. She is fine with the current medication regimen. She does she a psychiatrist.    Bipolar 1 disorder with psychosis episodes  Chronic and ongoing. Currently taking Ziprasidone 80 mg every evening. She states that the medication is working well. She has had four psychotic breakdowns in the past. Currently working with her psychiatrist.    Partial seizure disorder (CMS-HCC) (HCC)  Chronic and stable. Currently taking Topiramate 100 mg twice a day to help. Was seeing a Neurologist in the past.    Lump of left breast  Chronic and ongoing. August 2020 she had a mammo and ultrasounds that showed a lump in her breast. She was told that it looked like a cyst and that she should have another mammo in 6 months. She has not had that mammo yet.    Past Medical History    Allergies: Zoloft  Past Medical History:   Diagnosis Date   • Abnormal mammogram 2006    nodule in left breast, no follow up   • Acromioclavicular joint separation 5/24/2012   • Anxiety     No [place] mental health; klonopin, coping mechanisms   • Depression    • Former smoker 4/26/2019   • History of cervical dysplasia 1990    had cryo tx, resolved with nl paps now   • History of gestational diabetes 1/15/2018   • History of suicidal ideation    • Homicidal ideations 2014    [facility]   • Hyperthyroidism    • Hyperthyroidism     since age 16, NL labs 03/2012   • Lisfranc's dislocation 10/2017    right   • Migraine    • Pelvic exam 2008    Dysplasia at age 20s, had cryotherapy   • Polysubstance dependence (HCC)     in remission as of 11/26/2014   • Schizoaffective  "disorder ([facility])    • Schizoaffective disorder, bipolar type ([facility]) 2011   • Seizure ([facility])     partial complex-no motor seizure; managed by [facility]   • Seizure disorder ([facility])     partial complex seizure d/o   • Toe fracture 10/2017    right 3rd-5th toes   • Urinary tract infection    • Wrist fracture     casted by [facility]     Past Surgical History:   Procedure Laterality Date   • PRIMARY C SECTION     • CERVICAL CONIZATION      cryo for abnl pap   • EYE SURGERY  ,    to repair \"lazy eye\"   • OTHER      eye surgeries as a child     Current Outpatient Medications Ordered in Epic   Medication Sig Dispense Refill   • QUEtiapine (SEROQUEL) 200 MG Tab Take 200 mg by mouth.     • zolpidem (AMBIEN) 10 MG Tab Take 10 mg by mouth.     • topiramate (TOPAMAX) 100 MG Tab TAKE 1 TABLET BY MOUTH TWICE A DAY     • clonazePAM (KLONOPIN) 1 MG Tab Take 1 mg by mouth 1 time daily as needed (seizures).  3   • ziprasidone (GEODON) 80 MG Cap Take 80 mg by mouth every evening.  3     No current [facility]-ordered facility-administered medications on file.     Family History:    Family History   Problem Relation Age of Onset   • Cancer Mother         breast cancer   • Diabetes Mother    • Hypertension Mother    • Cancer Maternal Grandmother         stomach   • Stroke Maternal Grandfather    • Cancer Paternal Grandfather         lung   • Hypertension Father       Personal/Social History:    Social History     Tobacco Use   • Smoking status: Former Smoker     Packs/day: 0.50     Years: 30.00     Pack years: 15.00     Types: Cigarettes     Start date: 2016     Quit date: 2018     Years since quittin.2   • Smokeless tobacco: Never Used   • Tobacco comment: started at 18   Substance Use Topics   • Alcohol use: No     Comment: quit 2018   • Drug use: No     Social History     Social History Narrative    ** Merged History Encounter **           Review of Systems:     General: Negative for " "fever/chills and unexpected weight change.    Eyes:  Negative for vision changes, eye pain.   ENT:  Negative for hearing changes, ear pain, congestion, sore throat, and neck pain.    Respiratory:  Negative for cough and dyspnea.     Cardiovascular:  Negative for chest pain and palpitations.   Gastrointestinal:  Negative for nausea/vomiting, changes in bowel habits, and abdominal pain.    Genitourinary:  Negative for dysuria and hematuria.    Musculoskeletal:  Negative for myalgias.    Skin:  Negative for rash.    Neurological:  Negative for numbness/tingling and headaches.    Heme/Lymph:  Does not bruise/bleed easily.     Objective  Physical Exam:   /82 (BP Location: Right arm, Patient Position: Sitting, BP Cuff Size: Adult)   Pulse 95   Temp 36.7 °C (98.1 °F) (Temporal)   Resp 18   Ht 1.575 m (5' 2\")   Wt 95.7 kg (211 lb)   SpO2 95%  Body mass index is 38.59 kg/m².  General:  Alert and oriented.  Well appearing.  NAD.  Head:  Normocephalic.   Eyes:  Eyes conjunctiva clear lids without ptosis, pupils equal and reactive to light accommodation.    ENT: Ears normal shape and contour, canals are clear bilaterally, tympanic membranes are benign.  Oropharynx is without erythema, edema or exudates.   Neck: Supple without JVD. No lymphadenopathy.  Pulmonary:  Normal effort.  Clear to ausculation without rales, ronchi, or wheezing.  Cardiovascular:  Regular rate and rhythm without murmur, rubs or gallop.  Radial pulses are intact and equal bilaterally.  Gastrointestinal: Abdomen soft, nontender, nondistended. Normal bowel sounds.   Musculoskeletal:  No extremity cyanosis, clubbing, or edema.  Skin:  Warm and dry.  No obvious lesions.  Psych: Normal mood and affect. Alert and oriented x3. Judgment and insight is normal.      Assessment/Plan   1. Anxiety  Chronic and ongoing.  Continue to take Clonazepam 1 mg daily.    2. Bipolar 1 disorder with psychosis episodes  Chronic and ongoing.  Continue to take Ziprasidone " 80 mg every evening.  Continue to follow up with her Psychiatrist.    3. Partial seizure disorder (HCC)  Chronic and stable.  Continue to take Topiramate 100 mg twice a day.    4. Lump of left breast  Chronic and ongoing.  She had a mammogram and ultrasound of her breast in August 2020. Was told that she had a cyst in her left breast. She was told to get an ultrasound to follow up on in it in 6 months. Order placed at this time.  - US-LOCALIZATION BREAST SINGLE LEFT; Future    5. Screening for colon cancer  - REFERRAL TO GI FOR COLONOSCOPY    6. Wellness examination  Due for updated labs.  - Comp Metabolic Panel; Future  - Lipid Profile; Future  - TSH WITH REFLEX TO FT4; Future      Health Maintenance: Completed    Return in about 1 year (around 4/1/2022), or if symptoms worsen or fail to improve.    Please note that this dictation was created using voice recognition software. I have made every reasonable attempt to correct obvious errors, but I expect that there are errors of grammar and possibly content that I did not discover before finalizing the note.    JASMEET Galdamez  [facility]

## 2021-04-01 NOTE — ASSESSMENT & PLAN NOTE
Chronic and stable. Currently taking Topiramate 100 mg twice a day to help. Was seeing a Neurologist in the past.

## 2021-04-01 NOTE — ASSESSMENT & PLAN NOTE
Chronic and ongoing. Currently taking Ziprasidone 80 mg every evening. She states that the medication is working well. She has had four psychotic breakdowns in the past. Currently working with her psychiatrist.

## 2021-04-21 ENCOUNTER — HOSPITAL ENCOUNTER (OUTPATIENT)
Dept: LAB | Facility: MEDICAL CENTER | Age: 51
End: 2021-04-21
Attending: NURSE PRACTITIONER
Payer: MEDICARE

## 2021-04-21 DIAGNOSIS — Z00.00 WELLNESS EXAMINATION: ICD-10-CM

## 2021-06-17 ENCOUNTER — HOSPITAL ENCOUNTER (OUTPATIENT)
Dept: RADIOLOGY | Facility: MEDICAL CENTER | Age: 51
End: 2021-06-17
Attending: NURSE PRACTITIONER
Payer: MEDICARE

## 2021-06-17 DIAGNOSIS — N63.20 LUMP OF LEFT BREAST: ICD-10-CM

## 2021-06-17 DIAGNOSIS — N64.4 BREAST PAIN, LEFT: ICD-10-CM

## 2021-06-17 PROCEDURE — G0279 TOMOSYNTHESIS, MAMMO: HCPCS

## 2021-06-17 PROCEDURE — 76642 ULTRASOUND BREAST LIMITED: CPT | Mod: LT

## 2022-01-13 ENCOUNTER — OFFICE VISIT (OUTPATIENT)
Dept: SLEEP MEDICINE | Facility: MEDICAL CENTER | Age: 52
End: 2022-01-13
Payer: MEDICARE

## 2022-01-13 VITALS
OXYGEN SATURATION: 94 % | HEIGHT: 62 IN | SYSTOLIC BLOOD PRESSURE: 128 MMHG | BODY MASS INDEX: 37.73 KG/M2 | HEART RATE: 96 BPM | DIASTOLIC BLOOD PRESSURE: 76 MMHG | WEIGHT: 205 LBS

## 2022-01-13 DIAGNOSIS — F31.9 BIPOLAR 1 DISORDER (HCC): ICD-10-CM

## 2022-01-13 DIAGNOSIS — F51.04 CHRONIC INSOMNIA: ICD-10-CM

## 2022-01-13 DIAGNOSIS — G47.33 OSA (OBSTRUCTIVE SLEEP APNEA): ICD-10-CM

## 2022-01-13 DIAGNOSIS — F41.9 ANXIETY: ICD-10-CM

## 2022-01-13 PROCEDURE — 99213 OFFICE O/P EST LOW 20 MIN: CPT | Performed by: NURSE PRACTITIONER

## 2022-01-13 NOTE — PATIENT INSTRUCTIONS
Sleep Apnea  Sleep apnea affects breathing during sleep. It causes breathing to stop for a short time or to become shallow. It can also increase the risk of:  · Heart attack.  · Stroke.  · Being very overweight (obese).  · Diabetes.  · Heart failure.  · Irregular heartbeat.  The goal of treatment is to help you breathe normally again.  What are the causes?  There are three kinds of sleep apnea:  · Obstructive sleep apnea. This is caused by a blocked or collapsed airway.  · Central sleep apnea. This happens when the brain does not send the right signals to the muscles that control breathing.  · Mixed sleep apnea. This is a combination of obstructive and central sleep apnea.  The most common cause of this condition is a collapsed or blocked airway. This can happen if:  · Your throat muscles are too relaxed.  · Your tongue and tonsils are too large.  · You are overweight.  · Your airway is too small.  What increases the risk?  · Being overweight.  · Smoking.  · Having a small airway.  · Being older.  · Being male.  · Drinking alcohol.  · Taking medicines to calm yourself (sedatives or tranquilizers).  · Having family members with the condition.  What are the signs or symptoms?  · Trouble staying asleep.  · Being sleepy or tired during the day.  · Getting angry a lot.  · Loud snoring.  · Headaches in the morning.  · Not being able to focus your mind (concentrate).  · Forgetting things.  · Less interest in sex.  · Mood swings.  · Personality changes.  · Feelings of sadness (depression).  · Waking up a lot during the night to pee (urinate).  · Dry mouth.  · Sore throat.  How is this diagnosed?  · Your medical history.  · A physical exam.  · A test that is done when you are sleeping (sleep study). The test is most often done in a sleep lab but may also be done at home.  How is this treated?    · Sleeping on your side.  · Using a medicine to get rid of mucus in your nose (decongestant).  · Avoiding the use of alcohol,  medicines to help you relax, or certain pain medicines (narcotics).  · Losing weight, if needed.  · Changing your diet.  · Not smoking.  · Using a machine to open your airway while you sleep, such as:  ? An oral appliance. This is a mouthpiece that shifts your lower jaw forward.  ? A CPAP device. This device blows air through a mask when you breathe out (exhale).  ? An EPAP device. This has valves that you put in each nostril.  ? A BPAP device. This device blows air through a mask when you breathe in (inhale) and breathe out.  · Having surgery if other treatments do not work.  It is important to get treatment for sleep apnea. Without treatment, it can lead to:  · High blood pressure.  · Coronary artery disease.  · In men, not being able to have an erection (impotence).  · Reduced thinking ability.  Follow these instructions at home:  Lifestyle  · Make changes that your doctor recommends.  · Eat a healthy diet.  · Lose weight if needed.  · Avoid alcohol, medicines to help you relax, and some pain medicines.  · Do not use any products that contain nicotine or tobacco, such as cigarettes, e-cigarettes, and chewing tobacco. If you need help quitting, ask your doctor.  General instructions  · Take over-the-counter and prescription medicines only as told by your doctor.  · If you were given a machine to use while you sleep, use it only as told by your doctor.  · If you are having surgery, make sure to tell your doctor you have sleep apnea. You may need to bring your device with you.  · Keep all follow-up visits as told by your doctor. This is important.  Contact a doctor if:  · The machine that you were given to use during sleep bothers you or does not seem to be working.  · You do not get better.  · You get worse.  Get help right away if:  · Your chest hurts.  · You have trouble breathing in enough air.  · You have an uncomfortable feeling in your back, arms, or stomach.  · You have trouble talking.  · One side of your  body feels weak.  · A part of your face is hanging down.  These symptoms may be an emergency. Do not wait to see if the symptoms will go away. Get medical help right away. Call your local emergency services (911 in the [place] Do not drive yourself to the hospital.  Summary  · This condition affects breathing during sleep.  · The most common cause is a collapsed or blocked airway.  · The goal of treatment is to help you breathe normally while you sleep.  This information is not intended to replace advice given to you by your health care provider. Make sure you discuss any questions you have with your health care provider.  Document Released: 09/26/2009 Document Revised: 10/04/2019 Document Reviewed: 08/13/2019  [employer] Patient Education © 2020 [employer]    Obesity, Adult  Obesity is having too much body fat. Being obese means that your weight is more than what is healthy for you.  BMI is a number that explains how much body fat you have. If you have a BMI of 30 or more, you are obese. Obesity is often caused by eating or drinking more calories than your body uses. Changing your lifestyle can help you lose weight.  Obesity can cause serious health problems, such as:  · Stroke.  · Coronary artery disease (CAD).  · Type 2 diabetes.  · Some types of cancer, including cancers of the colon, breast, uterus, and gallbladder.  · Osteoarthritis.  · High blood pressure (hypertension).  · High cholesterol.  · Sleep apnea.  · Gallbladder stones.  · Infertility problems.  What are the causes?  · Eating meals each day that are high in calories, sugar, and fat.  · Being born with genes that may make you more likely to become obese.  · Having a medical condition that causes obesity.  · Taking certain medicines.  · Sitting a lot (having a sedentary lifestyle).  · Not getting enough sleep.  · Drinking a lot of drinks that have sugar in them.  What increases the risk?  · Having a family history of obesity.  · Being an   American woman.  · Being a  man.  · Living in an area with limited access to:  ? [place], recreation centers, or sidewalks.  ? Healthy food choices, such as grocery stores and [employer] markets.  What are the signs or symptoms?  The main sign is having too much body fat.  How is this treated?  · Treatment for this condition often includes changing your lifestyle. Treatment may include:  ? Changing your diet. This may include making a healthy meal plan.  ? Exercise. This may include activity that causes your heart to beat faster (aerobic exercise) and strength training. Work with your doctor to design a program that works for you.  ? Medicine to help you lose weight. This may be used if you are not able to lose 1 pound a week after 6 weeks of healthy eating and more exercise.  ? Treating conditions that cause the obesity.  ? Surgery. Options may include gastric banding and gastric bypass. This may be done if:  § Other treatments have not helped to improve your condition.  § You have a BMI of 40 or higher.  § You have life-threatening health problems related to obesity.  Follow these instructions at home:  Eating and drinking    · Follow advice from your doctor about what to eat and drink. Your doctor may tell you to:  ? Limit fast food, sweets, and processed snack foods.  ? Choose low-fat options. For example, choose low-fat milk instead of whole milk.  ? Eat 5 or more servings of fruits or vegetables each day.  ? Eat at home more often. This gives you more control over what you eat.  ? Choose healthy foods when you eat out.  ? Learn to read food labels. This will help you learn how much food is in 1 serving.  ? Keep low-fat snacks available.  ? Avoid drinks that have a lot of sugar in them. These include soda, fruit juice, iced tea with sugar, and flavored milk.  · Drink enough water to keep your pee (urine) pale yellow.  · Do not go on fad diets.  Physical activity  · Exercise often, as told by your doctor.  Most adults should get up to 150 minutes of moderate-intensity exercise every week.Ask your doctor:  ? What types of exercise are safe for you.  ? How often you should exercise.  · Warm up and stretch before being active.  · Do slow stretching after being active (cool down).  · Rest between times of being active.  Lifestyle  · Work with your doctor and a food expert (dietitian) to set a weight-loss goal that is best for you.  · Limit your screen time.  · Find ways to reward yourself that do not involve food.  · Do not drink alcohol if:  ? Your doctor tells you not to drink.  ? You are pregnant, may be pregnant, or are planning to become pregnant.  · If you drink alcohol:  ? Limit how much you use to:  § 0-1 drink a day for women.  § 0-2 drinks a day for men.  ? Be aware of how much alcohol is in your drink. In the [place] one drink equals one 12 oz bottle of beer (355 mL), one 5 oz glass of wine (148 mL), or one 1½ oz glass of hard liquor (44 mL).  General instructions  · Keep a weight-loss journal. This can help you keep track of:  ? The food that you eat.  ? How much exercise you get.  · Take over-the-counter and prescription medicines only as told by your doctor.  · Take vitamins and supplements only as told by your doctor.  · Think about joining a support group.  · Keep all follow-up visits as told by your doctor. This is important.  Contact a doctor if:  · You cannot meet your weight loss goal after you have changed your diet and lifestyle for 6 weeks.  Get help right away if you:  · Are having trouble breathing.  · Are having thoughts of harming yourself.  Summary  · Obesity is having too much body fat.  · Being obese means that your weight is more than what is healthy for you.  · Work with your doctor to set a weight-loss goal.  · Get regular exercise as told by your doctor.  This information is not intended to replace advice given to you by your health care provider. Make sure you discuss any questions you  have with your health care provider.  Document Released: 03/11/2013 Document Revised: 08/22/2019 Document Reviewed: 08/22/2019  Elsevier Patient Education © 2020 [employer]

## 2022-01-13 NOTE — PROGRESS NOTES
Chief Complaint   Patient presents with   • Follow-Up     HERLINDA- Last seen 10/08/2019       HPI:       Mrs. Solis is a 50 y/o female patient who is in today for annual HERLINDA f/u, overdue. PMH includes partial seizure disorder, anxiety, HERLINDA, bipolar 1 disorder, obesity, former smoker, depression, hyperthyroidism, migraine.    Patient was set up with a [employer] BiPAP machine on 4/26/2019.  Compliance report is not available for download as the patient has not used the machine for quite some time due to the recent recall.  Patient is treated with auto BiPAP IPAP/EPAP 15/14 cm of water pressure, pressure support 4/2 cm of water pressure.  She has registered the device.  She is tolerating the pressure and mask well. She goes to bed between 9-11 pm and wakes up at 2 am. She reports morning headache and snoring when she does not use the CPAP.  Patient has had to sleep in another room due to snoring.  She does struggle with sleep and is prescribed Belsomra 20 mg nightly which is managed by her psychiatrist Dr. Mayfield.  Patient will plan to resume BiPAP therapy.  She recently joined a gym and uses the treadmill 3 times a week ranging from 15 to 20 minutes.  She denies any new health problems or medications.    Sleep Study History:   PSG titration from 2/3/19 indicated HERLINDA. Successful BIPAP titration. The PAP titration was initiated with CPAP 5 cm of water and the pressure which was slowly titrated up in an attempt to eliminate sleep disordered breathing and snoring. CPAP was increased to 15 cm before switching to BiPAP. The BiPAP was titrated between 16/12 cm to 15/11 cm. At this final pressure the patient was observed in non supine REM sleep stage.The apnea hypopnea index improved to 0.4 per hour and O2 deshaun 89%. The average O2 stauration was 86%. She spent 9 min of sleep time below 89% O2 saturation.     PSG study from 12/16/18 indicated moderately severe obstructive sleep apnea hypopnea with an apnea hypopnea index  "of 24.8 events per hour and a lowest arterial oxygen saturation of 85% on room air.     ROS:    Constitutional: Denies fevers, Denies weight changes  Eyes: Denies changes in vision, no eye pain  Ears/Nose/Throat/Mouth: Denies nasal congestion or sore throat   Cardiovascular: Denies chest pain or palpitations   Respiratory: Denies shortness of breath , Denies cough  Gastrointestinal/Hepatic: Denies abdominal pain, nausea, vomiting,   Skin/Breast: Denies rash,   Neurological: Denies headache, confusion,   Psychiatric: denies mood disorder   Sleep: denies snoring       Past Medical History:   Diagnosis Date   • Abnormal mammogram 2006    nodule in left breast, no follow up   • Acromioclavicular joint separation 5/24/2012   • Anxiety     No [place] mental health; klonopin, coping mechanisms   • Depression    • Former smoker 4/26/2019   • History of cervical dysplasia 1990    had cryo tx, resolved with nl paps now   • History of gestational diabetes 1/15/2018   • History of suicidal ideation    • Homicidal ideations 2014    [facility] hold   • Hyperthyroidism    • Hyperthyroidism     since age 16, NL labs 03/2012   • Lisfranc's dislocation 10/2017    right   • Migraine    • Pelvic exam 2008    Dysplasia at age 20s, had cryotherapy   • Polysubstance dependence (HCC)     in remission as of 11/26/2014   • Schizoaffective disorder (HCC)    • Schizoaffective disorder, bipolar type (HCC) 4/7/2011   • Seizure (HCC) 1999    partial complex-no motor seizure; managed by [facility] mental health   • Seizure disorder (HCC)     partial complex seizure d/o   • Toe fracture 10/2017    right 3rd-5th toes   • Urinary tract infection    • Wrist fracture 2010    casted by [facility]       Past Surgical History:   Procedure Laterality Date   • PRIMARY C SECTION  2008   • CERVICAL CONIZATION  1990    cryo for abnl pap   • EYE SURGERY  1973,1979    to repair \"lazy eye\"   • OTHER      eye surgeries as a child       Family History   Problem Relation Age of " Onset   • Cancer Mother         breast cancer   • Diabetes Mother    • Hypertension Mother    • Cancer Maternal Grandmother         stomach   • Stroke Maternal Grandfather    • Cancer Paternal Grandfather         lung   • Hypertension Father        Social History     Socioeconomic History   • Marital status:      Spouse name: Not on file   • Number of children: Not on file   • Years of education: Not on file   • Highest education level: Not on file   Occupational History   • Occupation: not working     Employer: OTHER   Tobacco Use   • Smoking status: Former Smoker     Packs/day: 0.50     Years: 30.00     Pack years: 15.00     Types: Cigarettes     Start date: 6/22/2016     Quit date: 12/28/2018     Years since quitting: 3.0   • Smokeless tobacco: Never Used   • Tobacco comment: started at 18   Vaping Use   • Vaping Use: Former   Substance and Sexual Activity   • Alcohol use: No     Comment: quit 01/27/2018   • Drug use: No   • Sexual activity: Yes     Partners: Male     Comment:  x5 years   Other Topics Concern   • Not on file   Social History Narrative    ** Merged History Encounter **          Social Determinants of Health     Financial Resource Strain:    • Difficulty of Paying Living Expenses: Not on file   Food Insecurity:    • Worried About Running Out of Food in the Last Year: Not on file   • Ran Out of Food in the Last Year: Not on file   Transportation Needs:    • Lack of Transportation (Medical): Not on file   • Lack of Transportation (Non-Medical): Not on file   Physical Activity:    • Days of Exercise per Week: Not on file   • Minutes of Exercise per Session: Not on file   Stress:    • Feeling of Stress : Not on file   Social Connections:    • Frequency of Communication with Friends and Family: Not on file   • Frequency of Social Gatherings with Friends and Family: Not on file   • Attends Buddhism Services: Not on file   • Active Member of Clubs or Organizations: Not on file   • Attends  Club or Organization Meetings: Not on file   • Marital Status: Not on file   Intimate Partner Violence:    • Fear of Current or Ex-Partner: Not on file   • Emotionally Abused: Not on file   • Physically Abused: Not on file   • Sexually Abused: Not on file   Housing Stability:    • Unable to Pay for Housing in the Last Year: Not on file   • Number of Places Lived in the Last Year: Not on file   • Unstable Housing in the Last Year: Not on file       Allergies as of 01/13/2022 - Reviewed 01/13/2022   Allergen Reaction Noted   • Zoloft Unspecified 04/30/2010        Vitals:  Vitals:    01/13/22 0949   BP: 128/76   Pulse: 96   SpO2: 94%       Current medications as of today   Current Outpatient Medications   Medication Sig Dispense Refill   • BELSOMRA 20 MG Tab      • venlafaxine XR (EFFEXOR XR) 75 MG CAPSULE SR 24 HR      • QUEtiapine (SEROQUEL) 200 MG Tab Take 200 mg by mouth.     • topiramate (TOPAMAX) 100 MG Tab TAKE 1 TABLET BY MOUTH TWICE A DAY     • clonazePAM (KLONOPIN) 1 MG Tab Take 1 mg by mouth 1 time daily as needed (seizures).  3   • ziprasidone (GEODON) 80 MG Cap Take 80 mg by mouth every evening.  3     No current facility-administered medications for this visit.         Physical Exam: Limited by COVID-19 precautions.  Appearance: Well developed, well nourished, no acute distress  Eyes: PERRL, EOM intact, sclera white, conjunctiva moist  Ears: no lesions or deformities  Hearing: grossly intact  Nose: no lesions or deformities  Respiratory effort: no intercostal retractions or use of accessory muscles  Extremities: no cyanosis or edema  Abdomen: soft   Gait and Station: normal  Digits and nails: no clubbing, cyanosis, petechiae or nodes.  Cranial nerves: grossly intact  Skin: no visible rashes, lesions or ulcers noted  Orientation: Oriented to time, person and place  Mood and affect: mood and affect appropriate, normal interaction with examiner  Judgement: Intact    Assessment:  1. HERLINDA (obstructive sleep  apnea)     2. Bipolar 1 disorder with psychosis episodes     3. Anxiety     4. Chronic insomnia     5. BMI 37.0-37.9, adult           Plan  Discussed the cardiovascular and neuropsychiatric risks of untreated HERLINDA; including but not limited to: HTN, DM, MI, ASCVD, CVA, CHF, traffic accidents.     1. Compliance report is not available for download as the patient has not used the machine for quite some time due to the recent recall.  Patient is treated with auto BiPAP IPAP/EPAP 15/14 cm of water pressure, pressure support 4/2 cm of water pressure.  Patient will resume BiPAP therapy.    *DME order ([facility]) for mask (dreamwear FFM or MOC) and supplies was placed today. Continue to clean mask and supplies weekly with soap and water, and change supplies per insurance guidelines.     **Recall of [employer] BiPAP machines was reviewed with the patient today.    What I have been recommending to the patients is to call their supplying companies to figure out exact model of their machine. You can self register your machine on [employer] website listed below. [employer] has already been replacing the machines. The recent recall from [employer] is due to disintegration of the polyurethane foam. This is a 0.03% risk of break down of an inner foam in device, which is a small risk. Currently I recommend to continue using your machine until or unless you experience black debris/particles within the air path or experiencing headaches, upper airway irritation, cough, chest pressure, sinus infection, irritation to skin/eyes/respiratory tract, inflammatory response, asthma, nausea/vomiting to stop using the PAP therapy immediately and contact the office. It is ultimately your decision on whether you choose to continue using the BiPAP machine. Patient is also recommended to reach out to their DME to let them know that they are using [employer] machine, therefore when and if needed those machines can be replaced by  their DME's. We will keep you posted as we get more information from the  for the DME. Advised patient to not use So-Clean or any other type of Ozone .  Patient may also obtain an inline filter through CP[employer] and more which is a local company or on [employer].  These filters last for about 3 months but tend to decrease the device pressure.  If this should occur we would need to increase the device pressure.    [place] your device on the recall website at www.ii4b/src-update     2-4. Continue to f/u with psychiatry Dr. Mayfield.   Sleep hygiene discussed. Recommend keeping a set sleep/wake schedule. Logging enough hours of sleep. Limiting/Avoiding naps. No caffeine after noon and no heavy meals in the evening.   Chronic insomnia: Currently on Belsomra 20 mg nightly which is prescribed by psychiatry.  5. Continue to stay active.   If your BMI is 25-29.9 you are overweight. If your BMI is 30 or greater you are obese. To lose weight eat less, move more, or both. Any diet that reduces caloric intake can help with weight loss. Extra weight may reduce your lifespan. Avoid dramatic unsustainable dietary changes that result in the yo-yo effect (down then back up.)  Usually small modifications in diet and exercise are easier to stick with.  6. Follow up with appropriate healthcare providers for all other medical problems.  7. F/u in 6 months for HERLINDA per patient request, or sooner if needed.       HANK Rizo.      This dictation was created using voice recognition software. The accuracy of the dictation is limited to the abilities of the software. I expect there may be some errors of grammar and possibly content.

## 2022-01-25 ENCOUNTER — HOSPITAL ENCOUNTER (OUTPATIENT)
Facility: MEDICAL CENTER | Age: 52
End: 2022-01-25
Attending: OBSTETRICS & GYNECOLOGY
Payer: MEDICARE

## 2022-01-25 ENCOUNTER — GYNECOLOGY VISIT (OUTPATIENT)
Dept: OBGYN | Facility: CLINIC | Age: 52
End: 2022-01-25
Payer: MEDICARE

## 2022-01-25 VITALS
WEIGHT: 209.8 LBS | HEIGHT: 62 IN | DIASTOLIC BLOOD PRESSURE: 84 MMHG | SYSTOLIC BLOOD PRESSURE: 133 MMHG | BODY MASS INDEX: 38.61 KG/M2

## 2022-01-25 DIAGNOSIS — Z01.419 ENCOUNTER FOR ANNUAL ROUTINE GYNECOLOGICAL EXAMINATION: ICD-10-CM

## 2022-01-25 DIAGNOSIS — Z12.11 COLON CANCER SCREENING: ICD-10-CM

## 2022-01-25 DIAGNOSIS — Z30.09 FAMILY PLANNING: ICD-10-CM

## 2022-01-25 DIAGNOSIS — N63.0 BREAST MASS IN FEMALE: ICD-10-CM

## 2022-01-25 DIAGNOSIS — R92.8 OTHER ABNORMAL AND INCONCLUSIVE FINDINGS ON DIAGNOSTIC IMAGING OF BREAST: ICD-10-CM

## 2022-01-25 DIAGNOSIS — Z12.4 CERVICAL CANCER SCREENING: ICD-10-CM

## 2022-01-25 PROCEDURE — 88175 CYTOPATH C/V AUTO FLUID REDO: CPT

## 2022-01-25 PROCEDURE — 87624 HPV HI-RISK TYP POOLED RSLT: CPT

## 2022-01-25 PROCEDURE — 99999 PR NO CHARGE: CPT | Performed by: OBSTETRICS & GYNECOLOGY

## 2022-01-25 NOTE — PROGRESS NOTES
Well woman visit     Rianna Solis is a 51 y.o.  who presents to [facility] care for her Annual Exam.      GYN History:  LMP: 21, irregular  Menses:  Every other month to every few months  Menopause: no hot flashes or nightsweats  Last pap: 2019  Sexually active: yes  History of STDs: no  Fam Hx of breast, ovarian, or colon cancer: mother had breast cancer     OB History:        Health Maintenance:  Last mammogram: last mammo   Last colorectal screening: hasnt had yet  Immunizations: UTD    Review of Systems:   ROS:  Constitutional: No fever, weight loss, weight gain, or fatigue  Skin/breast: No breast lump, nipple discharge, acne  Cardiovascular: No chest pain, leg swelling, palpitations  Respiratory: No shortness of breath, wheezing, or cough  Genitourinary: No pelvic pain, vaginal discharge, painful urination, abnormal periods, involuntary loss of urine, or vaginal bulge.  GI: No diarrhea, constipation, blood in stool, vomiting, abdominal pain  Endocrine: No hot flashes, abnormal thirst  Psychiatric: No depression, anxiety, or thoughts of self-harm  Neurologic: No headaches or seizures  Heme/lymph: No enlarged lymph nodes, denies bruising/bleeding that will not stop  Allergic: Denies allergies  MSK: Denies muscle weakness    All PMH, PSH, allergies, social history and FH reviewed and updated today:  Past Medical History:  Past Medical History:   Diagnosis Date   • Abnormal mammogram 2006    nodule in left breast, no follow up   • Acromioclavicular joint separation 2012   • Anxiety     No [place] mental health; klonopin, coping mechanisms   • Depression    • Former smoker 2019   • History of cervical dysplasia     had cryo tx, resolved with nl paps now   • History of gestational diabetes 1/15/2018   • History of suicidal ideation    • Homicidal ideations     [facility] hold   • Hyperthyroidism    • Hyperthyroidism     since age 16, NL labs 2012   • Kaylah  "dislocation 10/2017    right   • Migraine    • Pelvic exam 2008    Dysplasia at age 20s, had cryotherapy   • Polysubstance dependence (HCC)     in remission as of 11/26/2014   • Schizoaffective disorder (HCC)    • Schizoaffective disorder, bipolar type (HCC) 4/7/2011   • Seizure (HCC) 1999    partial complex-no motor seizure; managed by [facility]   • Seizure disorder (HCC)     partial complex seizure d/o   • Toe fracture 10/2017    right 3rd-5th toes   • Urinary tract infection    • Wrist fracture 2010    casted by [facility]       Past Surgical History:  Past Surgical History:   Procedure Laterality Date   • PRIMARY C SECTION  2008   • CERVICAL CONIZATION  1990    cryo for abnl pap   • EYE SURGERY  1973,1979    to repair \"lazy eye\"   • OTHER      eye surgeries as a child       Medications:   Current Outpatient Medications Ordered in Epic   Medication Sig Dispense Refill   • BELSOMRA 20 MG Tab      • venlafaxine XR (EFFEXOR XR) 75 MG CAPSULE SR 24 HR      • QUEtiapine (SEROQUEL) 200 MG Tab Take 200 mg by mouth.     • topiramate (TOPAMAX) 100 MG Tab TAKE 1 TABLET BY MOUTH TWICE A DAY     • clonazePAM (KLONOPIN) 1 MG Tab Take 1 mg by mouth 1 time daily as needed (seizures).  3   • ziprasidone (GEODON) 80 MG Cap Take 80 mg by mouth every evening.  3     No current [facility]-ordered facility-administered medications on file.       Allergies: Zoloft    Social History:  Social History     Socioeconomic History   • Marital status:      Spouse name: Not on file   • Number of children: Not on file   • Years of education: Not on file   • Highest education level: Not on file   Occupational History   • Occupation: not working     Employer: OTHER   Tobacco Use   • Smoking status: Former Smoker     Packs/day: 0.50     Years: 30.00     Pack years: 15.00     Types: Cigarettes     Start date: 6/22/2016     Quit date: 12/28/2018     Years since quitting: 3.0   • Smokeless tobacco: Never Used   • Tobacco comment: started at " 18   Vaping Use   • Vaping Use: Former   Substance and Sexual Activity   • Alcohol use: No     Comment: quit 01/27/2018   • Drug use: No   • Sexual activity: Yes     Partners: Male     Comment:  x5 years   Other Topics Concern   • Not on file   Social History Narrative    ** Merged History Encounter **          Social Determinants of Health     Financial Resource Strain:    • Difficulty of Paying Living Expenses: Not on file   Food Insecurity:    • Worried About Running Out of Food in the Last Year: Not on file   • Ran Out of Food in the Last Year: Not on file   Transportation Needs:    • Lack of Transportation (Medical): Not on file   • Lack of Transportation (Non-Medical): Not on file   Physical Activity:    • Days of Exercise per Week: Not on file   • Minutes of Exercise per Session: Not on file   Stress:    • Feeling of Stress : Not on file   Social Connections:    • Frequency of Communication with Friends and Family: Not on file   • Frequency of Social Gatherings with Friends and Family: Not on file   • Attends [employer] Services: Not on file   • Active Member of Clubs or Organizations: Not on file   • Attends Club or Organization Meetings: Not on file   • Marital Status: Not on file   Intimate Partner Violence:    • Fear of Current or Ex-Partner: Not on file   • Emotionally Abused: Not on file   • Physically Abused: Not on file   • Sexually Abused: Not on file   Housing Stability:    • Unable to Pay for Housing in the Last Year: Not on file   • Number of Places Lived in the Last Year: Not on file   • Unstable Housing in the Last Year: Not on file       Family History:  Family History   Problem Relation Age of Onset   • Cancer Mother         breast cancer   • Diabetes Mother    • Hypertension Mother    • Cancer Maternal Grandmother         stomach   • Stroke Maternal Grandfather    • Cancer Paternal Grandfather         lung   • Hypertension Father            Objective:   Vitals:  /84 (BP Location:  "Left arm, Patient Position: Sitting, BP Cuff Size: Adult)   Ht 1.575 m (5' 2\")   Wt 95.2 kg (209 lb 12.8 oz)   Body mass index is 38.37 kg/m². (Goal BM I>18 <25)    Physical Exam:   Nursing note and vitals reviewed.  Physical Exam   Constitutional: She is oriented to person, place, and time and well-developed, well-nourished, and in no distress.   HENT:   Head: Normocephalic and atraumatic.   Right Ear: External ear normal.   Eyes: Pupils are equal, round, and reactive to light. Conjunctivae are normal.   Neck: No thyromegaly present.   Cardiovascular: Intact distal pulses.   Pulmonary/Chest: Effort normal and breath sounds normal.   Abdominal: Soft. Bowel sounds are normal.   Musculoskeletal:         General: Normal range of motion.      Cervical back: Normal range of motion and neck supple.   Neurological: She is alert and oriented to person, place, and time. Gait normal.   Skin: Skin is warm and dry.   Psychiatric: Mood, memory, affect and judgment normal.   Genitourinary:  Normal appearing external female genitalia without any rashes, lesions, labial fusion or tenderness.  Vagina is pink moist and well rugated. Physiologic discharge present within vaginal vault.  Cervix well visualized without masses or lesions.  On bimanual exam there is no cervical motion tenderness, uterus is midline, not enlarged, fixed, or tender.  No adnexal masses or tenderness.   Breast: No masses, skin dimpling, or lymphadenopathy noted bilaterally.  No nipple discharge.  Nipples everted.      Assessment/Plan:     1. Cervical cancer screening  THINPREP PAP W/HPV AND CTNG   2. Family planning  Referral to Infertility   3. Colon cancer screening  Referral to GI for Colonoscopy   4. Encounter for annual routine gynecological examination     5. Breast mass in female  MA-DIAGNOSTIC MAMMO BILAT W/TOMOSYNTHESIS W/CAD   6. Other abnormal and inconclusive findings on diagnostic imaging of breast   MA-DIAGNOSTIC MAMMO BILAT W/TOMOSYNTHESIS " W/CAD       Rianna Solis is a 51 y.o.  female who presents for her annual gynecologic exam.   # Preventative health care:   --Breast self awareness discussed. Mammogram ordered (annually starting at age 40).  --Cervical cancer screening. Last Pap dysplastic. Collected today. HPV sent. I will follow up with patient once results are back as per ASCCP guidelines.   --Smoking - not a smoker.   --Colorectal screening indicated.   --Immunizations are up to date.  --Diet, exercise, vitamin supplementation, and hydration discussed.  # Contraception: declines  # STD screening: not requested  #Patient states that she has been trying to conceive with her partner.  I discussed that at her age, she is at greater risk for preeclampsia, gestational diabetes, gestational hypertension,  labor, low birthweight, and overall a higher risk patient.  Patient expresses understanding.  She still wants to meet with the reproductive endocrinologist to discuss options.  She states her daughter is willing to provide and a donor egg.  # Follow up annually or prn.

## 2022-01-26 DIAGNOSIS — Z12.4 CERVICAL CANCER SCREENING: ICD-10-CM

## 2022-01-27 PROCEDURE — 87624 HPV HI-RISK TYP POOLED RSLT: CPT

## 2022-01-28 PROCEDURE — 88175 CYTOPATH C/V AUTO FLUID REDO: CPT

## 2022-01-31 DIAGNOSIS — Z12.4 CERVICAL CANCER SCREENING: ICD-10-CM

## 2022-02-01 LAB
CYTOLOGY REG CYTOL: NORMAL
HPV HR 12 DNA CVX QL NAA+PROBE: NEGATIVE
HPV16 DNA SPEC QL NAA+PROBE: NEGATIVE
HPV18 DNA SPEC QL NAA+PROBE: NEGATIVE
SPECIMEN SOURCE: NORMAL

## 2022-02-12 ENCOUNTER — APPOINTMENT (OUTPATIENT)
Dept: RADIOLOGY | Facility: MEDICAL CENTER | Age: 52
End: 2022-02-12
Attending: EMERGENCY MEDICINE
Payer: MEDICARE

## 2022-02-12 ENCOUNTER — OFFICE VISIT (OUTPATIENT)
Dept: URGENT CARE | Facility: PHYSICIAN GROUP | Age: 52
End: 2022-02-12
Payer: MEDICARE

## 2022-02-12 ENCOUNTER — HOSPITAL ENCOUNTER (EMERGENCY)
Facility: MEDICAL CENTER | Age: 52
End: 2022-02-12
Attending: EMERGENCY MEDICINE
Payer: MEDICARE

## 2022-02-12 VITALS
WEIGHT: 204 LBS | OXYGEN SATURATION: 98 % | HEIGHT: 62 IN | RESPIRATION RATE: 16 BRPM | DIASTOLIC BLOOD PRESSURE: 72 MMHG | HEART RATE: 80 BPM | SYSTOLIC BLOOD PRESSURE: 120 MMHG | BODY MASS INDEX: 37.54 KG/M2 | TEMPERATURE: 97.6 F

## 2022-02-12 VITALS
RESPIRATION RATE: 16 BRPM | TEMPERATURE: 97.3 F | OXYGEN SATURATION: 96 % | SYSTOLIC BLOOD PRESSURE: 136 MMHG | DIASTOLIC BLOOD PRESSURE: 82 MMHG | HEIGHT: 62 IN | WEIGHT: 203.93 LBS | HEART RATE: 69 BPM | BODY MASS INDEX: 37.53 KG/M2

## 2022-02-12 DIAGNOSIS — R10.32 LEFT LOWER QUADRANT ABDOMINAL PAIN: ICD-10-CM

## 2022-02-12 DIAGNOSIS — K57.92 DIVERTICULITIS: ICD-10-CM

## 2022-02-12 LAB
ALBUMIN SERPL BCP-MCNC: 4.4 G/DL (ref 3.2–4.9)
ALBUMIN/GLOB SERPL: 1.4 G/DL
ALP SERPL-CCNC: 80 U/L (ref 30–99)
ALT SERPL-CCNC: 29 U/L (ref 2–50)
ANION GAP SERPL CALC-SCNC: 12 MMOL/L (ref 7–16)
APPEARANCE UR: CLEAR
AST SERPL-CCNC: 19 U/L (ref 12–45)
BASOPHILS # BLD AUTO: 0.5 % (ref 0–1.8)
BASOPHILS # BLD: 0.05 K/UL (ref 0–0.12)
BILIRUB SERPL-MCNC: 0.3 MG/DL (ref 0.1–1.5)
BILIRUB UR STRIP-MCNC: NEGATIVE MG/DL
BUN SERPL-MCNC: 12 MG/DL (ref 8–22)
CALCIUM SERPL-MCNC: 9.6 MG/DL (ref 8.4–10.2)
CHLORIDE SERPL-SCNC: 106 MMOL/L (ref 96–112)
CO2 SERPL-SCNC: 19 MMOL/L (ref 20–33)
COLOR UR AUTO: YELLOW
CREAT SERPL-MCNC: 0.66 MG/DL (ref 0.5–1.4)
EOSINOPHIL # BLD AUTO: 0.12 K/UL (ref 0–0.51)
EOSINOPHIL NFR BLD: 1.2 % (ref 0–6.9)
ERYTHROCYTE [DISTWIDTH] IN BLOOD BY AUTOMATED COUNT: 40.5 FL (ref 35.9–50)
GLOBULIN SER CALC-MCNC: 3.1 G/DL (ref 1.9–3.5)
GLUCOSE SERPL-MCNC: 173 MG/DL (ref 65–99)
GLUCOSE UR STRIP.AUTO-MCNC: NEGATIVE MG/DL
HCT VFR BLD AUTO: 41.6 % (ref 37–47)
HGB BLD-MCNC: 14.3 G/DL (ref 12–16)
IMM GRANULOCYTES # BLD AUTO: 0.05 K/UL (ref 0–0.11)
IMM GRANULOCYTES NFR BLD AUTO: 0.5 % (ref 0–0.9)
INT CON NEG: NORMAL
INT CON POS: NORMAL
KETONES UR STRIP.AUTO-MCNC: NEGATIVE MG/DL
LEUKOCYTE ESTERASE UR QL STRIP.AUTO: NEGATIVE
LIPASE SERPL-CCNC: 47 U/L (ref 7–58)
LYMPHOCYTES # BLD AUTO: 2.69 K/UL (ref 1–4.8)
LYMPHOCYTES NFR BLD: 26.1 % (ref 22–41)
MCH RBC QN AUTO: 31.2 PG (ref 27–33)
MCHC RBC AUTO-ENTMCNC: 34.4 G/DL (ref 33.6–35)
MCV RBC AUTO: 90.8 FL (ref 81.4–97.8)
MONOCYTES # BLD AUTO: 0.52 K/UL (ref 0–0.85)
MONOCYTES NFR BLD AUTO: 5 % (ref 0–13.4)
NEUTROPHILS # BLD AUTO: 6.87 K/UL (ref 2–7.15)
NEUTROPHILS NFR BLD: 66.7 % (ref 44–72)
NITRITE UR QL STRIP.AUTO: NEGATIVE
NRBC # BLD AUTO: 0 K/UL
NRBC BLD-RTO: 0 /100 WBC
PH UR STRIP.AUTO: 7 [PH] (ref 5–8)
PLATELET # BLD AUTO: 294 K/UL (ref 164–446)
PMV BLD AUTO: 9.1 FL (ref 9–12.9)
POC URINE PREGNANCY TEST: NEGATIVE
POTASSIUM SERPL-SCNC: 3.7 MMOL/L (ref 3.6–5.5)
PROT SERPL-MCNC: 7.5 G/DL (ref 6–8.2)
PROT UR QL STRIP: NEGATIVE MG/DL
RBC # BLD AUTO: 4.58 M/UL (ref 4.2–5.4)
RBC UR QL AUTO: NEGATIVE
SODIUM SERPL-SCNC: 137 MMOL/L (ref 135–145)
SP GR UR STRIP.AUTO: 1.01
UROBILINOGEN UR STRIP-MCNC: 0.2 MG/DL
WBC # BLD AUTO: 10.3 K/UL (ref 4.8–10.8)

## 2022-02-12 PROCEDURE — 80053 COMPREHEN METABOLIC PANEL: CPT

## 2022-02-12 PROCEDURE — 83690 ASSAY OF LIPASE: CPT

## 2022-02-12 PROCEDURE — 700117 HCHG RX CONTRAST REV CODE 255: Performed by: EMERGENCY MEDICINE

## 2022-02-12 PROCEDURE — 74177 CT ABD & PELVIS W/CONTRAST: CPT | Mod: ME

## 2022-02-12 PROCEDURE — 99284 EMERGENCY DEPT VISIT MOD MDM: CPT

## 2022-02-12 PROCEDURE — 81025 URINE PREGNANCY TEST: CPT | Performed by: PHYSICIAN ASSISTANT

## 2022-02-12 PROCEDURE — 81002 URINALYSIS NONAUTO W/O SCOPE: CPT | Performed by: PHYSICIAN ASSISTANT

## 2022-02-12 PROCEDURE — 96376 TX/PRO/DX INJ SAME DRUG ADON: CPT

## 2022-02-12 PROCEDURE — 96365 THER/PROPH/DIAG IV INF INIT: CPT

## 2022-02-12 PROCEDURE — 700111 HCHG RX REV CODE 636 W/ 250 OVERRIDE (IP): Performed by: EMERGENCY MEDICINE

## 2022-02-12 PROCEDURE — 700105 HCHG RX REV CODE 258: Performed by: EMERGENCY MEDICINE

## 2022-02-12 PROCEDURE — 96375 TX/PRO/DX INJ NEW DRUG ADDON: CPT

## 2022-02-12 PROCEDURE — 99215 OFFICE O/P EST HI 40 MIN: CPT | Performed by: PHYSICIAN ASSISTANT

## 2022-02-12 PROCEDURE — 85025 COMPLETE CBC W/AUTO DIFF WBC: CPT

## 2022-02-12 RX ORDER — ONDANSETRON 2 MG/ML
4 INJECTION INTRAMUSCULAR; INTRAVENOUS ONCE
Status: COMPLETED | OUTPATIENT
Start: 2022-02-12 | End: 2022-02-12

## 2022-02-12 RX ORDER — MORPHINE SULFATE 4 MG/ML
4 INJECTION INTRAVENOUS ONCE
Status: COMPLETED | OUTPATIENT
Start: 2022-02-12 | End: 2022-02-12

## 2022-02-12 RX ORDER — HYDROCODONE BITARTRATE AND ACETAMINOPHEN 5; 325 MG/1; MG/1
1 TABLET ORAL EVERY 6 HOURS PRN
Qty: 12 TABLET | Refills: 0 | Status: SHIPPED | OUTPATIENT
Start: 2022-02-12 | End: 2022-02-15

## 2022-02-12 RX ORDER — IBUPROFEN 200 MG
400 TABLET ORAL EVERY 6 HOURS PRN
Status: SHIPPED | COMMUNITY
End: 2022-05-06

## 2022-02-12 RX ORDER — AMOXICILLIN AND CLAVULANATE POTASSIUM 875; 125 MG/1; MG/1
1 TABLET, FILM COATED ORAL 2 TIMES DAILY
Qty: 20 TABLET | Refills: 0 | Status: SHIPPED | OUTPATIENT
Start: 2022-02-12 | End: 2022-02-22

## 2022-02-12 RX ADMIN — MORPHINE SULFATE 4 MG: 4 INJECTION INTRAVENOUS at 12:38

## 2022-02-12 RX ADMIN — MORPHINE SULFATE 4 MG: 4 INJECTION INTRAVENOUS at 11:45

## 2022-02-12 RX ADMIN — IOHEXOL 90 ML: 350 INJECTION, SOLUTION INTRAVENOUS at 11:19

## 2022-02-12 RX ADMIN — ONDANSETRON 4 MG: 2 INJECTION INTRAMUSCULAR; INTRAVENOUS at 11:45

## 2022-02-12 RX ADMIN — SODIUM CHLORIDE 3 G: 900 INJECTION INTRAVENOUS at 12:13

## 2022-02-12 NOTE — ED PROVIDER NOTES
ED Provider Note    CHIEF COMPLAINT  Chief Complaint   Patient presents with   • LUQ Pain   • Sent from Urgent Care       HPI  Rianna Solis is a 51 y.o. female who presents to the emergency department complaining of left lower quadrant abdominal pain.  The pain began yesterday initially as a pain in the left flank but it is migrated forward and is now mostly in the left lower quadrant and just laterally to that it is sharp in sensation and comes and goes.  The patient says it hurts to press on that area.  She has never had a pain like this before and does not recognize any precipitating events or exacerbating or alleviating factors.    REVIEW OF SYSTEMS no fever or chills no chest pain cough or difficulty breathing no pain or discomfort with urination no black or bloody bowel movements.  All other systems negative    PAST MEDICAL HISTORY  Past Medical History:   Diagnosis Date   • Abnormal mammogram 2006    nodule in left breast, no follow up   • Acromioclavicular joint separation 5/24/2012   • Anxiety     No [place] mental health; klonopin, coping mechanisms   • Depression    • Former smoker 4/26/2019   • History of cervical dysplasia 1990    had cryo tx, resolved with nl paps now   • History of gestational diabetes 1/15/2018   • History of suicidal ideation    • Homicidal ideations 2014    [facility] hold   • Hyperthyroidism    • Hyperthyroidism     since age 16, NL labs 03/2012   • Lisfranc's dislocation 10/2017    right   • Migraine    • Pelvic exam 2008    Dysplasia at age 20s, had cryotherapy   • Polysubstance dependence (HCC)     in remission as of 11/26/2014   • Schizoaffective disorder (HCC)    • Schizoaffective disorder, bipolar type (HCC) 4/7/2011   • Seizure (HCC) 1999    partial complex-no motor seizure; managed by [facility]   • Seizure disorder (HCC)     partial complex seizure d/o   • Toe fracture 10/2017    right 3rd-5th toes   • Urinary tract infection    • Wrist fracture 2010     casted by [facility]       FAMILY HISTORY  Family History   Problem Relation Age of Onset   • Cancer Mother         breast cancer   • Diabetes Mother    • Hypertension Mother    • Cancer Maternal Grandmother         stomach   • Stroke Maternal Grandfather    • Cancer Paternal Grandfather         lung   • Hypertension Father        SOCIAL HISTORY  Social History     Socioeconomic History   • Marital status:      Spouse name: Not on file   • Number of children: Not on file   • Years of education: Not on file   • Highest education level: Not on file   Occupational History   • Occupation: not working     Employer: OTHER   Tobacco Use   • Smoking status: Former Smoker     Packs/day: 0.50     Years: 30.00     Pack years: 15.00     Types: Cigarettes     Start date: 6/22/2016     Quit date: 12/28/2018     Years since quitting: 3.1   • Smokeless tobacco: Never Used   • Tobacco comment: started at 18   Vaping Use   • Vaping Use: Former   Substance and Sexual Activity   • Alcohol use: Not Currently   • Drug use: No   • Sexual activity: Yes     Partners: Male     Comment:  x5 years   Other Topics Concern   • Not on file   Social History Narrative    ** Merged History Encounter **          Social Determinants of Health     Financial Resource Strain:    • Difficulty of Paying Living Expenses: Not on file   Food Insecurity:    • Worried About Running Out of Food in the Last Year: Not on file   • Ran Out of Food in the Last Year: Not on file   Transportation Needs:    • Lack of Transportation (Medical): Not on file   • Lack of Transportation (Non-Medical): Not on file   Physical Activity:    • Days of Exercise per Week: Not on file   • Minutes of Exercise per Session: Not on file   Stress:    • Feeling of Stress : Not on file   Social Connections:    • Frequency of Communication with Friends and Family: Not on file   • Frequency of Social Gatherings with Friends and Family: Not on file   • Attends [employer] Services: Not  "on file   • Active Member of Clubs or Organizations: Not on file   • Attends Club or Organization Meetings: Not on file   • Marital Status: Not on file   Intimate Partner Violence:    • Fear of Current or Ex-Partner: Not on file   • Emotionally Abused: Not on file   • Physically Abused: Not on file   • Sexually Abused: Not on file   Housing Stability:    • Unable to Pay for Housing in the Last Year: Not on file   • Number of Places Lived in the Last Year: Not on file   • Unstable Housing in the Last Year: Not on file       SURGICAL HISTORY  Past Surgical History:   Procedure Laterality Date   • PRIMARY C SECTION  2008   • CERVICAL CONIZATION  1990    cryo for abnl pap   • EYE SURGERY  1973,1979    to repair \"lazy eye\"   • OTHER      eye surgeries as a child       CURRENT MEDICATIONS  Home Medications     Reviewed by Tra Shane (Pharmacy Tech) on 02/12/22 at 1139  Med List Status: Complete   Medication Last Dose Status   clonazePAM (KLONOPIN) 1 MG Tab >3 days Active   ibuprofen (MOTRIN) 200 MG Tab 2/12/2022 Active   NAPROXEN PO 2/12/2022 Active   topiramate (TOPAMAX) 100 MG Tab 2/12/2022 Active   venlafaxine XR (EFFEXOR XR) 75 MG CAPSULE SR 24 HR 2/11/2022 Active   ziprasidone (GEODON) 80 MG Cap 2/11/2022 Active                ALLERGIES  Allergies   Allergen Reactions   • Zoloft Unspecified     \"Blows Up\"       PHYSICAL EXAM  VITAL SIGNS: /90   Pulse 84   Temp 36.3 °C (97.3 °F) (Temporal)   Resp 20   Ht 1.575 m (5' 2\")   Wt 92.5 kg (203 lb 14.8 oz)   SpO2 98%   BMI 37.30 kg/m²    Oxygen saturation is interpreted as adequate  Constitutional: Awake lucid verbal pleasant individual she does not appear toxic or distressed  Eyes: No erythema discharge or jaundice  Neck: Trachea midline no JVD  Cardiovascular: Regular rate and rhythm  Lungs: Clear and equal bilaterally with no apparent difficulty breathing  Abdomen/Back: Soft and nondistended she is mildly tender to palpation in the left lower " quadrant, no rebound guarding or peritoneal findings bowel sounds are present.  I do not see any rashes or vesicles overlying the left flank.  Skin: Warm and dry  Musculoskeletal: No acute bony deformity  Neurologic: Awake verbal moving all extremities without difficulty    CHART REVIEW  The patient was seen at an urgent care just before coming to the ER and the performed a urinalysis which was negative for nitrite leukocyte Estrace and blood and also a urine pregnancy test which was negative.    Laboratory  Here in the emergency department CBC shows white blood cell count of 10.3 hemoglobin is adequate at 14.4 basic metabolic panel shows a slightly elevated blood glucose of 173 and a slightly depressed bicarb of 19 LFTs and lipase are unremarkable.    Radiology  CT-ABDOMEN-PELVIS WITH   Final Result      1.  Findings are consistent with diverticulitis involving the left colon.      2.  Diverticulosis is present.        MEDICAL DECISION MAKING and DISPOSITION  In the emergency department an IV was established and the patient was kept NPO.  She was given intravenous morphine and Zofran for discomfort and this has been helpful.  The patient was given intravenous Unasyn.  I have reviewed all the findings with the patient and her  and at this point in time I think it will be safe to continue treatment on an outpatient basis and have written prescription for a 10-day course of Augmentin and also a 3-day course of Norco.  I discussed risks and benefits of narcotic pain medication use with the patient and consent was obtained.  I have advised the patient that if she feels she is developing new or worsening symptoms she is to return at once for recheck and we have discussed potential complications of diverticulitis.  Otherwise the patient is to call her primary care doctor Monday and arrange office recheck during the week.  Also the patient tells me that her primary care doctor has referred her to a  gastroenterologist for routine screening tests and I have encouraged her to pursue that follow-up appointment.    IMPRESSION  1.  Acute diverticulitis with no evidence of abscess or perforation         Electronically signed by: Tha Nicole M.D., 2/12/2022 12:56 PM

## 2022-02-12 NOTE — ED NOTES
Discharged home in good condition with prescriptions and follow up instructions, pt verbalizes understanding of all, ambulates out with steady gait accompanied by .  Pt instructed not to drive while taking narcotics, verbalizes understanding.

## 2022-02-12 NOTE — ED TRIAGE NOTES
"Pt presents from [facility] for further evaluation and management of acute onset of LUQ abdominal pain recurring since yesterday.  She denies N/V/D, or fever.  Chief Complaint   Patient presents with   • LUQ Pain   • Sent from Urgent Care     /90   Pulse 84   Temp 36.3 °C (97.3 °F) (Temporal)   Resp 20   Ht 1.575 m (5' 2\")   Wt 92.5 kg (203 lb 14.8 oz)   SpO2 98%   BMI 37.30 kg/m²   Has this patient been vaccinated for COVID YES  If not, would they like to be vaccinated while in the ER if eligible?  N/A  Would the patient like to speak with the ERP about the possibility of receiving the COVID vaccine today before making a decision? N/A    "

## 2022-02-12 NOTE — ED NOTES
Med Rec complete per Pt w/family at bedside.  Allergies reviewed.    Home Pharmacy:  [facility]/[place]

## 2022-02-12 NOTE — DISCHARGE INSTRUCTIONS
Return here at once if you feel you are developing new or worsening symptoms.  Call your primary care doctor and arrange office recheck during the week.

## 2022-02-12 NOTE — PROGRESS NOTES
"Subjective:   Rianna Solis is a 51 y.o. female who presents for Pain (started yesturday morning left side lower back pain, and pain is radiating to left side and sharp pain. )      HPI  Patient is a 51-year-old female who presents to clinic with complaints of abdominal pain onset yesterday morning.  Her symptoms started with left lower back pain.  The pain gradually worsened and moved to her left lower abdomen.  The pain is waxing and waning described as a sharp pain.  Currently, she has moderate to severe pain.  She denies any known alleviating or exacerbating factors.  She has been having normal BMs.  Last BM 0300 this morning without diarrhea, bloody or black stools. Denies any fever, cough, chest pain, SOB, nausea, vomiting, diarrhea.       Medications:    • Belsomra Tabs  • clonazePAM Tabs  • QUEtiapine Tabs  • topiramate Tabs  • venlafaxine XR Cp24  • ziprasidone Caps    Allergies: Zoloft    Problem List: Rianna Solis does not have any pertinent problems on file.    Surgical History:  Past Surgical History:   Procedure Laterality Date   • PRIMARY C SECTION  2008   • CERVICAL CONIZATION  1990    cryo for abnl pap   • EYE SURGERY  1973,1979    to repair \"lazy eye\"   • OTHER      eye surgeries as a child       Past Social Hx: Rianna Solis  reports that she quit smoking about 3 years ago. Her smoking use included cigarettes. She started smoking about 5 years ago. She has a 15.00 pack-year smoking history. She has never used smokeless tobacco. She reports that she does not drink alcohol and does not use drugs.     Past Family Hx:  Rianna Solis family history includes Cancer in her maternal grandmother, mother, and paternal grandfather; Diabetes in her mother; Hypertension in her father and mother; Stroke in her maternal grandfather.     Problem list, medications, and allergies reviewed by myself today in Epic.     Objective:     /72 (BP Location: Left arm, Patient " "Position: Sitting, BP Cuff Size: Adult)   Pulse 80   Temp 36.4 °C (97.6 °F) (Temporal)   Resp 16   Ht 1.575 m (5' 2\")   Wt 92.5 kg (204 lb)   SpO2 98%   BMI 37.31 kg/m²     Physical Exam  Vitals reviewed.   Constitutional:       General: She is in acute distress.      Appearance: Normal appearance. She is not ill-appearing or toxic-appearing.   Eyes:      Conjunctiva/sclera: Conjunctivae normal.      Pupils: Pupils are equal, round, and reactive to light.   Cardiovascular:      Rate and Rhythm: Normal rate and regular rhythm.      Heart sounds: Normal heart sounds.   Pulmonary:      Effort: Pulmonary effort is normal. No respiratory distress.      Breath sounds: Normal breath sounds. No wheezing, rhonchi or rales.   Abdominal:      General: Abdomen is flat. Bowel sounds are normal. There is no distension.      Palpations: Abdomen is soft. There is no hepatomegaly or mass.      Tenderness: There is abdominal tenderness in the left lower quadrant. There is guarding. There is no right CVA tenderness, left CVA tenderness or rebound. Negative signs include Atkins's sign and McBurney's sign.       Musculoskeletal:      Cervical back: Neck supple.      Comments: Back: Full range of flexion, extension, rotation, lateralization.   Negative TTP.   Negative midline tenderness, bony tenderness, crepitus, deformities, or step-offs.     Lymphadenopathy:      Cervical: No cervical adenopathy.   Skin:     General: Skin is warm and dry.   Neurological:      General: No focal deficit present.      Mental Status: She is alert and oriented to person, place, and time.   Psychiatric:         Mood and Affect: Mood normal.         Behavior: Behavior normal.         Diagnosis and associated orders:     1. Left lower quadrant abdominal pain  POCT Urinalysis    POCT Pregnancy        Comments/MDM:     • This is a 51-year-old female who presents to clinic with complaints of left lower back pain that moved to her left lower quadrant onset " yesterday morning.  Her symptoms have been gradually worsening.  She appears to be in mild acute distress secondary to pain.  On examination, she does have significant tenderness to palpation to the left abdomen with guarding and without rebound.  UA normal.  hCG negative.  Discussed wide differential diagnosis.  I am concerned for her significant abdominal pain.  Discussed several options of evaluation and care.  Ultimately, through shared decision-making, it was agreed the patient is going to present to the emergency room for higher level evaluation and care via private vehicle.  Patient verbalized an understanding and agreed to plan of care.  She and her  had no other questions or concerns.  She is otherwise stable.       Please note that this dictation was created using voice recognition software. I have made a reasonable attempt to correct obvious errors, but I expect that there are errors of grammar and possibly content that I did not discover before finalizing the note.    This note was electronically signed by Miguel Parada PA-C

## 2022-05-06 ENCOUNTER — OFFICE VISIT (OUTPATIENT)
Dept: MEDICAL GROUP | Facility: PHYSICIAN GROUP | Age: 52
End: 2022-05-06
Payer: MEDICARE

## 2022-05-06 ENCOUNTER — HOSPITAL ENCOUNTER (OUTPATIENT)
Dept: LAB | Facility: MEDICAL CENTER | Age: 52
End: 2022-05-06
Attending: NURSE PRACTITIONER
Payer: MEDICARE

## 2022-05-06 VITALS
BODY MASS INDEX: 39.56 KG/M2 | WEIGHT: 215 LBS | OXYGEN SATURATION: 96 % | HEIGHT: 62 IN | RESPIRATION RATE: 18 BRPM | SYSTOLIC BLOOD PRESSURE: 106 MMHG | DIASTOLIC BLOOD PRESSURE: 70 MMHG | HEART RATE: 91 BPM | TEMPERATURE: 97.4 F

## 2022-05-06 DIAGNOSIS — E06.3 HASHIMOTO'S THYROIDITIS: ICD-10-CM

## 2022-05-06 DIAGNOSIS — R53.82 CHRONIC FATIGUE: ICD-10-CM

## 2022-05-06 LAB
T3 SERPL-MCNC: 83.4 NG/DL (ref 60–181)
T4 FREE SERPL-MCNC: 0.95 NG/DL (ref 0.93–1.7)
TSH SERPL DL<=0.005 MIU/L-ACNC: 0.23 UIU/ML (ref 0.38–5.33)

## 2022-05-06 PROCEDURE — 36415 COLL VENOUS BLD VENIPUNCTURE: CPT

## 2022-05-06 PROCEDURE — 99214 OFFICE O/P EST MOD 30 MIN: CPT | Performed by: NURSE PRACTITIONER

## 2022-05-06 PROCEDURE — 84439 ASSAY OF FREE THYROXINE: CPT

## 2022-05-06 PROCEDURE — 84480 ASSAY TRIIODOTHYRONINE (T3): CPT

## 2022-05-06 PROCEDURE — 84443 ASSAY THYROID STIM HORMONE: CPT

## 2022-05-06 RX ORDER — QUETIAPINE FUMARATE 400 MG/1
400 TABLET, FILM COATED ORAL
COMMUNITY
Start: 2022-04-27 | End: 2022-05-06

## 2022-05-06 RX ORDER — SUVOREXANT 20 MG/1
TABLET, FILM COATED ORAL
COMMUNITY
Start: 2022-05-02 | End: 2022-08-17

## 2022-05-06 RX ORDER — LORAZEPAM 1 MG/1
TABLET ORAL
COMMUNITY
Start: 2022-05-02 | End: 2022-08-17

## 2022-05-06 RX ORDER — GABAPENTIN 300 MG/1
300 CAPSULE ORAL 3 TIMES DAILY
COMMUNITY
End: 2023-08-08

## 2022-05-06 ASSESSMENT — FIBROSIS 4 INDEX: FIB4 SCORE: 0.61

## 2022-05-06 NOTE — ASSESSMENT & PLAN NOTE
She states that she was diagnosed years ago when she was in [place]. She states that she has not been on medication. She tries to control her symptoms with diet and exercise. She would like a referral to Endocrinology.

## 2022-05-06 NOTE — ASSESSMENT & PLAN NOTE
Chronic and ongoing. She states that she has been overly fatigued for the past several months. She has not had lab work done for awhile. She would like to get her Thyroid levels checked along with a Vitamin D level.

## 2022-05-06 NOTE — PROGRESS NOTES
Subjective  Chief Complaint  Requesting Labs and a Referral    History of Present Illness  Rianna Solis is a 51 y.o. female. This established patient is here today requesting thyroid labs and requesting a referral.    Chronic fatigue  Chronic and ongoing. She states that she has been overly fatigued for the past several months. She has not had lab work done for awhile. She would like to get her Thyroid levels checked along with a Vitamin D level.    Hashimoto's thyroiditis  She states that she was diagnosed years ago when she was in [place]. She states that she has not been on medication. She tries to control her symptoms with diet and exercise. She would like a referral to Endocrinology.    Past Medical History    Allergies: Zoloft  Past Medical History:   Diagnosis Date   • Abnormal mammogram 2006    nodule in left breast, no follow up   • Acromioclavicular joint separation 5/24/2012   • Anxiety     No [place] mental health; klonopin, coping mechanisms   • Depression    • Former smoker 4/26/2019   • History of cervical dysplasia 1990    had cryo tx, resolved with nl paps now   • History of gestational diabetes 1/15/2018   • History of suicidal ideation    • Homicidal ideations 2014    [facility]   • Hyperthyroidism    • Hyperthyroidism     since age 16, NL labs 03/2012   • Lisfranc's dislocation 10/2017    right   • Migraine    • Pelvic exam 2008    Dysplasia at age 20s, had cryotherapy   • Polysubstance dependence (HCC)     in remission as of 11/26/2014   • Schizoaffective disorder (HCC)    • Schizoaffective disorder, bipolar type (HCC) 4/7/2011   • Seizure (HCC) 1999    partial complex-no motor seizure; managed by [facility] health   • Seizure disorder (HCC)     partial complex seizure d/o   • Toe fracture 10/2017    right 3rd-5th toes   • Urinary tract infection    • Wrist fracture 2010    casted by RNO     Past Surgical History:   Procedure Laterality Date   • PRIMARY C SECTION  2008   •  "CERVICAL CONIZATION  1990    cryo for abnl pap   • EYE SURGERY  1973,1979    to repair \"lazy eye\"   • OTHER      eye surgeries as a child     Current Outpatient Medications Ordered in Epic   Medication Sig Dispense Refill   • BELSOMRA 20 MG Tab TAKE 1 TABLET BY MOUTH BEFORE BEDTIME     • gabapentin (NEURONTIN) 300 MG Cap Take 300 mg by mouth every day.     • LORazepam (ATIVAN) 1 MG Tab TAKE 1 TABLET BY MOUTH EVERY DAY F41.9     • venlafaxine XR (EFFEXOR XR) 75 MG CAPSULE SR 24 HR Take 75 mg by mouth at bedtime.     • topiramate (TOPAMAX) 100 MG Tab Take 100 mg by mouth 2 times a day.     • clonazePAM (KLONOPIN) 1 MG Tab Take 1 mg by mouth 1 time daily as needed (seizures).  3   • ziprasidone (GEODON) 80 MG Cap Take 80 mg by mouth every evening.  3     No current [facility]-ordered facility-administered medications on file.     Family History:    Family History   Problem Relation Age of Onset   • Cancer Mother         breast cancer   • Diabetes Mother    • Hypertension Mother    • Cancer Maternal Grandmother         stomach   • Stroke Maternal Grandfather    • Cancer Paternal Grandfather         lung   • Hypertension Father       Personal/Social History:    Social History     Tobacco Use   • Smoking status: Former Smoker     Packs/day: 0.50     Years: 30.00     Pack years: 15.00     Types: Cigarettes     Start date: 6/22/2016     Quit date: 12/28/2018     Years since quitting: 3.3   • Smokeless tobacco: Never Used   • Tobacco comment: started at 18   Vaping Use   • Vaping Use: Former   Substance Use Topics   • Alcohol use: Not Currently   • Drug use: No     Social History     Social History Narrative    ** Merged History Encounter **           Review of Systems:   General: Negative for fever/chills and unexpected weight change. Positive for fatigue.   Eyes:  Negative for vision changes, eye pain.  Respiratory:  Negative for cough and dyspnea.    Cardiovascular:  Negative for chest pain and palpitations.  Musculoskeletal:  " "Negative for myalgias.   Skin:  Negative for rash.     Objective  Physical Exam:   /70   Pulse 91   Temp 36.3 °C (97.4 °F) (Temporal)   Resp 18   Ht 1.575 m (5' 2\")   Wt 97.5 kg (215 lb)   SpO2 96%  Body mass index is 39.32 kg/m².  General:  Alert and oriented.  Well appearing.  NAD  Neck: Supple without JVD. No lymphadenopathy.  Pulmonary:  Normal effort.  Clear to ausculation without rales, ronchi, or wheezing.  Cardiovascular:  Regular rate and rhythm without murmur, rubs or gallop.   Skin:  Warm and dry.  No obvious lesions.  Musculoskeletal:  No extremity cyanosis, clubbing, or edema.      Assessment/Plan  1. Chronic fatigue  Chronic and ongoing.  Discussed getting updated labs, she is agreeable.  - TSH; Future  - FREE THYROXINE; Future  - TRIIDOTHYRONINE; Future  - VITAMIN D,25 HYDROXY; Future    2. Hashimoto's thyroiditis  Chronic and ongoing.  Currently not on any medication.  She has been trying the Paleo diet which she states helps some.  She is requesting a referral to Endocrinology as she has not seen one for years, referral placed at this time.  - TSH; Future  - FREE THYROXINE; Future  - TRIIDOTHYRONINE; Future  - Referral to Endocrinology      Health Maintenance: Completed    Return if symptoms worsen or fail to improve.    Please note that this dictation was created using voice recognition software. I have made every reasonable attempt to correct obvious errors, but I expect that there are errors of grammar and possibly content that I did not discover before finalizing the note.    JASMEET Galdamez  [facility]  "

## 2022-05-13 ENCOUNTER — TELEPHONE (OUTPATIENT)
Dept: MEDICAL GROUP | Facility: PHYSICIAN GROUP | Age: 52
End: 2022-05-13
Payer: MEDICARE

## 2022-05-13 NOTE — TELEPHONE ENCOUNTER
1. Caller Name: Delaney                        Call Back Number: [phone] (home) [phone] (work)      How would the patient prefer to be contacted with a response: Phone call do NOT leave a detailed message    2. Patient is requesting lab results dated: 05/06/2022    3. Confirmed results are in chart. Patient advised they will be contacted once interpreted by provider.

## 2022-07-19 ENCOUNTER — APPOINTMENT (OUTPATIENT)
Dept: SLEEP MEDICINE | Facility: MEDICAL CENTER | Age: 52
End: 2022-07-19
Payer: MEDICARE

## 2022-08-17 ENCOUNTER — OFFICE VISIT (OUTPATIENT)
Dept: SLEEP MEDICINE | Facility: MEDICAL CENTER | Age: 52
End: 2022-08-17
Payer: MEDICARE

## 2022-08-17 VITALS
BODY MASS INDEX: 36.62 KG/M2 | WEIGHT: 199 LBS | HEIGHT: 62 IN | SYSTOLIC BLOOD PRESSURE: 124 MMHG | HEART RATE: 80 BPM | OXYGEN SATURATION: 94 % | DIASTOLIC BLOOD PRESSURE: 76 MMHG | RESPIRATION RATE: 16 BRPM

## 2022-08-17 DIAGNOSIS — Z87.891 FORMER SMOKER: ICD-10-CM

## 2022-08-17 DIAGNOSIS — F31.9 BIPOLAR 1 DISORDER (HCC): ICD-10-CM

## 2022-08-17 DIAGNOSIS — G47.33 OSA (OBSTRUCTIVE SLEEP APNEA): ICD-10-CM

## 2022-08-17 DIAGNOSIS — F41.9 ANXIETY: ICD-10-CM

## 2022-08-17 PROCEDURE — 99214 OFFICE O/P EST MOD 30 MIN: CPT | Performed by: NURSE PRACTITIONER

## 2022-08-17 RX ORDER — QUETIAPINE FUMARATE 200 MG/1
TABLET, FILM COATED ORAL
COMMUNITY
Start: 2022-07-14 | End: 2022-08-17

## 2022-08-17 ASSESSMENT — FIBROSIS 4 INDEX: FIB4 SCORE: 0.62

## 2022-08-17 NOTE — LETTER
SHAY Wheeler  [facility]   [address], [address] [place] [address]  Phone: [phone] - Fax:             Encounter Date: 8/17/2022  Provider: SHAY Wheeler  Location of Care: [facility] [facility]  [address] [facility] [place] [address]      Patient:   Rianna Solis   MR Number: [id number]   YOB: 1970     PROGRESS NOTE:  Chief Complaint   Patient presents with   • Follow-Up     last seen 1/13/2022       HPI:  Rianna Solis is a 52 y.o. year old female here today for follow-up on HERLINDA.  Last OV 1/13/22 with Pena APRN     PMH of partial seizure disorder, anxiety, HERLINDA, bipolar 1 disorder, obesity, former smoker, depression, hyperthyroidism and migraine. BMI 36.  Neuro psychiatry Dr. Mayfield managing anxiety/depression and patient currently using Geodon BID, topamax BID, gabapentin 300mg qhs and effexor qhs.    Currently using BIPAP @ 15/11cm, PS 4/2cm H20 nightly; RESPIRONICS; device obtained 2022 replacement motor from recall.  Compliance report 7/18/22-8/16/22 indicates 80% compliance, avg nightly use of 4hr 21min, max use 8hr 22min, minimal mask leak with reduced AHI 2.7/hr.  She has a hx of chronic insomnia and has used multiple sleep aides. She is curretnly using benadryl prn. She notes generally having broken up sleep and inconsistent bed time/wake time. The past 8 days of been significantly worse due to stress and has gone without sleep. She denies cardiac or respiratory symptoms. She talked via telephone to Dr. Mayfield last month but has not updated him on current situation.  She tolerates mask and pressure. She denies AM headaches. She does not generally nap.    She and her  own a carpet cleaning business.    Sleep Study History:   PSG study from 12/16/18 indicated moderately severe obstructive sleep apnea hypopnea with an apnea hypopnea index of 24.8 events per hour  and a lowest arterial oxygen saturation of 85% on room air.   PSG titration from 2/3/19 indicated HERLINDA. Successful BIPAP titration. The PAP titration was initiated with CPAP 5 cm of water and the pressure which was slowly titrated up in an attempt to eliminate sleep disordered breathing and snoring. CPAP was increased to 15 cm before switching to BiPAP. The BiPAP was titrated between 16/12 cm to 15/11 cm. At this final pressure the patient was observed in non supine REM sleep stage.The apnea hypopnea index improved to 0.4 per hour and O2 deshaun 89%. The average O2 stauration was 86%. She spent 9 min of sleep time below 89% O2 saturation.          ROS: As per HPI and otherwise negative if not stated.    Past Medical History:   Diagnosis Date   • Abnormal mammogram 2006    nodule in left breast, no follow up   • Acromioclavicular joint separation 5/24/2012   • Anxiety     No [place] mental health; klonopin, coping mechanisms   • Depression    • Former smoker 4/26/2019   • History of cervical dysplasia 1990    had cryo tx, resolved with nl paps now   • History of gestational diabetes 1/15/2018   • History of suicidal ideation    • Homicidal ideations 2014    [facility]   • Hyperthyroidism    • Hyperthyroidism     since age 16, NL labs 03/2012   • Lisfranc's dislocation 10/2017    right   • Migraine    • Pelvic exam 2008    Dysplasia at age 20s, had cryotherapy   • Polysubstance dependence (HCC)     in remission as of 11/26/2014   • Schizoaffective disorder (HCC)    • Schizoaffective disorder, bipolar type (HCC) 4/7/2011   • Seizure (HCC) 1999    partial complex-no motor seizure; managed by [facility] health   • Seizure disorder (HCC)     partial complex seizure d/o   • Toe fracture 10/2017    right 3rd-5th toes   • Urinary tract infection    • Wrist fracture 2010    casted by [facility]       Past Surgical History:   Procedure Laterality Date   • PRIMARY C SECTION  2008   • CERVICAL CONIZATION  1990    cryo for abnl pap  "  • EYE SURGERY  1973,1979    to repair \"lazy eye\"   • OTHER      eye surgeries as a child       Family History   Problem Relation Age of Onset   • Cancer Mother         breast cancer   • Diabetes Mother    • Hypertension Mother    • Cancer Maternal Grandmother         stomach   • Stroke Maternal Grandfather    • Cancer Paternal Grandfather         lung   • Hypertension Father        Social History     Socioeconomic History   • Marital status:      Spouse name: Not on file   • Number of children: Not on file   • Years of education: Not on file   • Highest education level: Not on file   Occupational History   • Occupation: not working     Employer: OTHER   Tobacco Use   • Smoking status: Former     Packs/day: 0.50     Years: 30.00     Pack years: 15.00     Types: Cigarettes     Start date: 6/22/2016     Quit date: 12/28/2018     Years since quitting: 3.6   • Smokeless tobacco: Never   • Tobacco comments:     started at 18   Vaping Use   • Vaping Use: Former   Substance and Sexual Activity   • Alcohol use: Not Currently   • Drug use: No   • Sexual activity: Yes     Partners: Male     Comment:  x5 years   Other Topics Concern   • Not on file   Social History Narrative    ** Merged History Encounter **          Social Determinants of Health     Financial Resource Strain: Not on file   Food Insecurity: Not on file   Transportation Needs: Not on file   Physical Activity: Not on file   Stress: Not on file   Social Connections: Not on file   Intimate Partner Violence: Not on file   Housing Stability: Not on file       Allergies as of 08/17/2022 - Reviewed 08/17/2022   Allergen Reaction Noted   • Zoloft Unspecified 04/30/2010        Vitals:  /76 (BP Location: Left arm, Patient Position: Sitting, BP Cuff Size: Adult)   Pulse 80   Resp 16   Ht 1.575 m (5' 2\")   Wt 90.3 kg (199 lb)   SpO2 94%     Current medications as of today   Current Outpatient Medications   Medication Sig Dispense Refill   • " gabapentin (NEURONTIN) 300 MG Cap Take 300 mg by mouth every day.     • venlafaxine XR (EFFEXOR XR) 75 MG CAPSULE SR 24 HR Take 75 mg by mouth at bedtime.     • topiramate (TOPAMAX) 100 MG Tab Take 100 mg by mouth 2 times a day.     • ziprasidone (GEODON) 80 MG Cap Take 80 mg by mouth every evening.  3     No current facility-administered medications for this visit.         Physical Exam:   Gen:           Alert and oriented, No apparent distress. Mood and affect appropriate, normal interaction with examiner.  Eyes:          PERRL, EOM intact, sclere white, conjunctive moist. Glasses.  Ears:          Not examined.   Hearing:     Grossly intact.  Nose:          Normal, no lesions or deformities.  Dentition:    mask  Oropharynx:   mask  Mallampati Classification: mask  Neck:        Supple, trachea midline, no masses.  Respiratory Effort: No intercostal retractions or use of accessory muscles.   Lung Auscultation:      Clear to auscultation bilaterally; no rales, rhonchi or wheezing.  CV:            Regular rate and rhythm. No murmurs, rubs or gallops.  Abd:           Not examined.   Lymphadenopathy: Not examined.  Gait and Station: Normal.  Digits and Nails: No clubbing, cyanosis, petechiae, or nodes.   Cranial Nerves: II-XII grossly intact.  Skin:        No rashes, lesions or ulcers noted.               Ext:           No cyanosis or edema.      Assessment:  1. HERLINDA (obstructive sleep apnea)  DME Mask and Supplies      2. Bipolar 1 disorder with psychosis episodes        3. Anxiety        4. BMI 36.0-36.9,adult  HEIGHT AND WEIGHT      5. Former smoker            Immunizations:    Flu:recommend in the fall  Pneumovax 23:not due  Prevnar 13:2020  PCV 20: not due  COVID-19: 1/6/22, 5/5/21    Plan:  1.  When patient is using her BiPAP device her HERLINDA is well controlled.  She does ortiz with a history of bipolar, anxiety/depression and chronic insomnia.  She is currently having an increase in symptoms due to increased life  stressors.  She has had very poor sleep last 8 days.  Recommend she follow-up with psychiatry right away.  She will continue to benefit from nightly therapy.  DME mask/supplies  2.  Discussed sleep hygiene  3.  Follow-up with psychiatry for acute symptoms  4.  Encourage weight loss through healthy eating and diet  5.  Follow-up in 6 months for compliance check, sooner if needed.    Please note that this dictation was created using voice recognition software. I have made every reasonable attempt to correct obvious errors, but it is possible there are errors of grammar and possibly content that I did not discover before finalizing the note.          Electronically signed by SHAY Wheeler  on 08/17/22    Villa Mayfield M.D.  [address] [place] [id number]  Via Fax: [phone]

## 2022-08-17 NOTE — PROGRESS NOTES
Chief Complaint   Patient presents with    Follow-Up     last seen 1/13/2022       HPI:  Rianna Solis is a 52 y.o. year old female here today for follow-up on HERLINDA.  Last OV 1/13/22 with Pena APRN     PMH of partial seizure disorder, anxiety, HERLINDA, bipolar 1 disorder, obesity, former smoker, depression, hyperthyroidism and migraine. BMI 36.  Neuro psychiatry Dr. Mayfield managing anxiety/depression and patient currently using Geodon BID, topamax BID, gabapentin 300mg qhs and effexor qhs.    Currently using BIPAP @ 15/11cm, PS 4/2cm H20 nightly; RESPIRONICS; device obtained 2022 replacement motor from recall.  Compliance report 7/18/22-8/16/22 indicates 80% compliance, avg nightly use of 4hr 21min, max use 8hr 22min, minimal mask leak with reduced AHI 2.7/hr.  She has a hx of chronic insomnia and has used multiple sleep aides. She is curretnly using benadryl prn. She notes generally having broken up sleep and inconsistent bed time/wake time. The past 8 days of been significantly worse due to stress and has gone without sleep. She denies cardiac or respiratory symptoms. She talked via telephone to Dr. Mayfield last month but has not updated him on current situation.  She tolerates mask and pressure. She denies AM headaches. She does not generally nap.    She and her  own a carpet cleaning business.    Sleep Study History:   PSG study from 12/16/18 indicated moderately severe obstructive sleep apnea hypopnea with an apnea hypopnea index of 24.8 events per hour and a lowest arterial oxygen saturation of 85% on room air.   PSG titration from 2/3/19 indicated HERLINDA. Successful BIPAP titration. The PAP titration was initiated with CPAP 5 cm of water and the pressure which was slowly titrated up in an attempt to eliminate sleep disordered breathing and snoring. CPAP was increased to 15 cm before switching to BiPAP. The BiPAP was titrated between 16/12 cm to 15/11 cm. At this final pressure the patient was observed in  "non supine REM sleep stage.The apnea hypopnea index improved to 0.4 per hour and O2 deshaun 89%. The average O2 stauration was 86%. She spent 9 min of sleep time below 89% O2 saturation.          ROS: As per HPI and otherwise negative if not stated.    Past Medical History:   Diagnosis Date    Abnormal mammogram 2006    nodule in left breast, no follow up    Acromioclavicular joint separation 5/24/2012    Anxiety     No [place] mental health; klonopin, coping mechanisms    Depression     Former smoker 4/26/2019    History of cervical dysplasia 1990    had cryo tx, resolved with nl paps now    History of gestational diabetes 1/15/2018    History of suicidal ideation     Homicidal ideations 2014    [facility]    Hyperthyroidism     Hyperthyroidism     since age 16, NL labs 03/2012    Lisfranc's dislocation 10/2017    right    Migraine     Pelvic exam 2008    Dysplasia at age 20s, had cryotherapy    Polysubstance dependence (HCC)     in remission as of 11/26/2014    Schizoaffective disorder (HCC)     Schizoaffective disorder, bipolar type (HCC) 4/7/2011    Seizure ([facility]) 1999    partial complex-no motor seizure; managed by [facility]    Seizure disorder ([facility])     partial complex seizure d/o    Toe fracture 10/2017    right 3rd-5th toes    Urinary tract infection     Wrist fracture 2010    casted by RNO       Past Surgical History:   Procedure Laterality Date    PRIMARY C SECTION  2008    CERVICAL CONIZATION  1990    cryo for abnl pap    EYE SURGERY  1973,1979    to repair \"lazy eye\"    OTHER      eye surgeries as a child       Family History   Problem Relation Age of Onset    Cancer Mother         breast cancer    Diabetes Mother     Hypertension Mother     Cancer Maternal Grandmother         stomach    Stroke Maternal Grandfather     Cancer Paternal Grandfather         lung    Hypertension Father        Social History     Socioeconomic History    Marital status:      Spouse name: Not on file    " "Number of children: Not on file    Years of education: Not on file    Highest education level: Not on file   Occupational History    Occupation: not working     Employer: OTHER   Tobacco Use    Smoking status: Former     Packs/day: 0.50     Years: 30.00     Pack years: 15.00     Types: Cigarettes     Start date: 6/22/2016     Quit date: 12/28/2018     Years since quitting: 3.6    Smokeless tobacco: Never    Tobacco comments:     started at 18   Vaping Use    Vaping Use: Former   Substance and Sexual Activity    Alcohol use: Not Currently    Drug use: No    Sexual activity: Yes     Partners: Male     Comment:  x5 years   Other Topics Concern    Not on file   Social History Narrative    ** Merged History Encounter **          Social Determinants of Health     Financial Resource Strain: Not on file   Food Insecurity: Not on file   Transportation Needs: Not on file   Physical Activity: Not on file   Stress: Not on file   Social Connections: Not on file   Intimate Partner Violence: Not on file   Housing Stability: Not on file       Allergies as of 08/17/2022 - Reviewed 08/17/2022   Allergen Reaction Noted    Zoloft Unspecified 04/30/2010        Vitals:  /76 (BP Location: Left arm, Patient Position: Sitting, BP Cuff Size: Adult)   Pulse 80   Resp 16   Ht 1.575 m (5' 2\")   Wt 90.3 kg (199 lb)   SpO2 94%     Current medications as of today   Current Outpatient Medications   Medication Sig Dispense Refill    gabapentin (NEURONTIN) 300 MG Cap Take 300 mg by mouth every day.      venlafaxine XR (EFFEXOR XR) 75 MG CAPSULE SR 24 HR Take 75 mg by mouth at bedtime.      topiramate (TOPAMAX) 100 MG Tab Take 100 mg by mouth 2 times a day.      ziprasidone (GEODON) 80 MG Cap Take 80 mg by mouth every evening.  3     No current facility-administered medications for this visit.         Physical Exam:   Gen:           Alert and oriented, No apparent distress. Mood and affect appropriate, normal interaction with " examiner.  Eyes:          PERRL, EOM intact, sclere white, conjunctive moist. Glasses.  Ears:          Not examined.   Hearing:     Grossly intact.  Nose:          Normal, no lesions or deformities.  Dentition:    mask  Oropharynx:   mask  Mallampati Classification: mask  Neck:        Supple, trachea midline, no masses.  Respiratory Effort: No intercostal retractions or use of accessory muscles.   Lung Auscultation:      Clear to auscultation bilaterally; no rales, rhonchi or wheezing.  CV:            Regular rate and rhythm. No murmurs, rubs or gallops.  Abd:           Not examined.   Lymphadenopathy: Not examined.  Gait and Station: Normal.  Digits and Nails: No clubbing, cyanosis, petechiae, or nodes.   Cranial Nerves: II-XII grossly intact.  Skin:        No rashes, lesions or ulcers noted.               Ext:           No cyanosis or edema.      Assessment:  1. HERLINDA (obstructive sleep apnea)  DME Mask and Supplies      2. Bipolar 1 disorder with psychosis episodes        3. Anxiety        4. BMI 36.0-36.9,adult  HEIGHT AND WEIGHT      5. Former smoker            Immunizations:    Flu:recommend in the fall  Pneumovax 23:not due  Prevnar 13:2020  PCV 20: not due  COVID-19: 1/6/22, 5/5/21    Plan:  1.  When patient is using her BiPAP device her HERLINDA is well controlled.  She does ortiz with a history of bipolar, anxiety/depression and chronic insomnia.  She is currently having an increase in symptoms due to increased life stressors.  She has had very poor sleep last 8 days.  Recommend she follow-up with psychiatry right away.  She will continue to benefit from nightly therapy.  DME mask/supplies  2.  Discussed sleep hygiene  3.  Follow-up with psychiatry for acute symptoms  4.  Encourage weight loss through healthy eating and diet  5.  Follow-up in 6 months for compliance check, sooner if needed.    Please note that this dictation was created using voice recognition software. I have made every reasonable attempt to  correct obvious errors, but it is possible there are errors of grammar and possibly content that I did not discover before finalizing the note.

## 2022-09-16 ENCOUNTER — OFFICE VISIT (OUTPATIENT)
Dept: MEDICAL GROUP | Facility: PHYSICIAN GROUP | Age: 52
End: 2022-09-16
Payer: MEDICARE

## 2022-09-16 VITALS
TEMPERATURE: 98 F | RESPIRATION RATE: 24 BRPM | BODY MASS INDEX: 34.09 KG/M2 | HEART RATE: 67 BPM | OXYGEN SATURATION: 100 % | SYSTOLIC BLOOD PRESSURE: 110 MMHG | WEIGHT: 185.25 LBS | DIASTOLIC BLOOD PRESSURE: 72 MMHG | HEIGHT: 62 IN

## 2022-09-16 DIAGNOSIS — Z13.1 ENCOUNTER FOR SCREENING FOR DIABETES MELLITUS: ICD-10-CM

## 2022-09-16 DIAGNOSIS — E66.09 CLASS 1 OBESITY DUE TO EXCESS CALORIES WITHOUT SERIOUS COMORBIDITY WITH BODY MASS INDEX (BMI) OF 33.0 TO 33.9 IN ADULT: ICD-10-CM

## 2022-09-16 DIAGNOSIS — G40.109 PARTIAL SEIZURE DISORDER (HCC): ICD-10-CM

## 2022-09-16 DIAGNOSIS — N63.20 LEFT BREAST MASS: ICD-10-CM

## 2022-09-16 DIAGNOSIS — Z13.0 SCREENING FOR DEFICIENCY ANEMIA: ICD-10-CM

## 2022-09-16 DIAGNOSIS — Z13.6 ENCOUNTER FOR LIPID SCREENING FOR CARDIOVASCULAR DISEASE: ICD-10-CM

## 2022-09-16 DIAGNOSIS — Z83.3 FAMILY HISTORY OF DIABETES MELLITUS (DM): ICD-10-CM

## 2022-09-16 DIAGNOSIS — Z13.220 ENCOUNTER FOR LIPID SCREENING FOR CARDIOVASCULAR DISEASE: ICD-10-CM

## 2022-09-16 PROBLEM — E66.811 CLASS 1 OBESITY DUE TO EXCESS CALORIES WITHOUT SERIOUS COMORBIDITY WITH BODY MASS INDEX (BMI) OF 33.0 TO 33.9 IN ADULT: Status: ACTIVE | Noted: 2018-11-05

## 2022-09-16 PROCEDURE — 99214 OFFICE O/P EST MOD 30 MIN: CPT | Performed by: NURSE PRACTITIONER

## 2022-09-16 RX ORDER — IBUPROFEN 800 MG/1
800 TABLET ORAL EVERY 8 HOURS PRN
Qty: 90 TABLET | Refills: 1 | Status: SHIPPED | OUTPATIENT
Start: 2022-09-16 | End: 2022-11-22

## 2022-09-16 ASSESSMENT — FIBROSIS 4 INDEX: FIB4 SCORE: 0.62

## 2022-09-16 NOTE — ASSESSMENT & PLAN NOTE
Chronic and ongoing. August 2020 she had a mammo and ultrasounds that showed a lump in her breast. She was told that it looked like a cyst and that she should have another mammo in 6 months. She has not had that mammo yet. She is requesting a specific mammo as she is suppose to get one done on 9-22-23.

## 2022-09-16 NOTE — ASSESSMENT & PLAN NOTE
Chronic and ongoing. She states that she has done a lot of walking and hiking lately. She has been able to lose some weight because of this. She states that she has also been working on her diet, she states that overall she is eating healthy.

## 2022-09-16 NOTE — PROGRESS NOTES
Subjective  Chief Complaint  Breast Mass    History of Present Illness  Rianna Solis is a 52 y.o. female. This established patient is here today to discuss a breast mass and request lab work.    Left breast mass  Chronic and ongoing. August 2020 she had a mammo and ultrasounds that showed a lump in her breast. She was told that it looked like a cyst and that she should have another mammo in 6 months. She has not had that mammo yet. She is requesting a specific mammo as she is suppose to get one done on 9-22-23.    Class 1 obesity due to excess calories without serious comorbidity with body mass index (BMI) of 33.0 to 33.9 in adult  Chronic and ongoing. She states that she has done a lot of walking and hiking lately. She has been able to lose some weight because of this. She states that she has also been working on her diet, she states that overall she is eating healthy.    Partial seizure disorder (CMS-HCC) (HCC)  Chronic and stable. Currently taking Topiramate 100 mg twice a day to help. Was seeing a Neurologist in the past. Denies any recent seizures.    Past Medical History    Allergies: Zoloft  Past Medical History:   Diagnosis Date    Abnormal mammogram 2006    nodule in left breast, no follow up    Acromioclavicular joint separation 5/24/2012    Anxiety     No [place] mental health; klonopin, coping mechanisms    Depression     Former smoker 4/26/2019    History of cervical dysplasia 1990    had cryo tx, resolved with nl paps now    History of gestational diabetes 1/15/2018    History of suicidal ideation     Homicidal ideations 2014    [facility] hold    Hyperthyroidism     Hyperthyroidism     since age 16, NL labs 03/2012    Lisfranc's dislocation 10/2017    right    Migraine     Pelvic exam 2008    Dysplasia at age 20s, had cryotherapy    Polysubstance dependence (HCC)     in remission as of 11/26/2014    Schizoaffective disorder (HCC)     Schizoaffective disorder, bipolar type (HCC) 4/7/2011     "Seizure ([facility]) 1999    partial complex-no motor seizure; managed by [facility]    Seizure disorder ([facility])     partial complex seizure d/o    Toe fracture 10/2017    right 3rd-5th toes    Urinary tract infection     Wrist fracture 2010    casted by [facility]     Past Surgical History:   Procedure Laterality Date    PRIMARY C SECTION  2008    CERVICAL CONIZATION  1990    cryo for abnl pap    EYE SURGERY  1973,1979    to repair \"lazy eye\"    OTHER      eye surgeries as a child     Current Outpatient Medications Ordered in Epic   Medication Sig Dispense Refill    Lumateperone Tosylate (CAPLYTA PO) Take  by mouth.      ibuprofen (MOTRIN) 800 MG Tab Take 1 Tablet by mouth every 8 hours as needed for Moderate Pain. 90 Tablet 1    clonazePAM (KLONOPIN) 1 MG Tab Take 1 mg by mouth 2 times a day.      gabapentin (NEURONTIN) 300 MG Cap Take 300 mg by mouth every day.      venlafaxine XR (EFFEXOR XR) 75 MG CAPSULE SR 24 HR Take 75 mg by mouth at bedtime.      topiramate (TOPAMAX) 100 MG Tab Take 100 mg by mouth 2 times a day.       No current [facility]-ordered facility-administered medications on file.     Family History:    Family History   Problem Relation Age of Onset    Cancer Mother         breast cancer    Diabetes Mother     Hypertension Mother     Cancer Maternal Grandmother         stomach    Stroke Maternal Grandfather     Cancer Paternal Grandfather         lung    Hypertension Father       Personal/Social History:    Social History     Tobacco Use    Smoking status: Former     Packs/day: 0.50     Years: 30.00     Pack years: 15.00     Types: Cigarettes     Start date: 6/22/2016     Quit date: 12/28/2018     Years since quitting: 3.7    Smokeless tobacco: Never    Tobacco comments:     started at 18   Vaping Use    Vaping Use: Former   Substance Use Topics    Alcohol use: Not Currently    Drug use: No     Social History     Social History Narrative    ** Merged History Encounter **           Review of Systems: " "  General: Negative for fever/chills and unexpected weight change.   Respiratory:  Negative for cough and dyspnea.    Cardiovascular:  Negative for chest pain and palpitations.  Musculoskeletal:  Negative for myalgias.   Skin:  Negative for rash.     Objective  Physical Exam:   /72 (BP Location: Right arm, Patient Position: Sitting, BP Cuff Size: Large adult)   Pulse 67   Temp 36.7 °C (98 °F) (Temporal)   Resp (!) 24   Ht 1.575 m (5' 2\")   Wt 84 kg (185 lb 4 oz)   SpO2 100%  Body mass index is 33.88 kg/m².  General:  Alert and oriented.  Well appearing.  NAD  Neck: Supple without JVD. No lymphadenopathy.  Pulmonary:  Normal effort.  Clear to ausculation without rales, ronchi, or wheezing.  Cardiovascular:  Regular rate and rhythm without murmur, rubs or gallop.   Skin:  Warm and dry.  No obvious lesions.  Musculoskeletal:  No extremity cyanosis, clubbing, or edema.      Assessment/Plan  1. Left breast mass  Chronic and ongoing.  Ordered mammogram per her request that the breast center told her she needed. Discussed with her that she needs to call the breast center and verify that the order is correct for her upcoming appointment, she verbalized understanding.  - MA-DIAGNOSTIC MAMMO LEFT W/TOMOSYNTHESIS W/O CAD; Future    2. Class 1 obesity due to excess calories without serious comorbidity with body mass index (BMI) of 33.0 to 33.9 in adult  Chronic and ongoing.  Educated on a healthy diet and exercise.    3. Partial seizure disorder (HCC)  Chronic and stable.  Continue to take Topiramate 100 mg BID.    4. Encounter for screening for diabetes mellitus  Due for updated labs.  - Comp Metabolic Panel; Future    5. Family history of diabetes mellitus (DM)  Due for updated labs.  - HEMOGLOBIN A1C; Future    6. Encounter for lipid screening for cardiovascular disease  Due for updated labs.  - Lipid Profile; Future    7. Screening for deficiency anemia  Due for updated labs.  - CBC WITHOUT DIFFERENTIAL; " Future      Health Maintenance: Discussed with the patient.    Return in about 6 months (around 3/16/2023) for F/U.    Please note that this dictation was created using voice recognition software. I have made every reasonable attempt to correct obvious errors, but I expect that there are errors of grammar and possibly content that I did not discover before finalizing the note.    JASMEET Galdamez  [facility]

## 2022-09-16 NOTE — ASSESSMENT & PLAN NOTE
Chronic and stable. Currently taking Topiramate 100 mg twice a day to help. Was seeing a Neurologist in the past. Denies any recent seizures.

## 2022-09-21 ENCOUNTER — APPOINTMENT (OUTPATIENT)
Dept: RADIOLOGY | Facility: MEDICAL CENTER | Age: 52
End: 2022-09-21
Attending: EMERGENCY MEDICINE
Payer: OTHER MISCELLANEOUS

## 2022-09-21 ENCOUNTER — HOSPITAL ENCOUNTER (EMERGENCY)
Facility: MEDICAL CENTER | Age: 52
End: 2022-09-21
Attending: EMERGENCY MEDICINE
Payer: OTHER MISCELLANEOUS

## 2022-09-21 VITALS
HEART RATE: 73 BPM | SYSTOLIC BLOOD PRESSURE: 119 MMHG | OXYGEN SATURATION: 99 % | TEMPERATURE: 96 F | WEIGHT: 185.63 LBS | RESPIRATION RATE: 18 BRPM | DIASTOLIC BLOOD PRESSURE: 89 MMHG | HEIGHT: 62 IN | BODY MASS INDEX: 34.16 KG/M2

## 2022-09-21 DIAGNOSIS — S90.02XA CONTUSION OF LEFT ANKLE, INITIAL ENCOUNTER: ICD-10-CM

## 2022-09-21 DIAGNOSIS — S90.32XA CONTUSION OF LEFT FOOT, INITIAL ENCOUNTER: ICD-10-CM

## 2022-09-21 PROCEDURE — 73630 X-RAY EXAM OF FOOT: CPT | Mod: LT

## 2022-09-21 PROCEDURE — 73610 X-RAY EXAM OF ANKLE: CPT | Mod: LT

## 2022-09-21 PROCEDURE — 99284 EMERGENCY DEPT VISIT MOD MDM: CPT

## 2022-09-21 RX ORDER — IBUPROFEN 600 MG/1
600 TABLET ORAL ONCE
Status: DISCONTINUED | OUTPATIENT
Start: 2022-09-21 | End: 2022-09-21 | Stop reason: HOSPADM

## 2022-09-21 RX ORDER — ACETAMINOPHEN 325 MG/1
650 TABLET ORAL ONCE
Status: DISCONTINUED | OUTPATIENT
Start: 2022-09-21 | End: 2022-09-21 | Stop reason: HOSPADM

## 2022-09-21 ASSESSMENT — PAIN DESCRIPTION - PAIN TYPE: TYPE: ACUTE PAIN

## 2022-09-21 ASSESSMENT — FIBROSIS 4 INDEX: FIB4 SCORE: 0.62

## 2022-09-21 NOTE — ED NOTES
Patient is stable for discharge at this time, anticipatory guidance provided, close follow-up is encouraged, and ED return instructions have been detailed. Patient is agreeable to the disposition and plan and discharged home in ambulatory state and in good condition.
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
## 2022-11-03 ENCOUNTER — PATIENT MESSAGE (OUTPATIENT)
Dept: HEALTH INFORMATION MANAGEMENT | Facility: OTHER | Age: 52
End: 2022-11-03

## 2022-11-09 ENCOUNTER — OFFICE VISIT (OUTPATIENT)
Dept: ENDOCRINOLOGY | Facility: MEDICAL CENTER | Age: 52
End: 2022-11-09
Attending: INTERNAL MEDICINE
Payer: MEDICARE

## 2022-11-09 ENCOUNTER — HOSPITAL ENCOUNTER (OUTPATIENT)
Dept: LAB | Facility: MEDICAL CENTER | Age: 52
End: 2022-11-09
Attending: INTERNAL MEDICINE
Payer: MEDICARE

## 2022-11-09 VITALS
DIASTOLIC BLOOD PRESSURE: 78 MMHG | OXYGEN SATURATION: 97 % | SYSTOLIC BLOOD PRESSURE: 124 MMHG | HEIGHT: 62 IN | WEIGHT: 173 LBS | BODY MASS INDEX: 31.83 KG/M2 | HEART RATE: 91 BPM

## 2022-11-09 DIAGNOSIS — E05.90 SUBCLINICAL HYPERTHYROIDISM: ICD-10-CM

## 2022-11-09 DIAGNOSIS — E55.9 VITAMIN D DEFICIENCY: ICD-10-CM

## 2022-11-09 DIAGNOSIS — E06.3 CHRONIC AUTOIMMUNE THYROIDITIS: ICD-10-CM

## 2022-11-09 LAB
25(OH)D3 SERPL-MCNC: 37 NG/ML (ref 30–100)
ALBUMIN SERPL BCP-MCNC: 4.2 G/DL (ref 3.2–4.9)
ALBUMIN/GLOB SERPL: 1.5 G/DL
ALP SERPL-CCNC: 71 U/L (ref 30–99)
ALT SERPL-CCNC: 18 U/L (ref 2–50)
ANION GAP SERPL CALC-SCNC: 9 MMOL/L (ref 7–16)
AST SERPL-CCNC: 18 U/L (ref 12–45)
BASOPHILS # BLD AUTO: 0.4 % (ref 0–1.8)
BASOPHILS # BLD: 0.03 K/UL (ref 0–0.12)
BILIRUB SERPL-MCNC: 0.2 MG/DL (ref 0.1–1.5)
BUN SERPL-MCNC: 9 MG/DL (ref 8–22)
CALCIUM SERPL-MCNC: 9.4 MG/DL (ref 8.4–10.2)
CHLORIDE SERPL-SCNC: 105 MMOL/L (ref 96–112)
CO2 SERPL-SCNC: 28 MMOL/L (ref 20–33)
CREAT SERPL-MCNC: 0.63 MG/DL (ref 0.5–1.4)
EOSINOPHIL # BLD AUTO: 0.06 K/UL (ref 0–0.51)
EOSINOPHIL NFR BLD: 0.8 % (ref 0–6.9)
ERYTHROCYTE [DISTWIDTH] IN BLOOD BY AUTOMATED COUNT: 40.9 FL (ref 35.9–50)
FASTING STATUS PATIENT QL REPORTED: NORMAL
GFR SERPLBLD CREATININE-BSD FMLA CKD-EPI: 106 ML/MIN/1.73 M 2
GLOBULIN SER CALC-MCNC: 2.8 G/DL (ref 1.9–3.5)
GLUCOSE SERPL-MCNC: 87 MG/DL (ref 65–99)
HCT VFR BLD AUTO: 40.8 % (ref 37–47)
HGB BLD-MCNC: 13.6 G/DL (ref 12–16)
IMM GRANULOCYTES # BLD AUTO: 0.01 K/UL (ref 0–0.11)
IMM GRANULOCYTES NFR BLD AUTO: 0.1 % (ref 0–0.9)
LYMPHOCYTES # BLD AUTO: 2.69 K/UL (ref 1–4.8)
LYMPHOCYTES NFR BLD: 37.6 % (ref 22–41)
MCH RBC QN AUTO: 30.8 PG (ref 27–33)
MCHC RBC AUTO-ENTMCNC: 33.3 G/DL (ref 33.6–35)
MCV RBC AUTO: 92.3 FL (ref 81.4–97.8)
MONOCYTES # BLD AUTO: 0.45 K/UL (ref 0–0.85)
MONOCYTES NFR BLD AUTO: 6.3 % (ref 0–13.4)
NEUTROPHILS # BLD AUTO: 3.92 K/UL (ref 2–7.15)
NEUTROPHILS NFR BLD: 54.8 % (ref 44–72)
NRBC # BLD AUTO: 0 K/UL
NRBC BLD-RTO: 0 /100 WBC
PLATELET # BLD AUTO: 264 K/UL (ref 164–446)
PMV BLD AUTO: 9.3 FL (ref 9–12.9)
POTASSIUM SERPL-SCNC: 4.2 MMOL/L (ref 3.6–5.5)
PROT SERPL-MCNC: 7 G/DL (ref 6–8.2)
RBC # BLD AUTO: 4.42 M/UL (ref 4.2–5.4)
SODIUM SERPL-SCNC: 142 MMOL/L (ref 135–145)
T3FREE SERPL-MCNC: 3.13 PG/ML (ref 2–4.4)
T4 FREE SERPL-MCNC: 1.35 NG/DL (ref 0.93–1.7)
TSH SERPL DL<=0.005 MIU/L-ACNC: 0.42 UIU/ML (ref 0.38–5.33)
WBC # BLD AUTO: 7.2 K/UL (ref 4.8–10.8)

## 2022-11-09 PROCEDURE — 99205 OFFICE O/P NEW HI 60 MIN: CPT | Performed by: INTERNAL MEDICINE

## 2022-11-09 PROCEDURE — 83520 IMMUNOASSAY QUANT NOS NONAB: CPT

## 2022-11-09 PROCEDURE — 99211 OFF/OP EST MAY X REQ PHY/QHP: CPT | Performed by: INTERNAL MEDICINE

## 2022-11-09 PROCEDURE — 84443 ASSAY THYROID STIM HORMONE: CPT

## 2022-11-09 PROCEDURE — 84445 ASSAY OF TSI GLOBULIN: CPT

## 2022-11-09 PROCEDURE — 80053 COMPREHEN METABOLIC PANEL: CPT

## 2022-11-09 PROCEDURE — 84439 ASSAY OF FREE THYROXINE: CPT

## 2022-11-09 PROCEDURE — 85025 COMPLETE CBC W/AUTO DIFF WBC: CPT

## 2022-11-09 PROCEDURE — 36415 COLL VENOUS BLD VENIPUNCTURE: CPT

## 2022-11-09 PROCEDURE — 84481 FREE ASSAY (FT-3): CPT

## 2022-11-09 PROCEDURE — 82306 VITAMIN D 25 HYDROXY: CPT

## 2022-11-09 ASSESSMENT — FIBROSIS 4 INDEX: FIB4 SCORE: 0.62

## 2022-11-09 NOTE — PROGRESS NOTES
Chief Complaint: Consult requested by SHAY Travis for evaluation of Hyperthyroidism    HPI:     Rianna Solis is a 52 y.o. female with autoimmune thyroid disease.  She has elevated TPO antibodies at baseline greater than 700 however her labs do not show hypothyroidism in fact she has endogenous subclinical hyperthyroidism    Her TSH was slightly low at 0.230 with a normal free T4 normal total T3 on May 6, 2022    Her TSH was normal at 0.390 with normal free T4 and free T3 levels on May 15, 2019      Her TSH was slightly low at 0.370 on November 9, 2018  Free T4 and free T3 was not measured    Her TSH was slightly low at 0.350 on March 12, 2018  Free T4 and free T3 was not measured      Unfortunately the patient was highly subdued in terms of her responses and had a very flat affect during this visit.  She has comorbid issues of bipolar disorder with psychosis and auditory hallucinations and was recently admitted and discharged from the hospital.  She sees Dr. Mayfield for psychiatry  She is not sure what she is taking for her bipolar disorder  She denies taking thyroid hormone  She denies taking antithyroid medications like methimazole      She is a relatively poor historian.   According to her she was diagnosed with Graves' disease when she was very young but she is not sure of the details.  She had a nuclear scan and was placed on an unknown medication which is presumed to be an antithyroid medication like methimazole but it was eventually discontinued for unclear reasons.        She does not have clear symptoms of thyrotoxicosis such as unexplained weight loss, tremors, palpitations and insomnia.    She denies heat intolerance.  She denies diarrhea.        She has not had a formal thyroid ultrasound recently  TSI and thyrotropin receptor antibodies have not been checked      Patient's medications, allergies, and social histories were reviewed and updated as appropriate.      ROS:     CONS:      "No fever, no chills, no weight loss, no fatigue   EYES:      No diplopia, no blurry vision, no redness of eyes, no swelling of eyelids   ENT:    No hearing loss, No ear pain, No sore throat, no dysphagia, no neck swelling   CV:     No chest pain, no palpitations, no claudication, no orthopnea, no PND   PULM:    No SOB, no cough, no hemoptysis, no wheezing    GI:   No nausea, no vomiting, no diarrhea, no constipation, no bloody stools   :  Passing urine well, no dysuria, no hematuria   ENDO:   No polyuria, no polydipsia, no heat intolerance, no cold intolerance   NEURO: No headaches, no dizziness, no convulsions, no tremors   MUSC:  No joint swellings, no arthralgias, no myalgias, no weakness   SKIN:   No rash, no ulcers, no dry skin   PSYCH:   No depression, no anxiety, no difficulty sleeping       Past Medical History:  Patient Active Problem List    Diagnosis Date Noted    Subclinical hyperthyroidism 11/09/2022    Chronic fatigue 05/06/2022    Hashimoto's thyroiditis 05/06/2022    Left breast mass 04/01/2021    ASCUS with positive high risk HPV cervical 05/24/2019    IGT (impaired glucose tolerance) 05/06/2019    Class 1 obesity due to excess calories without serious comorbidity with body mass index (BMI) of 33.0 to 33.9 in adult 11/05/2018    HERLINDA (obstructive sleep apnea) 02/12/2018    History of suicide attempt 02/18/2016    Elevated blood pressure (not hypertension) 12/05/2014    Hx of amenorrhea 12/05/2014    Partial seizure disorder (HCC)     Bipolar 1 disorder with psychosis episodes 04/07/2011    Anxiety 04/30/2010       Past Surgical History:  Past Surgical History:   Procedure Laterality Date    PRIMARY C SECTION  2008    CERVICAL CONIZATION  1990    cryo for abnl pap    EYE SURGERY  1973,1979    to repair \"lazy eye\"    OTHER      eye surgeries as a child        Allergies:  Zoloft     Current Medications:    Current Outpatient Medications:     ibuprofen (MOTRIN) 800 MG Tab, Take 1 Tablet by mouth every " 8 hours as needed for Moderate Pain., Disp: 90 Tablet, Rfl: 1    Lumateperone Tosylate (CAPLYTA PO), Take  by mouth. (Patient not taking: Reported on 2022), Disp: , Rfl:     clonazePAM (KLONOPIN) 1 MG Tab, Take 1 mg by mouth 2 times a day. (Patient not taking: Reported on 2022), Disp: , Rfl:     gabapentin (NEURONTIN) 300 MG Cap, Take 300 mg by mouth every day. (Patient not taking: Reported on 2022), Disp: , Rfl:     venlafaxine XR (EFFEXOR XR) 75 MG CAPSULE SR 24 HR, Take 75 mg by mouth at bedtime. (Patient not taking: Reported on 2022), Disp: , Rfl:     topiramate (TOPAMAX) 100 MG Tab, Take 100 mg by mouth 2 times a day. (Patient not taking: Reported on 2022), Disp: , Rfl:     Social History:  Social History     Socioeconomic History    Marital status:      Spouse name: Not on file    Number of children: Not on file    Years of education: Not on file    Highest education level: Not on file   Occupational History    Occupation: not working     Employer: OTHER   Tobacco Use    Smoking status: Former     Packs/day: 0.50     Years: 30.00     Pack years: 15.00     Types: Cigarettes     Start date: 2016     Quit date: 2022     Years since quittin.1    Smokeless tobacco: Never    Tobacco comments:     started at 18   Vaping Use    Vaping Use: Former   Substance and Sexual Activity    Alcohol use: Not Currently    Drug use: No    Sexual activity: Yes     Partners: Male     Comment:  x5 years   Other Topics Concern    Not on file   Social History Narrative    ** Merged History Encounter **          Social Determinants of Health     Financial Resource Strain: Not on file   Food Insecurity: Not on file   Transportation Needs: Not on file   Physical Activity: Not on file   Stress: Not on file   Social Connections: Not on file   Intimate Partner Violence: Not on file   Housing Stability: Not on file        Family History:   Family History   Problem Relation Age of Onset     "Cancer Mother         breast cancer    Diabetes Mother     Hypertension Mother     Cancer Maternal Grandmother         stomach    Stroke Maternal Grandfather     Cancer Paternal Grandfather         lung    Hypertension Father          PHYSICAL EXAM:   Vital signs: /78   Pulse 91   Ht 1.575 m (5' 2\")   Wt 78.5 kg (173 lb)   SpO2 97%   BMI 31.64 kg/m²   GENERAL: Well-developed, well-nourished  in no apparent distress.   EYE: No ocular and eyelid asymmetry, Anicteric sclerae,  PERRL, No exophthalmos or lidlag  HENT: Hearing grossly intact, Normocephalic, atraumatic. Pink, moist mucous membranes, No exudate  NECK: Supple. Trachea midline. thyroid is normal in size without nodules or tenderness  CARDIOVASCULAR: Regular rate and rhythm. No murmurs, rubs, or gallops.   LUNGS: Clear to auscultation bilaterally   ABDOMEN: Soft, nontender with positive bowel sounds.   EXTREMITIES: No clubbing, cyanosis, or edema.   NEUROLOGICAL: Cranial nerves II-XII are grossly intact   Symmetric reflexes at the patella no proximal muscle weakness, No visible tremor with both outstretched hands  LYMPH: No cervical, supraclavicular,  adenopathy palpated.   SKIN: No rashes, lesions. Turgor is normal.    Labs:  Lab Results   Component Value Date/Time    WBC 10.3 02/12/2022 10:51 AM    RBC 4.58 02/12/2022 10:51 AM    HEMOGLOBIN 14.3 02/12/2022 10:51 AM    MCV 90.8 02/12/2022 10:51 AM    MCH 31.2 02/12/2022 10:51 AM    MCHC 34.4 02/12/2022 10:51 AM    RDW 40.5 02/12/2022 10:51 AM    MPV 9.1 02/12/2022 10:51 AM       Lab Results   Component Value Date/Time    SODIUM 137 02/12/2022 10:51 AM    POTASSIUM 3.7 02/12/2022 10:51 AM    CHLORIDE 106 02/12/2022 10:51 AM    CO2 19 (L) 02/12/2022 10:51 AM    ANION 12.0 02/12/2022 10:51 AM    GLUCOSE 173 (H) 02/12/2022 10:51 AM    BUN 12 02/12/2022 10:51 AM    CREATININE 0.66 02/12/2022 10:51 AM    CALCIUM 9.6 02/12/2022 10:51 AM    ASTSGOT 19 02/12/2022 10:51 AM    ALTSGPT 29 02/12/2022 10:51 AM "    TBILIRUBIN 0.3 02/12/2022 10:51 AM    ALBUMIN 4.4 02/12/2022 10:51 AM    TOTPROTEIN 7.5 02/12/2022 10:51 AM    GLOBULIN 3.1 02/12/2022 10:51 AM    AGRATIO 1.4 02/12/2022 10:51 AM       Lab Results   Component Value Date/Time    TSHULTRASEN 0.230 (L) 05/06/2022 0759     Lab Results   Component Value Date/Time    FREET4 0.95 05/06/2022 0759     Lab Results   Component Value Date/Time    FREET3 3.48 05/15/2019 1236     No results found for: THYSTIMIG      Imaging:      ASSESSMENT/PLAN:     1. Subclinical hyperthyroidism  Stable  Review of serial thyroid labs for this patient shows that despite having the elevated TPO antibodies she has subclinical hyperthyroidism as her TSH is slightly low and free T4 and free T3 levels are normal  She is not taking thyroid hormone  She also reports a distant history of Graves' disease    I recommend confirmatory work-up  We will get a TSH, free T4, and  free T3 levels today  We will check the thyroid autoantibodies associated with Graves' disease which are the TSI and thyrotropin receptor antibody levels  I am going to schedule her for a thyroid ultrasound in the office  I also want her to repeat labs again before I see her in the office in March 2023      If her subclinical hyperthyroidism is stable and not severe antithyroid drug therapy is not medically necessary and serial observation of her thyroid function is what is recommended      If her TSH drops to less than 0.01 or her free T4 and free T3 levels are frankly elevated then she should be placed on antithyroid drug therapy for overt hyperthyroidism        2. Chronic autoimmune thyroiditis  Patient has elevated TPO antibodies but she also has a history of Graves' disease all overall she most likely has subclinical hyperthyroidism secondary to chronic autoimmune thyroid disease or chronic autoimmune thyroiditis    3. Vitamin D deficiency  Stable  We will check calcium vitamin D today      Return in about 4 months (around  3/9/2023).       This patient during there office visit was started on new medication.  Side effects of new medications were discussed with the patient today in the office. The patient was supplied paperwork on this new medication.    Thank you kindly for allowing me to participate in the thyroid care plan for this patient.    Total time spent on date of service was over 60 minutes which included obtaining detailed history and physical exam, ordering labs, cording care and scheduling future follow-up      Marty Elise MD, [facility], [facility]  11/09/22    CC:   Glenda Skinner, A.PElainaRElainaNElaina

## 2022-11-11 LAB
TSH RECEP AB SER-ACNC: 0.84 IU/L
TSI SER-ACNC: <0.1 IU/L

## 2022-11-22 RX ORDER — IBUPROFEN 800 MG/1
800 TABLET ORAL EVERY 8 HOURS PRN
Qty: 90 TABLET | Refills: 1 | Status: SHIPPED | OUTPATIENT
Start: 2022-11-22 | End: 2023-01-01

## 2022-12-09 ENCOUNTER — HOSPITAL ENCOUNTER (OUTPATIENT)
Dept: RADIOLOGY | Facility: MEDICAL CENTER | Age: 52
End: 2022-12-09
Attending: NURSE PRACTITIONER
Payer: MEDICARE

## 2022-12-09 DIAGNOSIS — Z12.31 VISIT FOR SCREENING MAMMOGRAM: ICD-10-CM

## 2022-12-21 ENCOUNTER — HOSPITAL ENCOUNTER (OUTPATIENT)
Dept: RADIOLOGY | Facility: MEDICAL CENTER | Age: 52
End: 2022-12-21
Attending: NURSE PRACTITIONER
Payer: MEDICARE

## 2022-12-21 DIAGNOSIS — N63.20 LEFT BREAST MASS: ICD-10-CM

## 2022-12-21 PROCEDURE — G0279 TOMOSYNTHESIS, MAMMO: HCPCS

## 2022-12-21 PROCEDURE — 76642 ULTRASOUND BREAST LIMITED: CPT | Mod: LT

## 2022-12-23 ENCOUNTER — APPOINTMENT (OUTPATIENT)
Dept: RADIOLOGY | Facility: IMAGING CENTER | Age: 52
End: 2022-12-23
Attending: NURSE PRACTITIONER
Payer: MEDICARE

## 2022-12-23 ENCOUNTER — OFFICE VISIT (OUTPATIENT)
Dept: URGENT CARE | Facility: PHYSICIAN GROUP | Age: 52
End: 2022-12-23
Payer: MEDICARE

## 2022-12-23 VITALS
BODY MASS INDEX: 32.57 KG/M2 | RESPIRATION RATE: 16 BRPM | DIASTOLIC BLOOD PRESSURE: 72 MMHG | HEART RATE: 66 BPM | HEIGHT: 62 IN | SYSTOLIC BLOOD PRESSURE: 124 MMHG | WEIGHT: 177 LBS | TEMPERATURE: 97 F | OXYGEN SATURATION: 100 %

## 2022-12-23 DIAGNOSIS — R11.0 NAUSEA: ICD-10-CM

## 2022-12-23 DIAGNOSIS — S89.91XA KNEE INJURY, RIGHT, INITIAL ENCOUNTER: ICD-10-CM

## 2022-12-23 DIAGNOSIS — R30.0 DYSURIA: ICD-10-CM

## 2022-12-23 DIAGNOSIS — N30.01 ACUTE CYSTITIS WITH HEMATURIA: ICD-10-CM

## 2022-12-23 LAB
APPEARANCE UR: CLEAR
BILIRUB UR STRIP-MCNC: NORMAL MG/DL
COLOR UR AUTO: YELLOW
GLUCOSE UR STRIP.AUTO-MCNC: NORMAL MG/DL
KETONES UR STRIP.AUTO-MCNC: NORMAL MG/DL
LEUKOCYTE ESTERASE UR QL STRIP.AUTO: NORMAL
NITRITE UR QL STRIP.AUTO: NORMAL
PH UR STRIP.AUTO: 7 [PH] (ref 5–8)
PROT UR QL STRIP: NORMAL MG/DL
RBC UR QL AUTO: NORMAL
SP GR UR STRIP.AUTO: 1.01
UROBILINOGEN UR STRIP-MCNC: 0.2 MG/DL

## 2022-12-23 PROCEDURE — 73562 X-RAY EXAM OF KNEE 3: CPT | Mod: TC,RT | Performed by: NURSE PRACTITIONER

## 2022-12-23 PROCEDURE — 81002 URINALYSIS NONAUTO W/O SCOPE: CPT | Performed by: NURSE PRACTITIONER

## 2022-12-23 PROCEDURE — 99213 OFFICE O/P EST LOW 20 MIN: CPT | Performed by: NURSE PRACTITIONER

## 2022-12-23 RX ORDER — ZIPRASIDONE HYDROCHLORIDE 80 MG/1
80 CAPSULE ORAL EVERY EVENING
COMMUNITY
Start: 2022-11-17

## 2022-12-23 RX ORDER — ONDANSETRON 4 MG/1
4 TABLET, ORALLY DISINTEGRATING ORAL EVERY 6 HOURS PRN
Qty: 10 TABLET | Refills: 0 | Status: SHIPPED | OUTPATIENT
Start: 2022-12-23 | End: 2023-01-27

## 2022-12-23 RX ORDER — CEFDINIR 300 MG/1
300 CAPSULE ORAL EVERY 12 HOURS
Qty: 10 CAPSULE | Refills: 0 | Status: SHIPPED | OUTPATIENT
Start: 2022-12-23 | End: 2022-12-28

## 2022-12-23 RX ORDER — BENZTROPINE MESYLATE 1 MG/1
TABLET ORAL
COMMUNITY
Start: 2022-11-29 | End: 2023-01-01

## 2022-12-23 RX ORDER — HALOPERIDOL 10 MG/1
TABLET ORAL
COMMUNITY
Start: 2022-11-29 | End: 2023-01-01

## 2022-12-23 ASSESSMENT — ENCOUNTER SYMPTOMS
BACK PAIN: 1
NAUSEA: 1
ABDOMINAL PAIN: 1
MYALGIAS: 1

## 2022-12-23 ASSESSMENT — FIBROSIS 4 INDEX: FIB4 SCORE: 0.84
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
## 2023-01-01 ENCOUNTER — OFFICE VISIT (OUTPATIENT)
Dept: URGENT CARE | Facility: PHYSICIAN GROUP | Age: 53
End: 2023-01-01
Payer: MEDICARE

## 2023-01-01 ENCOUNTER — HOSPITAL ENCOUNTER (OUTPATIENT)
Facility: MEDICAL CENTER | Age: 53
End: 2023-01-01
Attending: NURSE PRACTITIONER
Payer: MEDICARE

## 2023-01-01 VITALS
RESPIRATION RATE: 16 BRPM | DIASTOLIC BLOOD PRESSURE: 80 MMHG | SYSTOLIC BLOOD PRESSURE: 108 MMHG | BODY MASS INDEX: 31.32 KG/M2 | WEIGHT: 170.2 LBS | TEMPERATURE: 96.9 F | OXYGEN SATURATION: 99 % | HEIGHT: 62 IN | HEART RATE: 71 BPM

## 2023-01-01 DIAGNOSIS — R39.9 UTI SYMPTOMS: ICD-10-CM

## 2023-01-01 DIAGNOSIS — R10.9 ABDOMINAL PAIN, UNSPECIFIED ABDOMINAL LOCATION: ICD-10-CM

## 2023-01-01 DIAGNOSIS — Z87.19 HISTORY OF DIVERTICULITIS: ICD-10-CM

## 2023-01-01 LAB
APPEARANCE UR: CLEAR
BILIRUB UR STRIP-MCNC: NORMAL MG/DL
COLOR UR AUTO: NORMAL
GLUCOSE UR STRIP.AUTO-MCNC: NORMAL MG/DL
KETONES UR STRIP.AUTO-MCNC: NORMAL MG/DL
LEUKOCYTE ESTERASE UR QL STRIP.AUTO: NORMAL
NITRITE UR QL STRIP.AUTO: NORMAL
PH UR STRIP.AUTO: 5.5 [PH] (ref 5–8)
PROT UR QL STRIP: NORMAL MG/DL
RBC UR QL AUTO: NORMAL
SP GR UR STRIP.AUTO: 1
UROBILINOGEN UR STRIP-MCNC: 0.2 MG/DL

## 2023-01-01 PROCEDURE — 81002 URINALYSIS NONAUTO W/O SCOPE: CPT | Performed by: NURSE PRACTITIONER

## 2023-01-01 PROCEDURE — 99213 OFFICE O/P EST LOW 20 MIN: CPT | Performed by: NURSE PRACTITIONER

## 2023-01-01 PROCEDURE — 87086 URINE CULTURE/COLONY COUNT: CPT

## 2023-01-01 RX ORDER — VENLAFAXINE HYDROCHLORIDE 37.5 MG/1
37.5 CAPSULE, EXTENDED RELEASE ORAL EVERY MORNING
COMMUNITY
Start: 2022-12-24 | End: 2023-08-08

## 2023-01-01 RX ORDER — HYDROCODONE BITARTRATE AND ACETAMINOPHEN 5; 325 MG/1; MG/1
1 TABLET ORAL EVERY 8 HOURS PRN
Qty: 15 TABLET | Refills: 0 | Status: SHIPPED | OUTPATIENT
Start: 2023-01-01 | End: 2023-01-06

## 2023-01-01 RX ORDER — AMOXICILLIN AND CLAVULANATE POTASSIUM 875; 125 MG/1; MG/1
1 TABLET, FILM COATED ORAL 2 TIMES DAILY
Qty: 14 TABLET | Refills: 0 | Status: SHIPPED | OUTPATIENT
Start: 2023-01-01 | End: 2023-01-08

## 2023-01-01 RX ORDER — PHENAZOPYRIDINE HYDROCHLORIDE 200 MG/1
200 TABLET, FILM COATED ORAL 3 TIMES DAILY PRN
Qty: 6 TABLET | Refills: 0 | Status: SHIPPED | OUTPATIENT
Start: 2023-01-01 | End: 2023-01-03

## 2023-01-01 ASSESSMENT — ENCOUNTER SYMPTOMS
VOMITING: 0
ABDOMINAL PAIN: 1
RESPIRATORY NEGATIVE: 1
DIARRHEA: 0
FEVER: 0
CARDIOVASCULAR NEGATIVE: 1
CONSTIPATION: 1
CONSTITUTIONAL NEGATIVE: 1
NAUSEA: 1

## 2023-01-01 ASSESSMENT — FIBROSIS 4 INDEX: FIB4 SCORE: 0.84

## 2023-01-01 ASSESSMENT — VISUAL ACUITY: OU: 1

## 2023-01-01 NOTE — PROGRESS NOTES
Subjective:     Rianna Solis is a 52 y.o. female who presents for UTI (Bladder pain, L flank pain, painful urination, pressure, x1 week )       UTI  This is a new problem. The problem has been unchanged. Associated symptoms include abdominal pain, nausea and urinary symptoms. Pertinent negatives include no fever or vomiting.   Abdominal Pain  This is a new problem. The problem has been gradually worsening. The pain is located in the LLQ. The pain is severe. Associated symptoms include constipation, dysuria, frequency and nausea. Pertinent negatives include no diarrhea, fever or vomiting. Her past medical history is significant for abdominal surgery (C section).     Seen in urgent care 12/23/2022.  Was having dysuria for 3 days.  Urinalysis with moderate leukocytes and blood.  Treated with cefdinir.    Patient reports finishing her antibiotic.  However, urinary symptoms unchanged.  Continues to report suprapubic pressure.  Reports urinary frequency and pain with urination.    Also reports having had constipation and left lower quadrant pain ever since her last visit.  Worsening.  Reports history of  diverticulitis.  Concerned of the same.  Has tried milk of magnesia and most recently suppository.  Reports nausea.  Has tried ibuprofen for pain with no improvement.  Denies fever.  Continues to pass gas.    Review of Systems   Constitutional: Negative.  Negative for fever.   Respiratory: Negative.     Cardiovascular: Negative.    Gastrointestinal:  Positive for abdominal pain, constipation and nausea. Negative for diarrhea and vomiting.   Genitourinary:  Positive for dysuria and frequency.   All other systems reviewed and are negative.    Refer to HPI for additional details.    During this visit, appropriate PPE was worn, hand hygiene was performed, and the patient and any visitors were masked.    PMH:  has a past medical history of Abnormal mammogram (2006), Acromioclavicular joint separation (5/24/2012),  "Anxiety, Depression, Former smoker (4/26/2019), History of cervical dysplasia (1990), History of gestational diabetes (1/15/2018), History of suicidal ideation, Homicidal ideations (2014), Hyperthyroidism, Hyperthyroidism, Lisfranc's dislocation (10/2017), Migraine, Pelvic exam (2008), Polysubstance dependence ([facility]), Schizoaffective disorder ([facility]), Schizoaffective disorder, bipolar type ([facility]) (4/7/2011), Seizure ([facility]) (1999), Seizure disorder ([facility]), Toe fracture (10/2017), Urinary tract infection, and Wrist fracture (2010).    She has no past medical history of Ulcer.    MEDS:   Current Outpatient Medications:     venlafaxine XR (EFFEXOR XR) 37.5 MG CAPSULE SR 24 HR, Take 37.5 mg by mouth every morning., Disp: , Rfl:     amoxicillin-clavulanate (AUGMENTIN) 875-125 MG Tab, Take 1 Tablet by mouth 2 times a day for 7 days., Disp: 14 Tablet, Rfl: 0    HYDROcodone-acetaminophen (NORCO) 5-325 MG Tab per tablet, Take 1 Tablet by mouth every 8 hours as needed (severe pain) for up to 5 days., Disp: 15 Tablet, Rfl: 0    phenazopyridine (PYRIDIUM) 200 MG Tab, Take 1 Tablet by mouth 3 times a day as needed for Moderate Pain for up to 2 days., Disp: 6 Tablet, Rfl: 0    ziprasidone (GEODON) 80 MG Cap, Take 80 mg by mouth 2 times a day., Disp: , Rfl:     ondansetron (ZOFRAN ODT) 4 MG TABLET DISPERSIBLE, Take 1 Tablet by mouth every 6 hours as needed for Nausea/Vomiting., Disp: 10 Tablet, Rfl: 0    clonazePAM (KLONOPIN) 1 MG Tab, Take 1 mg by mouth 2 times a day., Disp: , Rfl:     gabapentin (NEURONTIN) 300 MG Cap, Take 300 mg by mouth every day., Disp: , Rfl:     ALLERGIES:   Allergies   Allergen Reactions    Zoloft Unspecified     \"Blows Up\"     SURGHX:   Past Surgical History:   Procedure Laterality Date    PRIMARY C SECTION  2008    CERVICAL CONIZATION  1990    cryo for abnl pap    EYE SURGERY  1973,1979    to repair \"lazy eye\"    OTHER      eye surgeries as a child     SOCHX:  reports that she has been smoking cigarettes. She " "started smoking about 6 years ago. She has a 15.00 pack-year smoking history. She has never used smokeless tobacco. She reports that she does not currently use alcohol. She reports that she does not use drugs.    FH: Per HPI as applicable/pertinent.      Objective:     /80 (BP Location: Left arm, Patient Position: Sitting, BP Cuff Size: Adult)   Pulse 71   Temp 36.1 °C (96.9 °F) (Temporal)   Resp 16   Ht 1.575 m (5' 2\")   Wt 77.2 kg (170 lb 3.2 oz)   SpO2 99%   BMI 31.13 kg/m²     Physical Exam  Nursing note reviewed.   Constitutional:       General: She is not in acute distress.     Appearance: She is well-developed. She is ill-appearing (Looks uncomfortable). She is not toxic-appearing.   Eyes:      General: Vision grossly intact.   Cardiovascular:      Rate and Rhythm: Normal rate.   Pulmonary:      Effort: Pulmonary effort is normal. No respiratory distress.   Abdominal:      General: Bowel sounds are normal. There is no distension.      Palpations: Abdomen is soft.      Tenderness: There is abdominal tenderness in the epigastric area, suprapubic area, left upper quadrant and left lower quadrant.   Musculoskeletal:         General: No deformity. Normal range of motion.   Skin:     General: Skin is warm and dry.      Coloration: Skin is not pale.   Neurological:      Mental Status: She is alert and oriented to person, place, and time.      Motor: No weakness.   Psychiatric:         Behavior: Behavior normal. Behavior is cooperative.     UA: unremarkable      Assessment/Plan:     1. UTI symptoms  - POCT Urinalysis  - URINE CULTURE(NEW); Future  - amoxicillin-clavulanate (AUGMENTIN) 875-125 MG Tab; Take 1 Tablet by mouth 2 times a day for 7 days.  Dispense: 14 Tablet; Refill: 0  - phenazopyridine (PYRIDIUM) 200 MG Tab; Take 1 Tablet by mouth 3 times a day as needed for Moderate Pain for up to 2 days.  Dispense: 6 Tablet; Refill: 0    2. Abdominal pain, unspecified abdominal location  - " amoxicillin-clavulanate (AUGMENTIN) 875-125 MG Tab; Take 1 Tablet by mouth 2 times a day for 7 days.  Dispense: 14 Tablet; Refill: 0  - HYDROcodone-acetaminophen (NORCO) 5-325 MG Tab per tablet; Take 1 Tablet by mouth every 8 hours as needed (severe pain) for up to 5 days.  Dispense: 15 Tablet; Refill: 0    3. History of diverticulitis  - amoxicillin-clavulanate (AUGMENTIN) 875-125 MG Tab; Take 1 Tablet by mouth 2 times a day for 7 days.  Dispense: 14 Tablet; Refill: 0    Other orders  - Consent for Opiate Prescription    Recommend advanced imaging/CT vs ER workup. Patient declines imaging or going to the ER at this time (financial limitation).    She would like to attempt empiric treatment. CT 2/12/2022 with diverticulitis.    Rx as above sent electronically. Requesting medication for pain. Ibuprofen not helping.    PDMP and Opioid Risk Tool show low risk of controlled substance abuse for this patient. Patient counseled on risks and benefits of controlled substances including possible worsening of constipation. Not for use at work or while driving. Handout provided. Written consent received for short-term prescription of controlled substance for adequate control of pain.     Differential diagnosis, natural history, supportive care, increased fluids, over-the-counter symptom management per 's instructions, close monitoring, and indications for immediate follow-up discussed.     Vital signs stable, afebrile, no acute distress at this time. Warning signs reviewed. Strict return/ER precautions advised many times. She verbalizes understanding.    All questions answered. Patient agrees with the plan of care.    Discharge summary provided via [email or web address].

## 2023-01-03 LAB
BACTERIA UR CULT: NORMAL
SIGNIFICANT IND 70042: NORMAL
SITE SITE: NORMAL
SOURCE SOURCE: NORMAL

## 2023-01-05 ENCOUNTER — APPOINTMENT (OUTPATIENT)
Dept: RADIOLOGY | Facility: MEDICAL CENTER | Age: 53
End: 2023-01-05
Attending: EMERGENCY MEDICINE
Payer: MEDICARE

## 2023-01-05 ENCOUNTER — HOSPITAL ENCOUNTER (EMERGENCY)
Facility: MEDICAL CENTER | Age: 53
End: 2023-01-05
Attending: EMERGENCY MEDICINE
Payer: MEDICARE

## 2023-01-05 VITALS
WEIGHT: 183.2 LBS | BODY MASS INDEX: 33.71 KG/M2 | HEART RATE: 72 BPM | SYSTOLIC BLOOD PRESSURE: 125 MMHG | RESPIRATION RATE: 18 BRPM | TEMPERATURE: 97.2 F | OXYGEN SATURATION: 98 % | DIASTOLIC BLOOD PRESSURE: 75 MMHG | HEIGHT: 62 IN

## 2023-01-05 DIAGNOSIS — K80.20 GALL STONES: ICD-10-CM

## 2023-01-05 DIAGNOSIS — R10.84 GENERALIZED ABDOMINAL PAIN: ICD-10-CM

## 2023-01-05 LAB
ALBUMIN SERPL BCP-MCNC: 4.5 G/DL (ref 3.2–4.9)
ALBUMIN/GLOB SERPL: 1.7 G/DL
ALP SERPL-CCNC: 71 U/L (ref 30–99)
ALT SERPL-CCNC: 24 U/L (ref 2–50)
ANION GAP SERPL CALC-SCNC: 9 MMOL/L (ref 7–16)
APPEARANCE UR: CLEAR
AST SERPL-CCNC: 27 U/L (ref 12–45)
BASOPHILS # BLD AUTO: 0.9 % (ref 0–1.8)
BASOPHILS # BLD: 0.08 K/UL (ref 0–0.12)
BILIRUB SERPL-MCNC: 0.3 MG/DL (ref 0.1–1.5)
BILIRUB UR QL STRIP.AUTO: NEGATIVE
BUN SERPL-MCNC: 18 MG/DL (ref 8–22)
CALCIUM ALBUM COR SERPL-MCNC: 9.1 MG/DL (ref 8.5–10.5)
CALCIUM SERPL-MCNC: 9.5 MG/DL (ref 8.4–10.2)
CHLORIDE SERPL-SCNC: 104 MMOL/L (ref 96–112)
CO2 SERPL-SCNC: 25 MMOL/L (ref 20–33)
COLOR UR: YELLOW
CREAT SERPL-MCNC: 0.65 MG/DL (ref 0.5–1.4)
EOSINOPHIL # BLD AUTO: 0.16 K/UL (ref 0–0.51)
EOSINOPHIL NFR BLD: 1.8 % (ref 0–6.9)
ERYTHROCYTE [DISTWIDTH] IN BLOOD BY AUTOMATED COUNT: 39.4 FL (ref 35.9–50)
GFR SERPLBLD CREATININE-BSD FMLA CKD-EPI: 105 ML/MIN/1.73 M 2
GLOBULIN SER CALC-MCNC: 2.6 G/DL (ref 1.9–3.5)
GLUCOSE SERPL-MCNC: 101 MG/DL (ref 65–99)
GLUCOSE UR STRIP.AUTO-MCNC: NEGATIVE MG/DL
HCT VFR BLD AUTO: 40.4 % (ref 37–47)
HGB BLD-MCNC: 13.8 G/DL (ref 12–16)
IMM GRANULOCYTES # BLD AUTO: 0.02 K/UL (ref 0–0.11)
IMM GRANULOCYTES NFR BLD AUTO: 0.2 % (ref 0–0.9)
KETONES UR STRIP.AUTO-MCNC: NEGATIVE MG/DL
LEUKOCYTE ESTERASE UR QL STRIP.AUTO: NEGATIVE
LIPASE SERPL-CCNC: 38 U/L (ref 7–58)
LYMPHOCYTES # BLD AUTO: 3.59 K/UL (ref 1–4.8)
LYMPHOCYTES NFR BLD: 40.7 % (ref 22–41)
MCH RBC QN AUTO: 30.5 PG (ref 27–33)
MCHC RBC AUTO-ENTMCNC: 34.2 G/DL (ref 33.6–35)
MCV RBC AUTO: 89.2 FL (ref 81.4–97.8)
MICRO URNS: NORMAL
MONOCYTES # BLD AUTO: 0.71 K/UL (ref 0–0.85)
MONOCYTES NFR BLD AUTO: 8 % (ref 0–13.4)
NEUTROPHILS # BLD AUTO: 4.27 K/UL (ref 2–7.15)
NEUTROPHILS NFR BLD: 48.4 % (ref 44–72)
NITRITE UR QL STRIP.AUTO: NEGATIVE
NRBC # BLD AUTO: 0 K/UL
NRBC BLD-RTO: 0 /100 WBC
PH UR STRIP.AUTO: 7 [PH] (ref 5–8)
PLATELET # BLD AUTO: 314 K/UL (ref 164–446)
PMV BLD AUTO: 8.8 FL (ref 9–12.9)
POTASSIUM SERPL-SCNC: 4.6 MMOL/L (ref 3.6–5.5)
PROT SERPL-MCNC: 7.1 G/DL (ref 6–8.2)
PROT UR QL STRIP: NEGATIVE MG/DL
RBC # BLD AUTO: 4.53 M/UL (ref 4.2–5.4)
RBC UR QL AUTO: NEGATIVE
SODIUM SERPL-SCNC: 138 MMOL/L (ref 135–145)
SP GR UR STRIP.AUTO: <=1.005
WBC # BLD AUTO: 8.8 K/UL (ref 4.8–10.8)

## 2023-01-05 PROCEDURE — 96375 TX/PRO/DX INJ NEW DRUG ADDON: CPT

## 2023-01-05 PROCEDURE — 700117 HCHG RX CONTRAST REV CODE 255: Performed by: EMERGENCY MEDICINE

## 2023-01-05 PROCEDURE — 80053 COMPREHEN METABOLIC PANEL: CPT

## 2023-01-05 PROCEDURE — 36415 COLL VENOUS BLD VENIPUNCTURE: CPT

## 2023-01-05 PROCEDURE — 74177 CT ABD & PELVIS W/CONTRAST: CPT

## 2023-01-05 PROCEDURE — 99284 EMERGENCY DEPT VISIT MOD MDM: CPT

## 2023-01-05 PROCEDURE — 96374 THER/PROPH/DIAG INJ IV PUSH: CPT | Mod: XU

## 2023-01-05 PROCEDURE — 700111 HCHG RX REV CODE 636 W/ 250 OVERRIDE (IP): Performed by: EMERGENCY MEDICINE

## 2023-01-05 PROCEDURE — 83690 ASSAY OF LIPASE: CPT

## 2023-01-05 PROCEDURE — 81003 URINALYSIS AUTO W/O SCOPE: CPT

## 2023-01-05 PROCEDURE — 85025 COMPLETE CBC W/AUTO DIFF WBC: CPT

## 2023-01-05 RX ORDER — ONDANSETRON 2 MG/ML
4 INJECTION INTRAMUSCULAR; INTRAVENOUS ONCE
Status: COMPLETED | OUTPATIENT
Start: 2023-01-05 | End: 2023-01-05

## 2023-01-05 RX ORDER — MORPHINE SULFATE 4 MG/ML
4 INJECTION INTRAVENOUS ONCE
Status: COMPLETED | OUTPATIENT
Start: 2023-01-05 | End: 2023-01-05

## 2023-01-05 RX ADMIN — IOHEXOL 100 ML: 350 INJECTION, SOLUTION INTRAVENOUS at 20:00

## 2023-01-05 RX ADMIN — ONDANSETRON 4 MG: 2 INJECTION INTRAMUSCULAR; INTRAVENOUS at 19:18

## 2023-01-05 RX ADMIN — MORPHINE SULFATE 4 MG: 4 INJECTION INTRAVENOUS at 19:19

## 2023-01-05 ASSESSMENT — FIBROSIS 4 INDEX: FIB4 SCORE: 0.84

## 2023-01-05 ASSESSMENT — PAIN DESCRIPTION - PAIN TYPE
TYPE: ACUTE PAIN
TYPE: ACUTE PAIN

## 2023-01-06 NOTE — ED PROVIDER NOTES
ER Provider Note    Scribed for Alvin Capps M.d. by Sukumar Burrell. 1/5/2023  6:49 PM    Primary Care Provider: SHAY Travis    CHIEF COMPLAINT  Chief Complaint   Patient presents with    Abdominal Pain     Reports pain to bilat lower quad abd and upper L quad 10/10. Reports also vomiting and constipation/diarrhea. Unknown last normal BM. Pt. Concerned for bowel blockage but denies hx of this. Denies known fevers.      EXTERNAL RECORDS REVIEWED  Select: Inpatient Notes The patient was recently admitted for inpatient psychiatric evaluation at [facility]. She has a history of schizoaffective disorder.    HPI/ROS  LIMITATION TO HISTORY   Select: : None  OUTSIDE HISTORIAN(S):  None    Rianna Solis is a 52 y.o. female who presents to the ED complaining of moderate abdominal pain onset 2-3 weeks ago. The patient states that after being placed on a course Amoxicillin 2-3 weeks ago for a UTI she has been experiencing progressively worsening abdominal pain. She reports being evaluated at Urgent Care on 1/1/2023 where they suspected the patient had diverticulitis and a bladder infection. Rianna chose not to pursue further evaluation during this encounter and left home. She was ultimately prompted to visit the ED today after being unable to tolerate the abdominal pain. The patient localizes the abdominal pain diffusely across the lower abdomen and left upper quadrant. Associated symptoms include constipation, diarrhea, vomiting, and dysuria. The patient also complains of a mild cough. She denies any new back pain or hematuria.     PAST MEDICAL HISTORY  Past Medical History:   Diagnosis Date    Abnormal mammogram 2006    nodule in left breast, no follow up    Acromioclavicular joint separation 5/24/2012    Anxiety     No [place] mental health; klonopin, coping mechanisms    Depression     Former smoker 4/26/2019    History of cervical dysplasia 1990    had cryo tx, resolved with nl paps now    History  "of gestational diabetes 1/15/2018    History of suicidal ideation     Homicidal ideations 2014    [facility] ER hold    Hyperthyroidism     Hyperthyroidism     since age 16, NL labs 03/2012    Lisfranc's dislocation 10/2017    right    Migraine     Pelvic exam 2008    Dysplasia at age 20s, had cryotherapy    Polysubstance dependence (HCC)     in remission as of 11/26/2014    Schizoaffective disorder (HCC)     Schizoaffective disorder, bipolar type ([facility]) 4/7/2011    Seizure ([facility]) 1999    partial complex-no motor seizure; managed [facility]    Seizure disorder ([facility])     partial complex seizure d/o    Toe fracture 10/2017    right 3rd-5th toes    Urinary tract infection     Wrist fracture 2010    casted by [facility]       SURGICAL HISTORY  Past Surgical History:   Procedure Laterality Date    PRIMARY C SECTION  2008    CERVICAL CONIZATION  1990    cryo for abnl pap    EYE SURGERY  1973,1979    to repair \"lazy eye\"    OTHER      eye surgeries as a child       FAMILY HISTORY  Family History   Problem Relation Age of Onset    Cancer Mother         breast cancer    Diabetes Mother     Hypertension Mother     Cancer Maternal Grandmother         stomach    Stroke Maternal Grandfather     Cancer Paternal Grandfather         lung    Hypertension Father        SOCIAL HISTORY   reports that she has been smoking cigarettes. She started smoking about 6 years ago. She has a 15.00 pack-year smoking history. She has never used smokeless tobacco. She reports that she does not currently use alcohol. She reports that she does not use drugs.    CURRENT MEDICATIONS  Discharge Medication List as of 1/5/2023  8:38 PM        CONTINUE these medications which have NOT CHANGED    Details   venlafaxine XR (EFFEXOR XR) 37.5 MG CAPSULE SR 24 HR Take 37.5 mg by mouth every morning., Historical Med      amoxicillin-clavulanate (AUGMENTIN) 875-125 MG Tab Take 1 Tablet by mouth 2 times a day for 7 days., Disp-14 Tablet, R-0, Normal    " "  HYDROcodone-acetaminophen (NORCO) 5-325 MG Tab per tablet Take 1 Tablet by mouth every 8 hours as needed (severe pain) for up to 5 days., Disp-15 Tablet, R-0, Normal      ziprasidone (GEODON) 80 MG Cap Take 80 mg by mouth 2 times a day., Historical Med      ondansetron (ZOFRAN ODT) 4 MG TABLET DISPERSIBLE Take 1 Tablet by mouth every 6 hours as needed for Nausea/Vomiting., Disp-10 Tablet, R-0, Normal      clonazePAM (KLONOPIN) 1 MG Tab Take 1 mg by mouth 2 times a day., Historical Med      gabapentin (NEURONTIN) 300 MG Cap Take 300 mg by mouth every day., Historical Med             ALLERGIES  Zoloft    PHYSICAL EXAM  BP (!) 141/99   Pulse 86   Temp (!) 35.7 °C (96.3 °F) (Temporal)   Resp 18   Ht 1.575 m (5' 2\")   Wt 83.1 kg (183 lb 3.2 oz)   SpO2 95%   BMI 33.51 kg/m²     Constitutional: Alert, Moderate distress.  HENT: No signs of trauma, Bilateral external ears normal, Nose normal.   Eyes: Pupils are equal and reactive, Conjunctiva normal, Non-icteric.   Neck: Normal range of motion, No tenderness, Supple, No stridor.   Lymphatic: No lymphadenopathy noted.   Cardiovascular: Regular rate and rhythm, no murmurs.   Thorax & Lungs: Normal breath sounds, No respiratory distress, No wheezing, No chest tenderness.   Abdomen: Bowel sounds normal, Soft, Left upper quadrant, left lower quadrant, and right lower quadrant tenderness to palpation, No masses, No pulsatile masses. No peritoneal signs.  Skin: Warm, Dry, No erythema, No rash.   Back: No bony tenderness, No CVA tenderness.   Extremities: Intact distal pulses, No edema, No tenderness, No cyanosis.  Musculoskeletal: Good range of motion in all major joints. No tenderness to palpation or major deformities noted.   Neurologic: Alert , Normal motor function, Normal sensory function, No focal deficits noted.   Psychiatric: Affect normal, Judgment normal, Mood normal.     DIAGNOSTIC STUDIES    Labs:   Labs Reviewed   CBC WITH DIFFERENTIAL - Abnormal; Notable for " the following components:       Result Value    MPV 8.8 (*)     All other components within normal limits   COMP METABOLIC PANEL - Abnormal; Notable for the following components:    Glucose 101 (*)     All other components within normal limits   LIPASE   URINALYSIS,CULTURE IF INDICATED    Narrative:     Indication for culture:->Patient WITHOUT an indwelling Henao  catheter in place with new onset of Dysuria, Frequency,  Urgency, and/or Suprapubic pain   CORRECTED CALCIUM   ESTIMATED GFR     Radiology:   CT-ABDOMEN-PELVIS WITH   Final Result      1.  No acute abnormality in the abdomen or pelvis.   2.  Cholelithiasis.   3.  Colonic diverticulosis.      I independently interpreted the CT abd/pelv and saw cholelithiasis and no obvious free air in the abdomen.    COURSE & MEDICAL DECISION MAKING     ED Observation Status?  Yes patient meets criteria for observation. 650pm    6:49 PM - Patient was seen and evaluated at bedside. Ordered Lipase, CMP, CBC with diff, UA, and CT-Abdomen-Pelvis With to evaluate. The patient will be treated with Morphine 4 mg and Zofran 4 mg for her symptoms. Discussed utilizing labs and imaging to further evaluate the patient, she is amenable to the plan of care.     8:15 PM - Patient was reevaluated at bedside, she reports that her abdominal pain has markedly improved following the administration of medications. UA results are still pending.    8:38 PM - Patient was reevaluated at bedside. Explained lab and radiology results with the patient and informed them that there is no acute abnormality in the abdomen or pelvis. There is evidence of cholelithiasis and colonic diverticulosis. Labs are grossly reassuring without any evidence of a UTI. I will include a referral to Surgery for further evaluation of CT findings. Discussed plan for discharge; I advised the patient to follow-up with Dr. Wilkins (Surgery) in 1 week and her PCP in 2 days for a recheck, and to return to the [facility] ED with any new  or worsening symptoms. Patient was given the opportunity for questions. I addressed all questions or concerns at this time and they verbalize agreement to the plan of care.     INITIAL ASSESSMENT AND PLAN  Care Narrative:  Patient was found to have gallstones on CT.  The patient has no infection in the urine.  The patient has no electrolyte derangements and LFTs are normal.  The patient CBC shows no leukocytosis.  I was initially concerned that the patient may have small bowel obstruction versus pancreatitis versus pyelonephritis or intra-abdominal abscess.  The patient was observed for a extended period of time in emergency department and had multiple reexaminations.  The patient did meet criteria for observation and was ultimately safe for discharge home with strict return precautions and follow-up with surgery given the gallstones.    ADDITIONAL PROBLEM LIST AND DISPOSITION  Patient was discharged home with strict return precautions and follow-up.    Discussion of management with other QHP or appropriate source(s): Select: None     Escalation of care considered, and ultimately not performed: IV fluids and acute inpatient care management, however at this time, the patient is most appropriate for outpatient management.      Decision tools and prescription drugs considered including, but not limited to: Patient was recommended to have a bland diet and given follow-up.    Patient will be discharged home in stable condition.    FOLLOW UP:  Glenda Skinner, EMILY.P.R.N.  [address]  [place] [address]  [phone]    In 2 days      Brett Wilkins M.D.  [address] [facility] [address]  [place] [address]  [phone]    In 1 week      FINAL DIAGNOSIS  1. Gall stones    2. Generalized abdominal pain            Sukumar MICHAEL (Brunilda), am scribing for, and in the presence of, Alvin Capps M.D..    Electronically signed by: Sukumar Burrell (Brunilda), 1/5/2023    Alvin MICHAEL M.D. personally performed the  services described in this documentation, as scribed by Sukumar Burrell in my presence, and it is both accurate and complete.    The note accurately reflects work and decisions made by me.  Alvin Capps M.D.  1/5/2023  11:49 PM

## 2023-01-06 NOTE — ED NOTES
Discharged in stable condition, alert and oriented, ambulatory and accompanied by . Follow up instruction given to her PCP and Surgeon. Pt verbalized understanding the information given .

## 2023-01-11 ENCOUNTER — PRE-ADMISSION TESTING (OUTPATIENT)
Dept: ADMISSIONS | Facility: MEDICAL CENTER | Age: 53
End: 2023-01-11
Attending: SURGERY
Payer: MEDICARE

## 2023-01-11 ASSESSMENT — FIBROSIS 4 INDEX: FIB4 SCORE: 0.91

## 2023-01-11 NOTE — PREPROCEDURE INSTRUCTIONS
"Pre admit apt: Pt. Instructed to continue regularly prescribed medications through day before surgery.  Instructed to take the following medications, the day of surgery, with a sip of water per anesthesia protocol: tylenol, klonopin, zofran, geodon.    METS greater than 4  Pt reports no previous issues with anesthesia    Pt reports she was at [facility] in September of 2022, until October of 2022.  She states she had a \"psychotic break\".  Pt reports she is feeling much better, and Dr. Guzman is aware of this situation.  Instructed pt to contact Dr. Guzman if anything changes if her mood, and also instructed to call Dr. Guzman if she becomes ill or develops any covid symptoms, prior to surgery.  "

## 2023-01-13 ENCOUNTER — ANESTHESIA EVENT (OUTPATIENT)
Dept: SURGERY | Facility: MEDICAL CENTER | Age: 53
End: 2023-01-13
Payer: MEDICARE

## 2023-01-13 ENCOUNTER — ANESTHESIA (OUTPATIENT)
Dept: SURGERY | Facility: MEDICAL CENTER | Age: 53
End: 2023-01-13
Payer: MEDICARE

## 2023-01-13 ENCOUNTER — HOSPITAL ENCOUNTER (OUTPATIENT)
Facility: MEDICAL CENTER | Age: 53
End: 2023-01-13
Attending: SURGERY | Admitting: SURGERY
Payer: MEDICARE

## 2023-01-13 VITALS
HEART RATE: 67 BPM | RESPIRATION RATE: 16 BRPM | WEIGHT: 177.69 LBS | HEIGHT: 62 IN | DIASTOLIC BLOOD PRESSURE: 91 MMHG | BODY MASS INDEX: 32.7 KG/M2 | SYSTOLIC BLOOD PRESSURE: 143 MMHG | OXYGEN SATURATION: 94 % | TEMPERATURE: 97 F

## 2023-01-13 DIAGNOSIS — Z98.890 POST-OPERATIVE NAUSEA AND VOMITING: ICD-10-CM

## 2023-01-13 DIAGNOSIS — R11.2 POST-OPERATIVE NAUSEA AND VOMITING: ICD-10-CM

## 2023-01-13 DIAGNOSIS — G89.18 ACUTE POST-OPERATIVE PAIN: ICD-10-CM

## 2023-01-13 LAB — PATHOLOGY CONSULT NOTE: NORMAL

## 2023-01-13 PROCEDURE — 700101 HCHG RX REV CODE 250: Performed by: SURGERY

## 2023-01-13 PROCEDURE — 160042 HCHG SURGERY MINUTES - EA ADDL 1 MIN LEVEL 5: Performed by: SURGERY

## 2023-01-13 PROCEDURE — 88304 TISSUE EXAM BY PATHOLOGIST: CPT

## 2023-01-13 PROCEDURE — 700111 HCHG RX REV CODE 636 W/ 250 OVERRIDE (IP): Performed by: ANESTHESIOLOGY

## 2023-01-13 PROCEDURE — 160031 HCHG SURGERY MINUTES - 1ST 30 MINS LEVEL 5: Performed by: SURGERY

## 2023-01-13 PROCEDURE — 700101 HCHG RX REV CODE 250: Performed by: ANESTHESIOLOGY

## 2023-01-13 PROCEDURE — 160002 HCHG RECOVERY MINUTES (STAT): Performed by: SURGERY

## 2023-01-13 PROCEDURE — 700102 HCHG RX REV CODE 250 W/ 637 OVERRIDE(OP): Performed by: ANESTHESIOLOGY

## 2023-01-13 PROCEDURE — 00790 ANES IPER UPR ABD NOS: CPT | Performed by: ANESTHESIOLOGY

## 2023-01-13 PROCEDURE — 160046 HCHG PACU - 1ST 60 MINS PHASE II: Performed by: SURGERY

## 2023-01-13 PROCEDURE — A9270 NON-COVERED ITEM OR SERVICE: HCPCS | Performed by: ANESTHESIOLOGY

## 2023-01-13 PROCEDURE — 700104 HCHG RX REV CODE 254: Performed by: SURGERY

## 2023-01-13 PROCEDURE — 160048 HCHG OR STATISTICAL LEVEL 1-5: Performed by: SURGERY

## 2023-01-13 PROCEDURE — 700105 HCHG RX REV CODE 258: Performed by: SURGERY

## 2023-01-13 PROCEDURE — 502714 HCHG ROBOTIC SURGERY SERVICES: Performed by: SURGERY

## 2023-01-13 PROCEDURE — 160035 HCHG PACU - 1ST 60 MINS PHASE I: Performed by: SURGERY

## 2023-01-13 PROCEDURE — 160009 HCHG ANES TIME/MIN: Performed by: SURGERY

## 2023-01-13 PROCEDURE — 160025 RECOVERY II MINUTES (STATS): Performed by: SURGERY

## 2023-01-13 RX ORDER — SODIUM CHLORIDE, SODIUM LACTATE, POTASSIUM CHLORIDE, CALCIUM CHLORIDE 600; 310; 30; 20 MG/100ML; MG/100ML; MG/100ML; MG/100ML
INJECTION, SOLUTION INTRAVENOUS CONTINUOUS
Status: DISCONTINUED | OUTPATIENT
Start: 2023-01-13 | End: 2023-01-13 | Stop reason: HOSPADM

## 2023-01-13 RX ORDER — ONDANSETRON 2 MG/ML
INJECTION INTRAMUSCULAR; INTRAVENOUS PRN
Status: DISCONTINUED | OUTPATIENT
Start: 2023-01-13 | End: 2023-01-13 | Stop reason: SURG

## 2023-01-13 RX ORDER — BUPIVACAINE HYDROCHLORIDE AND EPINEPHRINE 5; 5 MG/ML; UG/ML
INJECTION, SOLUTION EPIDURAL; INTRACAUDAL; PERINEURAL
Status: DISCONTINUED | OUTPATIENT
Start: 2023-01-13 | End: 2023-01-13 | Stop reason: HOSPADM

## 2023-01-13 RX ORDER — OXYCODONE HCL 5 MG/5 ML
10 SOLUTION, ORAL ORAL
Status: COMPLETED | OUTPATIENT
Start: 2023-01-13 | End: 2023-01-13

## 2023-01-13 RX ORDER — MIDAZOLAM HYDROCHLORIDE 1 MG/ML
1 INJECTION INTRAMUSCULAR; INTRAVENOUS
Status: DISCONTINUED | OUTPATIENT
Start: 2023-01-13 | End: 2023-01-13 | Stop reason: HOSPADM

## 2023-01-13 RX ORDER — LABETALOL HYDROCHLORIDE 5 MG/ML
INJECTION, SOLUTION INTRAVENOUS PRN
Status: DISCONTINUED | OUTPATIENT
Start: 2023-01-13 | End: 2023-01-13 | Stop reason: SURG

## 2023-01-13 RX ORDER — INDOCYANINE GREEN AND WATER 25 MG
2 KIT INJECTION ONCE
Status: COMPLETED | OUTPATIENT
Start: 2023-01-13 | End: 2023-01-13

## 2023-01-13 RX ORDER — ROCURONIUM BROMIDE 10 MG/ML
INJECTION, SOLUTION INTRAVENOUS PRN
Status: DISCONTINUED | OUTPATIENT
Start: 2023-01-13 | End: 2023-01-13 | Stop reason: SURG

## 2023-01-13 RX ORDER — HYDROMORPHONE HYDROCHLORIDE 2 MG/ML
INJECTION, SOLUTION INTRAMUSCULAR; INTRAVENOUS; SUBCUTANEOUS PRN
Status: DISCONTINUED | OUTPATIENT
Start: 2023-01-13 | End: 2023-01-13 | Stop reason: SURG

## 2023-01-13 RX ORDER — NEOSTIGMINE METHYLSULFATE 1 MG/ML
INJECTION, SOLUTION INTRAVENOUS PRN
Status: DISCONTINUED | OUTPATIENT
Start: 2023-01-13 | End: 2023-01-13 | Stop reason: SURG

## 2023-01-13 RX ORDER — HYDROMORPHONE HYDROCHLORIDE 1 MG/ML
0.1 INJECTION, SOLUTION INTRAMUSCULAR; INTRAVENOUS; SUBCUTANEOUS
Status: DISCONTINUED | OUTPATIENT
Start: 2023-01-13 | End: 2023-01-13 | Stop reason: HOSPADM

## 2023-01-13 RX ORDER — HYDROMORPHONE HYDROCHLORIDE 1 MG/ML
0.2 INJECTION, SOLUTION INTRAMUSCULAR; INTRAVENOUS; SUBCUTANEOUS
Status: DISCONTINUED | OUTPATIENT
Start: 2023-01-13 | End: 2023-01-13 | Stop reason: HOSPADM

## 2023-01-13 RX ORDER — ACETAMINOPHEN 500 MG
1000 TABLET ORAL ONCE
Status: COMPLETED | OUTPATIENT
Start: 2023-01-13 | End: 2023-01-13

## 2023-01-13 RX ORDER — DIPHENHYDRAMINE HYDROCHLORIDE 50 MG/ML
12.5 INJECTION INTRAMUSCULAR; INTRAVENOUS
Status: DISCONTINUED | OUTPATIENT
Start: 2023-01-13 | End: 2023-01-13 | Stop reason: HOSPADM

## 2023-01-13 RX ORDER — CEFAZOLIN SODIUM 1 G/3ML
INJECTION, POWDER, FOR SOLUTION INTRAMUSCULAR; INTRAVENOUS PRN
Status: DISCONTINUED | OUTPATIENT
Start: 2023-01-13 | End: 2023-01-13 | Stop reason: SURG

## 2023-01-13 RX ORDER — DEXAMETHASONE SODIUM PHOSPHATE 4 MG/ML
INJECTION, SOLUTION INTRA-ARTICULAR; INTRALESIONAL; INTRAMUSCULAR; INTRAVENOUS; SOFT TISSUE PRN
Status: DISCONTINUED | OUTPATIENT
Start: 2023-01-13 | End: 2023-01-13 | Stop reason: SURG

## 2023-01-13 RX ORDER — HALOPERIDOL 5 MG/ML
1 INJECTION INTRAMUSCULAR
Status: DISCONTINUED | OUTPATIENT
Start: 2023-01-13 | End: 2023-01-13 | Stop reason: HOSPADM

## 2023-01-13 RX ORDER — HYDROMORPHONE HYDROCHLORIDE 1 MG/ML
0.4 INJECTION, SOLUTION INTRAMUSCULAR; INTRAVENOUS; SUBCUTANEOUS
Status: DISCONTINUED | OUTPATIENT
Start: 2023-01-13 | End: 2023-01-13 | Stop reason: HOSPADM

## 2023-01-13 RX ORDER — GLYCOPYRROLATE 0.2 MG/ML
INJECTION INTRAMUSCULAR; INTRAVENOUS PRN
Status: DISCONTINUED | OUTPATIENT
Start: 2023-01-13 | End: 2023-01-13 | Stop reason: SURG

## 2023-01-13 RX ORDER — CELECOXIB 200 MG/1
200 CAPSULE ORAL ONCE
Status: COMPLETED | OUTPATIENT
Start: 2023-01-13 | End: 2023-01-13

## 2023-01-13 RX ORDER — OXYCODONE HCL 5 MG/5 ML
5 SOLUTION, ORAL ORAL
Status: COMPLETED | OUTPATIENT
Start: 2023-01-13 | End: 2023-01-13

## 2023-01-13 RX ORDER — ONDANSETRON 2 MG/ML
4 INJECTION INTRAMUSCULAR; INTRAVENOUS
Status: DISCONTINUED | OUTPATIENT
Start: 2023-01-13 | End: 2023-01-13 | Stop reason: HOSPADM

## 2023-01-13 RX ORDER — HYDROCODONE BITARTRATE AND ACETAMINOPHEN 5; 325 MG/1; MG/1
1 TABLET ORAL EVERY 4 HOURS PRN
Qty: 20 TABLET | Refills: 0 | Status: SHIPPED | OUTPATIENT
Start: 2023-01-13 | End: 2023-01-17

## 2023-01-13 RX ORDER — MEPERIDINE HYDROCHLORIDE 25 MG/ML
6.25 INJECTION INTRAMUSCULAR; INTRAVENOUS; SUBCUTANEOUS
Status: DISCONTINUED | OUTPATIENT
Start: 2023-01-13 | End: 2023-01-13 | Stop reason: HOSPADM

## 2023-01-13 RX ORDER — ONDANSETRON 4 MG/1
4 TABLET, ORALLY DISINTEGRATING ORAL EVERY 6 HOURS PRN
Qty: 20 TABLET | Refills: 1 | Status: SHIPPED | OUTPATIENT
Start: 2023-01-13 | End: 2023-01-27

## 2023-01-13 RX ADMIN — ONDANSETRON 4 MG: 2 INJECTION INTRAMUSCULAR; INTRAVENOUS at 10:27

## 2023-01-13 RX ADMIN — CEFAZOLIN 2 G: 330 INJECTION, POWDER, FOR SOLUTION INTRAMUSCULAR; INTRAVENOUS at 10:16

## 2023-01-13 RX ADMIN — LABETALOL HYDROCHLORIDE 10 MG: 5 INJECTION, SOLUTION INTRAVENOUS at 10:39

## 2023-01-13 RX ADMIN — ROCURONIUM BROMIDE 40 MG: 10 INJECTION, SOLUTION INTRAVENOUS at 10:07

## 2023-01-13 RX ADMIN — NEOSTIGMINE METHYLSULFATE 5 MG: 1 INJECTION INTRAVENOUS at 10:52

## 2023-01-13 RX ADMIN — INDOCYANINE GREEN AND WATER 2 MG: KIT at 10:15

## 2023-01-13 RX ADMIN — OXYCODONE HYDROCHLORIDE 10 MG: 5 SOLUTION ORAL at 11:09

## 2023-01-13 RX ADMIN — GLYCOPYRROLATE 1 MG: 0.2 INJECTION INTRAMUSCULAR; INTRAVENOUS at 10:52

## 2023-01-13 RX ADMIN — MIDAZOLAM 2 MG: 1 INJECTION INTRAMUSCULAR; INTRAVENOUS at 10:04

## 2023-01-13 RX ADMIN — HYDROMORPHONE HYDROCHLORIDE 2 MG: 2 INJECTION INTRAMUSCULAR; INTRAVENOUS; SUBCUTANEOUS at 10:07

## 2023-01-13 RX ADMIN — CELECOXIB 200 MG: 200 CAPSULE ORAL at 09:02

## 2023-01-13 RX ADMIN — ACETAMINOPHEN 500 MG: 500 TABLET ORAL at 09:01

## 2023-01-13 RX ADMIN — PROPOFOL 150 MG: 10 INJECTION, EMULSION INTRAVENOUS at 10:07

## 2023-01-13 RX ADMIN — SODIUM CHLORIDE, POTASSIUM CHLORIDE, SODIUM LACTATE AND CALCIUM CHLORIDE: 600; 310; 30; 20 INJECTION, SOLUTION INTRAVENOUS at 09:08

## 2023-01-13 RX ADMIN — FENTANYL CITRATE 50 MCG: 50 INJECTION, SOLUTION INTRAMUSCULAR; INTRAVENOUS at 11:09

## 2023-01-13 RX ADMIN — DEXAMETHASONE SODIUM PHOSPHATE 4 MG: 4 INJECTION, SOLUTION INTRA-ARTICULAR; INTRALESIONAL; INTRAMUSCULAR; INTRAVENOUS; SOFT TISSUE at 10:27

## 2023-01-13 ASSESSMENT — PAIN SCALES - GENERAL: PAIN_LEVEL: 1

## 2023-01-13 ASSESSMENT — PAIN DESCRIPTION - PAIN TYPE: TYPE: SURGICAL PAIN

## 2023-01-13 ASSESSMENT — FIBROSIS 4 INDEX: FIB4 SCORE: 0.91

## 2023-01-13 NOTE — ANESTHESIA POSTPROCEDURE EVALUATION
Patient: Rianna Solis    Procedure Summary     Date: 01/13/23 Room / Location:  OR  / SURGERY [facility]    Anesthesia Start: 1004 Anesthesia Stop: 1102    Procedure: ROBOTIC CHOLECYSTECTOMY (Abdomen) Diagnosis: (CHRONIC CHOLECYSTITIS)    Surgeons: Kayce Guzman M.D. Responsible Provider: Prudencio Mccord M.D.    Anesthesia Type: general ASA Status: 2          Final Anesthesia Type: general  Last vitals  BP   Blood Pressure: 114/74    Temp   36 °C (96.8 °F)    Pulse   71   Resp   18    SpO2   98 %      Anesthesia Post Evaluation    Patient location during evaluation: PACU  Patient participation: complete - patient participated  Level of consciousness: awake and alert  Pain score: 1    Airway patency: patent  Anesthetic complications: no  Cardiovascular status: hemodynamically stable  Respiratory status: acceptable  Hydration status: euvolemic    PONV: none          No notable events documented.     Nurse Pain Score: 2 (NPRS)

## 2023-01-13 NOTE — ANESTHESIA PROCEDURE NOTES
Airway    Date/Time: 1/13/2023 10:09 AM  Performed by: Prudencio Mccord M.D.  Authorized by: Prudencio Mccord M.D.     Location:  OR  Urgency:  Elective  Indications for Airway Management:  Anesthesia      Spontaneous Ventilation: absent    Sedation Level:  Deep  Preoxygenated: Yes    Patient Position:  Sniffing  Mask Difficulty Assessment:  1 - vent by mask  Final Airway Type:  Endotracheal airway  Final Endotracheal Airway:  ETT  Cuffed: Yes    Technique Used for Successful ETT Placement:  Direct laryngoscopy    Insertion Site:  Oral  Blade Type:  Mary  Laryngoscope Blade/Videolaryngoscope Blade Size:  3  ETT Size (mm):  6.5  Measured from:  Teeth  ETT to Teeth (cm):  24  Placement Verified by: auscultation and capnometry    Cormack-Lehane Classification:  Grade I - full view of glottis  Number of Attempts at Approach:  1

## 2023-01-13 NOTE — OR NURSING
1100: To PACU from OR via gurney, awake, c/o surgical pain, respirations spontaneous and non-labored. Abdo rounded with x 4 lap sites, + bowel sounds. Reassured she will be medicated as soon as Rx approved by pharmacy.   1105: Pt tolerates sips po water, placed on own CPAP @ home settings. Plan to keep pt in PACU for full hour per [facility] protocol.   1115: Pain decreasing  1120: Dozing when not stimulated so no further analgesia given at this time.  1130: Verbalizes pain control, taking po water.  1145: CPAP use discontinued, Pt commenced po ginger ale.  1200: No change in surgical site assessment. Meets criteria to transfer to Stage 2

## 2023-01-13 NOTE — OR NURSING
1200: Pt to stage 2 from PACU. Dressed w/ help of CNA. Reports tolerable pain, denies nausea (tolerating soda and crackers). Surgical site C/D/I. Ambulated to restroom and able to void x 1 w/o issue    1205: Discharge instructions provided to pt and family, all questions answered.     1221: PIV removed, tip intact. Discharged to care of family after uneventful PACU stay via wheelchair by CNA.

## 2023-01-13 NOTE — ANESTHESIA PREPROCEDURE EVALUATION
Case: [id number] Date/Time: 01/13/23 0945    Procedure: ROBOTIC CHOLECYSTECTOMY    Pre-op diagnosis: CHRONIC CHOLECYSTITIS    Location:  OR 01 / SURGERY [facility]    Surgeons: Kayce Guzman M.D.          Relevant Problems   ANESTHESIA   (positive) HERLINDA (obstructive sleep apnea)      NEURO   (positive) Partial seizure disorder (HCC)      ENDO   (positive) Subclinical hyperthyroidism       Physical Exam    Airway   Mallampati: II  TM distance: >3 FB  Neck ROM: full       Cardiovascular - normal exam  Rhythm: regular  Rate: normal  (-) murmur     Dental - normal exam           Pulmonary - normal exam  Breath sounds clear to auscultation     Abdominal    Neurological - normal exam                 Anesthesia Plan    ASA 2       Plan - general       Airway plan will be ETT                    Informed Consent:

## 2023-01-13 NOTE — OP REPORT
DATE OF OPERATION: 1/13/2023    PREOPERATIVE DIAGNOSIS: Cholelithiasis with chronic cholecystitis  POSTOPERATIVE DIAGNOSIS:  Same    PROCEDURE: Robotic assisted cholecystectomy with indocyanine green cholangiogram    SURGEON: Kayce Land MD       ANESTHESIOLOGIST: Prudencio Mccord MD with [facility]    SPECIMENS: Gallbladder and contents.     FINDINGS: Gallbladder with stones and chronic adhesions of the omentum to the gallbladder wall    INDICATIONS: This is a 52 year old woman with significant right upper quadrant pain and gallstones.  We discussed definitive surgical therapy with risks including, but   not limited to bleeding, infection, damage to the common duct, possibly   requiring hepaticojejunostomy. Patient understood and agreed to proceed.     DESCRIPTION OF PROCEDURE: The patient was brought to the operating room. He   was placed in a comfortable supine position on the operating room table with   all appropriate points padded. General anesthesia was induced without   complication. A time-out was held and completed confirming correct patient,   correct site, correct procedure and SCDs on and functioning. He received   Ancef prior to incision. After prepping the abdomen with ChloraPrep and   draping in usual sterile fashion, 0.25% Marcaine with epinephrine was   infiltrated to the left of the umbilicus and an 8mm incision was made in this area. This   was taken down the fascia bluntly using a hemostat and a kocher was used to grasp the fascia and elevate the abdominal wall. A   Veress needle was placed into the abdomen and a saline drop test showed no   evidence of injury to bowel or vessel. I then insufflated the abdomen to a   pressure of 15 mmHg with CO2. An 8-mm trocar was placed through this   incision into the abdomen and a camera was then inserted confirming no   evidence of injury to bowel or vessel.  I then placed an 8-mm   trocar in the right mid abdomen  and right lateral abdomen and one in the  left lateral abdomen and left mid abdomen under laparoscopic vision.   I docked the robot with a and placed instruments. The fundus of the gallbladder was lifted up over the liver edge. I then grasped the   infundibulum of the gallbladder and   retracted it laterally and circumferentially dissected the cystic duct and the   cystic artery from lateral to medial using a Hook cautery. I obtained the critical view of safety and confirmed the anatomy with indocyanine green cholangiogram, visualizing the takeoff from the common hepatic and common bile ducts. I then placed   three 10-mm hemolock clips on the patient's side and one on the specimen side on the   cystic duct and divided sharply. I then placed one clip on the cystic artery   on the patient's side and one on the specimen side and divided sharply and   removed the gallbladder from the liver bed using hook electrocautery. The gallbladder was placed in an Endocatch bag and removed from the abdomen by my assistant.   The ports were removed under visualization and confirmed hemostasis.  The skin was closed using a single running 4-0   Vicryl in a subcuticular fashion. These were then dressed with dry dressings   and patient was awoken from anesthesia in good condition having tolerated the   procedure well. All counts were correct at the end of the case x2.

## 2023-01-13 NOTE — ANESTHESIA TIME REPORT
Anesthesia Start and Stop Event Times     Date Time Event    1/13/2023 0945 Ready for Procedure     1004 Anesthesia Start     1102 Anesthesia Stop        Responsible Staff  01/13/23    Name Role Begin End    Prudencio Mccord M.D. Anesth 1004 1102        Overtime Reason:  no overtime (within assigned shift)    Comments:

## 2023-01-24 ENCOUNTER — HOSPITAL ENCOUNTER (OUTPATIENT)
Dept: LAB | Facility: MEDICAL CENTER | Age: 53
End: 2023-01-24
Attending: SURGERY
Payer: MEDICARE

## 2023-01-24 ENCOUNTER — HOSPITAL ENCOUNTER (OUTPATIENT)
Dept: RADIOLOGY | Facility: MEDICAL CENTER | Age: 53
End: 2023-01-24
Attending: SURGERY
Payer: MEDICARE

## 2023-01-24 DIAGNOSIS — Z09 RADIOTHERAPY FOLLOW-UP EXAMINATION: ICD-10-CM

## 2023-01-24 LAB
ALBUMIN SERPL BCP-MCNC: 4.1 G/DL (ref 3.2–4.9)
ALBUMIN/GLOB SERPL: 1.4 G/DL
ALP SERPL-CCNC: 80 U/L (ref 30–99)
ALT SERPL-CCNC: 28 U/L (ref 2–50)
ANION GAP SERPL CALC-SCNC: 9 MMOL/L (ref 7–16)
AST SERPL-CCNC: 15 U/L (ref 12–45)
BASOPHILS # BLD AUTO: 0.9 % (ref 0–1.8)
BASOPHILS # BLD: 0.09 K/UL (ref 0–0.12)
BILIRUB SERPL-MCNC: <0.2 MG/DL (ref 0.1–1.5)
BUN SERPL-MCNC: 16 MG/DL (ref 8–22)
CALCIUM ALBUM COR SERPL-MCNC: 9.5 MG/DL (ref 8.5–10.5)
CALCIUM SERPL-MCNC: 9.6 MG/DL (ref 8.5–10.5)
CHLORIDE SERPL-SCNC: 99 MMOL/L (ref 96–112)
CO2 SERPL-SCNC: 28 MMOL/L (ref 20–33)
CREAT SERPL-MCNC: 0.72 MG/DL (ref 0.5–1.4)
EOSINOPHIL # BLD AUTO: 0.17 K/UL (ref 0–0.51)
EOSINOPHIL NFR BLD: 1.7 % (ref 0–6.9)
ERYTHROCYTE [DISTWIDTH] IN BLOOD BY AUTOMATED COUNT: 42.4 FL (ref 35.9–50)
GFR SERPLBLD CREATININE-BSD FMLA CKD-EPI: 100 ML/MIN/1.73 M 2
GLOBULIN SER CALC-MCNC: 3 G/DL (ref 1.9–3.5)
GLUCOSE SERPL-MCNC: 72 MG/DL (ref 65–99)
HCT VFR BLD AUTO: 39 % (ref 37–47)
HGB BLD-MCNC: 12.7 G/DL (ref 12–16)
IMM GRANULOCYTES # BLD AUTO: 0.07 K/UL (ref 0–0.11)
IMM GRANULOCYTES NFR BLD AUTO: 0.7 % (ref 0–0.9)
LYMPHOCYTES # BLD AUTO: 3.54 K/UL (ref 1–4.8)
LYMPHOCYTES NFR BLD: 35.2 % (ref 22–41)
MCH RBC QN AUTO: 30.4 PG (ref 27–33)
MCHC RBC AUTO-ENTMCNC: 32.6 G/DL (ref 33.6–35)
MCV RBC AUTO: 93.3 FL (ref 81.4–97.8)
MONOCYTES # BLD AUTO: 0.75 K/UL (ref 0–0.85)
MONOCYTES NFR BLD AUTO: 7.5 % (ref 0–13.4)
NEUTROPHILS # BLD AUTO: 5.43 K/UL (ref 2–7.15)
NEUTROPHILS NFR BLD: 54 % (ref 44–72)
NRBC # BLD AUTO: 0 K/UL
NRBC BLD-RTO: 0 /100 WBC
PLATELET # BLD AUTO: 412 K/UL (ref 164–446)
PMV BLD AUTO: 9.1 FL (ref 9–12.9)
POTASSIUM SERPL-SCNC: 4.3 MMOL/L (ref 3.6–5.5)
PROT SERPL-MCNC: 7.1 G/DL (ref 6–8.2)
RBC # BLD AUTO: 4.18 M/UL (ref 4.2–5.4)
SODIUM SERPL-SCNC: 136 MMOL/L (ref 135–145)
WBC # BLD AUTO: 10.1 K/UL (ref 4.8–10.8)

## 2023-01-24 PROCEDURE — 85025 COMPLETE CBC W/AUTO DIFF WBC: CPT | Mod: GA

## 2023-01-24 PROCEDURE — 74177 CT ABD & PELVIS W/CONTRAST: CPT

## 2023-01-24 PROCEDURE — 700117 HCHG RX CONTRAST REV CODE 255: Performed by: SURGERY

## 2023-01-24 PROCEDURE — 80053 COMPREHEN METABOLIC PANEL: CPT

## 2023-01-24 PROCEDURE — 36415 COLL VENOUS BLD VENIPUNCTURE: CPT | Mod: GA

## 2023-01-24 RX ADMIN — IOHEXOL 100 ML: 350 INJECTION, SOLUTION INTRAVENOUS at 17:13

## 2023-01-24 RX ADMIN — IOHEXOL 25 ML: 240 INJECTION, SOLUTION INTRATHECAL; INTRAVASCULAR; INTRAVENOUS; ORAL at 17:12

## 2023-01-25 SDOH — HEALTH STABILITY: MENTAL HEALTH
STRESS IS WHEN SOMEONE FEELS TENSE, NERVOUS, ANXIOUS, OR CAN'T SLEEP AT NIGHT BECAUSE THEIR MIND IS TROUBLED. HOW STRESSED ARE YOU?: RATHER MUCH

## 2023-01-25 SDOH — ECONOMIC STABILITY: INCOME INSECURITY: HOW HARD IS IT FOR YOU TO PAY FOR THE VERY BASICS LIKE FOOD, HOUSING, MEDICAL CARE, AND HEATING?: SOMEWHAT HARD

## 2023-01-25 SDOH — HEALTH STABILITY: PHYSICAL HEALTH: ON AVERAGE, HOW MANY DAYS PER WEEK DO YOU ENGAGE IN MODERATE TO STRENUOUS EXERCISE (LIKE A BRISK WALK)?: 3 DAYS

## 2023-01-25 SDOH — HEALTH STABILITY: PHYSICAL HEALTH: ON AVERAGE, HOW MANY MINUTES DO YOU ENGAGE IN EXERCISE AT THIS LEVEL?: 20 MIN

## 2023-01-25 SDOH — ECONOMIC STABILITY: FOOD INSECURITY: WITHIN THE PAST 12 MONTHS, YOU WORRIED THAT YOUR FOOD WOULD RUN OUT BEFORE YOU GOT MONEY TO BUY MORE.: SOMETIMES TRUE

## 2023-01-25 SDOH — ECONOMIC STABILITY: INCOME INSECURITY: IN THE LAST 12 MONTHS, WAS THERE A TIME WHEN YOU WERE NOT ABLE TO PAY THE MORTGAGE OR RENT ON TIME?: YES

## 2023-01-25 SDOH — ECONOMIC STABILITY: HOUSING INSECURITY
IN THE LAST 12 MONTHS, WAS THERE A TIME WHEN YOU DID NOT HAVE A STEADY PLACE TO SLEEP OR SLEPT IN A SHELTER (INCLUDING NOW)?: YES

## 2023-01-25 SDOH — ECONOMIC STABILITY: HOUSING INSECURITY
IN THE LAST 12 MONTHS, WAS THERE A TIME WHEN YOU DID NOT HAVE A STEADY PLACE TO SLEEP OR SLEPT IN A SHELTER (INCLUDING NOW)?: NO

## 2023-01-25 SDOH — ECONOMIC STABILITY: FOOD INSECURITY: WITHIN THE PAST 12 MONTHS, THE FOOD YOU BOUGHT JUST DIDN'T LAST AND YOU DIDN'T HAVE MONEY TO GET MORE.: NEVER TRUE

## 2023-01-25 SDOH — ECONOMIC STABILITY: HOUSING INSECURITY: IN THE LAST 12 MONTHS, HOW MANY PLACES HAVE YOU LIVED?: 1

## 2023-01-25 SDOH — ECONOMIC STABILITY: TRANSPORTATION INSECURITY
IN THE PAST 12 MONTHS, HAS THE LACK OF TRANSPORTATION KEPT YOU FROM MEDICAL APPOINTMENTS OR FROM GETTING MEDICATIONS?: NO

## 2023-01-25 SDOH — ECONOMIC STABILITY: TRANSPORTATION INSECURITY
IN THE PAST 12 MONTHS, HAS LACK OF RELIABLE TRANSPORTATION KEPT YOU FROM MEDICAL APPOINTMENTS, MEETINGS, WORK OR FROM GETTING THINGS NEEDED FOR DAILY LIVING?: NO

## 2023-01-25 SDOH — ECONOMIC STABILITY: TRANSPORTATION INSECURITY
IN THE PAST 12 MONTHS, HAS LACK OF TRANSPORTATION KEPT YOU FROM MEETINGS, WORK, OR FROM GETTING THINGS NEEDED FOR DAILY LIVING?: NO

## 2023-01-25 ASSESSMENT — LIFESTYLE VARIABLES
HOW MANY STANDARD DRINKS CONTAINING ALCOHOL DO YOU HAVE ON A TYPICAL DAY: PATIENT DOES NOT DRINK
HOW OFTEN DO YOU HAVE A DRINK CONTAINING ALCOHOL: NEVER
SKIP TO QUESTIONS 9-10: 1
HOW OFTEN DO YOU HAVE SIX OR MORE DRINKS ON ONE OCCASION: NEVER
AUDIT-C TOTAL SCORE: 0

## 2023-01-25 ASSESSMENT — SOCIAL DETERMINANTS OF HEALTH (SDOH)
HOW OFTEN DO YOU HAVE A DRINK CONTAINING ALCOHOL: NEVER
HOW OFTEN DO YOU ATTEND CHURCH OR RELIGIOUS SERVICES?: MORE THAN 4 TIMES PER YEAR
DO YOU BELONG TO ANY CLUBS OR ORGANIZATIONS SUCH AS CHURCH GROUPS UNIONS, FRATERNAL OR ATHLETIC GROUPS, OR SCHOOL GROUPS?: YES
HOW OFTEN DO YOU ATTENT MEETINGS OF THE CLUB OR ORGANIZATION YOU BELONG TO?: MORE THAN 4 TIMES PER YEAR
HOW OFTEN DO YOU ATTENT MEETINGS OF THE CLUB OR ORGANIZATION YOU BELONG TO?: MORE THAN 4 TIMES PER YEAR
HOW OFTEN DO YOU GET TOGETHER WITH FRIENDS OR RELATIVES?: ONCE A WEEK
HOW MANY DRINKS CONTAINING ALCOHOL DO YOU HAVE ON A TYPICAL DAY WHEN YOU ARE DRINKING: PATIENT DOES NOT DRINK
DO YOU BELONG TO ANY CLUBS OR ORGANIZATIONS SUCH AS CHURCH GROUPS UNIONS, FRATERNAL OR ATHLETIC GROUPS, OR SCHOOL GROUPS?: YES
HOW HARD IS IT FOR YOU TO PAY FOR THE VERY BASICS LIKE FOOD, HOUSING, MEDICAL CARE, AND HEATING?: SOMEWHAT HARD
IN A TYPICAL WEEK, HOW MANY TIMES DO YOU TALK ON THE PHONE WITH FAMILY, FRIENDS, OR NEIGHBORS?: MORE THAN THREE TIMES A WEEK
HOW OFTEN DO YOU HAVE SIX OR MORE DRINKS ON ONE OCCASION: NEVER
WITHIN THE PAST 12 MONTHS, YOU WORRIED THAT YOUR FOOD WOULD RUN OUT BEFORE YOU GOT THE MONEY TO BUY MORE: SOMETIMES TRUE
HOW OFTEN DO YOU ATTEND CHURCH OR RELIGIOUS SERVICES?: MORE THAN 4 TIMES PER YEAR
HOW OFTEN DO YOU GET TOGETHER WITH FRIENDS OR RELATIVES?: ONCE A WEEK
IN A TYPICAL WEEK, HOW MANY TIMES DO YOU TALK ON THE PHONE WITH FAMILY, FRIENDS, OR NEIGHBORS?: MORE THAN THREE TIMES A WEEK

## 2023-01-27 ENCOUNTER — OFFICE VISIT (OUTPATIENT)
Dept: SPORTS MEDICINE | Facility: CLINIC | Age: 53
End: 2023-01-27
Payer: MEDICARE

## 2023-01-27 ENCOUNTER — HOSPITAL ENCOUNTER (INPATIENT)
Facility: MEDICAL CENTER | Age: 53
LOS: 1 days | DRG: 391 | End: 2023-01-28
Attending: EMERGENCY MEDICINE | Admitting: HOSPITALIST
Payer: MEDICARE

## 2023-01-27 VITALS
DIASTOLIC BLOOD PRESSURE: 80 MMHG | RESPIRATION RATE: 16 BRPM | HEART RATE: 88 BPM | TEMPERATURE: 97.2 F | HEIGHT: 62 IN | OXYGEN SATURATION: 98 % | BODY MASS INDEX: 32.7 KG/M2 | WEIGHT: 177.69 LBS | SYSTOLIC BLOOD PRESSURE: 118 MMHG

## 2023-01-27 DIAGNOSIS — S80.01XA HEMATOMA OF RIGHT KNEE REGION: ICD-10-CM

## 2023-01-27 DIAGNOSIS — G57.81 SAPHENOUS NERVE NEUROPATHY, RIGHT: ICD-10-CM

## 2023-01-27 DIAGNOSIS — K57.92 DIVERTICULITIS: ICD-10-CM
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
## 2023-01-27 PROBLEM — M25.561 RIGHT KNEE PAIN: Status: ACTIVE | Noted: 2023-01-27

## 2023-01-27 PROBLEM — F17.200 TOBACCO USE DISORDER: Status: ACTIVE | Noted: 2023-01-27

## 2023-01-27 LAB
ALBUMIN SERPL BCP-MCNC: 4 G/DL (ref 3.2–4.9)
ALBUMIN/GLOB SERPL: 1.3 G/DL
ALP SERPL-CCNC: 82 U/L (ref 30–99)
ALT SERPL-CCNC: 22 U/L (ref 2–50)
ANION GAP SERPL CALC-SCNC: 13 MMOL/L (ref 7–16)
AST SERPL-CCNC: 19 U/L (ref 12–45)
BASOPHILS # BLD AUTO: 0.4 % (ref 0–1.8)
BASOPHILS # BLD: 0.07 K/UL (ref 0–0.12)
BILIRUB SERPL-MCNC: 0.3 MG/DL (ref 0.1–1.5)
BUN SERPL-MCNC: 15 MG/DL (ref 8–22)
CALCIUM ALBUM COR SERPL-MCNC: 9 MG/DL (ref 8.5–10.5)
CALCIUM SERPL-MCNC: 9 MG/DL (ref 8.4–10.2)
CHLORIDE SERPL-SCNC: 100 MMOL/L (ref 96–112)
CO2 SERPL-SCNC: 21 MMOL/L (ref 20–33)
CREAT SERPL-MCNC: 0.58 MG/DL (ref 0.5–1.4)
EOSINOPHIL # BLD AUTO: 0.2 K/UL (ref 0–0.51)
EOSINOPHIL NFR BLD: 1.2 % (ref 0–6.9)
ERYTHROCYTE [DISTWIDTH] IN BLOOD BY AUTOMATED COUNT: 40 FL (ref 35.9–50)
GFR SERPLBLD CREATININE-BSD FMLA CKD-EPI: 108 ML/MIN/1.73 M 2
GLOBULIN SER CALC-MCNC: 3.1 G/DL (ref 1.9–3.5)
GLUCOSE SERPL-MCNC: 89 MG/DL (ref 65–99)
HCT VFR BLD AUTO: 36.2 % (ref 37–47)
HGB BLD-MCNC: 12.3 G/DL (ref 12–16)
IMM GRANULOCYTES # BLD AUTO: 0.08 K/UL (ref 0–0.11)
IMM GRANULOCYTES NFR BLD AUTO: 0.5 % (ref 0–0.9)
LACTATE SERPL-SCNC: 0.8 MMOL/L (ref 0.5–2)
LYMPHOCYTES # BLD AUTO: 3.19 K/UL (ref 1–4.8)
LYMPHOCYTES NFR BLD: 18.5 % (ref 22–41)
MCH RBC QN AUTO: 30.3 PG (ref 27–33)
MCHC RBC AUTO-ENTMCNC: 34 G/DL (ref 33.6–35)
MCV RBC AUTO: 89.2 FL (ref 81.4–97.8)
MONOCYTES # BLD AUTO: 1.23 K/UL (ref 0–0.85)
MONOCYTES NFR BLD AUTO: 7.1 % (ref 0–13.4)
NEUTROPHILS # BLD AUTO: 12.51 K/UL (ref 2–7.15)
NEUTROPHILS NFR BLD: 72.3 % (ref 44–72)
NRBC # BLD AUTO: 0 K/UL
NRBC BLD-RTO: 0 /100 WBC
PLATELET # BLD AUTO: 372 K/UL (ref 164–446)
PMV BLD AUTO: 8.5 FL (ref 9–12.9)
POTASSIUM SERPL-SCNC: 3.9 MMOL/L (ref 3.6–5.5)
PROT SERPL-MCNC: 7.1 G/DL (ref 6–8.2)
RBC # BLD AUTO: 4.06 M/UL (ref 4.2–5.4)
SODIUM SERPL-SCNC: 134 MMOL/L (ref 135–145)
WBC # BLD AUTO: 17.3 K/UL (ref 4.8–10.8)

## 2023-01-27 PROCEDURE — 700105 HCHG RX REV CODE 258: Performed by: HOSPITALIST

## 2023-01-27 PROCEDURE — 96365 THER/PROPH/DIAG IV INF INIT: CPT

## 2023-01-27 PROCEDURE — 700101 HCHG RX REV CODE 250: Performed by: EMERGENCY MEDICINE

## 2023-01-27 PROCEDURE — A9270 NON-COVERED ITEM OR SERVICE: HCPCS | Performed by: HOSPITALIST

## 2023-01-27 PROCEDURE — 700105 HCHG RX REV CODE 258: Performed by: EMERGENCY MEDICINE

## 2023-01-27 PROCEDURE — 94760 N-INVAS EAR/PLS OXIMETRY 1: CPT

## 2023-01-27 PROCEDURE — 99222 1ST HOSP IP/OBS MODERATE 55: CPT | Mod: 25 | Performed by: HOSPITALIST

## 2023-01-27 PROCEDURE — 87040 BLOOD CULTURE FOR BACTERIA: CPT

## 2023-01-27 PROCEDURE — 80053 COMPREHEN METABOLIC PANEL: CPT

## 2023-01-27 PROCEDURE — 99407 BEHAV CHNG SMOKING > 10 MIN: CPT | Performed by: HOSPITALIST

## 2023-01-27 PROCEDURE — 83605 ASSAY OF LACTIC ACID: CPT

## 2023-01-27 PROCEDURE — 700111 HCHG RX REV CODE 636 W/ 250 OVERRIDE (IP): Performed by: EMERGENCY MEDICINE

## 2023-01-27 PROCEDURE — 700111 HCHG RX REV CODE 636 W/ 250 OVERRIDE (IP): Performed by: HOSPITALIST

## 2023-01-27 PROCEDURE — 770001 HCHG ROOM/CARE - MED/SURG/GYN PRIV*

## 2023-01-27 PROCEDURE — 36415 COLL VENOUS BLD VENIPUNCTURE: CPT

## 2023-01-27 PROCEDURE — 99285 EMERGENCY DEPT VISIT HI MDM: CPT

## 2023-01-27 PROCEDURE — 96375 TX/PRO/DX INJ NEW DRUG ADDON: CPT

## 2023-01-27 PROCEDURE — 99213 OFFICE O/P EST LOW 20 MIN: CPT | Performed by: FAMILY MEDICINE

## 2023-01-27 PROCEDURE — 85025 COMPLETE CBC W/AUTO DIFF WBC: CPT

## 2023-01-27 PROCEDURE — 700102 HCHG RX REV CODE 250 W/ 637 OVERRIDE(OP): Performed by: HOSPITALIST

## 2023-01-27 RX ORDER — HYDROMORPHONE HYDROCHLORIDE 1 MG/ML
1 INJECTION, SOLUTION INTRAMUSCULAR; INTRAVENOUS; SUBCUTANEOUS EVERY 4 HOURS PRN
Status: DISCONTINUED | OUTPATIENT
Start: 2023-01-27 | End: 2023-01-28

## 2023-01-27 RX ORDER — PROMETHAZINE HYDROCHLORIDE 25 MG/1
12.5-25 TABLET ORAL EVERY 4 HOURS PRN
Status: DISCONTINUED | OUTPATIENT
Start: 2023-01-27 | End: 2023-01-28 | Stop reason: HOSPADM

## 2023-01-27 RX ORDER — ONDANSETRON 2 MG/ML
4 INJECTION INTRAMUSCULAR; INTRAVENOUS ONCE
Status: COMPLETED | OUTPATIENT
Start: 2023-01-27 | End: 2023-01-27

## 2023-01-27 RX ORDER — ACETAMINOPHEN 500 MG
1000 TABLET ORAL EVERY 6 HOURS PRN
COMMUNITY
End: 2023-08-08

## 2023-01-27 RX ORDER — MORPHINE SULFATE 4 MG/ML
4 INJECTION INTRAVENOUS ONCE
Status: COMPLETED | OUTPATIENT
Start: 2023-01-27 | End: 2023-01-27

## 2023-01-27 RX ORDER — AMOXICILLIN AND CLAVULANATE POTASSIUM 875; 125 MG/1; MG/1
1 TABLET, FILM COATED ORAL 2 TIMES DAILY
Status: ON HOLD | COMMUNITY
Start: 2023-01-29 | End: 2023-02-03

## 2023-01-27 RX ORDER — CALCIUM CARBONATE-CHOLECALCIFEROL TAB 250 MG-125 UNIT 250-125 MG-UNIT
2 TAB ORAL DAILY
Status: DISCONTINUED | OUTPATIENT
Start: 2023-01-28 | End: 2023-01-28 | Stop reason: HOSPADM

## 2023-01-27 RX ORDER — TRAMADOL HYDROCHLORIDE 50 MG/1
50 TABLET ORAL 3 TIMES DAILY PRN
COMMUNITY
Start: 2023-01-26 | End: 2023-01-31

## 2023-01-27 RX ORDER — PROCHLORPERAZINE 25 MG/1
25 SUPPOSITORY RECTAL 2 TIMES DAILY PRN
COMMUNITY
Start: 2023-01-24 | End: 2023-02-07

## 2023-01-27 RX ORDER — ACETAMINOPHEN 325 MG/1
650 TABLET ORAL EVERY 6 HOURS PRN
Status: DISCONTINUED | OUTPATIENT
Start: 2023-01-27 | End: 2023-01-28 | Stop reason: HOSPADM

## 2023-01-27 RX ORDER — CEFTRIAXONE 1 G/1
1000 INJECTION, POWDER, FOR SOLUTION INTRAMUSCULAR; INTRAVENOUS ONCE
Status: COMPLETED | OUTPATIENT
Start: 2023-01-27 | End: 2023-01-27

## 2023-01-27 RX ORDER — ZIPRASIDONE HYDROCHLORIDE 20 MG/1
80 CAPSULE ORAL 2 TIMES DAILY
Status: DISCONTINUED | OUTPATIENT
Start: 2023-01-27 | End: 2023-01-28 | Stop reason: HOSPADM

## 2023-01-27 RX ORDER — IBUPROFEN 800 MG/1
800 TABLET ORAL EVERY 6 HOURS PRN
COMMUNITY
End: 2023-08-08 | Stop reason: SDUPTHER

## 2023-01-27 RX ORDER — CLONAZEPAM 0.5 MG/1
1 TABLET ORAL 3 TIMES DAILY PRN
Status: DISCONTINUED | OUTPATIENT
Start: 2023-01-27 | End: 2023-01-28 | Stop reason: HOSPADM

## 2023-01-27 RX ORDER — SODIUM CHLORIDE 9 MG/ML
1000 INJECTION, SOLUTION INTRAVENOUS ONCE
Status: COMPLETED | OUTPATIENT
Start: 2023-01-27 | End: 2023-01-27

## 2023-01-27 RX ORDER — NICOTINE 21 MG/24HR
14 PATCH, TRANSDERMAL 24 HOURS TRANSDERMAL
Status: DISCONTINUED | OUTPATIENT
Start: 2023-01-28 | End: 2023-01-28 | Stop reason: HOSPADM

## 2023-01-27 RX ORDER — GABAPENTIN 300 MG/1
300 CAPSULE ORAL 3 TIMES DAILY
Status: DISCONTINUED | OUTPATIENT
Start: 2023-01-27 | End: 2023-01-28 | Stop reason: HOSPADM

## 2023-01-27 RX ORDER — LABETALOL HYDROCHLORIDE 5 MG/ML
10 INJECTION, SOLUTION INTRAVENOUS EVERY 4 HOURS PRN
Status: DISCONTINUED | OUTPATIENT
Start: 2023-01-27 | End: 2023-01-28 | Stop reason: HOSPADM

## 2023-01-27 RX ORDER — VENLAFAXINE HYDROCHLORIDE 37.5 MG/1
37.5 CAPSULE, EXTENDED RELEASE ORAL EVERY MORNING
Status: DISCONTINUED | OUTPATIENT
Start: 2023-01-28 | End: 2023-01-28 | Stop reason: HOSPADM

## 2023-01-27 RX ORDER — ONDANSETRON 4 MG/1
4 TABLET, ORALLY DISINTEGRATING ORAL EVERY 4 HOURS PRN
Status: DISCONTINUED | OUTPATIENT
Start: 2023-01-27 | End: 2023-01-28 | Stop reason: HOSPADM

## 2023-01-27 RX ORDER — HYDROMORPHONE HYDROCHLORIDE 2 MG/ML
2 INJECTION, SOLUTION INTRAMUSCULAR; INTRAVENOUS; SUBCUTANEOUS EVERY 4 HOURS PRN
Status: DISCONTINUED | OUTPATIENT
Start: 2023-01-27 | End: 2023-01-27

## 2023-01-27 RX ORDER — ONDANSETRON 2 MG/ML
4 INJECTION INTRAMUSCULAR; INTRAVENOUS EVERY 4 HOURS PRN
Status: DISCONTINUED | OUTPATIENT
Start: 2023-01-27 | End: 2023-01-28 | Stop reason: HOSPADM

## 2023-01-27 RX ORDER — METRONIDAZOLE 500 MG/100ML
500 INJECTION, SOLUTION INTRAVENOUS ONCE
Status: COMPLETED | OUTPATIENT
Start: 2023-01-27 | End: 2023-01-27

## 2023-01-27 RX ORDER — PROMETHAZINE HYDROCHLORIDE 25 MG/1
12.5-25 SUPPOSITORY RECTAL EVERY 4 HOURS PRN
Status: DISCONTINUED | OUTPATIENT
Start: 2023-01-27 | End: 2023-01-28 | Stop reason: HOSPADM

## 2023-01-27 RX ORDER — PROCHLORPERAZINE EDISYLATE 5 MG/ML
5-10 INJECTION INTRAMUSCULAR; INTRAVENOUS EVERY 4 HOURS PRN
Status: DISCONTINUED | OUTPATIENT
Start: 2023-01-27 | End: 2023-01-28 | Stop reason: HOSPADM

## 2023-01-27 RX ORDER — HYDROMORPHONE HYDROCHLORIDE 1 MG/ML
1 INJECTION, SOLUTION INTRAMUSCULAR; INTRAVENOUS; SUBCUTANEOUS ONCE
Status: COMPLETED | OUTPATIENT
Start: 2023-01-27 | End: 2023-01-27

## 2023-01-27 RX ORDER — SODIUM CHLORIDE 9 MG/ML
INJECTION, SOLUTION INTRAVENOUS CONTINUOUS
Status: DISCONTINUED | OUTPATIENT
Start: 2023-01-27 | End: 2023-01-28 | Stop reason: HOSPADM

## 2023-01-27 RX ORDER — HYDROMORPHONE HYDROCHLORIDE 2 MG/ML
1.5 INJECTION, SOLUTION INTRAMUSCULAR; INTRAVENOUS; SUBCUTANEOUS EVERY 4 HOURS PRN
Status: DISCONTINUED | OUTPATIENT
Start: 2023-01-27 | End: 2023-01-28 | Stop reason: HOSPADM

## 2023-01-27 RX ADMIN — HYDROMORPHONE HYDROCHLORIDE 1.5 MG: 2 INJECTION INTRAMUSCULAR; INTRAVENOUS; SUBCUTANEOUS at 23:06

## 2023-01-27 RX ADMIN — MORPHINE SULFATE 4 MG: 4 INJECTION INTRAVENOUS at 17:16

## 2023-01-27 RX ADMIN — GABAPENTIN 300 MG: 300 CAPSULE ORAL at 20:09

## 2023-01-27 RX ADMIN — CEFTRIAXONE SODIUM 1000 MG: 1 INJECTION, POWDER, FOR SOLUTION INTRAMUSCULAR; INTRAVENOUS at 17:16

## 2023-01-27 RX ADMIN — HYDROMORPHONE HYDROCHLORIDE 1 MG: 1 INJECTION, SOLUTION INTRAMUSCULAR; INTRAVENOUS; SUBCUTANEOUS at 18:34

## 2023-01-27 RX ADMIN — ZIPRASIDONE HYDROCHLORIDE 80 MG: 20 CAPSULE ORAL at 20:09

## 2023-01-27 RX ADMIN — SODIUM CHLORIDE: 9 INJECTION, SOLUTION INTRAVENOUS at 19:58

## 2023-01-27 RX ADMIN — METRONIDAZOLE 500 MG: 500 INJECTION, SOLUTION INTRAVENOUS at 18:34

## 2023-01-27 RX ADMIN — SODIUM CHLORIDE 1000 ML: 9 INJECTION, SOLUTION INTRAVENOUS at 17:15

## 2023-01-27 RX ADMIN — ONDANSETRON 4 MG: 2 INJECTION INTRAMUSCULAR; INTRAVENOUS at 17:16

## 2023-01-27 RX ADMIN — PIPERACILLIN AND TAZOBACTAM 3.38 G: 3; .375 INJECTION, POWDER, LYOPHILIZED, FOR SOLUTION INTRAVENOUS; PARENTERAL at 20:02

## 2023-01-27 RX ADMIN — PIPERACILLIN AND TAZOBACTAM 3.38 G: 3; .375 INJECTION, POWDER, LYOPHILIZED, FOR SOLUTION INTRAVENOUS; PARENTERAL at 22:55

## 2023-01-27 RX ADMIN — CLONAZEPAM 1 MG: 0.5 TABLET ORAL at 20:09

## 2023-01-27 ASSESSMENT — ENCOUNTER SYMPTOMS
ABDOMINAL PAIN: 1
COUGH: 0
HEMOPTYSIS: 0
RESPIRATORY NEGATIVE: 1
CHILLS: 1
DIARRHEA: 1
BRUISES/BLEEDS EASILY: 0
CONSTIPATION: 0
WHEEZING: 0
NEUROLOGICAL NEGATIVE: 1
NAUSEA: 1
BLOOD IN STOOL: 0
HEADACHES: 0
EYES NEGATIVE: 1
DEPRESSION: 0
FEVER: 0
CARDIOVASCULAR NEGATIVE: 1
FOCAL WEAKNESS: 0
PALPITATIONS: 0
DOUBLE VISION: 0
LOSS OF CONSCIOUSNESS: 0
SEIZURES: 0
NERVOUS/ANXIOUS: 0
VOMITING: 1
HEARTBURN: 0
DIZZINESS: 0
DIAPHORESIS: 0

## 2023-01-27 ASSESSMENT — LIFESTYLE VARIABLES
SUBSTANCE_ABUSE: 1
TOTAL SCORE: 0
TOTAL SCORE: 0
HAVE PEOPLE ANNOYED YOU BY CRITICIZING YOUR DRINKING: NO
TOTAL SCORE: 0
TOTAL SCORE: 0
CONSUMPTION TOTAL: INCOMPLETE
HAVE PEOPLE ANNOYED YOU BY CRITICIZING YOUR DRINKING: NO
ALCOHOL_USE: NO
HOW MANY TIMES IN THE PAST YEAR HAVE YOU HAD 5 OR MORE DRINKS IN A DAY: 0
CONSUMPTION TOTAL: NEGATIVE
EVER FELT BAD OR GUILTY ABOUT YOUR DRINKING: NO
TOTAL SCORE: 0
HAVE YOU EVER FELT YOU SHOULD CUT DOWN ON YOUR DRINKING: NO
HAVE YOU EVER FELT YOU SHOULD CUT DOWN ON YOUR DRINKING: NO
EVER HAD A DRINK FIRST THING IN THE MORNING TO STEADY YOUR NERVES TO GET RID OF A HANGOVER: NO
TOTAL SCORE: 0
AVERAGE NUMBER OF DAYS PER WEEK YOU HAVE A DRINK CONTAINING ALCOHOL: 0
ON A TYPICAL DAY WHEN YOU DRINK ALCOHOL HOW MANY DRINKS DO YOU HAVE: 0
EVER FELT BAD OR GUILTY ABOUT YOUR DRINKING: NO
EVER HAD A DRINK FIRST THING IN THE MORNING TO STEADY YOUR NERVES TO GET RID OF A HANGOVER: NO
ALCOHOL_USE: NO

## 2023-01-27 ASSESSMENT — PAIN SCALES - WONG BAKER: WONGBAKER_NUMERICALRESPONSE: HURTS A WHOLE LOT

## 2023-01-27 ASSESSMENT — COGNITIVE AND FUNCTIONAL STATUS - GENERAL
DAILY ACTIVITIY SCORE: 24
SUGGESTED CMS G CODE MODIFIER DAILY ACTIVITY: CH
SUGGESTED CMS G CODE MODIFIER MOBILITY: CH
MOBILITY SCORE: 24

## 2023-01-27 ASSESSMENT — FIBROSIS 4 INDEX
FIB4 SCORE: 0.57
FIB4 SCORE: 0.57
FIB4 SCORE: 0.36
FIB4 SCORE: 0.36

## 2023-01-27 ASSESSMENT — PAIN DESCRIPTION - PAIN TYPE
TYPE: ACUTE PAIN
TYPE: ACUTE PAIN

## 2023-01-27 ASSESSMENT — PATIENT HEALTH QUESTIONNAIRE - PHQ9
SUM OF ALL RESPONSES TO PHQ9 QUESTIONS 1 AND 2: 0
2. FEELING DOWN, DEPRESSED, IRRITABLE, OR HOPELESS: NOT AT ALL
1. LITTLE INTEREST OR PLEASURE IN DOING THINGS: NOT AT ALL

## 2023-01-27 ASSESSMENT — VISUAL ACUITY: OU: 1

## 2023-01-28 VITALS
RESPIRATION RATE: 18 BRPM | WEIGHT: 183.64 LBS | HEART RATE: 88 BPM | DIASTOLIC BLOOD PRESSURE: 65 MMHG | OXYGEN SATURATION: 94 % | TEMPERATURE: 99.3 F | HEIGHT: 62 IN | SYSTOLIC BLOOD PRESSURE: 104 MMHG | BODY MASS INDEX: 33.79 KG/M2

## 2023-01-28 PROBLEM — J96.01 ACUTE RESPIRATORY FAILURE WITH HYPOXIA (HCC): Status: ACTIVE | Noted: 2023-01-28

## 2023-01-28 LAB
ANION GAP SERPL CALC-SCNC: 9 MMOL/L (ref 7–16)
BUN SERPL-MCNC: 11 MG/DL (ref 8–22)
CALCIUM SERPL-MCNC: 8.2 MG/DL (ref 8.4–10.2)
CHLORIDE SERPL-SCNC: 106 MMOL/L (ref 96–112)
CO2 SERPL-SCNC: 22 MMOL/L (ref 20–33)
CREAT SERPL-MCNC: 0.53 MG/DL (ref 0.5–1.4)
ERYTHROCYTE [DISTWIDTH] IN BLOOD BY AUTOMATED COUNT: 42.2 FL (ref 35.9–50)
GFR SERPLBLD CREATININE-BSD FMLA CKD-EPI: 111 ML/MIN/1.73 M 2
GLUCOSE SERPL-MCNC: 91 MG/DL (ref 65–99)
HCT VFR BLD AUTO: 33.6 % (ref 37–47)
HGB BLD-MCNC: 11.1 G/DL (ref 12–16)
MCH RBC QN AUTO: 30.5 PG (ref 27–33)
MCHC RBC AUTO-ENTMCNC: 33 G/DL (ref 33.6–35)
MCV RBC AUTO: 92.3 FL (ref 81.4–97.8)
PLATELET # BLD AUTO: 309 K/UL (ref 164–446)
PMV BLD AUTO: 8.6 FL (ref 9–12.9)
POTASSIUM SERPL-SCNC: 4.2 MMOL/L (ref 3.6–5.5)
RBC # BLD AUTO: 3.64 M/UL (ref 4.2–5.4)
SODIUM SERPL-SCNC: 137 MMOL/L (ref 135–145)
WBC # BLD AUTO: 10.8 K/UL (ref 4.8–10.8)

## 2023-01-28 PROCEDURE — A9270 NON-COVERED ITEM OR SERVICE: HCPCS | Performed by: HOSPITALIST

## 2023-01-28 PROCEDURE — 99239 HOSP IP/OBS DSCHRG MGMT >30: CPT | Performed by: INTERNAL MEDICINE

## 2023-01-28 PROCEDURE — 85027 COMPLETE CBC AUTOMATED: CPT

## 2023-01-28 PROCEDURE — 700105 HCHG RX REV CODE 258: Performed by: INTERNAL MEDICINE

## 2023-01-28 PROCEDURE — 700105 HCHG RX REV CODE 258: Performed by: HOSPITALIST

## 2023-01-28 PROCEDURE — 700102 HCHG RX REV CODE 250 W/ 637 OVERRIDE(OP): Performed by: HOSPITALIST

## 2023-01-28 PROCEDURE — 700111 HCHG RX REV CODE 636 W/ 250 OVERRIDE (IP): Performed by: INTERNAL MEDICINE

## 2023-01-28 PROCEDURE — 80048 BASIC METABOLIC PNL TOTAL CA: CPT

## 2023-01-28 PROCEDURE — 700111 HCHG RX REV CODE 636 W/ 250 OVERRIDE (IP): Performed by: HOSPITALIST

## 2023-01-28 PROCEDURE — 94760 N-INVAS EAR/PLS OXIMETRY 1: CPT

## 2023-01-28 PROCEDURE — 36415 COLL VENOUS BLD VENIPUNCTURE: CPT

## 2023-01-28 RX ORDER — ENOXAPARIN SODIUM 100 MG/ML
40 INJECTION SUBCUTANEOUS DAILY
Status: DISCONTINUED | OUTPATIENT
Start: 2023-01-28 | End: 2023-01-28 | Stop reason: HOSPADM

## 2023-01-28 RX ORDER — HYDROMORPHONE HYDROCHLORIDE 1 MG/ML
1 INJECTION, SOLUTION INTRAMUSCULAR; INTRAVENOUS; SUBCUTANEOUS EVERY 4 HOURS PRN
Status: DISCONTINUED | OUTPATIENT
Start: 2023-01-28 | End: 2023-01-28 | Stop reason: HOSPADM

## 2023-01-28 RX ORDER — OXYCODONE HYDROCHLORIDE 5 MG/1
5 TABLET ORAL EVERY 4 HOURS PRN
Status: DISCONTINUED | OUTPATIENT
Start: 2023-01-28 | End: 2023-01-28 | Stop reason: HOSPADM

## 2023-01-28 RX ADMIN — Medication 2 TABLET: at 05:12

## 2023-01-28 RX ADMIN — VENLAFAXINE HYDROCHLORIDE 37.5 MG: 37.5 CAPSULE, EXTENDED RELEASE ORAL at 05:12

## 2023-01-28 RX ADMIN — ACETAMINOPHEN 650 MG: 325 TABLET, FILM COATED ORAL at 13:25

## 2023-01-28 RX ADMIN — HYDROMORPHONE HYDROCHLORIDE 1 MG: 1 INJECTION, SOLUTION INTRAMUSCULAR; INTRAVENOUS; SUBCUTANEOUS at 05:09

## 2023-01-28 RX ADMIN — NICOTINE 14 MG: 14 PATCH TRANSDERMAL at 05:11

## 2023-01-28 RX ADMIN — SODIUM CHLORIDE: 9 INJECTION, SOLUTION INTRAVENOUS at 14:55

## 2023-01-28 RX ADMIN — GABAPENTIN 300 MG: 300 CAPSULE ORAL at 08:32

## 2023-01-28 RX ADMIN — AMPICILLIN AND SULBACTAM 3 G: 1; 2 INJECTION, POWDER, FOR SOLUTION INTRAMUSCULAR; INTRAVENOUS at 17:17

## 2023-01-28 RX ADMIN — AMPICILLIN AND SULBACTAM 3 G: 1; 2 INJECTION, POWDER, FOR SOLUTION INTRAMUSCULAR; INTRAVENOUS at 11:24

## 2023-01-28 RX ADMIN — ZIPRASIDONE HYDROCHLORIDE 80 MG: 20 CAPSULE ORAL at 05:11

## 2023-01-28 RX ADMIN — PIPERACILLIN AND TAZOBACTAM 3.38 G: 3; .375 INJECTION, POWDER, LYOPHILIZED, FOR SOLUTION INTRAVENOUS; PARENTERAL at 04:58

## 2023-01-28 RX ADMIN — ONDANSETRON 4 MG: 2 INJECTION INTRAMUSCULAR; INTRAVENOUS at 07:46

## 2023-01-28 ASSESSMENT — ENCOUNTER SYMPTOMS
VOMITING: 0
BLOOD IN STOOL: 0
DIZZINESS: 0
FOCAL WEAKNESS: 0
HEARTBURN: 0
CHILLS: 0
MYALGIAS: 0
DEPRESSION: 0
DIARRHEA: 0
PALPITATIONS: 0
HALLUCINATIONS: 0
WEAKNESS: 0
BACK PAIN: 0
FEVER: 0
COUGH: 0
HEADACHES: 0
SHORTNESS OF BREATH: 0
NAUSEA: 1
SORE THROAT: 0
ABDOMINAL PAIN: 1

## 2023-01-28 ASSESSMENT — PAIN DESCRIPTION - PAIN TYPE
TYPE: ACUTE PAIN

## 2023-01-28 NOTE — CARE PLAN
The patient is Stable - Low risk of patient condition declining or worsening    Shift Goals  Clinical Goals: Pt pain managed at tolerable level after PRN meds; will tolerate full liquid diet.  Patient Goals: Sleep comfortably; pain management.  Family Goals: n/a    Problem: Pain - Standard  Goal: Alleviation of pain or a reduction in pain to the patient’s comfort goal  Outcome: Progressing     Problem: Knowledge Deficit - Standard  Goal: Patient and family/care givers will demonstrate understanding of plan of care, disease process/condition, diagnostic tests and medications  Outcome: Progressing     Problem: Hemodynamics  Goal: Patient's hemodynamics, fluid balance and neurologic status will be stable or improve  Outcome: Progressing     Problem: Gastrointestinal Irritability  Goal: Nausea and vomiting will be absent or improve  Outcome: Progressing     Problem: Mobility  Goal: Patient's capacity to carry out activities will improve  Outcome: Progressing     Progress made toward(s) clinical / shift goals:    Pt's pain tolerated after giving PRN pain meds x2 at shift; able to sleep comfortably and maintained on full liquid diet. Pt unable to provide stool sample.    Patient is not progressing towards the following goals: N/A

## 2023-01-28 NOTE — ASSESSMENT & PLAN NOTE
Continue at this point with Effexor, clonazepam, and Geodon.  Currently patient states that with the medication she has been very stable and able to conduct normal activities of life.
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
## 2023-01-28 NOTE — PROGRESS NOTES
Received bedside report.  Assumed pt care.   Assessment and chart check complete.  Pt is AA0X4, no signs of distress.  POC discussed, pt understanding.  Fall precautions in place, treaded socks on pt, bed in low position.   Call light within reach.   Educated pt to call if needing anything.   Hourly rounding.

## 2023-01-28 NOTE — ED NOTES
Hospitalist in to evaluate patient for further treatment. Admitting orders received. Waiting for bed assignment.
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
## 2023-01-28 NOTE — HOSPITAL COURSE
History of tobacco abuse, bipolar, seizure disorder who had a recent lap jen as well as a recently treated UTI who presented with bilateral lower quadrant pain found to have acute diverticulitis.  She was started empirically on IV Zosyn and was admitted for further treatment with antibiotic therapy and IV fluids

## 2023-01-28 NOTE — ASSESSMENT & PLAN NOTE
Suspect due to atelectasis as she is listless and has abdominal discomfort which may be causing splinting  Encourage I-S  If unable to wean then will evaluate further with chest x-ray

## 2023-01-28 NOTE — ED NOTES
Iv placed , labs drawn and taken to lab.  Pt medicated as directed by [facility] pro, poc update given to pt. Further orders and dispo pending at this time. No further questions from pt at this time

## 2023-01-28 NOTE — ASSESSMENT & PLAN NOTE
Last partial seizure was back in 2014 has not had an episode since.  She has been cleared and has not been taking medications.

## 2023-01-28 NOTE — ED PROVIDER NOTES
ED Provider Note    CHIEF COMPLAINT  Chief Complaint   Patient presents with    Abdominal Pain     Recent surgery  1/13/2023 gall bladder removal    was sent here by MD for further evaluation of new  this c/o pain  lower abdomen  pain  Hx of diverticulitis believes this is a flair up     N/V     Since surgery        EXTERNAL RECORDS REVIEWED  Outpatient Notes CT imaging    HPI/ROS  LIMITATION TO HISTORY   Select: : None      Rianna Solis is a 52 y.o. female who presents with abdominal pain.  Patient recently had gallbladder surgery on January 13.  Since that time she developed some gradual left lower quadrant abdominal pain.  She has a history of diverticulitis in the past if they develop a flare.  Her doctor ordered a CT scan today and it showed evidence of diverticulitis and therefore she was sent in for evaluation.  Patient is having nausea and vomiting.  She had decreased p.o. intake.  She denies fevers.  She describes it as stabbing.  Nothing seems make it better or worse.  She states overall she been healing well from her abdominal surgeries.  No blood in her vomit or stool.    PAST MEDICAL HISTORY   has a past medical history of Abnormal mammogram (2006), Acromioclavicular joint separation (05/24/2012), Anxiety, Bipolar 1 disorder (HCC) (01/2023), Depression, Former smoker (04/26/2019), History of cervical dysplasia (1990), History of gestational diabetes (01/15/2018), History of suicidal ideation, Homicidal ideations (2014), Hyperthyroidism, Hyperthyroidism, Lisfranc's dislocation (10/2017), Migraine, Partial seizure disorder ([facility]) (01/2023), Pelvic exam (2008), Polysubstance dependence ([facility]), Schizoaffective disorder ([facility]), Schizoaffective disorder, bipolar type ([facility]) (04/07/2011), Seizure ([facility]) (1999), Seizure disorder ([facility]), Sleep apnea (01/2023), Toe fracture (10/2017), Urinary tract infection, and Wrist fracture (2010).    SURGICAL HISTORY   has a past surgical history that includes other;  "cervical conization (1990); eye surgery (,); primary c section (2008); foot surgery (Right, 10/2017); and cholecystectomy robotic xi (N/A, 2023).    FAMILY HISTORY  Family History   Problem Relation Age of Onset    Cancer Mother         breast cancer    Diabetes Mother     Hypertension Mother     Cancer Maternal Grandmother         stomach    Stroke Maternal Grandfather     Cancer Paternal Grandfather         lung    Hypertension Father        SOCIAL HISTORY  Social History     Tobacco Use    Smoking status: Some Days     Packs/day: 0.50     Years: 30.00     Pack years: 15.00     Types: Cigarettes     Start date: 2016     Last attempt to quit: 2022     Years since quittin.3    Smokeless tobacco: Never    Tobacco comments:     started at 18   Vaping Use    Vaping Use: Former    Substances: Nicotine   Substance and Sexual Activity    Alcohol use: Not Currently    Drug use: No    Sexual activity: Yes     Partners: Male     Comment:  x5 years       CURRENT MEDICATIONS  Home Medications    **Home medications have not yet been reviewed for this encounter**         ALLERGIES  Allergies   Allergen Reactions    Zoloft Swelling     \"Blows Up\"       PHYSICAL EXAM  VITAL SIGNS: /82   Pulse 82   Temp 36.6 °C (97.9 °F) (Temporal)   Resp (!) 24   Ht 1.575 m (5' 2\")   Wt 82.3 kg (181 lb 7 oz)   SpO2 98%   BMI 33.19 kg/m²    Constitutional:  Well developed, mild acute distress, Non-toxic appearance.   HENT: Normocephalic, Atraumatic, Bilateral external ears normal, Nose normal.   Eyes: PERRL, EOMI, Conjunctiva normal  Neck: Normal range of motion, No tenderness, Supple  Cardiovascular: Normal heart rate, Normal rhythm  Thorax & Lungs: Normal breath sounds, No respiratory distress,   Abdomen: Diffuse tenderness to palpation but more focal in the left lower quadrant.  Mild guarding.  Positive bowel sounds.  The incision from gallbladder surgery are healing well.  No evidence of " incisional infections.  Skin: Warm, Dry, No erythema, No rash.   Back: No tenderness, No CVA tenderness.   Extremities: Intact distal pulses, No edema, No tenderness   Neurologic: Alert & oriented x 3, Normal motor function, Normal sensory function, No focal deficits noted.   Psychiatric: appropriate     DIAGNOSTIC STUDIES / PROCEDURES    LABS  Results for orders placed or performed during the hospital encounter of 01/27/23   CBC WITH DIFFERENTIAL   Result Value Ref Range    WBC 17.3 (H) 4.8 - 10.8 K/uL    RBC 4.06 (L) 4.20 - 5.40 M/uL    Hemoglobin 12.3 12.0 - 16.0 g/dL    Hematocrit 36.2 (L) 37.0 - 47.0 %    MCV 89.2 81.4 - 97.8 fL    MCH 30.3 27.0 - 33.0 pg    MCHC 34.0 33.6 - 35.0 g/dL    RDW 40.0 35.9 - 50.0 fL    Platelet Count 372 164 - 446 K/uL    MPV 8.5 (L) 9.0 - 12.9 fL    Neutrophils-Polys 72.30 (H) 44.00 - 72.00 %    Lymphocytes 18.50 (L) 22.00 - 41.00 %    Monocytes 7.10 0.00 - 13.40 %    Eosinophils 1.20 0.00 - 6.90 %    Basophils 0.40 0.00 - 1.80 %    Immature Granulocytes 0.50 0.00 - 0.90 %    Nucleated RBC 0.00 /100 WBC    Neutrophils (Absolute) 12.51 (H) 2.00 - 7.15 K/uL    Lymphs (Absolute) 3.19 1.00 - 4.80 K/uL    Monos (Absolute) 1.23 (H) 0.00 - 0.85 K/uL    Eos (Absolute) 0.20 0.00 - 0.51 K/uL    Baso (Absolute) 0.07 0.00 - 0.12 K/uL    Immature Granulocytes (abs) 0.08 0.00 - 0.11 K/uL    NRBC (Absolute) 0.00 K/uL   COMP METABOLIC PANEL   Result Value Ref Range    Sodium 134 (L) 135 - 145 mmol/L    Potassium 3.9 3.6 - 5.5 mmol/L    Chloride 100 96 - 112 mmol/L    Co2 21 20 - 33 mmol/L    Anion Gap 13.0 7.0 - 16.0    Glucose 89 65 - 99 mg/dL    Bun 15 8 - 22 mg/dL    Creatinine 0.58 0.50 - 1.40 mg/dL    Calcium 9.0 8.4 - 10.2 mg/dL    AST(SGOT) 19 12 - 45 U/L    ALT(SGPT) 22 2 - 50 U/L    Alkaline Phosphatase 82 30 - 99 U/L    Total Bilirubin 0.3 0.1 - 1.5 mg/dL    Albumin 4.0 3.2 - 4.9 g/dL    Total Protein 7.1 6.0 - 8.2 g/dL    Globulin 3.1 1.9 - 3.5 g/dL    A-G Ratio 1.3 g/dL   LACTIC ACID    Result Value Ref Range    Lactic Acid 0.8 0.5 - 2.0 mmol/L   CORRECTED CALCIUM   Result Value Ref Range    Correct Calcium 9.0 8.5 - 10.5 mg/dL   ESTIMATED GFR   Result Value Ref Range    GFR (CKD-EPI) 108 >60 mL/min/1.73 m 2        RADIOLOGY  IMPRESSION:        1.Wall thickening and stranding in distal descending colon could relate to acute diverticulitis.     2. Additional wall thickening in the mid sigmoid colon could relate to a different focus of diverticulitis.    COURSE & MEDICAL DECISION MAKING    INITIAL ASSESSMENT, COURSE AND PLAN  Care Narrative: Patient presented to the emergency department with abdominal pain.  CT earlier today showed evidence of acute diverticulitis.  Patient presented here for further treatment of this CT finding.  Patient on exam looks quite uncomfortable.  She has pretty significant pain with palpation.  She is also had nausea and vomiting.  I am concerned about her being able to tolerate her p.o. antibiotics.  Plan was to give her an IV dose of pain medication and check labs.  Laboratory studies returned and a lactic acid is normal.  Patient had no significant electrolyte abnormalities.  White count was 17 without evidence of a bandemia.  Patient was given morphine initially for pain unfortunately this is not adequate to help her pain.  With her elevated white count and evidence of infection and severe pain and nausea vomiting I feel this acute infection requires hospitalization for management.  Also of concern is there appears to be 2 different foci of the diverticulitis.  Fortunately CT reports do not indicate abscess or perforation.  Discussed the case with Dr. Prasad who will see the patient.       DISPOSITION AND DISCUSSIONS  I have discussed management of the patient with the following physicians and LATESHA's:  Dr. Lamar    Discussion of management with other [facility] [place] or appropriate source(s): None     Escalation of care considered, and ultimately not performed:IV fluids    FINAL  DIAGNOSIS  1. Diverticulitis           Electronically signed by: Ellen Brown M.D., 1/27/2023 4:59 PM
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
## 2023-01-28 NOTE — ASSESSMENT & PLAN NOTE
Body mass index is 33.19 kg/m².  Outpatient weight loss management program with lifestyle modifications highly recommended.

## 2023-01-28 NOTE — ED NOTES
Pt resting in bed. IV Flagyl infusing at this time.  Denies any pain.  Has meal try and tolerating po intake w/o issue.  WCTM.  Friend at bedside.  Call bell in reach.

## 2023-01-28 NOTE — ASSESSMENT & PLAN NOTE
Patient had a cholecystectomy on January 13 and had a UTI diagnosed and treated shortly before that.  After her cholecystectomy she had right upper quadrant pain but also increasing bilateral lower quadrant discomfort  Presented to outpatient surgery with intractable pain where CT revealed acute diverticulitis  She was found to have leukocytosis and was placed on empiric Zosyn therapy.  She has had only mild improvement remains distended and unable to eat  De-escalate antibiotics to Unasyn  Trend WBC, it has normalized today, but she remains intermittently tachycardic

## 2023-01-28 NOTE — ED TRIAGE NOTES
Pt comes in w/ friend  c/o lower abdomen pain for the last couple of weeks  recent surgery 1/13/2023  had gallbladder removed and at time of d/c for hospital has been having lower abdomen pain  has Hx of diverticulosis however thinking this is a flair up of diverticulitis  had CT done a [facility]   MD sent her here for further evaluation of this complaint   has taken tylenol, ibuprofen and tramadol and gabapentin w/ out relief   has been having N/V since surgery as well

## 2023-01-28 NOTE — PROGRESS NOTES
4 Eyes Skin Assessment Completed by Krystal RN and Ma, RN.    Head WDL  Ears WDL  Nose WDL  Mouth WDL  Neck WDL  Breast/Chest WDL  Shoulder Blades WDL  Spine WDL  (R) Arm/Elbow/Hand WDL  (L) Arm/Elbow/Hand WDL  Abdomen : small bruising and healing surgical incisions, 4 lap sites.  Groin WDL  Scrotum/Coccyx/Buttocks WDL  (R) Leg WDL  (L) Leg WDL  (R) Heel/Foot/Toe WDL  (L) Heel/Foot/Toe WDL          Devices In Places Blood Pressure Cuff, Pulse Ox, and Nasal Cannula      Interventions In Place Pillows    Possible Skin Injury No    Pictures Uploaded Into Epic N/A  Wound Consult Placed N/A  RN Wound Prevention Protocol Ordered No

## 2023-01-28 NOTE — PROGRESS NOTES
Pt arrived via gurney, admitted to room 1117 from ED. Pt is A&O x 4, on 2 LPM, complains of abdominal pain at 5/10, hot packs and pillows offered since PRN meds not due yet. Pt denies nausea at this time. Pt oriented to room and call light. POC reviewed which includes clear liquid diet, IV fluids, and pain management. Call light within reach, fall precautions in place, all needs met at this time.

## 2023-01-28 NOTE — ED NOTES
Med rec completed per pt   Allergies reviewed    Pt states that she was prescribed a course of Augmentin 2 days ago but has been unable to start taking this antibiotic because the pharmacy has been out of it

## 2023-01-29 NOTE — CARE PLAN
The patient is Stable - Low risk of patient condition declining or worsening    Shift Goals  Clinical Goals: Tolerating current diet and adequate pain control throughout the shift  Patient Goals: rest comfortably  Family Goals: n/1    Progress made toward(s) clinical / shift goals:  Pt tolerating 25% of food intake. Medicated per MAR for nausea. Resting in bed comfortably. Pt states pain has been controlled throughout the shift.    Patient is not progressing towards the following goals:      Problem: Pain - Standard  Goal: Alleviation of pain or a reduction in pain to the patient’s comfort goal  Outcome: Progressing     Problem: Gastrointestinal Irritability  Goal: Nausea and vomiting will be absent or improve  Outcome: Progressing     Problem: Mobility  Goal: Patient's capacity to carry out activities will improve  Outcome: Progressing     Problem: Fall Risk  Goal: Patient will remain free from falls  Outcome: Progressing

## 2023-01-29 NOTE — PROGRESS NOTES
Md informed that patient wants to leave AMA. This nurse,charge nurse and nursing supervisor all have tried to educate patient pt refuses education and wishes to leave AMA. Pt signed AMA form and is waiting for a her ride home. Pt is refusing all vital signs and further intervention.

## 2023-01-29 NOTE — PROGRESS NOTES
Per primary RN Bird, patient is wanting to leave AMA and has already pulled her IV out, called a ride, and gotten dressed. Patient A&Ox4, understands the risks of her decision to leave against medical advice. She did not disclose rationale for why she wishes to leave AMA. Patient encouraged to return to the emergency department at any time if she changes her mind.

## 2023-01-29 NOTE — DISCHARGE SUMMARY
Discharge Summary    CHIEF COMPLAINT ON ADMISSION  Chief Complaint   Patient presents with    Abdominal Pain     Recent surgery  1/13/2023 gall bladder removal    was sent here by MD for further evaluation of new  this c/o pain  lower abdomen  pain  Hx of diverticulitis believes this is a flair up     N/V     Since surgery        Reason for Admission  Abdominal Pain     Admission Date  1/27/2023    CODE STATUS  Full    HPI & HOSPITAL COURSE  This is a 52 y.o. female with a History of tobacco abuse, bipolar, seizure disorder who had a recent lap jen as well as a recently treated UTI who presented with bilateral lower quadrant pain found to have acute diverticulitis.  She was started empirically on IV Zosyn and was admitted for further treatment with antibiotic therapy and IV fluids.  Her antibiotics were de-escalated to Unasyn however she continued to be quite ill-appearing with severe lower abdomen tenderness and inability to tolerate p.o.  She was listless and drowsy on exam although her WBC had improved by the following day.  Given her inability to tolerate p.o. and the severity of her discomfort she was not determined stable for discharge.  She was quite anxious to leave the hospital therefore throughout the day she attempted to eat.  She was able to keep down minuscule amounts of oral intake however ultimately decided she would like to leave the hospital AGAINST MEDICAL ADVICE.  She was counseled on the potential risks including bowel perforation if the severity of infection should worsen.  She stated understanding of these risks and still opted to leave AGAINST MEDICAL ADVICE    Therefore, she is discharged in guarded and stable condition against medcial advice.    Left the hospital prior to being medically clear therefore her stay was less than 2 midnights     Discharge Date  1/28/2023    FOLLOW UP ITEMS POST DISCHARGE  follow-up with PCP within 1 to 2 weeks    DISCHARGE DIAGNOSES  Principal Problem:     Acute diverticulitis POA: Yes  Active Problems:    Anxiety POA: Yes    Bipolar 1 disorder with psychosis episodes POA: Yes      Overview: psychotic break in 2010, 2014, 2018.       She has been doing quite well recently.  2018 psychiatrist Dr. Mayfield              08/2014  Psych hold at [facility] for suicidal AND homicidal ideation with       auditory hallicinations          Partial seizure disorder (HCC) POA: Yes      Overview: Stopped depakote 500mg BID-prescribed by Dr. Pantera Gibson [facility].      States partial complex-no motor symptoms      November 2014 last episode            EEG ok 05/2016.  Dr. Reid    HERLINDA (obstructive sleep apnea) (Chronic) POA: Yes    Class 1 obesity due to excess calories without serious comorbidity with body mass index (BMI) of 33.0 to 33.9 in adult POA: Yes    Left breast mass POA: Yes    Subclinical hyperthyroidism POA: Yes    Right knee pain POA: Unknown    Tobacco use disorder POA: Unknown    Acute respiratory failure with hypoxia (HCC) POA: Unknown  Resolved Problems:    * No resolved hospital problems. *      FOLLOW UP  Future Appointments   Date Time Provider Department Center   1/31/2023  5:00 PM Glenda Skinner, A.P.R.N. [facility] [place]   3/10/2023  8:00 AM Preston Portillo M.D. [facility]   3/13/2023  9:50 AM JAVID Vega A.P.R.N.  [address]  [facility] [id number]  [phone]            MEDICATIONS ON DISCHARGE     Medication List        ASK your doctor about these medications        Instructions   acetaminophen 500 MG Tabs  Commonly known as: TYLENOL  Ask about: Which instructions should I use?   Take 500-1,000 mg by mouth every 6 hours as needed. Indications: Pain  Dose: 500-1,000 mg     amoxicillin-clavulanate 875-125 MG Tabs  Commonly known as: AUGMENTIN   Take 1 Tablet by mouth 2 times a day.  Dose: 1 Tablet     CALCIUM 1000 + D PO   Take 1 Tablet by mouth every day.  Dose: 1  "Tablet     clonazePAM 1 MG Tabs  Commonly known as: KLONOPIN   Take 1 mg by mouth 3 times a day as needed. Indications: Feeling Anxious  Dose: 1 mg     Compro 25 MG Supp  Generic drug: prochlorperazine   Insert 25 mg into the rectum 2 times a day as needed for Nausea/Vomiting.  Dose: 25 mg     fish oil 1000 MG Caps capsule   Take 1,000 mg by mouth every day.  Dose: 1,000 mg     gabapentin 300 MG Caps  Commonly known as: NEURONTIN   Take 300 mg by mouth 3 times a day.  Dose: 300 mg     ibuprofen 200 MG Tabs  Commonly known as: MOTRIN   Take 200 mg by mouth every 6 hours as needed. Indications: Pain  Dose: 200 mg     Multivitamin Adults Tabs   Take 1 Tablet by mouth every day.  Dose: 1 Tablet     traMADol 50 MG Tabs  Commonly known as: Ultram   Take 50 mg by mouth 3 times a day as needed. Indications: Pain  Dose: 50 mg     venlafaxine XR 37.5 MG Cp24  Commonly known as: EFFEXOR XR   Take 37.5 mg by mouth every morning.  Dose: 37.5 mg     Vitamin D (Cholecalciferol) 25 MCG (1000 UT) Caps   Take 1,000 Units by mouth every day.  Dose: 1,000 Units     ziprasidone 80 MG Caps  Commonly known as: GEODON   Take 80 mg by mouth 2 times a day.  Dose: 80 mg              Allergies  Allergies   Allergen Reactions    Zoloft Swelling     \"Blows Up\"       DIET  Clear liquids      ACTIVITY  As tolerated.  Weight bearing as tolerated    CONSULTATIONS  None    PROCEDURES  None    LABORATORY  Lab Results   Component Value Date    SODIUM 137 01/28/2023    POTASSIUM 4.2 01/28/2023    CHLORIDE 106 01/28/2023    CO2 22 01/28/2023    GLUCOSE 91 01/28/2023    BUN 11 01/28/2023    CREATININE 0.53 01/28/2023        Lab Results   Component Value Date    WBC 10.8 01/28/2023    HEMOGLOBIN 11.1 (L) 01/28/2023    HEMATOCRIT 33.6 (L) 01/28/2023    PLATELETCT 309 01/28/2023        Total time of the discharge process exceeds 37 minutes.  "

## 2023-01-30 ENCOUNTER — PATIENT OUTREACH (OUTPATIENT)
Dept: MEDICAL GROUP | Facility: PHYSICIAN GROUP | Age: 53
End: 2023-01-30
Payer: MEDICARE

## 2023-01-30 NOTE — PROGRESS NOTES
TCM call complete.  Patient states she is now having diarrhea.  She has been taking Augmentin.  Discussed side effects of medication. Patient denied blood in stool, fever, N/V.  She is tolerating po fluids. She is still having abdominal pain.  ER/UC precautions advised.  F/U with PCP 1/31/23.

## 2023-01-31 ENCOUNTER — TELEPHONE (OUTPATIENT)
Dept: MEDICAL GROUP | Facility: PHYSICIAN GROUP | Age: 53
End: 2023-01-31
Payer: MEDICARE

## 2023-01-31 ENCOUNTER — HOSPITAL ENCOUNTER (INPATIENT)
Facility: MEDICAL CENTER | Age: 53
LOS: 3 days | DRG: 392 | End: 2023-02-03
Attending: EMERGENCY MEDICINE | Admitting: STUDENT IN AN ORGANIZED HEALTH CARE EDUCATION/TRAINING PROGRAM
Payer: MEDICARE

## 2023-01-31 ENCOUNTER — APPOINTMENT (OUTPATIENT)
Dept: RADIOLOGY | Facility: MEDICAL CENTER | Age: 53
DRG: 392 | End: 2023-01-31
Attending: EMERGENCY MEDICINE
Payer: MEDICARE

## 2023-01-31 DIAGNOSIS — K57.92 ACUTE DIVERTICULITIS: ICD-10-CM

## 2023-01-31 PROBLEM — R19.7 DIARRHEA OF PRESUMED INFECTIOUS ORIGIN: Status: ACTIVE | Noted: 2023-01-31

## 2023-01-31 PROBLEM — R74.01 TRANSAMINITIS: Status: ACTIVE | Noted: 2023-01-31

## 2023-01-31 PROBLEM — Z72.0 TOBACCO CONSUMPTION: Status: ACTIVE | Noted: 2023-01-27

## 2023-01-31 PROBLEM — R73.9 HYPERGLYCEMIA: Status: ACTIVE | Noted: 2023-01-31

## 2023-01-31 LAB
ALBUMIN SERPL BCP-MCNC: 4.3 G/DL (ref 3.2–4.9)
ALBUMIN/GLOB SERPL: 1.3 G/DL
ALP SERPL-CCNC: 100 U/L (ref 30–99)
ALT SERPL-CCNC: 60 U/L (ref 2–50)
ANION GAP SERPL CALC-SCNC: 10 MMOL/L (ref 7–16)
APPEARANCE UR: CLEAR
AST SERPL-CCNC: 28 U/L (ref 12–45)
BASOPHILS # BLD AUTO: 0.7 % (ref 0–1.8)
BASOPHILS # BLD: 0.06 K/UL (ref 0–0.12)
BILIRUB SERPL-MCNC: <0.2 MG/DL (ref 0.1–1.5)
BILIRUB UR QL STRIP.AUTO: NEGATIVE
BUN SERPL-MCNC: 11 MG/DL (ref 8–22)
CALCIUM ALBUM COR SERPL-MCNC: 9.4 MG/DL (ref 8.5–10.5)
CALCIUM SERPL-MCNC: 9.6 MG/DL (ref 8.5–10.5)
CHLORIDE SERPL-SCNC: 102 MMOL/L (ref 96–112)
CO2 SERPL-SCNC: 26 MMOL/L (ref 20–33)
COLOR UR: YELLOW
CREAT SERPL-MCNC: 0.62 MG/DL (ref 0.5–1.4)
EOSINOPHIL # BLD AUTO: 0.17 K/UL (ref 0–0.51)
EOSINOPHIL NFR BLD: 1.9 % (ref 0–6.9)
ERYTHROCYTE [DISTWIDTH] IN BLOOD BY AUTOMATED COUNT: 40.1 FL (ref 35.9–50)
GFR SERPLBLD CREATININE-BSD FMLA CKD-EPI: 107 ML/MIN/1.73 M 2
GLOBULIN SER CALC-MCNC: 3.4 G/DL (ref 1.9–3.5)
GLUCOSE SERPL-MCNC: 123 MG/DL (ref 65–99)
GLUCOSE UR STRIP.AUTO-MCNC: NEGATIVE MG/DL
HCG SERPL QL: NEGATIVE
HCT VFR BLD AUTO: 35.8 % (ref 37–47)
HGB BLD-MCNC: 12.1 G/DL (ref 12–16)
IMM GRANULOCYTES # BLD AUTO: 0.02 K/UL (ref 0–0.11)
IMM GRANULOCYTES NFR BLD AUTO: 0.2 % (ref 0–0.9)
KETONES UR STRIP.AUTO-MCNC: NEGATIVE MG/DL
LACTATE SERPL-SCNC: 1 MMOL/L (ref 0.5–2)
LEUKOCYTE ESTERASE UR QL STRIP.AUTO: NEGATIVE
LIPASE SERPL-CCNC: 34 U/L (ref 11–82)
LYMPHOCYTES # BLD AUTO: 2.98 K/UL (ref 1–4.8)
LYMPHOCYTES NFR BLD: 33.9 % (ref 22–41)
MAGNESIUM SERPL-MCNC: 1.9 MG/DL (ref 1.5–2.5)
MCH RBC QN AUTO: 30.3 PG (ref 27–33)
MCHC RBC AUTO-ENTMCNC: 33.8 G/DL (ref 33.6–35)
MCV RBC AUTO: 89.7 FL (ref 81.4–97.8)
MICRO URNS: NORMAL
MONOCYTES # BLD AUTO: 0.58 K/UL (ref 0–0.85)
MONOCYTES NFR BLD AUTO: 6.6 % (ref 0–13.4)
NEUTROPHILS # BLD AUTO: 4.98 K/UL (ref 2–7.15)
NEUTROPHILS NFR BLD: 56.7 % (ref 44–72)
NITRITE UR QL STRIP.AUTO: NEGATIVE
NRBC # BLD AUTO: 0 K/UL
NRBC BLD-RTO: 0 /100 WBC
PH UR STRIP.AUTO: 6.5 [PH] (ref 5–8)
PHOSPHATE SERPL-MCNC: 3.9 MG/DL (ref 2.5–4.5)
PLATELET # BLD AUTO: 405 K/UL (ref 164–446)
PMV BLD AUTO: 8.5 FL (ref 9–12.9)
POTASSIUM SERPL-SCNC: 3.9 MMOL/L (ref 3.6–5.5)
PROT SERPL-MCNC: 7.7 G/DL (ref 6–8.2)
PROT UR QL STRIP: NEGATIVE MG/DL
RBC # BLD AUTO: 3.99 M/UL (ref 4.2–5.4)
RBC UR QL AUTO: NEGATIVE
SODIUM SERPL-SCNC: 138 MMOL/L (ref 135–145)
SP GR UR STRIP.AUTO: 1
UROBILINOGEN UR STRIP.AUTO-MCNC: 0.2 MG/DL
WBC # BLD AUTO: 8.8 K/UL (ref 4.8–10.8)

## 2023-01-31 PROCEDURE — 36415 COLL VENOUS BLD VENIPUNCTURE: CPT

## 2023-01-31 PROCEDURE — 81003 URINALYSIS AUTO W/O SCOPE: CPT

## 2023-01-31 PROCEDURE — 84100 ASSAY OF PHOSPHORUS: CPT

## 2023-01-31 PROCEDURE — 84703 CHORIONIC GONADOTROPIN ASSAY: CPT

## 2023-01-31 PROCEDURE — 770004 HCHG ROOM/CARE - ONCOLOGY PRIVATE *

## 2023-01-31 PROCEDURE — 93005 ELECTROCARDIOGRAM TRACING: CPT | Performed by: STUDENT IN AN ORGANIZED HEALTH CARE EDUCATION/TRAINING PROGRAM

## 2023-01-31 PROCEDURE — 96365 THER/PROPH/DIAG IV INF INIT: CPT

## 2023-01-31 PROCEDURE — 96376 TX/PRO/DX INJ SAME DRUG ADON: CPT

## 2023-01-31 PROCEDURE — 99285 EMERGENCY DEPT VISIT HI MDM: CPT

## 2023-01-31 PROCEDURE — 99223 1ST HOSP IP/OBS HIGH 75: CPT | Mod: AI,GC | Performed by: STUDENT IN AN ORGANIZED HEALTH CARE EDUCATION/TRAINING PROGRAM

## 2023-01-31 PROCEDURE — 83605 ASSAY OF LACTIC ACID: CPT

## 2023-01-31 PROCEDURE — 87493 C DIFF AMPLIFIED PROBE: CPT

## 2023-01-31 PROCEDURE — A9270 NON-COVERED ITEM OR SERVICE: HCPCS | Performed by: STUDENT IN AN ORGANIZED HEALTH CARE EDUCATION/TRAINING PROGRAM

## 2023-01-31 PROCEDURE — 80053 COMPREHEN METABOLIC PANEL: CPT

## 2023-01-31 PROCEDURE — 700111 HCHG RX REV CODE 636 W/ 250 OVERRIDE (IP): Performed by: EMERGENCY MEDICINE

## 2023-01-31 PROCEDURE — 700102 HCHG RX REV CODE 250 W/ 637 OVERRIDE(OP): Performed by: STUDENT IN AN ORGANIZED HEALTH CARE EDUCATION/TRAINING PROGRAM

## 2023-01-31 PROCEDURE — 96375 TX/PRO/DX INJ NEW DRUG ADDON: CPT

## 2023-01-31 PROCEDURE — 83735 ASSAY OF MAGNESIUM: CPT

## 2023-01-31 PROCEDURE — 700117 HCHG RX CONTRAST REV CODE 255: Performed by: EMERGENCY MEDICINE

## 2023-01-31 PROCEDURE — 83690 ASSAY OF LIPASE: CPT

## 2023-01-31 PROCEDURE — 85025 COMPLETE CBC W/AUTO DIFF WBC: CPT

## 2023-01-31 PROCEDURE — 700111 HCHG RX REV CODE 636 W/ 250 OVERRIDE (IP): Performed by: STUDENT IN AN ORGANIZED HEALTH CARE EDUCATION/TRAINING PROGRAM

## 2023-01-31 PROCEDURE — 87641 MR-STAPH DNA AMP PROBE: CPT

## 2023-01-31 PROCEDURE — 700105 HCHG RX REV CODE 258: Performed by: STUDENT IN AN ORGANIZED HEALTH CARE EDUCATION/TRAINING PROGRAM

## 2023-01-31 PROCEDURE — 700105 HCHG RX REV CODE 258: Performed by: EMERGENCY MEDICINE

## 2023-01-31 PROCEDURE — 74177 CT ABD & PELVIS W/CONTRAST: CPT

## 2023-01-31 RX ORDER — ONDANSETRON 2 MG/ML
4 INJECTION INTRAMUSCULAR; INTRAVENOUS EVERY 4 HOURS PRN
Status: DISCONTINUED | OUTPATIENT
Start: 2023-01-31 | End: 2023-02-03

## 2023-01-31 RX ORDER — MORPHINE SULFATE 4 MG/ML
4 INJECTION INTRAVENOUS ONCE
Status: COMPLETED | OUTPATIENT
Start: 2023-01-31 | End: 2023-01-31

## 2023-01-31 RX ORDER — ONDANSETRON 4 MG/1
4 TABLET, ORALLY DISINTEGRATING ORAL EVERY 4 HOURS PRN
Status: DISCONTINUED | OUTPATIENT
Start: 2023-01-31 | End: 2023-02-03

## 2023-01-31 RX ORDER — CLONAZEPAM 1 MG/1
1 TABLET ORAL 3 TIMES DAILY PRN
Status: DISCONTINUED | OUTPATIENT
Start: 2023-01-31 | End: 2023-02-03 | Stop reason: HOSPADM

## 2023-01-31 RX ORDER — SODIUM CHLORIDE, SODIUM LACTATE, POTASSIUM CHLORIDE, CALCIUM CHLORIDE 600; 310; 30; 20 MG/100ML; MG/100ML; MG/100ML; MG/100ML
INJECTION, SOLUTION INTRAVENOUS CONTINUOUS
Status: DISCONTINUED | OUTPATIENT
Start: 2023-01-31 | End: 2023-02-03

## 2023-01-31 RX ORDER — HYDROMORPHONE HYDROCHLORIDE 1 MG/ML
1 INJECTION, SOLUTION INTRAMUSCULAR; INTRAVENOUS; SUBCUTANEOUS ONCE
Status: COMPLETED | OUTPATIENT
Start: 2023-01-31 | End: 2023-01-31

## 2023-01-31 RX ORDER — BISACODYL 10 MG
10 SUPPOSITORY, RECTAL RECTAL
Status: DISCONTINUED | OUTPATIENT
Start: 2023-01-31 | End: 2023-02-03 | Stop reason: HOSPADM

## 2023-01-31 RX ORDER — VENLAFAXINE HYDROCHLORIDE 37.5 MG/1
37.5 CAPSULE, EXTENDED RELEASE ORAL EVERY MORNING
Status: DISCONTINUED | OUTPATIENT
Start: 2023-02-01 | End: 2023-02-03 | Stop reason: HOSPADM

## 2023-01-31 RX ORDER — PROCHLORPERAZINE EDISYLATE 5 MG/ML
5-10 INJECTION INTRAMUSCULAR; INTRAVENOUS EVERY 4 HOURS PRN
Status: DISCONTINUED | OUTPATIENT
Start: 2023-01-31 | End: 2023-02-03 | Stop reason: HOSPADM

## 2023-01-31 RX ORDER — SODIUM CHLORIDE, SODIUM LACTATE, POTASSIUM CHLORIDE, CALCIUM CHLORIDE 600; 310; 30; 20 MG/100ML; MG/100ML; MG/100ML; MG/100ML
1000 INJECTION, SOLUTION INTRAVENOUS ONCE
Status: COMPLETED | OUTPATIENT
Start: 2023-01-31 | End: 2023-01-31

## 2023-01-31 RX ORDER — GABAPENTIN 300 MG/1
300 CAPSULE ORAL 3 TIMES DAILY PRN
Status: DISCONTINUED | OUTPATIENT
Start: 2023-01-31 | End: 2023-01-31

## 2023-01-31 RX ORDER — GABAPENTIN 300 MG/1
300 CAPSULE ORAL 3 TIMES DAILY
Status: DISCONTINUED | OUTPATIENT
Start: 2023-01-31 | End: 2023-02-03 | Stop reason: HOSPADM

## 2023-01-31 RX ORDER — ACETAMINOPHEN 325 MG/1
650 TABLET ORAL EVERY 6 HOURS PRN
Status: DISCONTINUED | OUTPATIENT
Start: 2023-01-31 | End: 2023-02-03 | Stop reason: HOSPADM

## 2023-01-31 RX ORDER — OXYCODONE HYDROCHLORIDE 5 MG/1
5 TABLET ORAL EVERY 6 HOURS PRN
Status: DISCONTINUED | OUTPATIENT
Start: 2023-01-31 | End: 2023-02-03

## 2023-01-31 RX ORDER — POLYETHYLENE GLYCOL 3350 17 G/17G
1 POWDER, FOR SOLUTION ORAL
Status: DISCONTINUED | OUTPATIENT
Start: 2023-01-31 | End: 2023-02-03 | Stop reason: HOSPADM

## 2023-01-31 RX ORDER — LABETALOL HYDROCHLORIDE 5 MG/ML
10 INJECTION, SOLUTION INTRAVENOUS EVERY 4 HOURS PRN
Status: DISCONTINUED | OUTPATIENT
Start: 2023-01-31 | End: 2023-02-03 | Stop reason: HOSPADM

## 2023-01-31 RX ORDER — HYDROMORPHONE HYDROCHLORIDE 1 MG/ML
0.5 INJECTION, SOLUTION INTRAMUSCULAR; INTRAVENOUS; SUBCUTANEOUS EVERY 4 HOURS PRN
Status: DISCONTINUED | OUTPATIENT
Start: 2023-01-31 | End: 2023-02-03

## 2023-01-31 RX ORDER — AMOXICILLIN 250 MG
2 CAPSULE ORAL 2 TIMES DAILY
Status: DISCONTINUED | OUTPATIENT
Start: 2023-01-31 | End: 2023-02-01

## 2023-01-31 RX ORDER — ONDANSETRON 2 MG/ML
4 INJECTION INTRAMUSCULAR; INTRAVENOUS ONCE
Status: COMPLETED | OUTPATIENT
Start: 2023-01-31 | End: 2023-01-31

## 2023-01-31 RX ORDER — PROMETHAZINE HYDROCHLORIDE 25 MG/1
12.5-25 SUPPOSITORY RECTAL EVERY 4 HOURS PRN
Status: DISCONTINUED | OUTPATIENT
Start: 2023-01-31 | End: 2023-02-03 | Stop reason: HOSPADM

## 2023-01-31 RX ORDER — PROMETHAZINE HYDROCHLORIDE 25 MG/1
12.5-25 TABLET ORAL EVERY 4 HOURS PRN
Status: DISCONTINUED | OUTPATIENT
Start: 2023-01-31 | End: 2023-02-03 | Stop reason: HOSPADM

## 2023-01-31 RX ORDER — ZIPRASIDONE HYDROCHLORIDE 80 MG/1
80 CAPSULE ORAL 2 TIMES DAILY
Status: DISCONTINUED | OUTPATIENT
Start: 2023-01-31 | End: 2023-02-03 | Stop reason: HOSPADM

## 2023-01-31 RX ORDER — NICOTINE 21 MG/24HR
14 PATCH, TRANSDERMAL 24 HOURS TRANSDERMAL
Status: DISCONTINUED | OUTPATIENT
Start: 2023-02-01 | End: 2023-02-03 | Stop reason: HOSPADM

## 2023-01-31 RX ORDER — ENOXAPARIN SODIUM 100 MG/ML
40 INJECTION SUBCUTANEOUS DAILY
Status: DISCONTINUED | OUTPATIENT
Start: 2023-02-01 | End: 2023-02-03 | Stop reason: HOSPADM

## 2023-01-31 RX ADMIN — SODIUM CHLORIDE, POTASSIUM CHLORIDE, SODIUM LACTATE AND CALCIUM CHLORIDE 1000 ML: 600; 310; 30; 20 INJECTION, SOLUTION INTRAVENOUS at 18:42

## 2023-01-31 RX ADMIN — SODIUM CHLORIDE, POTASSIUM CHLORIDE, SODIUM LACTATE AND CALCIUM CHLORIDE: 600; 310; 30; 20 INJECTION, SOLUTION INTRAVENOUS at 23:51

## 2023-01-31 RX ADMIN — PIPERACILLIN AND TAZOBACTAM 3.38 G: 3; .375 INJECTION, POWDER, LYOPHILIZED, FOR SOLUTION INTRAVENOUS; PARENTERAL at 21:27

## 2023-01-31 RX ADMIN — ONDANSETRON 4 MG: 2 INJECTION INTRAMUSCULAR; INTRAVENOUS at 18:42

## 2023-01-31 RX ADMIN — HYDROMORPHONE HYDROCHLORIDE 1 MG: 1 INJECTION, SOLUTION INTRAMUSCULAR; INTRAVENOUS; SUBCUTANEOUS at 21:24

## 2023-01-31 RX ADMIN — CLONAZEPAM 1 MG: 1 TABLET ORAL at 22:45

## 2023-01-31 RX ADMIN — ZIPRASIDONE HYDROCHLORIDE 80 MG: 80 CAPSULE ORAL at 23:13

## 2023-01-31 RX ADMIN — GABAPENTIN 300 MG: 300 CAPSULE ORAL at 22:45

## 2023-01-31 RX ADMIN — ONDANSETRON 4 MG: 2 INJECTION INTRAMUSCULAR; INTRAVENOUS at 22:46

## 2023-01-31 RX ADMIN — PROMETHAZINE HYDROCHLORIDE 25 MG: 25 TABLET ORAL at 23:51

## 2023-01-31 RX ADMIN — MORPHINE SULFATE 4 MG: 4 INJECTION, SOLUTION INTRAMUSCULAR; INTRAVENOUS at 18:42

## 2023-01-31 RX ADMIN — IOHEXOL 100 ML: 350 INJECTION, SOLUTION INTRAVENOUS at 20:05

## 2023-01-31 RX ADMIN — OXYCODONE HYDROCHLORIDE 5 MG: 5 TABLET ORAL at 22:45

## 2023-01-31 ASSESSMENT — ENCOUNTER SYMPTOMS
DOUBLE VISION: 0
FEVER: 0
DIARRHEA: 1
MYALGIAS: 0
COUGH: 0
HEADACHES: 0
NAUSEA: 1
HEARTBURN: 1
DIAPHORESIS: 0
HALLUCINATIONS: 0
SHORTNESS OF BREATH: 0
VOMITING: 1
BRUISES/BLEEDS EASILY: 0
EYE DISCHARGE: 0
FALLS: 0
WHEEZING: 0
DIZZINESS: 0
TINGLING: 0
BLOOD IN STOOL: 0
CHILLS: 0
BLURRED VISION: 0
ABDOMINAL PAIN: 1
PALPITATIONS: 0
DEPRESSION: 1
NECK PAIN: 0

## 2023-01-31 ASSESSMENT — FIBROSIS 4 INDEX: FIB4 SCORE: 0.68

## 2023-01-31 NOTE — TELEPHONE ENCOUNTER
Caller Name: Rianna  Call Back Number: [phone] (home)       How would the patient prefer to be contacted with a response: Phone call OK to leave a detailed message    2. What are the patient's symptoms (location & severity)? Severe pain and diarrhea     3. Is this a new symptom Yes    4. When did it start? 01/27/2023    5. Action taken per Active Symptom Guide: Emergency Department recommended    6. Patient agrees to recommended action per Active Symptom Escalation Protocol.    Patient called and was advice to go to [facility]. She agrees.

## 2023-02-01 PROBLEM — I16.0 HYPERTENSIVE URGENCY: Status: ACTIVE | Noted: 2023-02-01

## 2023-02-01 LAB
ALBUMIN SERPL BCP-MCNC: 3.7 G/DL (ref 3.2–4.9)
ALBUMIN/GLOB SERPL: 1.3 G/DL
ALP SERPL-CCNC: 88 U/L (ref 30–99)
ALT SERPL-CCNC: 48 U/L (ref 2–50)
ANION GAP SERPL CALC-SCNC: 9 MMOL/L (ref 7–16)
AST SERPL-CCNC: 24 U/L (ref 12–45)
BACTERIA BLD CULT: NORMAL
BACTERIA BLD CULT: NORMAL
BASOPHILS # BLD AUTO: 0.8 % (ref 0–1.8)
BASOPHILS # BLD: 0.07 K/UL (ref 0–0.12)
BILIRUB SERPL-MCNC: 0.2 MG/DL (ref 0.1–1.5)
BUN SERPL-MCNC: 12 MG/DL (ref 8–22)
CALCIUM ALBUM COR SERPL-MCNC: 9.3 MG/DL (ref 8.5–10.5)
CALCIUM SERPL-MCNC: 9.1 MG/DL (ref 8.5–10.5)
CHLORIDE SERPL-SCNC: 102 MMOL/L (ref 96–112)
CO2 SERPL-SCNC: 26 MMOL/L (ref 20–33)
CREAT SERPL-MCNC: 0.65 MG/DL (ref 0.5–1.4)
EKG IMPRESSION: NORMAL
EOSINOPHIL # BLD AUTO: 0.21 K/UL (ref 0–0.51)
EOSINOPHIL NFR BLD: 2.3 % (ref 0–6.9)
ERYTHROCYTE [DISTWIDTH] IN BLOOD BY AUTOMATED COUNT: 41.4 FL (ref 35.9–50)
EST. AVERAGE GLUCOSE BLD GHB EST-MCNC: 123 MG/DL
GFR SERPLBLD CREATININE-BSD FMLA CKD-EPI: 105 ML/MIN/1.73 M 2
GLOBULIN SER CALC-MCNC: 2.8 G/DL (ref 1.9–3.5)
GLUCOSE SERPL-MCNC: 130 MG/DL (ref 65–99)
HBA1C MFR BLD: 5.9 % (ref 4–5.6)
HCT VFR BLD AUTO: 34.1 % (ref 37–47)
HGB BLD-MCNC: 11.3 G/DL (ref 12–16)
IMM GRANULOCYTES # BLD AUTO: 0.03 K/UL (ref 0–0.11)
IMM GRANULOCYTES NFR BLD AUTO: 0.3 % (ref 0–0.9)
LACTATE SERPL-SCNC: 0.8 MMOL/L (ref 0.5–2)
LACTATE SERPL-SCNC: 0.8 MMOL/L (ref 0.5–2)
LACTATE SERPL-SCNC: 0.9 MMOL/L (ref 0.5–2)
LYMPHOCYTES # BLD AUTO: 2.98 K/UL (ref 1–4.8)
LYMPHOCYTES NFR BLD: 32.5 % (ref 22–41)
MAGNESIUM SERPL-MCNC: 1.8 MG/DL (ref 1.5–2.5)
MCH RBC QN AUTO: 30.3 PG (ref 27–33)
MCHC RBC AUTO-ENTMCNC: 33.1 G/DL (ref 33.6–35)
MCV RBC AUTO: 91.4 FL (ref 81.4–97.8)
MONOCYTES # BLD AUTO: 0.67 K/UL (ref 0–0.85)
MONOCYTES NFR BLD AUTO: 7.3 % (ref 0–13.4)
NEUTROPHILS # BLD AUTO: 5.2 K/UL (ref 2–7.15)
NEUTROPHILS NFR BLD: 56.8 % (ref 44–72)
NRBC # BLD AUTO: 0 K/UL
NRBC BLD-RTO: 0 /100 WBC
PHOSPHATE SERPL-MCNC: 3.8 MG/DL (ref 2.5–4.5)
PLATELET # BLD AUTO: 383 K/UL (ref 164–446)
PMV BLD AUTO: 8.7 FL (ref 9–12.9)
POTASSIUM SERPL-SCNC: 3.9 MMOL/L (ref 3.6–5.5)
PROCALCITONIN SERPL-MCNC: <0.05 NG/ML
PROT SERPL-MCNC: 6.5 G/DL (ref 6–8.2)
RBC # BLD AUTO: 3.73 M/UL (ref 4.2–5.4)
SCCMEC + MECA PNL NOSE NAA+PROBE: NEGATIVE
SIGNIFICANT IND 70042: NORMAL
SIGNIFICANT IND 70042: NORMAL
SITE SITE: NORMAL
SITE SITE: NORMAL
SODIUM SERPL-SCNC: 137 MMOL/L (ref 135–145)
SOURCE SOURCE: NORMAL
SOURCE SOURCE: NORMAL
WBC # BLD AUTO: 9.2 K/UL (ref 4.8–10.8)

## 2023-02-01 PROCEDURE — 85025 COMPLETE CBC W/AUTO DIFF WBC: CPT

## 2023-02-01 PROCEDURE — 700105 HCHG RX REV CODE 258: Performed by: STUDENT IN AN ORGANIZED HEALTH CARE EDUCATION/TRAINING PROGRAM

## 2023-02-01 PROCEDURE — 99233 SBSQ HOSP IP/OBS HIGH 50: CPT | Performed by: INTERNAL MEDICINE

## 2023-02-01 PROCEDURE — 700102 HCHG RX REV CODE 250 W/ 637 OVERRIDE(OP): Performed by: INTERNAL MEDICINE

## 2023-02-01 PROCEDURE — 770004 HCHG ROOM/CARE - ONCOLOGY PRIVATE *

## 2023-02-01 PROCEDURE — 93010 ELECTROCARDIOGRAM REPORT: CPT | Performed by: INTERNAL MEDICINE

## 2023-02-01 PROCEDURE — 80053 COMPREHEN METABOLIC PANEL: CPT

## 2023-02-01 PROCEDURE — A9270 NON-COVERED ITEM OR SERVICE: HCPCS | Performed by: INTERNAL MEDICINE

## 2023-02-01 PROCEDURE — 700102 HCHG RX REV CODE 250 W/ 637 OVERRIDE(OP): Performed by: STUDENT IN AN ORGANIZED HEALTH CARE EDUCATION/TRAINING PROGRAM

## 2023-02-01 PROCEDURE — 83735 ASSAY OF MAGNESIUM: CPT

## 2023-02-01 PROCEDURE — 84145 PROCALCITONIN (PCT): CPT

## 2023-02-01 PROCEDURE — 36415 COLL VENOUS BLD VENIPUNCTURE: CPT

## 2023-02-01 PROCEDURE — 83036 HEMOGLOBIN GLYCOSYLATED A1C: CPT

## 2023-02-01 PROCEDURE — 83605 ASSAY OF LACTIC ACID: CPT

## 2023-02-01 PROCEDURE — 84100 ASSAY OF PHOSPHORUS: CPT

## 2023-02-01 PROCEDURE — 700111 HCHG RX REV CODE 636 W/ 250 OVERRIDE (IP): Performed by: STUDENT IN AN ORGANIZED HEALTH CARE EDUCATION/TRAINING PROGRAM

## 2023-02-01 PROCEDURE — A9270 NON-COVERED ITEM OR SERVICE: HCPCS | Performed by: STUDENT IN AN ORGANIZED HEALTH CARE EDUCATION/TRAINING PROGRAM

## 2023-02-01 RX ORDER — LANOLIN ALCOHOL/MO/W.PET/CERES
400 CREAM (GRAM) TOPICAL 2 TIMES DAILY
Status: COMPLETED | OUTPATIENT
Start: 2023-02-01 | End: 2023-02-01

## 2023-02-01 RX ORDER — SODIUM CHLORIDE, SODIUM LACTATE, POTASSIUM CHLORIDE, AND CALCIUM CHLORIDE .6; .31; .03; .02 G/100ML; G/100ML; G/100ML; G/100ML
500 INJECTION, SOLUTION INTRAVENOUS ONCE
Status: COMPLETED | OUTPATIENT
Start: 2023-02-01 | End: 2023-02-01

## 2023-02-01 RX ORDER — POTASSIUM CHLORIDE 20 MEQ/1
20 TABLET, EXTENDED RELEASE ORAL ONCE
Status: COMPLETED | OUTPATIENT
Start: 2023-02-01 | End: 2023-02-01

## 2023-02-01 RX ADMIN — Medication 400 MG: at 17:19

## 2023-02-01 RX ADMIN — HYDROMORPHONE HYDROCHLORIDE 0.5 MG: 1 INJECTION, SOLUTION INTRAMUSCULAR; INTRAVENOUS; SUBCUTANEOUS at 08:40

## 2023-02-01 RX ADMIN — SODIUM CHLORIDE, POTASSIUM CHLORIDE, SODIUM LACTATE AND CALCIUM CHLORIDE 500 ML: 600; 310; 30; 20 INJECTION, SOLUTION INTRAVENOUS at 03:56

## 2023-02-01 RX ADMIN — PIPERACILLIN AND TAZOBACTAM 3.38 G: 3; .375 INJECTION, POWDER, LYOPHILIZED, FOR SOLUTION INTRAVENOUS; PARENTERAL at 20:33

## 2023-02-01 RX ADMIN — ZIPRASIDONE HYDROCHLORIDE 80 MG: 80 CAPSULE ORAL at 05:39

## 2023-02-01 RX ADMIN — Medication 400 MG: at 08:32

## 2023-02-01 RX ADMIN — OXYCODONE HYDROCHLORIDE 5 MG: 5 TABLET ORAL at 05:52

## 2023-02-01 RX ADMIN — HYDROMORPHONE HYDROCHLORIDE 0.5 MG: 1 INJECTION, SOLUTION INTRAMUSCULAR; INTRAVENOUS; SUBCUTANEOUS at 01:07

## 2023-02-01 RX ADMIN — GABAPENTIN 300 MG: 300 CAPSULE ORAL at 15:04

## 2023-02-01 RX ADMIN — GABAPENTIN 300 MG: 300 CAPSULE ORAL at 20:32

## 2023-02-01 RX ADMIN — VENLAFAXINE HYDROCHLORIDE 37.5 MG: 37.5 CAPSULE, EXTENDED RELEASE ORAL at 05:39

## 2023-02-01 RX ADMIN — PIPERACILLIN AND TAZOBACTAM 3.38 G: 3; .375 INJECTION, POWDER, LYOPHILIZED, FOR SOLUTION INTRAVENOUS; PARENTERAL at 12:45

## 2023-02-01 RX ADMIN — OXYCODONE HYDROCHLORIDE 5 MG: 5 TABLET ORAL at 20:32

## 2023-02-01 RX ADMIN — POTASSIUM CHLORIDE 20 MEQ: 1500 TABLET, EXTENDED RELEASE ORAL at 08:32

## 2023-02-01 RX ADMIN — CLONAZEPAM 1 MG: 1 TABLET ORAL at 05:52

## 2023-02-01 RX ADMIN — ZIPRASIDONE HYDROCHLORIDE 80 MG: 80 CAPSULE ORAL at 17:19

## 2023-02-01 RX ADMIN — CLONAZEPAM 1 MG: 1 TABLET ORAL at 17:36

## 2023-02-01 RX ADMIN — ENOXAPARIN SODIUM 40 MG: 40 INJECTION SUBCUTANEOUS at 17:19

## 2023-02-01 RX ADMIN — PIPERACILLIN AND TAZOBACTAM 3.38 G: 3; .375 INJECTION, POWDER, LYOPHILIZED, FOR SOLUTION INTRAVENOUS; PARENTERAL at 02:15

## 2023-02-01 RX ADMIN — GABAPENTIN 300 MG: 300 CAPSULE ORAL at 08:32

## 2023-02-01 RX ADMIN — SODIUM CHLORIDE, POTASSIUM CHLORIDE, SODIUM LACTATE AND CALCIUM CHLORIDE: 600; 310; 30; 20 INJECTION, SOLUTION INTRAVENOUS at 05:38

## 2023-02-01 ASSESSMENT — ENCOUNTER SYMPTOMS
FALLS: 0
DIARRHEA: 1
SORE THROAT: 0
NERVOUS/ANXIOUS: 0
SHORTNESS OF BREATH: 0
VOMITING: 1
PALPITATIONS: 0
DIZZINESS: 0
HEADACHES: 1
CHILLS: 0
BLURRED VISION: 0
DEPRESSION: 0
NAUSEA: 1
ABDOMINAL PAIN: 1
COUGH: 0
WEAKNESS: 0
CONSTIPATION: 0
FEVER: 0

## 2023-02-01 ASSESSMENT — COGNITIVE AND FUNCTIONAL STATUS - GENERAL
SUGGESTED CMS G CODE MODIFIER DAILY ACTIVITY: CH
CLIMB 3 TO 5 STEPS WITH RAILING: A LITTLE
DAILY ACTIVITIY SCORE: 24
SUGGESTED CMS G CODE MODIFIER MOBILITY: CJ
WALKING IN HOSPITAL ROOM: A LITTLE
MOBILITY SCORE: 22

## 2023-02-01 ASSESSMENT — LIFESTYLE VARIABLES
ON A TYPICAL DAY WHEN YOU DRINK ALCOHOL HOW MANY DRINKS DO YOU HAVE: 0
CONSUMPTION TOTAL: NEGATIVE
TOTAL SCORE: 0
ALCOHOL_USE: NO
HAVE PEOPLE ANNOYED YOU BY CRITICIZING YOUR DRINKING: NO
TOTAL SCORE: 0
HOW MANY TIMES IN THE PAST YEAR HAVE YOU HAD 5 OR MORE DRINKS IN A DAY: 0
TOTAL SCORE: 0
EVER HAD A DRINK FIRST THING IN THE MORNING TO STEADY YOUR NERVES TO GET RID OF A HANGOVER: NO
EVER FELT BAD OR GUILTY ABOUT YOUR DRINKING: NO
DOES PATIENT WANT TO STOP DRINKING: NO
AVERAGE NUMBER OF DAYS PER WEEK YOU HAVE A DRINK CONTAINING ALCOHOL: 0
HAVE YOU EVER FELT YOU SHOULD CUT DOWN ON YOUR DRINKING: NO

## 2023-02-01 ASSESSMENT — PAIN DESCRIPTION - PAIN TYPE
TYPE: ACUTE PAIN

## 2023-02-01 ASSESSMENT — PATIENT HEALTH QUESTIONNAIRE - PHQ9
SUM OF ALL RESPONSES TO PHQ9 QUESTIONS 1 AND 2: 0
1. LITTLE INTEREST OR PLEASURE IN DOING THINGS: NOT AT ALL
2. FEELING DOWN, DEPRESSED, IRRITABLE, OR HOPELESS: NOT AT ALL

## 2023-02-01 ASSESSMENT — FIBROSIS 4 INDEX: FIB4 SCORE: 0.46

## 2023-02-01 NOTE — ASSESSMENT & PLAN NOTE
Likely Secondary to pain    Plan:  Prn labetalol for systolic bp greater than 170 or diastolic bp greater than 110

## 2023-02-01 NOTE — ED NOTES
Med rec updated and complete. Allergies reviewed.  Confirmed name and date of birth. Pt is currently on a 7 day course of Augmentin 875-125 mg  Start date 01/29/23      [place] [facility] [phone]

## 2023-02-01 NOTE — ASSESSMENT & PLAN NOTE
Smokes 5 to 10 cigarettes a day    Plan:  Inpatient nicotine replacement  Smoking cessation counseling provided

## 2023-02-01 NOTE — PROGRESS NOTES
[facility] Medicine Daily Progress Note    Date of Service  2/1/2023    Chief Complaint  Rianna Solis is a 52 y.o. female admitted 1/31/2023 with abdominal pain, nausea, vomiting    Hospital Course  No notes on file    Rianna is a 52 y.o. female with a history of diverticulitis, cholecystectomy, bipolar I disorder with psychosis, anxiety, depression, Hashimoto thyroiditis not on medication who presented 1/31/2023 with worsening abdominal pain. She was recently admitted (1/27-1/28) for diverticulitis and left ama despite nausea and vomiting. During the admission, she was switched from Zosyn to Unasyn; and per discharge summary, after starting Unasyn, patient started having nausea and vomiting again.  However, she states that she still wanted to leave because she did not want to stay in the hospital anymore.  After she was discharged, she was taking Augmentin.  However gradually her pain, nausea and vomiting have been getting worse.  She states that the pain is in lower abdomen and migrates to mid to upper abdomen as well.      CT abdomen pelvis showed ongoing distal colonic diverticulitis increased since 1/24, prior cholecystectomy.    Patient reports abdominal pain since [place].  Initially was attributed to urinary infection and then diverticulitis.  It was then thought to be secondary to cholecystitis and she had a cholecystectomy 1/13.  Her pain has been persistent and again returned and again attributed to diverticulitis.    Interval Problem Update  Patient was seen and examined at bedside.  I have personally reviewed and interpreted vitals, labs, and imaging.    2/1.  Afebrile.  Hypertension is improved.  On room air.  Replete mag, potassium.  Denies fever, chills, chest pains, shortness of breath.  She does report a headache which has been chronic.  Does report abdominal pain which has been persistent since around [place].  Started on clear liquid diet.  Continue Zosyn for diverticulitis.      I  have discussed this patient's plan of care and discharge plan at IDT rounds today with Case Management, Nursing, Nursing leadership, and other members of the IDT team.    Consultants/Specialty  None    Code Status  Full Code    Disposition  Patient is not medically cleared for discharge.   Anticipate discharge to to home with close outpatient follow-up.  I have placed the appropriate orders for post-discharge needs.    Review of Systems  Review of Systems   Constitutional:  Negative for chills and fever.   HENT:  Negative for congestion and sore throat.    Eyes:  Negative for blurred vision.   Respiratory:  Negative for cough and shortness of breath.    Cardiovascular:  Negative for chest pain, palpitations and leg swelling.   Gastrointestinal:  Positive for abdominal pain, diarrhea, nausea and vomiting. Negative for constipation.   Genitourinary:  Negative for dysuria, frequency and urgency.   Musculoskeletal:  Negative for falls.   Skin:  Negative for rash.   Neurological:  Positive for headaches. Negative for dizziness and weakness.   Psychiatric/Behavioral:  Negative for depression. The patient is not nervous/anxious.    All other systems reviewed and are negative.     Physical Exam  Temp:  [36.3 °C (97.3 °F)-36.5 °C (97.7 °F)] 36.4 °C (97.6 °F)  Pulse:  [66-75] 68  Resp:  [18-22] 18  BP: ()/(52-90) 133/68  SpO2:  [90 %-98 %] 93 %    Physical Exam  Vitals and nursing note reviewed.   Constitutional:       Appearance: Normal appearance. She is obese. She is ill-appearing.   HENT:      Head: Normocephalic and atraumatic.      Right Ear: External ear normal.      Left Ear: External ear normal.      Nose: Nose normal.      Mouth/Throat:      Mouth: Mucous membranes are moist.      Pharynx: Oropharynx is clear. No oropharyngeal exudate or posterior oropharyngeal erythema.   Eyes:      Extraocular Movements: Extraocular movements intact.      Conjunctiva/sclera: Conjunctivae normal.   Cardiovascular:      Rate  and Rhythm: Normal rate and regular rhythm.      Pulses: Normal pulses.      Heart sounds: Normal heart sounds. No murmur heard.  Pulmonary:      Effort: Pulmonary effort is normal. No respiratory distress.      Breath sounds: Normal breath sounds. No stridor. No wheezing or rales.   Abdominal:      General: Abdomen is flat. Bowel sounds are normal. There is no distension.      Palpations: Abdomen is soft. There is no mass.      Tenderness: There is abdominal tenderness.   Musculoskeletal:      Cervical back: Normal range of motion.   Skin:     General: Skin is warm.      Capillary Refill: Capillary refill takes less than 2 seconds.   Neurological:      General: No focal deficit present.      Mental Status: She is alert and oriented to person, place, and time. Mental status is at baseline.      Cranial Nerves: No cranial nerve deficit.   Psychiatric:         Mood and Affect: Mood normal.         Behavior: Behavior normal.       Fluids    Intake/Output Summary (Last 24 hours) at 2/1/2023 0752  Last data filed at 2/1/2023 0615  Gross per 24 hour   Intake 3509.93 ml   Output --   Net 3509.93 ml       Laboratory  Recent Labs     01/31/23  1613 02/01/23  0151   WBC 8.8 9.2   RBC 3.99* 3.73*   HEMOGLOBIN 12.1 11.3*   HEMATOCRIT 35.8* 34.1*   MCV 89.7 91.4   MCH 30.3 30.3   MCHC 33.8 33.1*   RDW 40.1 41.4   PLATELETCT 405 383   MPV 8.5* 8.7*     Recent Labs     01/31/23  1613 02/01/23  0151   SODIUM 138 137   POTASSIUM 3.9 3.9   CHLORIDE 102 102   CO2 26 26   GLUCOSE 123* 130*   BUN 11 12   CREATININE 0.62 0.65   CALCIUM 9.6 9.1                   Imaging  CT-ABDOMEN-PELVIS WITH   Final Result      1.  Ongoing distal colonic diverticulitis which appears increased in severity since the prior study   2.  Prior cholecystectomy           Assessment/Plan  * Acute diverticulitis- (present on admission)  Assessment & Plan  She was recently admiited (1/27-1/28) for diverticulitis and left ama despite nausea and vomiting.  She has  been having abdominal pain since December 25.  last time she had diverticulitis before December was 1 year ago.  No leukocytosis or fever, blood cultures during last admissions were negative  Lactic acid negative.    Plan:  Admit to medicine  Pain control  Antiemetic  IV fluid  MRSA swab negative  Advance diet as tolerated    Hypertensive urgency  Assessment & Plan  Likely Secondary to pain    Plan:  Prn labetalol for systolic bp greater than 170 or diastolic bp greater than 110    Hyperglycemia  Assessment & Plan  Likely reactive  a1c 5.9    Transaminitis  Assessment & Plan  #elevated ALT  #elevated alkphos    Elevated ALT likely due to fatty liver, elevated alk phos likely reactive    Plan:  Continue to trend    Diarrhea of presumed infectious origin  Assessment & Plan  Diarrhea likely due to Augmentin use or from diverticulitis but cannot rule out C. Difficile  C. difficile testing    Diarrhea can also be secondary to diverticulitis.  Would hold off on loperamide.    Tobacco consumption  Assessment & Plan  Smokes 5 to 10 cigarettes a day    Plan:  Inpatient nicotine replacement  Smoking cessation counseling provided    Partial seizure disorder (HCC)- (present on admission)  Assessment & Plan  Continue home gabapentin    Bipolar 1 disorder with psychosis episodes- (present on admission)  Assessment & Plan  Continue home meds    Anxiety- (present on admission)  Assessment & Plan  Continue home meds         VTE prophylaxis: enoxaparin ppx    I have performed a physical exam and reviewed and updated ROS and Plan today (2/1/2023). In review of yesterday's note (1/31/2023), there are no changes except as documented above.

## 2023-02-01 NOTE — ASSESSMENT & PLAN NOTE
Diarrhea likely due to Augmentin use or from diverticulitis but cannot rule out C. Difficile  C. difficile negative    Diarrhea can also be secondary to diverticulitis.  Would hold off on loperamide.   Patient position prone. Patient prepped and draped per unit standard.    Safety straps applied: yes

## 2023-02-01 NOTE — DISCHARGE PLANNING
Case Management Discharge Planning    Admission Date: 1/31/2023  GMLOS: 2.6  ALOS: 1    6-Clicks ADL Score: 24  6-Clicks Mobility Score: 22      Anticipated Discharge Dispo: Discharge Disposition: Discharged to home/self care (01)    DME Needed: No    Action(s) Taken: RNCM participated in IDT rounds. Patient is not medically cleared and will need a few more days. Receiving IV abx.     Escalations Completed: None    Medically Clear: No    Next Steps: HCM will continue to follow for any discharge planing needs.     Barriers to Discharge: Medical clearance    Is the patient up for discharge tomorrow: No

## 2023-02-01 NOTE — ED TRIAGE NOTES
Chief Complaint   Patient presents with    Abdominal Pain     Pt had gall bladder removal x3 weeks ago. Pt complains of abdominal pain since the surgery. Last seen at [facility] ED for same issue on 01/27. Pt states recent bouts of nausea, vomiting, diarrhea.     Pt educated upon triage process and told to inform  staff of any changes in condition so that Pt may be reassessed. No further questions at this time. Pt sitting out in lobby.

## 2023-02-01 NOTE — ED NOTES
Assumed care of pt, received report from HARSH Reyez. Safety rounds completed, pt resting comfortably in the room.

## 2023-02-01 NOTE — PROGRESS NOTES
4 Eyes Skin Assessment Completed by HARSH Crenshaw and HARSH Martin.    Head WDL  Ears WDL  Nose WDL  Mouth WDL  Neck WDL  Breast/Chest WDL  Shoulder Blades WDL  Spine WDL  (R) Arm/Elbow/Hand WDL  (L) Arm/Elbow/Hand WDL  Abdomen Scar  Groin WDL  Scrotum/Coccyx/Buttocks WDL  (R) Leg WDL  (L) Leg WDL  (R) Heel/Foot/Toe WDL  (L) Heel/Foot/Toe WDL          Devices In Places       Interventions In Place Pillows    Possible Skin Injury No    Pictures Uploaded Into Epic N/A  Wound Consult Placed N/A  RN Wound Prevention Protocol Ordered No

## 2023-02-01 NOTE — ED PROVIDER NOTES
ED Provider Note    CHIEF COMPLAINT  Chief Complaint   Patient presents with    Abdominal Pain     Pt had gall bladder removal x3 weeks ago. Pt complains of abdominal pain since the surgery. Last seen at [facility] ED for same issue on 01/27. Pt states recent bouts of nausea, vomiting, diarrhea.       EXTERNAL RECORDS REVIEWED  Inpatient Notes patient has a recent complicated history over the last month and a half of urinary tract infection, followed by cholecystitis and then a recent admission on 127 for diverticulitis at [facility].  During her hospitalization they had wanted to keep patient longer for her severity of her diverticulitis with IV antibiotics and unfortunately she left AMA    HPI/ROS  LIMITATION TO HISTORY   Select: : None  OUTSIDE HISTORIAN(S):  Family corroborates the story at the bedside    Rianna Solis is a 52 y.o. female who presents to the emerge department chief complaint of worsening abdominal pain nausea vomiting diarrhea.  The patient understands she was diagnosed with diverticulitis and then left AGAINST MEDICAL ADVICE the day before they wanted to release her she states she has been taking her Augmentin as well as her home pain meds without relief of symptoms.  She states they really worsened last night and then throughout the day today she is had multiple episodes of emesis with most episodes of diarrhea no bloody stools and severely worsening lower abdominal pain.  She states her pain currently is about an 8 out of 10 mostly in the left to the right lower quadrant    PAST MEDICAL HISTORY   has a past medical history of Abnormal mammogram (2006), Acromioclavicular joint separation (05/24/2012), Anxiety, Bipolar 1 disorder (HCC) (01/2023), Depression, Former smoker (04/26/2019), History of cervical dysplasia (1990), History of gestational diabetes (01/15/2018), History of suicidal ideation, Homicidal ideations (2014), Hyperthyroidism, Hyperthyroidism, Lisfranc's dislocation  "(10/2017), Migraine, Partial seizure disorder (HCC) (2023), Pelvic exam (), Polysubstance dependence (), Schizoaffective disorder (HCC), Schizoaffective disorder, bipolar type (HCC) (2011), Seizure (HCC) (), Seizure disorder ([facility]), Sleep apnea (2023), Toe fracture (10/2017), Urinary tract infection, and Wrist fracture ().    SURGICAL HISTORY   has a past surgical history that includes other; cervical conization (1990); eye surgery (,); primary c section (2008); foot surgery (Right, 10/2017); and cholecystectomy robotic xi (N/A, 2023).    FAMILY HISTORY  Family History   Problem Relation Age of Onset    Cancer Mother         breast cancer    Diabetes Mother     Hypertension Mother     Cancer Maternal Grandmother         stomach    Stroke Maternal Grandfather     Cancer Paternal Grandfather         lung    Hypertension Father        SOCIAL HISTORY  Social History     Tobacco Use    Smoking status: Some Days     Packs/day: 0.50     Years: 30.00     Pack years: 15.00     Types: Cigarettes     Start date: 2016     Last attempt to quit: 2022     Years since quittin.4    Smokeless tobacco: Never    Tobacco comments:     started at 18   Vaping Use    Vaping Use: Former    Substances: Nicotine   Substance and Sexual Activity    Alcohol use: Not Currently    Drug use: No    Sexual activity: Yes     Partners: Male     Comment:  x5 years       CURRENT MEDICATIONS  Home Medications    **Home medications have not yet been reviewed for this encounter**         ALLERGIES  Allergies   Allergen Reactions    Zoloft Swelling     \"Blows Up\"       PHYSICAL EXAM  VITAL SIGNS: BP (!) 186/90   Pulse 66   Temp 36.3 °C (97.3 °F) (Temporal)   Resp (!) 22   Ht 1.575 m (5' 2\")   Wt 81.7 kg (180 lb 1.9 oz)   SpO2 98%   BMI 32.94 kg/m²    Pulse Ox Interpretation:   Pulse Ox is normal  Constitutional: Alert in mild distress  HENT: Normocephalic atraumatic, mildly dry " mucous membranes  Eyes: PER, Conjunctiva normal, Non-icteric.   Neck: Normal range of motion, No tenderness, Supple, No stridor.   Cardiovascular: Regular rate and rhythm, no murmurs.   Thorax & Lungs: Normal breath sounds, No respiratory distress, No wheezing, No chest tenderness.   Abdomen: Bowel sounds normal, Soft, bilateral lower abdominal tenderness with guarding but no rebound no pulsatile masses. No peritoneal signs.  Skin: Warm, Dry, No erythema, No rash.   Back: No bony tenderness, No CVA tenderness.   Extremities/MSK: Intact equal distal pulses, No edema, No tenderness, No cyanosis, no major deformities noted  Neurologic: Alert and oriented x3, No focal deficits noted.         DIAGNOSTIC STUDIES / PROCEDURES      LABS  Labs Reviewed   CBC WITH DIFFERENTIAL - Abnormal; Notable for the following components:       Result Value    RBC 3.99 (*)     Hematocrit 35.8 (*)     MPV 8.5 (*)     All other components within normal limits   COMP METABOLIC PANEL - Abnormal; Notable for the following components:    Glucose 123 (*)     ALT(SGPT) 60 (*)     Alkaline Phosphatase 100 (*)     All other components within normal limits   LIPASE   HCG QUAL SERUM   URINALYSIS,CULTURE IF INDICATED    Narrative:     Indication for culture:->Patient WITHOUT an indwelling Henao  catheter in place with new onset of Dysuria, Frequency,  Urgency, and/or Suprapubic pain   CORRECTED CALCIUM   ESTIMATED GFR   CDIFF BY PCR RFLX TOXIN   LACTIC ACID   LACTIC ACID   MRSA BY PCR (AMP)   CBC WITH DIFFERENTIAL   COMP METABOLIC PANEL   MAGNESIUM   PHOSPHORUS   HEMOGLOBIN A1C   MAGNESIUM   PHOSPHORUS     Normal white blood cell count mildly elevated ALT and alk phos normal lipase urinalysis within normal limits    RADIOLOGY  I have independently interpreted the diagnostic imaging associated with this visit and am waiting the final reading from the radiologist.   My preliminary interpretation is a follows:   CT abd pelvis - no acute abscess or  perforation, diverticulitis still noted   Radiologist interpretation:     CT-ABDOMEN-PELVIS WITH   Final Result      1.  Ongoing distal colonic diverticulitis which appears increased in severity since the prior study   2.  Prior cholecystectomy            COURSE & MEDICAL DECISION MAKING    ED Observation Status? Yes; I am placing the patient in to an observation status due to a diagnostic uncertainty as well as therapeutic intensity. Patient placed in observation status at 6:03 PM, 1/31/2023.     Observation plan is as follows: Repeat imaging and blood work with pain management antiemetics IV fluids    Upon Reevaluation, the patient's condition has: not improved; and will be escalated to hospitalization.    Patient discharged from ED Observation status at 8:29 PM  (Time) 1/31 (Date).     INITIAL ASSESSMENT, COURSE AND PLAN  Care Narrative: Patient resents emergency department with recent diverticulitis she seems to have worsening symptoms despite at home oral antibiotics concern for worsening or diverticulitis versus perforation or abscess.  Patient was treated with IV fluids antiemetics pain management repeat CT scan of the abdomen pelvis.    8:29 PM  Unfortunately repeat examination shows worsening of her diverticulitis with worsening symptoms vomiting and severe pain she will be hospitalized.  She states her pain was not touched by the morphine she was written for some Dilaudid.  IV antibiotics were ordered.      I spoke with the medicine team and they have accepted the patient for hospitalization.    HYDRATION: Based on the patient's presentation of Dehydration the patient was given IV fluids. IV Hydration was used because oral hydration failed due to vomiting. Upon recheck following hydration, the patient was feeling somewhat improved.      ADDITIONAL PROBLEM LIST  1.  Chronic nausea  2.  Worsening diverticulitis  Lower abdominal pain    DISPOSITION AND DISCUSSIONS  I have discussed management of the patient  with the following physicians and LATESHA's: Internal medicine team    Discussion of management with other QHP or appropriate source(s): None       FINAL DIAGNOSIS  Diverticulitis  Nausea vomiting diarrhea intractable       Electronically signed by: Shivani Pretty M.D., 1/31/2023 6:03 PM

## 2023-02-01 NOTE — H&P
[facility] internal medicine history & physical note    Date of Admission: 2/1/2023  Admission Status: inpatient  Attending:   Senior Resident: Dr. Mitchell    Contact Number: [phone]    Chief Complaint: abdominal pain     History of Present Illness (HPI):   Rianna is a 52 y.o. female with a history of diverticulitis, cholecystectomy, bipolar I disorder with psychosis, anxiety, depression, Hashimoto thyroiditis not on medication who presented 1/31/2023 with worsening abdominal pain. She was recently admitted (1/27-1/28) for diverticulitis and left ama despite nausea and vomiting. During the admission, she was switched from Zosyn to Unasyn; and per discharge summary, after starting Unasyn, patient started having nausea and vomiting again.  However, she states that she still wanted to leave because she did not want to stay in the hospital anymore.  After she was discharged, she was taking Augmentin.  However gradually her pain, nausea and vomiting have been getting worse.  She states that the pain is in lower abdomen and migrates to mid to upper abdomen as well.  She denies fever.  But has chills.  Last solid bowel movement was 5 days ago.  After that she has been having increased watery diarrhea without any solid stool.  She also denies blood in stool, hematochezia, melena.  Today, she had nausea and vomiting but no coffee-ground emesis or hematemesis.  She denies nausea and vomiting now.  She states that the abdominal pain is constantly there since December 25.  No change in medication but states that she tried to eat healthier. last time she had diverticulitis before December was 1 year ago.    ED course  Initial vitals: afebrile, RR 22, /90  Labs:   Negative lipase, cbc negative for leukocytosis. CMP notable for mildly elevated ALT and Alk  EKG: Sinus rhythm without ST change or other acute finding  Imaging: CT abdomen pelvis shows ongoing distal colonic diverticulitis which appears increased in severity  since the prior study, prior cholecystectomy  Treatments: Received Zosyn and 1 L fluid, dilaudid 1 mg, morphine 4 mg, zofran 4 mg    Review of Systems:   Review of Systems   Constitutional:  Negative for chills, diaphoresis and fever.   HENT:  Negative for ear discharge, hearing loss and tinnitus.    Eyes:  Negative for blurred vision, double vision and discharge.   Respiratory:  Negative for cough, shortness of breath and wheezing.    Cardiovascular:  Negative for chest pain and palpitations.   Gastrointestinal:  Positive for abdominal pain, diarrhea, heartburn, nausea and vomiting. Negative for blood in stool.   Genitourinary:  Negative for dysuria, frequency and urgency.   Musculoskeletal:  Negative for falls, myalgias and neck pain.   Skin:  Negative for rash.   Neurological:  Negative for dizziness, tingling and headaches.   Endo/Heme/Allergies:  Negative for environmental allergies. Does not bruise/bleed easily.   Psychiatric/Behavioral:  Positive for depression. Negative for hallucinations and suicidal ideas.        Past Medical History:   Past Medical History was reviewed with patient.   has a past medical history of Abnormal mammogram (2006), Acromioclavicular joint separation (05/24/2012), Anxiety, Bipolar 1 disorder (HCC) (01/2023), Depression, Former smoker (04/26/2019), History of cervical dysplasia (1990), History of gestational diabetes (01/15/2018), History of suicidal ideation, Homicidal ideations (2014), Hyperthyroidism, Hyperthyroidism, Lisfranc's dislocation (10/2017), Migraine, Partial seizure disorder ([facility]) (01/2023), Pelvic exam (2008), Polysubstance dependence ([facility]), Schizoaffective disorder ([facility]), Schizoaffective disorder, bipolar type ([facility]) (04/07/2011), Seizure ([facility]) (1999), Seizure disorder ([facility]), Sleep apnea (01/2023), Toe fracture (10/2017), Urinary tract infection, and Wrist fracture (2010).    She has no past medical history of Ulcer.    Past Surgical History: Past Surgical History  was reviewed with patient.   has a past surgical history that includes other; cervical conization (01/01/1990); eye surgery (1973,1979); primary c section (01/01/2008); foot surgery (Right, 10/2017); and cholecystectomy robotic xi (N/A, 1/13/2023).    Medications: Medications have been reviewed with patient.  Prior to Admission Medications   Prescriptions Last Dose Informant Patient Reported? Taking?   COMPRO 25 MG Suppos  Patient Yes No   Sig: Insert 25 mg into the rectum 2 times a day as needed for Nausea/Vomiting.   Calcium Carb-Cholecalciferol (CALCIUM 1000 + D PO)  Patient Yes No   Sig: Take 1 Tablet by mouth every day.   Multiple Vitamins-Minerals (MULTIVITAMIN ADULTS) Tab  Patient Yes No   Sig: Take 1 Tablet by mouth every day.   Omega-3 Fatty Acids (FISH OIL) 1000 MG Cap capsule  Patient Yes No   Sig: Take 1,000 mg by mouth every day.   Vitamin D, Cholecalciferol, 25 MCG (1000 UT) Cap  Patient Yes No   Sig: Take 1,000 Units by mouth every day.   acetaminophen (TYLENOL) 500 MG Tab  Patient Yes No   Sig: Take 500-1,000 mg by mouth every 6 hours as needed. Indications: Pain   amoxicillin-clavulanate (AUGMENTIN) 875-125 MG Tab  Patient Yes No   Sig: Take 1 Tablet by mouth 2 times a day.   clonazePAM (KLONOPIN) 1 MG Tab  Patient Yes No   Sig: Take 1 mg by mouth 3 times a day as needed. Indications: Feeling Anxious   gabapentin (NEURONTIN) 300 MG Cap  Patient Yes No   Sig: Take 300 mg by mouth 3 times a day.   ibuprofen (MOTRIN) 200 MG Tab  Patient Yes No   Sig: Take 200 mg by mouth every 6 hours as needed. Indications: Pain   traMADol (ULTRAM) 50 MG Tab  Patient Yes No   Sig: Take 50 mg by mouth 3 times a day as needed. Indications: Pain   venlafaxine XR (EFFEXOR XR) 37.5 MG CAPSULE SR 24 HR  Patient Yes No   Sig: Take 37.5 mg by mouth every morning.   ziprasidone (GEODON) 80 MG Cap  Patient Yes No   Sig: Take 80 mg by mouth 2 times a day.      Facility-Administered Medications: None        Allergies: Allergies  have been reviewed with patient.  Allergies   Allergen Reactions    Sertraline Swelling     Swelling all over       Family History:   family history includes Cancer in her maternal grandmother, mother, and paternal grandfather; Diabetes in her mother; Hypertension in her father and mother; Stroke in her maternal grandfather.     positive for breast cancer, brain, stomach      Social History:   Tobacco: smokes 5-10 cigarettes a day   Alcohol: denies   Recreational drugs (illegal and prescription):  denies   Employment: none  Activity Level: walks well   Living situation: Lives in [place] with her   Recent travel:  none  Primary Care Provider: reviewed SHAY Travis    Physical Exam:   Vitals:  Temp:  [36.3 °C (97.3 °F)-36.5 °C (97.7 °F)] 36.4 °C (97.6 °F)  Pulse:  [66-75] 68  Resp:  [18-22] 18  BP: ()/(52-90) 101/52  SpO2:  [90 %-98 %] 93 %    Physical Exam  Constitutional:       General: She is in acute distress.      Appearance: She is obese. She is not ill-appearing or diaphoretic.      Comments: Patient is in pain   HENT:      Head: Normocephalic.      Right Ear: External ear normal.      Left Ear: External ear normal.      Nose: Nose normal.      Mouth/Throat:      Mouth: Mucous membranes are moist.      Pharynx: No oropharyngeal exudate.   Eyes:      General: No scleral icterus.        Right eye: No discharge.         Left eye: No discharge.      Extraocular Movements: Extraocular movements intact.   Cardiovascular:      Rate and Rhythm: Normal rate and regular rhythm.      Heart sounds: Normal heart sounds. No murmur heard.  Pulmonary:      Effort: Pulmonary effort is normal. No respiratory distress.      Breath sounds: Normal breath sounds. No wheezing or rales.   Abdominal:      General: Abdomen is flat. Bowel sounds are normal. There is no distension.      Palpations: Abdomen is soft.      Tenderness: There is abdominal tenderness. There is no guarding or rebound.      Comments: Tender  to palpation diffusely in the abdomen more prominently in lower abdomen on the left side.  Also tender to palpation in right lower back.  No CVA tenderness   Musculoskeletal:         General: No swelling.      Cervical back: Normal range of motion and neck supple.      Right lower leg: No edema.      Left lower leg: No edema.   Skin:     General: Skin is warm and dry.      Capillary Refill: Capillary refill takes less than 2 seconds.      Coloration: Skin is not jaundiced.   Neurological:      General: No focal deficit present.      Mental Status: She is alert and oriented to person, place, and time. Mental status is at baseline.   Psychiatric:         Mood and Affect: Mood normal.         Behavior: Behavior normal.         Thought Content: Thought content normal.         Judgment: Judgment normal.       Labs:   reviewed    EKG: Sinus rhythm without ST changes or any other acute finding    Imaging:   reviewed    Previous Data Review: reviewed    Patient requires at least 2 midnights of inpatient admission for diverticulitis treatment  Patient needs medical service for diverticular treatment    * Acute diverticulitis- (present on admission)  Assessment & Plan  She was recently admiited (1/27-1/28) for diverticulitis and left ama despite nausea and vomiting.  She has been having abdominal pain since December 25.  last time she had diverticulitis before December was 1 year ago.  No leukocytosis or fever, blood cultures during last admissions were negative    Plan:  Admit to medicine  Pain control  Antiemetic  IV fluid  MRSA swab  Advance diet as tolerated    Diarrhea of presumed infectious origin  Assessment & Plan  Diarrhea likely due to Augmentin use or from diverticulitis but cannot rule out C. Difficile    Plan:  C. difficile testing    Hypertensive urgency  Assessment & Plan  Likely Secondary to pain    Plan:  Prn labetalol for systolic bp greater than 170 or diastolic bp greater than  110    Hyperglycemia  Assessment & Plan  Likely reactive  a1c    Transaminitis  Assessment & Plan  #elevated ALT  #elevated alkphos    Elevated ALT likely due to fatty liver, elevated alk phos likely reactive    Plan:  Continue to trend    Tobacco consumption  Assessment & Plan  Smokes 5 to 10 cigarettes a day    Plan:  Inpatient nicotine replacement  Smoking cessation counseling provided    Partial seizure disorder (HCC)- (present on admission)  Assessment & Plan  Continue home gabapentin    Bipolar 1 disorder with psychosis episodes- (present on admission)  Assessment & Plan  Continue home meds    Anxiety- (present on admission)  Assessment & Plan  Continue home meds        Diet: advance as tolerated  Code:  full  dvt prophylaxis: lovenox  Fluid: lr  DC planning:home  Lines/drain:peripheral  Henao:  none

## 2023-02-01 NOTE — DIETARY
"Nutrition services: Day 1 of admit.  Rianna Solis is a 52 y.o. female with admitting DX of Diverticulitis     Consult received for unintentional weight loss and poor PO intake/appetite;MST 5    Met with pt at bedside. RD unable to interview pt as pt was in and out of sleep and could not hold conversation. RD provided pt with diverticulitis nutrition handouts and shared that RD team would follow up in upcoming days for a full nutrition assessment.     Assessment:  Height: 157.5 cm (5' 2\")  Weight: 81.7 kg (180 lb 1.9 oz) via stand up scale   Body mass index is 32.94 kg/m²., BMI classification: Obesity class I  Diet/Intake: CLD; no meals recorded this this time     Evaluation:   Hx of: diverticulitis, cholecystectomy, bipolar I disorder with psychosis, anxiety, depression, Hashimoto thyroiditis not on medication who presented 1/31/2023 with worsening abdominal pain  Per flowsheets, pt with hyperactive bowel sounds   Labs: A1c 5.9  Meds: klonopin, LR bolus, LR infusion, magnesium oxide, zofran, ziprasidone, kdur   +BM: 1/31  Nutrition focused physical exam: Pt appeared well nourished.     Malnutrition Risk: Unable to determine at this time     Recommendations/Plan:  Diet advancement per RD   RD to follow up in upcoming days for nutrition interview  Encourage intake of >50% of meals   Document intake of all meals  as % taken in ADL's to provide interdisciplinary communication across all shifts.   Monitor weight.  Nutrition rep will continue to see patient for ongoing meal and snack preferences.     RD following.     "

## 2023-02-01 NOTE — ASSESSMENT & PLAN NOTE
She was recently admiited (1/27-1/28) for diverticulitis and left ama despite nausea and vomiting.  She has been having abdominal pain since December 25.  last time she had diverticulitis before December was 1 year ago.  No leukocytosis or fever, blood cultures during last admissions were negative  Lactic acid negative.    Plan:  Admit to medicine  Pain control  Antiemetic  IV fluid  MRSA swab negative  Advance diet as tolerated

## 2023-02-01 NOTE — ASSESSMENT & PLAN NOTE
#elevated ALT  #elevated alkphos    Elevated ALT likely due to fatty liver, elevated alk phos likely reactive    Plan:  Continue to trend

## 2023-02-02 LAB
ANION GAP SERPL CALC-SCNC: 9 MMOL/L (ref 7–16)
BASOPHILS # BLD AUTO: 0.5 % (ref 0–1.8)
BASOPHILS # BLD: 0.04 K/UL (ref 0–0.12)
BUN SERPL-MCNC: 9 MG/DL (ref 8–22)
C DIFF DNA SPEC QL NAA+PROBE: NEGATIVE
C DIFF TOX GENS STL QL NAA+PROBE: NEGATIVE
CALCIUM SERPL-MCNC: 9.1 MG/DL (ref 8.5–10.5)
CHLORIDE SERPL-SCNC: 105 MMOL/L (ref 96–112)
CO2 SERPL-SCNC: 24 MMOL/L (ref 20–33)
CREAT SERPL-MCNC: 0.55 MG/DL (ref 0.5–1.4)
EOSINOPHIL # BLD AUTO: 0.19 K/UL (ref 0–0.51)
EOSINOPHIL NFR BLD: 2.2 % (ref 0–6.9)
ERYTHROCYTE [DISTWIDTH] IN BLOOD BY AUTOMATED COUNT: 40.6 FL (ref 35.9–50)
GFR SERPLBLD CREATININE-BSD FMLA CKD-EPI: 110 ML/MIN/1.73 M 2
GLUCOSE SERPL-MCNC: 90 MG/DL (ref 65–99)
HCT VFR BLD AUTO: 34.5 % (ref 37–47)
HGB BLD-MCNC: 11.6 G/DL (ref 12–16)
IMM GRANULOCYTES # BLD AUTO: 0.03 K/UL (ref 0–0.11)
IMM GRANULOCYTES NFR BLD AUTO: 0.4 % (ref 0–0.9)
LYMPHOCYTES # BLD AUTO: 2.97 K/UL (ref 1–4.8)
LYMPHOCYTES NFR BLD: 34.7 % (ref 22–41)
MAGNESIUM SERPL-MCNC: 2 MG/DL (ref 1.5–2.5)
MCH RBC QN AUTO: 30.9 PG (ref 27–33)
MCHC RBC AUTO-ENTMCNC: 33.6 G/DL (ref 33.6–35)
MCV RBC AUTO: 91.8 FL (ref 81.4–97.8)
MONOCYTES # BLD AUTO: 0.67 K/UL (ref 0–0.85)
MONOCYTES NFR BLD AUTO: 7.8 % (ref 0–13.4)
NEUTROPHILS # BLD AUTO: 4.66 K/UL (ref 2–7.15)
NEUTROPHILS NFR BLD: 54.4 % (ref 44–72)
NRBC # BLD AUTO: 0 K/UL
NRBC BLD-RTO: 0 /100 WBC
PHOSPHATE SERPL-MCNC: 4.5 MG/DL (ref 2.5–4.5)
PLATELET # BLD AUTO: 327 K/UL (ref 164–446)
PMV BLD AUTO: 8.5 FL (ref 9–12.9)
POTASSIUM SERPL-SCNC: 4.2 MMOL/L (ref 3.6–5.5)
RBC # BLD AUTO: 3.76 M/UL (ref 4.2–5.4)
SODIUM SERPL-SCNC: 138 MMOL/L (ref 135–145)
WBC # BLD AUTO: 8.6 K/UL (ref 4.8–10.8)

## 2023-02-02 PROCEDURE — 700105 HCHG RX REV CODE 258: Performed by: STUDENT IN AN ORGANIZED HEALTH CARE EDUCATION/TRAINING PROGRAM

## 2023-02-02 PROCEDURE — A9270 NON-COVERED ITEM OR SERVICE: HCPCS | Performed by: STUDENT IN AN ORGANIZED HEALTH CARE EDUCATION/TRAINING PROGRAM

## 2023-02-02 PROCEDURE — 36415 COLL VENOUS BLD VENIPUNCTURE: CPT

## 2023-02-02 PROCEDURE — 85025 COMPLETE CBC W/AUTO DIFF WBC: CPT

## 2023-02-02 PROCEDURE — 700105 HCHG RX REV CODE 258: Performed by: INTERNAL MEDICINE

## 2023-02-02 PROCEDURE — 99232 SBSQ HOSP IP/OBS MODERATE 35: CPT | Performed by: INTERNAL MEDICINE

## 2023-02-02 PROCEDURE — 84100 ASSAY OF PHOSPHORUS: CPT

## 2023-02-02 PROCEDURE — 700102 HCHG RX REV CODE 250 W/ 637 OVERRIDE(OP): Performed by: STUDENT IN AN ORGANIZED HEALTH CARE EDUCATION/TRAINING PROGRAM

## 2023-02-02 PROCEDURE — 80048 BASIC METABOLIC PNL TOTAL CA: CPT

## 2023-02-02 PROCEDURE — A9270 NON-COVERED ITEM OR SERVICE: HCPCS | Performed by: INTERNAL MEDICINE

## 2023-02-02 PROCEDURE — 83735 ASSAY OF MAGNESIUM: CPT

## 2023-02-02 PROCEDURE — 700111 HCHG RX REV CODE 636 W/ 250 OVERRIDE (IP): Performed by: STUDENT IN AN ORGANIZED HEALTH CARE EDUCATION/TRAINING PROGRAM

## 2023-02-02 PROCEDURE — 770004 HCHG ROOM/CARE - ONCOLOGY PRIVATE *

## 2023-02-02 PROCEDURE — 700102 HCHG RX REV CODE 250 W/ 637 OVERRIDE(OP): Performed by: INTERNAL MEDICINE

## 2023-02-02 PROCEDURE — 700111 HCHG RX REV CODE 636 W/ 250 OVERRIDE (IP): Performed by: INTERNAL MEDICINE

## 2023-02-02 RX ADMIN — VENLAFAXINE HYDROCHLORIDE 37.5 MG: 37.5 CAPSULE, EXTENDED RELEASE ORAL at 06:03

## 2023-02-02 RX ADMIN — ENOXAPARIN SODIUM 40 MG: 40 INJECTION SUBCUTANEOUS at 17:26

## 2023-02-02 RX ADMIN — HYDROMORPHONE HYDROCHLORIDE 0.5 MG: 1 INJECTION, SOLUTION INTRAMUSCULAR; INTRAVENOUS; SUBCUTANEOUS at 17:32

## 2023-02-02 RX ADMIN — ZIPRASIDONE HYDROCHLORIDE 80 MG: 80 CAPSULE ORAL at 17:26

## 2023-02-02 RX ADMIN — SODIUM CHLORIDE, POTASSIUM CHLORIDE, SODIUM LACTATE AND CALCIUM CHLORIDE: 600; 310; 30; 20 INJECTION, SOLUTION INTRAVENOUS at 05:59

## 2023-02-02 RX ADMIN — GABAPENTIN 300 MG: 300 CAPSULE ORAL at 08:21

## 2023-02-02 RX ADMIN — PIPERACILLIN AND TAZOBACTAM 3.38 G: 3; .375 INJECTION, POWDER, LYOPHILIZED, FOR SOLUTION INTRAVENOUS; PARENTERAL at 05:58

## 2023-02-02 RX ADMIN — GABAPENTIN 300 MG: 300 CAPSULE ORAL at 14:39

## 2023-02-02 RX ADMIN — ZIPRASIDONE HYDROCHLORIDE 80 MG: 80 CAPSULE ORAL at 06:03

## 2023-02-02 RX ADMIN — AMPICILLIN AND SULBACTAM 3 G: 1; 2 INJECTION, POWDER, FOR SOLUTION INTRAMUSCULAR; INTRAVENOUS at 17:28

## 2023-02-02 RX ADMIN — HYDROMORPHONE HYDROCHLORIDE 0.5 MG: 1 INJECTION, SOLUTION INTRAMUSCULAR; INTRAVENOUS; SUBCUTANEOUS at 21:38

## 2023-02-02 RX ADMIN — OXYCODONE HYDROCHLORIDE 5 MG: 5 TABLET ORAL at 09:21

## 2023-02-02 RX ADMIN — OXYCODONE HYDROCHLORIDE 5 MG: 5 TABLET ORAL at 15:24

## 2023-02-02 RX ADMIN — SODIUM CHLORIDE, POTASSIUM CHLORIDE, SODIUM LACTATE AND CALCIUM CHLORIDE: 600; 310; 30; 20 INJECTION, SOLUTION INTRAVENOUS at 16:17

## 2023-02-02 RX ADMIN — OXYCODONE HYDROCHLORIDE 5 MG: 5 TABLET ORAL at 03:04

## 2023-02-02 RX ADMIN — CLONAZEPAM 1 MG: 1 TABLET ORAL at 17:26

## 2023-02-02 RX ADMIN — AMPICILLIN AND SULBACTAM 3 G: 1; 2 INJECTION, POWDER, FOR SOLUTION INTRAMUSCULAR; INTRAVENOUS at 12:27

## 2023-02-02 RX ADMIN — LIDOCAINE HYDROCHLORIDE 30 ML: 20 SOLUTION OROPHARYNGEAL at 14:39

## 2023-02-02 ASSESSMENT — PATIENT HEALTH QUESTIONNAIRE - PHQ9
1. LITTLE INTEREST OR PLEASURE IN DOING THINGS: NOT AT ALL
2. FEELING DOWN, DEPRESSED, IRRITABLE, OR HOPELESS: NOT AT ALL
SUM OF ALL RESPONSES TO PHQ9 QUESTIONS 1 AND 2: 0

## 2023-02-02 ASSESSMENT — ENCOUNTER SYMPTOMS
NERVOUS/ANXIOUS: 0
CHILLS: 0
WEAKNESS: 0
CONSTIPATION: 0
FALLS: 0
PALPITATIONS: 0
SORE THROAT: 0
FEVER: 0
VOMITING: 1
NAUSEA: 1
ABDOMINAL PAIN: 1
COUGH: 0
DIZZINESS: 0
DIARRHEA: 1
HEADACHES: 1
BLURRED VISION: 0
DEPRESSION: 0
SHORTNESS OF BREATH: 0

## 2023-02-02 ASSESSMENT — PAIN DESCRIPTION - PAIN TYPE
TYPE: ACUTE PAIN

## 2023-02-02 NOTE — CARE PLAN
The patient is Watcher - Medium risk of patient condition declining or worsening    Shift Goals  Clinical Goals: IV ABX/IVF, c.diff rule out  Patient Goals: Rest., pain control  Family Goals: ARTEMIO    Progress made toward(s) clinical / shift goals:    Problem: Knowledge Deficit - Standard  Goal: Patient and family/care givers will demonstrate understanding of plan of care, disease process/condition, diagnostic tests and medications  Outcome: Progressing  Note: Patient had complaints of abd pain PRN dilaudid 05.mg given with effect. On contact precautions for Cdiff r/o no BM during shift. Tolerating diet denies n/v. Ambulates independently to restroom.         Patient is not progressing towards the following goals:

## 2023-02-02 NOTE — CARE PLAN
The patient is Stable - Low risk of patient condition declining or worsening    Shift Goals  Clinical Goals: iv abx/ivf, cdiff  Patient Goals: rest, pain control  Family Goals: ARTEMIO    Progress made toward(s) clinical / shift goals:    Problem: Knowledge Deficit - Standard  Goal: Patient and family/care givers will demonstrate understanding of plan of care, disease process/condition, diagnostic tests and medications  Outcome: Progressing     Problem: Pain - Standard  Goal: Alleviation of pain or a reduction in pain to the patient’s comfort goal  Outcome: Progressing

## 2023-02-02 NOTE — PROGRESS NOTES
[facility] Daily Progress Note    Date of Service  2/2/2023    Chief Complaint  Rianna Solis is a 52 y.o. female admitted 1/31/2023 with abdominal pain, nausea, vomiting    Hospital Course  No notes on file    Rianna is a 52 y.o. female with a history of diverticulitis, cholecystectomy, bipolar I disorder with psychosis, anxiety, depression, Hashimoto thyroiditis not on medication who presented 1/31/2023 with worsening abdominal pain. She was recently admitted (1/27-1/28) for diverticulitis and left ama despite nausea and vomiting. During the admission, she was switched from Zosyn to Unasyn; and per discharge summary, after starting Unasyn, patient started having nausea and vomiting again.  However, she states that she still wanted to leave because she did not want to stay in the hospital anymore.  After she was discharged, she was taking Augmentin.  However gradually her pain, nausea and vomiting have been getting worse.  She states that the pain is in lower abdomen and migrates to mid to upper abdomen as well.      CT abdomen pelvis showed ongoing distal colonic diverticulitis increased since 1/24, prior cholecystectomy.    Patient reports abdominal pain since [place].  Initially was attributed to urinary infection and then diverticulitis.  It was then thought to be secondary to cholecystitis and she had a cholecystectomy 1/13.  Her pain has been persistent and again returned and again attributed to diverticulitis.    Interval Problem Update  Patient was seen and examined at bedside.  I have personally reviewed and interpreted vitals, labs, and imaging.    2/1.  Afebrile.  Hypertension is improved.  On room air.  Replete mag, potassium.  Denies fever, chills, chest pains, shortness of breath.  She does report a headache which has been chronic.  Does report abdominal pain which has been persistent since around [place].  Started on clear liquid diet.  Continue Zosyn for diverticulitis.     2/2.  Afebrile.  Bradycardic this morning.  On room air.  Denies fevers, chills, chest pains, shortness of breath.  Abdominal pain is improved.  Was advanced to GI soft today which she tolerated for breakfast but feels a little bit bloated and gassy.  Last bowel movement was 3-4 days ago.  Continue to advance diet as tolerated.  Started on GI cocktail.  Discontinued from Zosyn to Unasyn.  If oral intake improves and abdominal pain remains controlled possibly DC tomorrow on oral antibiotics    I have discussed this patient's plan of care and discharge plan at IDT rounds today with Case Management, Nursing, Nursing leadership, and other members of the IDT team.    Consultants/Specialty  None    Code Status  Full Code    Disposition  Patient is not medically cleared for discharge.   Anticipate discharge to to home with close outpatient follow-up.  I have placed the appropriate orders for post-discharge needs.    Review of Systems  Review of Systems   Constitutional:  Negative for chills and fever.   HENT:  Negative for congestion and sore throat.    Eyes:  Negative for blurred vision.   Respiratory:  Negative for cough and shortness of breath.    Cardiovascular:  Negative for chest pain, palpitations and leg swelling.   Gastrointestinal:  Positive for abdominal pain, diarrhea, nausea and vomiting. Negative for constipation.   Genitourinary:  Negative for dysuria, frequency and urgency.   Musculoskeletal:  Negative for falls.   Skin:  Negative for rash.   Neurological:  Positive for headaches. Negative for dizziness and weakness.   Psychiatric/Behavioral:  Negative for depression. The patient is not nervous/anxious.    All other systems reviewed and are negative.     Physical Exam  Temp:  [36.5 °C (97.7 °F)-37.2 °C (98.9 °F)] 36.5 °C (97.7 °F)  Pulse:  [59-84] 59  Resp:  [16-18] 16  BP: (107-133)/(63-90) 133/90  SpO2:  [93 %] 93 %    Physical Exam  Vitals and nursing note reviewed.   Constitutional:       Appearance:  Normal appearance. She is obese. She is ill-appearing.   HENT:      Head: Normocephalic and atraumatic.      Right Ear: External ear normal.      Left Ear: External ear normal.      Nose: Nose normal.      Mouth/Throat:      Mouth: Mucous membranes are moist.      Pharynx: Oropharynx is clear. No oropharyngeal exudate or posterior oropharyngeal erythema.   Eyes:      Extraocular Movements: Extraocular movements intact.      Conjunctiva/sclera: Conjunctivae normal.   Cardiovascular:      Rate and Rhythm: Normal rate and regular rhythm.      Pulses: Normal pulses.      Heart sounds: Normal heart sounds. No murmur heard.  Pulmonary:      Effort: Pulmonary effort is normal. No respiratory distress.      Breath sounds: Normal breath sounds. No stridor. No wheezing or rales.   Abdominal:      General: Abdomen is flat. Bowel sounds are normal. There is no distension.      Palpations: Abdomen is soft. There is no mass.      Tenderness: There is abdominal tenderness.   Musculoskeletal:      Cervical back: Normal range of motion.   Skin:     General: Skin is warm.      Capillary Refill: Capillary refill takes less than 2 seconds.   Neurological:      General: No focal deficit present.      Mental Status: She is alert and oriented to person, place, and time. Mental status is at baseline.      Cranial Nerves: No cranial nerve deficit.   Psychiatric:         Mood and Affect: Mood normal.         Behavior: Behavior normal.       Fluids  No intake or output data in the 24 hours ending 02/02/23 0914      Laboratory  Recent Labs     01/31/23  1613 02/01/23  0151 02/02/23  0400   WBC 8.8 9.2 8.6   RBC 3.99* 3.73* 3.76*   HEMOGLOBIN 12.1 11.3* 11.6*   HEMATOCRIT 35.8* 34.1* 34.5*   MCV 89.7 91.4 91.8   MCH 30.3 30.3 30.9   MCHC 33.8 33.1* 33.6   RDW 40.1 41.4 40.6   PLATELETCT 405 383 327   MPV 8.5* 8.7* 8.5*       Recent Labs     01/31/23  1613 02/01/23  0151 02/02/23  0525   SODIUM 138 137 138   POTASSIUM 3.9 3.9 4.2   CHLORIDE 102  102 105   CO2 26 26 24   GLUCOSE 123* 130* 90   BUN 11 12 9   CREATININE 0.62 0.65 0.55   CALCIUM 9.6 9.1 9.1                     Imaging  CT-ABDOMEN-PELVIS WITH   Final Result      1.  Ongoing distal colonic diverticulitis which appears increased in severity since the prior study   2.  Prior cholecystectomy             Assessment/Plan  * Acute diverticulitis- (present on admission)  Assessment & Plan  She was recently admiited (1/27-1/28) for diverticulitis and left ama despite nausea and vomiting.  She has been having abdominal pain since December 25.  last time she had diverticulitis before December was 1 year ago.  No leukocytosis or fever, blood cultures during last admissions were negative  Lactic acid negative.    Plan:  Admit to medicine  Pain control  Antiemetic  IV fluid  MRSA swab negative  Advance diet as tolerated    Hypertensive urgency  Assessment & Plan  Likely Secondary to pain    Plan:  Prn labetalol for systolic bp greater than 170 or diastolic bp greater than 110    Hyperglycemia  Assessment & Plan  Likely reactive  a1c 5.9    Transaminitis  Assessment & Plan  #elevated ALT  #elevated alkphos    Elevated ALT likely due to fatty liver, elevated alk phos likely reactive    Plan:  Continue to trend    Diarrhea of presumed infectious origin  Assessment & Plan  Diarrhea likely due to Augmentin use or from diverticulitis but cannot rule out C. Difficile  C. difficile negative    Diarrhea can also be secondary to diverticulitis.  Would hold off on loperamide.    Tobacco consumption  Assessment & Plan  Smokes 5 to 10 cigarettes a day    Plan:  Inpatient nicotine replacement  Smoking cessation counseling provided    Partial seizure disorder (HCC)- (present on admission)  Assessment & Plan  Continue home gabapentin    Bipolar 1 disorder with psychosis episodes- (present on admission)  Assessment & Plan  Continue home meds    Anxiety- (present on admission)  Assessment & Plan  Continue home meds         VTE  prophylaxis: enoxaparin ppx    I have performed a physical exam and reviewed and updated ROS and Plan today (2/2/2023). In review of yesterday's note (2/1/2023), there are no changes except as documented above.

## 2023-02-03 VITALS
OXYGEN SATURATION: 92 % | HEART RATE: 65 BPM | HEIGHT: 62 IN | BODY MASS INDEX: 34.32 KG/M2 | TEMPERATURE: 98.2 F | WEIGHT: 186.51 LBS | SYSTOLIC BLOOD PRESSURE: 133 MMHG | DIASTOLIC BLOOD PRESSURE: 73 MMHG | RESPIRATION RATE: 16 BRPM

## 2023-02-03 PROBLEM — I16.0 HYPERTENSIVE URGENCY: Status: RESOLVED | Noted: 2023-02-01 | Resolved: 2023-02-03

## 2023-02-03 PROBLEM — R74.01 TRANSAMINITIS: Status: RESOLVED | Noted: 2023-01-31 | Resolved: 2023-02-03

## 2023-02-03 PROBLEM — R73.9 HYPERGLYCEMIA: Status: RESOLVED | Noted: 2023-01-31 | Resolved: 2023-02-03

## 2023-02-03 PROBLEM — R19.7 DIARRHEA OF PRESUMED INFECTIOUS ORIGIN: Status: RESOLVED | Noted: 2023-01-31 | Resolved: 2023-02-03

## 2023-02-03 LAB
ANION GAP SERPL CALC-SCNC: 10 MMOL/L (ref 7–16)
BASOPHILS # BLD AUTO: 0.9 % (ref 0–1.8)
BASOPHILS # BLD: 0.06 K/UL (ref 0–0.12)
BUN SERPL-MCNC: 16 MG/DL (ref 8–22)
CALCIUM SERPL-MCNC: 8.8 MG/DL (ref 8.5–10.5)
CHLORIDE SERPL-SCNC: 105 MMOL/L (ref 96–112)
CO2 SERPL-SCNC: 24 MMOL/L (ref 20–33)
CREAT SERPL-MCNC: 0.69 MG/DL (ref 0.5–1.4)
EOSINOPHIL # BLD AUTO: 0.19 K/UL (ref 0–0.51)
EOSINOPHIL NFR BLD: 2.9 % (ref 0–6.9)
ERYTHROCYTE [DISTWIDTH] IN BLOOD BY AUTOMATED COUNT: 40.4 FL (ref 35.9–50)
GFR SERPLBLD CREATININE-BSD FMLA CKD-EPI: 104 ML/MIN/1.73 M 2
GLUCOSE SERPL-MCNC: 124 MG/DL (ref 65–99)
HCT VFR BLD AUTO: 33.5 % (ref 37–47)
HGB BLD-MCNC: 11.4 G/DL (ref 12–16)
IMM GRANULOCYTES # BLD AUTO: 0.02 K/UL (ref 0–0.11)
IMM GRANULOCYTES NFR BLD AUTO: 0.3 % (ref 0–0.9)
LYMPHOCYTES # BLD AUTO: 2.8 K/UL (ref 1–4.8)
LYMPHOCYTES NFR BLD: 43.2 % (ref 22–41)
MAGNESIUM SERPL-MCNC: 1.8 MG/DL (ref 1.5–2.5)
MCH RBC QN AUTO: 30.8 PG (ref 27–33)
MCHC RBC AUTO-ENTMCNC: 34 G/DL (ref 33.6–35)
MCV RBC AUTO: 90.5 FL (ref 81.4–97.8)
MONOCYTES # BLD AUTO: 0.6 K/UL (ref 0–0.85)
MONOCYTES NFR BLD AUTO: 9.3 % (ref 0–13.4)
NEUTROPHILS # BLD AUTO: 2.81 K/UL (ref 2–7.15)
NEUTROPHILS NFR BLD: 43.4 % (ref 44–72)
NRBC # BLD AUTO: 0 K/UL
NRBC BLD-RTO: 0 /100 WBC
PHOSPHATE SERPL-MCNC: 4 MG/DL (ref 2.5–4.5)
PLATELET # BLD AUTO: 336 K/UL (ref 164–446)
PMV BLD AUTO: 8.7 FL (ref 9–12.9)
POTASSIUM SERPL-SCNC: 3.8 MMOL/L (ref 3.6–5.5)
RBC # BLD AUTO: 3.7 M/UL (ref 4.2–5.4)
SODIUM SERPL-SCNC: 139 MMOL/L (ref 135–145)
WBC # BLD AUTO: 6.5 K/UL (ref 4.8–10.8)

## 2023-02-03 PROCEDURE — 700102 HCHG RX REV CODE 250 W/ 637 OVERRIDE(OP): Performed by: INTERNAL MEDICINE

## 2023-02-03 PROCEDURE — 700105 HCHG RX REV CODE 258: Performed by: STUDENT IN AN ORGANIZED HEALTH CARE EDUCATION/TRAINING PROGRAM

## 2023-02-03 PROCEDURE — 700102 HCHG RX REV CODE 250 W/ 637 OVERRIDE(OP): Performed by: STUDENT IN AN ORGANIZED HEALTH CARE EDUCATION/TRAINING PROGRAM

## 2023-02-03 PROCEDURE — 85025 COMPLETE CBC W/AUTO DIFF WBC: CPT

## 2023-02-03 PROCEDURE — A9270 NON-COVERED ITEM OR SERVICE: HCPCS | Performed by: INTERNAL MEDICINE

## 2023-02-03 PROCEDURE — 83735 ASSAY OF MAGNESIUM: CPT

## 2023-02-03 PROCEDURE — 700111 HCHG RX REV CODE 636 W/ 250 OVERRIDE (IP): Performed by: INTERNAL MEDICINE

## 2023-02-03 PROCEDURE — A9270 NON-COVERED ITEM OR SERVICE: HCPCS | Performed by: STUDENT IN AN ORGANIZED HEALTH CARE EDUCATION/TRAINING PROGRAM

## 2023-02-03 PROCEDURE — 99239 HOSP IP/OBS DSCHRG MGMT >30: CPT | Performed by: INTERNAL MEDICINE

## 2023-02-03 PROCEDURE — 700111 HCHG RX REV CODE 636 W/ 250 OVERRIDE (IP): Performed by: STUDENT IN AN ORGANIZED HEALTH CARE EDUCATION/TRAINING PROGRAM

## 2023-02-03 PROCEDURE — 80048 BASIC METABOLIC PNL TOTAL CA: CPT

## 2023-02-03 PROCEDURE — 84100 ASSAY OF PHOSPHORUS: CPT

## 2023-02-03 PROCEDURE — 700105 HCHG RX REV CODE 258: Performed by: INTERNAL MEDICINE

## 2023-02-03 PROCEDURE — 36415 COLL VENOUS BLD VENIPUNCTURE: CPT

## 2023-02-03 RX ORDER — OXYCODONE HYDROCHLORIDE 5 MG/1
5 TABLET ORAL EVERY 6 HOURS PRN
Qty: 20 TABLET | Refills: 0 | Status: SHIPPED | OUTPATIENT
Start: 2023-02-03 | End: 2023-02-07 | Stop reason: SDUPTHER

## 2023-02-03 RX ORDER — OXYCODONE HYDROCHLORIDE 10 MG/1
10 TABLET ORAL
Status: DISCONTINUED | OUTPATIENT
Start: 2023-02-03 | End: 2023-02-03 | Stop reason: HOSPADM

## 2023-02-03 RX ORDER — HYDROMORPHONE HYDROCHLORIDE 1 MG/ML
0.5 INJECTION, SOLUTION INTRAMUSCULAR; INTRAVENOUS; SUBCUTANEOUS
Status: DISCONTINUED | OUTPATIENT
Start: 2023-02-03 | End: 2023-02-03 | Stop reason: HOSPADM

## 2023-02-03 RX ORDER — AMOXICILLIN AND CLAVULANATE POTASSIUM 500; 125 MG/1; MG/1
1 TABLET, FILM COATED ORAL EVERY 8 HOURS
Status: DISCONTINUED | OUTPATIENT
Start: 2023-02-03 | End: 2023-02-03 | Stop reason: HOSPADM

## 2023-02-03 RX ORDER — OXYCODONE HYDROCHLORIDE 5 MG/1
5 TABLET ORAL
Status: DISCONTINUED | OUTPATIENT
Start: 2023-02-03 | End: 2023-02-03 | Stop reason: HOSPADM

## 2023-02-03 RX ORDER — PROCHLORPERAZINE MALEATE 5 MG/1
5 TABLET ORAL EVERY 6 HOURS PRN
Qty: 30 TABLET | Refills: 0 | Status: SHIPPED | OUTPATIENT
Start: 2023-02-03 | End: 2023-08-08

## 2023-02-03 RX ORDER — AMOXICILLIN AND CLAVULANATE POTASSIUM 500; 125 MG/1; MG/1
1 TABLET, FILM COATED ORAL EVERY 8 HOURS
Qty: 21 TABLET | Refills: 0 | Status: ACTIVE | OUTPATIENT
Start: 2023-02-03 | End: 2023-02-10

## 2023-02-03 RX ORDER — ONDANSETRON 4 MG/1
4 TABLET, ORALLY DISINTEGRATING ORAL EVERY 4 HOURS PRN
Qty: 10 TABLET | Refills: 0 | Status: SHIPPED | OUTPATIENT
Start: 2023-02-03 | End: 2023-02-03

## 2023-02-03 RX ADMIN — OXYCODONE HYDROCHLORIDE 10 MG: 10 TABLET ORAL at 09:28

## 2023-02-03 RX ADMIN — GABAPENTIN 300 MG: 300 CAPSULE ORAL at 09:28

## 2023-02-03 RX ADMIN — AMPICILLIN AND SULBACTAM 3 G: 1; 2 INJECTION, POWDER, FOR SOLUTION INTRAMUSCULAR; INTRAVENOUS at 05:41

## 2023-02-03 RX ADMIN — AMOXICILLIN AND CLAVULANATE POTASSIUM 1 TABLET: 500; 125 TABLET, FILM COATED ORAL at 14:03

## 2023-02-03 RX ADMIN — ZIPRASIDONE HYDROCHLORIDE 80 MG: 80 CAPSULE ORAL at 05:35

## 2023-02-03 RX ADMIN — SODIUM CHLORIDE, POTASSIUM CHLORIDE, SODIUM LACTATE AND CALCIUM CHLORIDE: 600; 310; 30; 20 INJECTION, SOLUTION INTRAVENOUS at 02:44

## 2023-02-03 RX ADMIN — GABAPENTIN 300 MG: 300 CAPSULE ORAL at 14:03

## 2023-02-03 RX ADMIN — AMPICILLIN AND SULBACTAM 3 G: 1; 2 INJECTION, POWDER, FOR SOLUTION INTRAMUSCULAR; INTRAVENOUS at 00:04

## 2023-02-03 RX ADMIN — OXYCODONE HYDROCHLORIDE 5 MG: 5 TABLET ORAL at 05:35

## 2023-02-03 RX ADMIN — HYDROMORPHONE HYDROCHLORIDE 0.5 MG: 1 INJECTION, SOLUTION INTRAMUSCULAR; INTRAVENOUS; SUBCUTANEOUS at 03:37

## 2023-02-03 RX ADMIN — OXYCODONE HYDROCHLORIDE 10 MG: 10 TABLET ORAL at 14:04

## 2023-02-03 RX ADMIN — VENLAFAXINE HYDROCHLORIDE 37.5 MG: 37.5 CAPSULE, EXTENDED RELEASE ORAL at 05:35

## 2023-02-03 RX ADMIN — MAGNESIUM HYDROXIDE 30 ML: 400 SUSPENSION ORAL at 05:35

## 2023-02-03 RX ADMIN — ACETAMINOPHEN 650 MG: 325 TABLET, FILM COATED ORAL at 12:30

## 2023-02-03 NOTE — CARE PLAN
The patient is Stable - Low risk of patient condition declining or worsening    Shift Goals  Clinical Goals: Pain control - start PO pain med to get pain control for d/c.  Patient Goals: home, pain control  Family Goals: ARTEMIO    Progress made toward(s) clinical / shift goals:  yes      Problem: Pain - Standard  Goal: Alleviation of pain or a reduction in pain to the patient’s comfort goal  Outcome: Not Met/progressing  Pt taking both iv and oral pain med and asked to d/c, but not pain controled on oral.  Pt asking for iv pain med to get her in control so she can go home.  Edu given that she needs to be off iv and only take oral so she doesn't have pain crises at home and needing to returning to hospital.  Pt agrees.      Pain regimen changed and started.  Pt to Eat, drink fluids (iv fluids stopped) oral po pain meds, switch to oral PO abx, and see how she does this afternoon for d/c.     Problem: Knowledge Deficit - Standard  Goal: Patient and family/care givers will demonstrate understanding of plan of care, disease process/condition, diagnostic tests and medications  Outcome: Progressing

## 2023-02-03 NOTE — DIETARY
Nutrition Services Brief Update:    Day 3 of admit.  Rianna Solis is a 52 y.o. female with admitting DX of Diverticulitis.    Current Diet: Diet was advanced to GI Soft. Recorded PO intake this morning was good at %, though no other PO intake recorded. Noted plan to discharge home today of oral medications tolerated.    Problem: Nutritional:  Goal: Achieve adequate nutritional intake  Description: Patient will consume >50% of meals  Outcome: Progressing    RD following
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
## 2023-02-03 NOTE — CARE PLAN
The patient is Stable - Low risk of patient condition declining or worsening    Shift Goals  Clinical Goals: IVabx, pain managment  Patient Goals: pain management  Family Goals: ARTEMIO    Progress made toward(s) clinical / shift goals:    Problem: Knowledge Deficit - Standard  Goal: Patient and family/care givers will demonstrate understanding of plan of care, disease process/condition, diagnostic tests and medications  Outcome: Progressing  Note:    Patient had complaints of abd pain PRN oxy 5mg given with effect. discontinued contact precautions Cdiff negative. Tolerating diet denies n/v. Ambulates independently to restroom.         Patient is not progressing towards the following goals:

## 2023-02-03 NOTE — PROGRESS NOTES
Patient doing better with 10mg oxycodone - took tylenol to help breakthrough and states she is doing much better with pain and feels she so far is tolerating.  Iv out.  Showed and ambulated.  Pt to eat lunch and started PO abx.  Will see how she does by 1600 and plan for d/c.  Spouse picking up rx's from pharmacy now.

## 2023-02-03 NOTE — DISCHARGE INSTRUCTIONS
Discharge Instructions per BELLA SilvermanOElaina    DIET: Diet Order Diet: Low Fiber(GI Soft)    ACTIVITY: As tolerated    A proper diet that is low in grease, fat, and salt, along with 30 minutes of exercise per day will lead to weight loss, and better controlled blood sugar and blood pressure.    DIAGNOSIS: Acute diverticulitis    Follow up with your Primary Care Provider SHAY Travis as scheduled or sooner if your symptoms persist or worsen.  Return to Emergency Room for sever chest pain, shortness of breath, signs of a stroke, or any other emergencies.      Discharge Instructions    Discharged to home by car with relative. Discharged via wheelchair, hospital escort: Yes.  Special equipment needed: Not Applicable    Be sure to schedule a follow-up appointment with your primary care doctor or any specialists as instructed.     Discharge Plan:   Diet Plan: Discussed  Activity Level: Discussed  Confirmed Follow up Appointment: Appointment Scheduled  Confirmed Symptoms Management: Discussed  Medication Reconciliation Updated: Yes  Influenza Vaccine Indication: Not indicated: Previously immunized this influenza season and > 8 years of age    I understand that a diet low in cholesterol, fat, and sodium is recommended for good health. Unless I have been given specific instructions below for another diet, I accept this instruction as my diet prescription.   Other diet: GI soft.    Special Instructions: None    -Is this patient being discharged with medication to prevent blood clots?  No    Is patient discharged on Warfarin / Coumadin?   No

## 2023-02-03 NOTE — CARE PLAN
The patient is Stable - Low risk of patient condition declining or worsening    Shift Goals  Clinical Goals: ABX, pain mgmt, safety  Patient Goals: pain mgmt  Family Goals: ARTEMIO    Progress made toward(s) clinical / shift goals:    Problem: Knowledge Deficit - Standard  Goal: Patient and family/care givers will demonstrate understanding of plan of care, disease process/condition, diagnostic tests and medications  Outcome: Progressing     Problem: Pain - Standard  Goal: Alleviation of pain or a reduction in pain to the patient’s comfort goal  Outcome: Progressing       Patient is aware and compliant of care plan during course of hospital stay. Pain will remain within patient's comfort zone and will be free from falls during this shift. Bedside table and call light are within reach.

## 2023-02-03 NOTE — PROGRESS NOTES
Pt not pain controled.     Rounded on pt and she states she's going home.  Pt is not pain controlled mainly taking iv dilaudid in addition oxy during last 24 hour.  Pt asked for iv pain meds so to be in pain control so to feel ok to go home.  Pt states she didn't tell dr about not being in pain control when he rounded.  Edu to pt that she needs to be taking only oral po pain med to see if she will tolerate what we will be sending her home on so that she will controlled pain.  Tolerate food, tolerate PO abx - pt agree.    Informed dr. Dale:  meds being adjusted now and will plan for discharge this afternoon.

## 2023-02-03 NOTE — DISCHARGE SUMMARY
Discharge Summary    CHIEF COMPLAINT ON ADMISSION  Chief Complaint   Patient presents with    Abdominal Pain     Pt had gall bladder removal x3 weeks ago. Pt complains of abdominal pain since the surgery. Last seen at [facility] ED for same issue on 01/27. Pt states recent bouts of nausea, vomiting, diarrhea.       Reason for Admission  abd pain     Admission Date  1/31/2023    CODE STATUS  Full Code    HPI & HOSPITAL COURSE  Rianna is a 52 y.o. female with a history of diverticulitis, cholecystectomy, bipolar I disorder with psychosis, anxiety, depression, Hashimoto thyroiditis not on medication who presented 1/31/2023 with worsening abdominal pain. She was recently admitted (1/27-1/28) for diverticulitis and left ama despite nausea and vomiting. During the admission, she was switched from Zosyn to Unasyn; and per discharge summary, after starting Unasyn, patient started having nausea and vomiting again.  However, she states that she still wanted to leave because she did not want to stay in the hospital anymore.  After she was discharged, she was taking Augmentin.  However gradually her pain, nausea and vomiting have been getting worse.  She states that the pain is in lower abdomen and migrates to mid to upper abdomen as well.       CT abdomen pelvis showed ongoing distal colonic diverticulitis increased since 1/24, prior cholecystectomy.    Patient reports abdominal pain since Christmas.  Initially was attributed to urinary infection and then diverticulitis.  It was then thought to be secondary to cholecystitis and she had a cholecystectomy 1/13.  Her pain has been persistent and again returned and again attributed to diverticulitis.     Leukocytosis on admission resolved.  Patient was de-escalated from Zosyn to Unasyn and then to Augmentin on day of discharge.  She tolerated his medications with no nausea.  Diet was advanced as tolerated and patient tolerated soft diet prior to discharge.  Patient can complete  course of antibiotics as outpatient.  Counseled to keep soft diet for at least 1 week as the resolution of diverticulitis can be prolonged.  Pain was well controlled on oral meds.  Medically stable to discharge home.  Follow-up with primary care as outpatient.    Therefore, she is discharged in fair and stable condition to home with close outpatient follow-up.    The patient met 2-midnight criteria for an inpatient stay at the time of discharge.    Discharge Date  2/3/2023    FOLLOW UP ITEMS POST DISCHARGE  None    DISCHARGE DIAGNOSES  Principal Problem:    Acute diverticulitis POA: Yes  Active Problems:    Anxiety (Chronic) POA: Yes    Bipolar 1 disorder with psychosis episodes (Chronic) POA: Yes      Overview: psychotic break in 2010, 2014, 2018.       She has been doing quite well recently.  2018 psychiatrist Dr. Mayfield              08/2014  Psych hold at [facility] for suicidal AND homicidal ideation with       auditory hallicinations          Partial seizure disorder (HCC) (Chronic) POA: Yes      Overview: Stopped depakote 500mg BID-prescribed by Dr. Pantera Gibson [facility].      States partial complex-no motor symptoms      November 2014 last episode            EEG ok 05/2016.  Dr. Reid    Tobacco consumption POA: Yes  Resolved Problems:    Diarrhea of presumed infectious origin POA: Yes    Transaminitis POA: Yes    Hyperglycemia POA: Yes    Hypertensive urgency POA: Yes      FOLLOW UP  Future Appointments   Date Time Provider Department [place]   3/10/2023  8:00 AM Preston Portillo M.D. [facility]   3/13/2023  9:50 AM JAVID Vega, A.P.R.N.  [address] [facility] [address]  [phone]    Follow up        MEDICATIONS ON DISCHARGE     Medication List        START taking these medications        Instructions   amoxicillin-clavulanate 500-125 MG Tabs  Commonly known as: AUGMENTIN  Replaces: amoxicillin-clavulanate 875-125 MG  Tabs   Take 1 Tablet by mouth every 8 hours for 7 days.  Dose: 1 Tablet     ondansetron 4 MG Tbdp  Commonly known as: ZOFRAN ODT   Take 1 Tablet by mouth every four hours as needed for Nausea/Vomiting (give PO if no IV route available).  Dose: 4 mg     oxyCODONE immediate-release 5 MG Tabs  Commonly known as: ROXICODONE   Take 1 Tablet by mouth every 6 hours as needed for Severe Pain for up to 7 days.  Dose: 5 mg            CONTINUE taking these medications        Instructions   acetaminophen 500 MG Tabs  Commonly known as: TYLENOL   Take 500-1,000 mg by mouth every 6 hours as needed. Indications: Pain  Dose: 500-1,000 mg     CALCIUM 1000 + D PO   Take 1 Tablet by mouth every day.  Dose: 1 Tablet     clonazePAM 1 MG Tabs  Commonly known as: KLONOPIN   Take 1 mg by mouth 3 times a day as needed. Indications: Feeling Anxious  Dose: 1 mg     Compro 25 MG Supp  Generic drug: prochlorperazine   Insert 25 mg into the rectum 2 times a day as needed for Nausea/Vomiting.  Dose: 25 mg     fish oil 1000 MG Caps capsule   Take 1,000 mg by mouth every day.  Dose: 1,000 mg     gabapentin 300 MG Caps  Commonly known as: NEURONTIN   Take 300 mg by mouth 3 times a day as needed (pain).  Dose: 300 mg     ibuprofen 200 MG Tabs  Commonly known as: MOTRIN   Take 200 mg by mouth every 6 hours as needed. Indications: Pain  Dose: 200 mg     Multivitamin Adults Tabs   Take 1 Tablet by mouth every day.  Dose: 1 Tablet     venlafaxine XR 37.5 MG Cp24  Commonly known as: EFFEXOR XR   Take 37.5 mg by mouth every morning.  Dose: 37.5 mg     Vitamin D (Cholecalciferol) 25 MCG (1000 UT) Caps   Take 1,000 Units by mouth every day.  Dose: 1,000 Units     ziprasidone 80 MG Caps  Commonly known as: GEODON   Take 80 mg by mouth 2 times a day.  Dose: 80 mg            STOP taking these medications      amoxicillin-clavulanate 875-125 MG Tabs  Commonly known as: AUGMENTIN  Replaced by: amoxicillin-clavulanate 500-125 MG Tabs               Allergies  Allergies   Allergen Reactions    Sertraline Swelling     Swelling all over       DIET  Orders Placed This Encounter   Procedures    Diet Order Diet: Low Fiber(GI Soft)     Standing Status:   Standing     Number of Occurrences:   1     Order Specific Question:   Diet:     Answer:   Low Fiber(GI Soft) [2]       ACTIVITY  As tolerated.  Weight bearing as tolerated    CONSULTATIONS  None    PROCEDURES  None    LABORATORY  Lab Results   Component Value Date    SODIUM 139 02/03/2023    POTASSIUM 3.8 02/03/2023    CHLORIDE 105 02/03/2023    CO2 24 02/03/2023    GLUCOSE 124 (H) 02/03/2023    BUN 16 02/03/2023    CREATININE 0.69 02/03/2023        Lab Results   Component Value Date    WBC 6.5 02/03/2023    HEMOGLOBIN 11.4 (L) 02/03/2023    HEMATOCRIT 33.5 (L) 02/03/2023    PLATELETCT 336 02/03/2023        I discussed medications and side effects with the patient.  I discussed prognosis and importance of medical compliance with the patient.  I counseled the patient about diet, exercise, weight loss, smoking cessation, and life style modifications.  All questions and concerns have been addressed.  Total time of the discharge process was 37 minutes.

## 2023-02-06 ENCOUNTER — PATIENT OUTREACH (OUTPATIENT)
Dept: MEDICAL GROUP | Facility: PHYSICIAN GROUP | Age: 53
End: 2023-02-06
Payer: MEDICARE

## 2023-02-06 NOTE — PROGRESS NOTES
Subjective:     Rianna Solis is a 52 y.o. female who presents for Hospital Follow-up.    POST DISCHARGE CALL:  Discharge Date:2/3/2023   Date of Outreach Call: 2/6/2023 10:17 AM  Now that you're home, how are you doing? Fair  Did you receive any new prescriptions? Yes  Were you able to fill those medications? Yes  How did you fill those prescriptions? Pharmacy  If not able to fill prescriptions, why? N/A  Do you have questions about your medications? No  Do you have a follow-up appointment scheduled?Yes  Any issues or paperwork you wish to discuss with your PCP? discuss abdominal pain  Does patient qualify for CCM Program? No  If patient qualifies, was CCM Program Introduced? N/A  If patient does not qualify, comment? N/A  Number of Attempts: 1  Current or previous attempts completed within two business days of discharge? Yes  Provided education regarding treatment plan, medication, self-management, ADLs? Yes  Has patient completed Advance Directive? If yes, advise them to bring to appointment. No  Care Manager phone number provided? Yes  Is there anything else I can help you with? No  Chief Complaint? Abdominal Pain  Admitting Dx? Abdominal Pain  Discharge Diagnosis? Acute diverticulitis  Additional Comments? N/A    HPI:   Recently hospitalized for diverticulitis. She went to the ER on 1-31-23 because she was having a lot of abdominal pain. She ended up being admitted as she was having a flare up of Diverticulitis. She was given IV antibiotics in the hospital and was switched over to oral Augmentin to go home. She was discharged home on 2-3-23. She has been taking OTC Ibuprofen, Tylenol and Oxycodone to help with the pain. She states that she continues to have some pain. She is wanting education material on diverticulitis and a referral to GI.      Current medicines (including reconciliation performed today)  Current Outpatient Medications   Medication Sig Dispense Refill    oxyCODONE immediate-release  (ROXICODONE) 5 MG Tab Take 1 Tablet by mouth every 8 hours as needed for Severe Pain for up to 7 days. 20 Tablet 0    amoxicillin-clavulanate (AUGMENTIN) 500-125 MG Tab Take 1 Tablet by mouth every 8 hours for 7 days. 21 Tablet 0    prochlorperazine (COMPAZINE) 5 MG Tab Take 1 Tablet by mouth every 6 hours as needed for Nausea/Vomiting. 30 Tablet 0    acetaminophen (TYLENOL) 500 MG Tab Take 500-1,000 mg by mouth every 6 hours as needed. Indications: Pain      ibuprofen (MOTRIN) 200 MG Tab Take 200 mg by mouth every 6 hours as needed. Indications: Pain      Multiple Vitamins-Minerals (MULTIVITAMIN ADULTS) Tab Take 1 Tablet by mouth every day.      Omega-3 Fatty Acids (FISH OIL) 1000 MG Cap capsule Take 1,000 mg by mouth every day.      Vitamin D, Cholecalciferol, 25 MCG (1000 UT) Cap Take 1,000 Units by mouth every day.      Calcium Carb-Cholecalciferol (CALCIUM 1000 + D PO) Take 1 Tablet by mouth every day.      venlafaxine XR (EFFEXOR XR) 37.5 MG CAPSULE SR 24 HR Take 37.5 mg by mouth every morning.      ziprasidone (GEODON) 80 MG Cap Take 80 mg by mouth 2 times a day.      clonazePAM (KLONOPIN) 1 MG Tab Take 1 mg by mouth 3 times a day as needed. Indications: Feeling Anxious      gabapentin (NEURONTIN) 300 MG Cap Take 300 mg by mouth 3 times a day as needed (pain).       No current facility-administered medications for this visit.       Allergies:   Sertraline    Social History     Tobacco Use    Smoking status: Some Days     Packs/day: 0.50     Years: 30.00     Pack years: 15.00     Types: Cigarettes     Start date: 2016     Last attempt to quit: 2022     Years since quittin.4    Smokeless tobacco: Never    Tobacco comments:     started at 18   Vaping Use    Vaping Use: Some days    Substances: Nicotine   Substance Use Topics    Alcohol use: Not Currently    Drug use: No       ROS:   General: Negative for fever/chills and unexpected weight change.    Respiratory:  Negative for cough and dyspnea.      Cardiovascular:  Negative for chest pain and palpitations.   Gastrointestinal:  Negative for nausea/vomiting and changes in bowel habits. Positive for abdominal pain.   Musculoskeletal:  Negative for myalgias.    Skin:  Negative for rash.     Objective:     Vitals:    02/07/23 0655   BP: 112/70   BP Location: Left arm   Patient Position: Sitting   BP Cuff Size: Adult   Pulse: 90   Resp: 20   Temp: 35.8 °C (96.5 °F)   TempSrc: Temporal   SpO2: 98%   Weight: 81.2 kg (179 lb)   Height: 1.524 m (5')     Body mass index is 34.96 kg/m².    Physical Exam:  General:  Alert and oriented.  Well appearing.  NAD.  Head:  Normocephalic.   Neck: Supple without JVD. No lymphadenopathy.  Pulmonary:  Normal effort.  Clear to ausculation without rales, ronchi, or wheezing.  Cardiovascular:  Regular rate and rhythm without murmur, rubs or gallop.  Radial pulses are intact and equal bilaterally.  Gastrointestinal: Bowel sounds hypoactive in all four quadrants, tender to palpation.  Musculoskeletal:  No extremity cyanosis, clubbing, or edema.  Skin:  Warm and dry.  No obvious lesions.  Psych: Normal mood and affect. Alert and oriented x3. Judgment and insight is normal.    Assessment and Plan:   1. Hospital discharge follow-up  2. Acute diverticulitis  Acute and ongoing.  Continue to take Augmentin 500-125 mg every 8 hours until antibiotics are completed.  Given educational material on Diverticulitis and what foods to eat or avoid.  Discussed a referral to GI, she is agreeable.  Continue to take Oxycodone 5 mg every 8 hours as needed.  - oxyCODONE immediate-release (ROXICODONE) 5 MG Tab; Take 1 Tablet by mouth every 8 hours as needed for Severe Pain for up to 7 days.  Dispense: 20 Tablet; Refill: 0  - Referral to Gastroenterology    Obtained and reviewed patient utilization report from [facility] pharmacy database on 2/7/2023 at 0706 prior to writing prescription for controlled substance II, III or IV per [place] bill . Based on  assessment of the report, the prescription is medically necessary.      - Chart and discharge summary were reviewed.   - Hospitalization and results reviewed with patient.   - Medications reviewed including instructions regarding high risk medications, dosing and side effects.  - Recommended Services: No services needed at this time  - Advance directive/POLST on file?  No     Follow-up:Return in about 3 months (around 5/7/2023) for F/U.    Face-to-face transitional care management services with HIGH (today's visit is within days post discharge & LACE+ score 59+) medical decision complexity were provided.     LACE+ Historical Score Over Time (0-28: Low, 29-58: Medium, 59+: High): 30

## 2023-02-06 NOTE — PROGRESS NOTES
Patient outreach for TCM follow up.  Patient states her symptoms of pain and nausea have improved, and are well-controlled with medications.  Reviewed discharge instructions and new and current medications.  Patient verbalized understanding.  Confirmed follow-up appointment with PCP 02/07/23.

## 2023-02-07 ENCOUNTER — OFFICE VISIT (OUTPATIENT)
Dept: MEDICAL GROUP | Facility: PHYSICIAN GROUP | Age: 53
End: 2023-02-07
Payer: MEDICARE

## 2023-02-07 VITALS
OXYGEN SATURATION: 98 % | HEIGHT: 60 IN | TEMPERATURE: 96.5 F | RESPIRATION RATE: 20 BRPM | DIASTOLIC BLOOD PRESSURE: 70 MMHG | BODY MASS INDEX: 35.14 KG/M2 | WEIGHT: 179 LBS | SYSTOLIC BLOOD PRESSURE: 112 MMHG | HEART RATE: 90 BPM

## 2023-02-07 DIAGNOSIS — K57.92 ACUTE DIVERTICULITIS: ICD-10-CM

## 2023-02-07 DIAGNOSIS — Z09 HOSPITAL DISCHARGE FOLLOW-UP: ICD-10-CM

## 2023-02-07 PROCEDURE — 99496 TRANSJ CARE MGMT HIGH F2F 7D: CPT | Performed by: NURSE PRACTITIONER

## 2023-02-07 RX ORDER — HALOPERIDOL 10 MG/1
TABLET ORAL
COMMUNITY
Start: 2023-02-06 | End: 2023-02-07

## 2023-02-07 RX ORDER — OXYCODONE HYDROCHLORIDE 5 MG/1
5 TABLET ORAL EVERY 8 HOURS PRN
Qty: 20 TABLET | Refills: 0 | Status: SHIPPED | OUTPATIENT
Start: 2023-02-07 | End: 2023-02-16

## 2023-02-07 RX ORDER — QUETIAPINE FUMARATE 400 MG/1
TABLET, FILM COATED ORAL
COMMUNITY
Start: 2023-02-03 | End: 2023-02-07

## 2023-02-07 ASSESSMENT — FIBROSIS 4 INDEX: FIB4 SCORE: 0.54

## 2023-02-07 NOTE — ASSESSMENT & PLAN NOTE
She went to the ER on 1-31-23 because she was having a lot of abdominal pain. She ended up being admitted as she was having a flare up of Diverticulitis. She was given IV antibiotics in the hospital and was switched over to oral Augmentin to go home. She was discharged home on 2-3-23. She has been taking OTC Ibuprofen, Tylenol and Oxycodone to help with the pain. She states that she continues to have some pain. She is wanting education material on diverticulitis and a referral to GI.

## 2023-02-14 ENCOUNTER — TELEPHONE (OUTPATIENT)
Dept: MEDICAL GROUP | Facility: PHYSICIAN GROUP | Age: 53
End: 2023-02-14
Payer: MEDICARE

## 2023-02-14 ENCOUNTER — HOSPITAL ENCOUNTER (INPATIENT)
Facility: MEDICAL CENTER | Age: 53
LOS: 2 days | DRG: 392 | End: 2023-02-16
Attending: EMERGENCY MEDICINE | Admitting: HOSPITALIST
Payer: MEDICARE

## 2023-02-14 ENCOUNTER — APPOINTMENT (OUTPATIENT)
Dept: RADIOLOGY | Facility: MEDICAL CENTER | Age: 53
DRG: 392 | End: 2023-02-14
Attending: EMERGENCY MEDICINE
Payer: MEDICARE

## 2023-02-14 DIAGNOSIS — K57.92 DIVERTICULITIS: ICD-10-CM

## 2023-02-14 DIAGNOSIS — K57.92 ACUTE DIVERTICULITIS: ICD-10-CM

## 2023-02-14 PROBLEM — D72.829 LEUKOCYTOSIS: Status: ACTIVE | Noted: 2023-02-14

## 2023-02-14 LAB
ALBUMIN SERPL BCP-MCNC: 4 G/DL (ref 3.2–4.9)
ALBUMIN/GLOB SERPL: 1.3 G/DL
ALP SERPL-CCNC: 86 U/L (ref 30–99)
ALT SERPL-CCNC: 27 U/L (ref 2–50)
ANION GAP SERPL CALC-SCNC: 14 MMOL/L (ref 7–16)
APPEARANCE UR: CLEAR
AST SERPL-CCNC: 22 U/L (ref 12–45)
BASOPHILS # BLD AUTO: 0.5 % (ref 0–1.8)
BASOPHILS # BLD: 0.08 K/UL (ref 0–0.12)
BILIRUB SERPL-MCNC: 0.4 MG/DL (ref 0.1–1.5)
BILIRUB UR QL STRIP.AUTO: NEGATIVE
BLOOD CULTURE HOLD CXBCH: NORMAL
BLOOD CULTURE HOLD CXBCH: NORMAL
BUN SERPL-MCNC: 12 MG/DL (ref 8–22)
CALCIUM ALBUM COR SERPL-MCNC: 9.5 MG/DL (ref 8.5–10.5)
CALCIUM SERPL-MCNC: 9.5 MG/DL (ref 8.4–10.2)
CHLORIDE SERPL-SCNC: 105 MMOL/L (ref 96–112)
CO2 SERPL-SCNC: 21 MMOL/L (ref 20–33)
COLOR UR: YELLOW
CREAT SERPL-MCNC: 0.64 MG/DL (ref 0.5–1.4)
EOSINOPHIL # BLD AUTO: 0.31 K/UL (ref 0–0.51)
EOSINOPHIL NFR BLD: 1.9 % (ref 0–6.9)
ERYTHROCYTE [DISTWIDTH] IN BLOOD BY AUTOMATED COUNT: 40.9 FL (ref 35.9–50)
GFR SERPLBLD CREATININE-BSD FMLA CKD-EPI: 106 ML/MIN/1.73 M 2
GLOBULIN SER CALC-MCNC: 3.2 G/DL (ref 1.9–3.5)
GLUCOSE SERPL-MCNC: 112 MG/DL (ref 65–99)
GLUCOSE UR STRIP.AUTO-MCNC: NEGATIVE MG/DL
HCG SERPL QL: NEGATIVE
HCT VFR BLD AUTO: 37.8 % (ref 37–47)
HGB BLD-MCNC: 12.9 G/DL (ref 12–16)
IMM GRANULOCYTES # BLD AUTO: 0.09 K/UL (ref 0–0.11)
IMM GRANULOCYTES NFR BLD AUTO: 0.5 % (ref 0–0.9)
KETONES UR STRIP.AUTO-MCNC: NEGATIVE MG/DL
LACTATE SERPL-SCNC: 1 MMOL/L (ref 0.5–2)
LEUKOCYTE ESTERASE UR QL STRIP.AUTO: NEGATIVE
LIPASE SERPL-CCNC: 31 U/L (ref 7–58)
LYMPHOCYTES # BLD AUTO: 3.64 K/UL (ref 1–4.8)
LYMPHOCYTES NFR BLD: 21.9 % (ref 22–41)
MCH RBC QN AUTO: 30.2 PG (ref 27–33)
MCHC RBC AUTO-ENTMCNC: 34.1 G/DL (ref 33.6–35)
MCV RBC AUTO: 88.5 FL (ref 81.4–97.8)
MICRO URNS: NORMAL
MONOCYTES # BLD AUTO: 1.39 K/UL (ref 0–0.85)
MONOCYTES NFR BLD AUTO: 8.4 % (ref 0–13.4)
NEUTROPHILS # BLD AUTO: 11.13 K/UL (ref 2–7.15)
NEUTROPHILS NFR BLD: 66.8 % (ref 44–72)
NITRITE UR QL STRIP.AUTO: NEGATIVE
NRBC # BLD AUTO: 0 K/UL
NRBC BLD-RTO: 0 /100 WBC
PH UR STRIP.AUTO: 5.5 [PH] (ref 5–8)
PLATELET # BLD AUTO: 361 K/UL (ref 164–446)
PMV BLD AUTO: 8.7 FL (ref 9–12.9)
POTASSIUM SERPL-SCNC: 4.1 MMOL/L (ref 3.6–5.5)
PROT SERPL-MCNC: 7.2 G/DL (ref 6–8.2)
PROT UR QL STRIP: NEGATIVE MG/DL
RBC # BLD AUTO: 4.27 M/UL (ref 4.2–5.4)
RBC UR QL AUTO: NEGATIVE
SODIUM SERPL-SCNC: 140 MMOL/L (ref 135–145)
SP GR UR STRIP.AUTO: <=1.005
WBC # BLD AUTO: 16.6 K/UL (ref 4.8–10.8)

## 2023-02-14 PROCEDURE — 700102 HCHG RX REV CODE 250 W/ 637 OVERRIDE(OP): Performed by: HOSPITALIST

## 2023-02-14 PROCEDURE — 99285 EMERGENCY DEPT VISIT HI MDM: CPT

## 2023-02-14 PROCEDURE — 700117 HCHG RX CONTRAST REV CODE 255: Performed by: EMERGENCY MEDICINE

## 2023-02-14 PROCEDURE — 81003 URINALYSIS AUTO W/O SCOPE: CPT

## 2023-02-14 PROCEDURE — 87040 BLOOD CULTURE FOR BACTERIA: CPT

## 2023-02-14 PROCEDURE — 99406 BEHAV CHNG SMOKING 3-10 MIN: CPT

## 2023-02-14 PROCEDURE — 36415 COLL VENOUS BLD VENIPUNCTURE: CPT

## 2023-02-14 PROCEDURE — 700105 HCHG RX REV CODE 258: Performed by: EMERGENCY MEDICINE

## 2023-02-14 PROCEDURE — 96375 TX/PRO/DX INJ NEW DRUG ADDON: CPT

## 2023-02-14 PROCEDURE — 83605 ASSAY OF LACTIC ACID: CPT

## 2023-02-14 PROCEDURE — A9270 NON-COVERED ITEM OR SERVICE: HCPCS | Performed by: HOSPITALIST

## 2023-02-14 PROCEDURE — 83690 ASSAY OF LIPASE: CPT

## 2023-02-14 PROCEDURE — 85025 COMPLETE CBC W/AUTO DIFF WBC: CPT

## 2023-02-14 PROCEDURE — G0378 HOSPITAL OBSERVATION PER HR: HCPCS

## 2023-02-14 PROCEDURE — 74177 CT ABD & PELVIS W/CONTRAST: CPT

## 2023-02-14 PROCEDURE — 770006 HCHG ROOM/CARE - MED/SURG/GYN SEMI*

## 2023-02-14 PROCEDURE — 96365 THER/PROPH/DIAG IV INF INIT: CPT

## 2023-02-14 PROCEDURE — 99223 1ST HOSP IP/OBS HIGH 75: CPT | Mod: AI,25 | Performed by: HOSPITALIST

## 2023-02-14 PROCEDURE — 84703 CHORIONIC GONADOTROPIN ASSAY: CPT

## 2023-02-14 PROCEDURE — 80053 COMPREHEN METABOLIC PANEL: CPT

## 2023-02-14 PROCEDURE — 700111 HCHG RX REV CODE 636 W/ 250 OVERRIDE (IP): Performed by: EMERGENCY MEDICINE

## 2023-02-14 PROCEDURE — 700111 HCHG RX REV CODE 636 W/ 250 OVERRIDE (IP): Performed by: HOSPITALIST

## 2023-02-14 PROCEDURE — 99406 BEHAV CHNG SMOKING 3-10 MIN: CPT | Performed by: HOSPITALIST

## 2023-02-14 PROCEDURE — 96376 TX/PRO/DX INJ SAME DRUG ADON: CPT

## 2023-02-14 RX ORDER — ONDANSETRON 4 MG/1
4 TABLET, ORALLY DISINTEGRATING ORAL EVERY 4 HOURS PRN
Status: DISCONTINUED | OUTPATIENT
Start: 2023-02-14 | End: 2023-02-16 | Stop reason: HOSPADM

## 2023-02-14 RX ORDER — CLONAZEPAM 0.5 MG/1
1 TABLET ORAL 3 TIMES DAILY PRN
Status: DISCONTINUED | OUTPATIENT
Start: 2023-02-14 | End: 2023-02-16 | Stop reason: HOSPADM

## 2023-02-14 RX ORDER — AMOXICILLIN AND CLAVULANATE POTASSIUM 875; 125 MG/1; MG/1
1 TABLET, FILM COATED ORAL 2 TIMES DAILY
Qty: 14 TABLET | Refills: 0 | Status: ON HOLD | OUTPATIENT
Start: 2023-02-14 | End: 2023-02-16

## 2023-02-14 RX ORDER — HYDROMORPHONE HYDROCHLORIDE 1 MG/ML
0.5 INJECTION, SOLUTION INTRAMUSCULAR; INTRAVENOUS; SUBCUTANEOUS ONCE
Status: COMPLETED | OUTPATIENT
Start: 2023-02-14 | End: 2023-02-14

## 2023-02-14 RX ORDER — OXYCODONE HYDROCHLORIDE 5 MG/1
5 TABLET ORAL EVERY 8 HOURS PRN
Status: DISCONTINUED | OUTPATIENT
Start: 2023-02-14 | End: 2023-02-14

## 2023-02-14 RX ORDER — PROMETHAZINE HYDROCHLORIDE 25 MG/1
12.5-25 SUPPOSITORY RECTAL EVERY 4 HOURS PRN
Status: DISCONTINUED | OUTPATIENT
Start: 2023-02-14 | End: 2023-02-16 | Stop reason: HOSPADM

## 2023-02-14 RX ORDER — ONDANSETRON 2 MG/ML
4 INJECTION INTRAMUSCULAR; INTRAVENOUS EVERY 4 HOURS PRN
Status: DISCONTINUED | OUTPATIENT
Start: 2023-02-14 | End: 2023-02-16 | Stop reason: HOSPADM

## 2023-02-14 RX ORDER — METRONIDAZOLE 500 MG/100ML
500 INJECTION, SOLUTION INTRAVENOUS EVERY 8 HOURS
Status: DISCONTINUED | OUTPATIENT
Start: 2023-02-15 | End: 2023-02-16 | Stop reason: HOSPADM

## 2023-02-14 RX ORDER — PROCHLORPERAZINE EDISYLATE 5 MG/ML
5-10 INJECTION INTRAMUSCULAR; INTRAVENOUS EVERY 4 HOURS PRN
Status: DISCONTINUED | OUTPATIENT
Start: 2023-02-14 | End: 2023-02-16 | Stop reason: HOSPADM

## 2023-02-14 RX ORDER — GABAPENTIN 300 MG/1
300 CAPSULE ORAL 3 TIMES DAILY PRN
Status: DISCONTINUED | OUTPATIENT
Start: 2023-02-14 | End: 2023-02-16 | Stop reason: HOSPADM

## 2023-02-14 RX ORDER — ONDANSETRON 2 MG/ML
4 INJECTION INTRAMUSCULAR; INTRAVENOUS ONCE
Status: COMPLETED | OUTPATIENT
Start: 2023-02-14 | End: 2023-02-14

## 2023-02-14 RX ORDER — MORPHINE SULFATE 4 MG/ML
2 INJECTION INTRAVENOUS
Status: DISCONTINUED | OUTPATIENT
Start: 2023-02-14 | End: 2023-02-16 | Stop reason: HOSPADM

## 2023-02-14 RX ORDER — ZIPRASIDONE HYDROCHLORIDE 20 MG/1
80 CAPSULE ORAL 2 TIMES DAILY
Status: DISCONTINUED | OUTPATIENT
Start: 2023-02-14 | End: 2023-02-16 | Stop reason: HOSPADM

## 2023-02-14 RX ORDER — PROMETHAZINE HYDROCHLORIDE 25 MG/1
12.5-25 TABLET ORAL EVERY 4 HOURS PRN
Status: DISCONTINUED | OUTPATIENT
Start: 2023-02-14 | End: 2023-02-16 | Stop reason: HOSPADM

## 2023-02-14 RX ORDER — SODIUM CHLORIDE, SODIUM LACTATE, POTASSIUM CHLORIDE, CALCIUM CHLORIDE 600; 310; 30; 20 MG/100ML; MG/100ML; MG/100ML; MG/100ML
1000 INJECTION, SOLUTION INTRAVENOUS ONCE
Status: COMPLETED | OUTPATIENT
Start: 2023-02-14 | End: 2023-02-14

## 2023-02-14 RX ORDER — HYDROMORPHONE HYDROCHLORIDE 1 MG/ML
0.5 INJECTION, SOLUTION INTRAMUSCULAR; INTRAVENOUS; SUBCUTANEOUS
Status: DISCONTINUED | OUTPATIENT
Start: 2023-02-14 | End: 2023-02-16 | Stop reason: HOSPADM

## 2023-02-14 RX ORDER — VENLAFAXINE HYDROCHLORIDE 37.5 MG/1
37.5 CAPSULE, EXTENDED RELEASE ORAL
Status: DISCONTINUED | OUTPATIENT
Start: 2023-02-15 | End: 2023-02-16 | Stop reason: HOSPADM

## 2023-02-14 RX ORDER — ACETAMINOPHEN 325 MG/1
650 TABLET ORAL EVERY 6 HOURS PRN
Status: DISCONTINUED | OUTPATIENT
Start: 2023-02-14 | End: 2023-02-16 | Stop reason: HOSPADM

## 2023-02-14 RX ADMIN — HYDROMORPHONE HYDROCHLORIDE 0.5 MG: 1 INJECTION, SOLUTION INTRAMUSCULAR; INTRAVENOUS; SUBCUTANEOUS at 21:51

## 2023-02-14 RX ADMIN — ONDANSETRON 4 MG: 2 INJECTION INTRAMUSCULAR; INTRAVENOUS at 20:23

## 2023-02-14 RX ADMIN — HYDROMORPHONE HYDROCHLORIDE 0.5 MG: 1 INJECTION, SOLUTION INTRAMUSCULAR; INTRAVENOUS; SUBCUTANEOUS at 20:23

## 2023-02-14 RX ADMIN — IOHEXOL 100 ML: 350 INJECTION, SOLUTION INTRAVENOUS at 21:23

## 2023-02-14 RX ADMIN — PIPERACILLIN SODIUM AND TAZOBACTAM SODIUM 3.38 G: 3; .375 INJECTION, POWDER, FOR SOLUTION INTRAVENOUS at 21:51

## 2023-02-14 RX ADMIN — ACETAMINOPHEN 650 MG: 325 TABLET, FILM COATED ORAL at 22:58

## 2023-02-14 RX ADMIN — ZIPRASIDONE HYDROCHLORIDE 80 MG: 20 CAPSULE ORAL at 23:16

## 2023-02-14 RX ADMIN — SODIUM CHLORIDE, POTASSIUM CHLORIDE, SODIUM LACTATE AND CALCIUM CHLORIDE 1000 ML: 600; 310; 30; 20 INJECTION, SOLUTION INTRAVENOUS at 20:23

## 2023-02-14 RX ADMIN — MORPHINE SULFATE 2 MG: 4 INJECTION, SOLUTION INTRAMUSCULAR; INTRAVENOUS at 22:58

## 2023-02-14 ASSESSMENT — FIBROSIS 4 INDEX: FIB4 SCORE: 0.54

## 2023-02-15 LAB
ANION GAP SERPL CALC-SCNC: 11 MMOL/L (ref 7–16)
BUN SERPL-MCNC: 10 MG/DL (ref 8–22)
CALCIUM SERPL-MCNC: 9 MG/DL (ref 8.4–10.2)
CHLORIDE SERPL-SCNC: 104 MMOL/L (ref 96–112)
CO2 SERPL-SCNC: 24 MMOL/L (ref 20–33)
CREAT SERPL-MCNC: 0.61 MG/DL (ref 0.5–1.4)
EKG IMPRESSION: NORMAL
ERYTHROCYTE [DISTWIDTH] IN BLOOD BY AUTOMATED COUNT: 41.9 FL (ref 35.9–50)
GFR SERPLBLD CREATININE-BSD FMLA CKD-EPI: 107 ML/MIN/1.73 M 2
GLUCOSE SERPL-MCNC: 99 MG/DL (ref 65–99)
HCT VFR BLD AUTO: 35.3 % (ref 37–47)
HGB BLD-MCNC: 11.8 G/DL (ref 12–16)
MCH RBC QN AUTO: 30.5 PG (ref 27–33)
MCHC RBC AUTO-ENTMCNC: 33.4 G/DL (ref 33.6–35)
MCV RBC AUTO: 91.2 FL (ref 81.4–97.8)
PLATELET # BLD AUTO: 300 K/UL (ref 164–446)
PMV BLD AUTO: 8.6 FL (ref 9–12.9)
POTASSIUM SERPL-SCNC: 4.5 MMOL/L (ref 3.6–5.5)
PROCALCITONIN SERPL-MCNC: 0.06 NG/ML
RBC # BLD AUTO: 3.87 M/UL (ref 4.2–5.4)
SODIUM SERPL-SCNC: 139 MMOL/L (ref 135–145)
WBC # BLD AUTO: 12.7 K/UL (ref 4.8–10.8)

## 2023-02-15 PROCEDURE — 700105 HCHG RX REV CODE 258: Performed by: FAMILY MEDICINE

## 2023-02-15 PROCEDURE — 84145 PROCALCITONIN (PCT): CPT

## 2023-02-15 PROCEDURE — 96376 TX/PRO/DX INJ SAME DRUG ADON: CPT

## 2023-02-15 PROCEDURE — 770006 HCHG ROOM/CARE - MED/SURG/GYN SEMI*

## 2023-02-15 PROCEDURE — 700105 HCHG RX REV CODE 258: Performed by: HOSPITALIST

## 2023-02-15 PROCEDURE — A9270 NON-COVERED ITEM OR SERVICE: HCPCS | Performed by: HOSPITALIST

## 2023-02-15 PROCEDURE — 700111 HCHG RX REV CODE 636 W/ 250 OVERRIDE (IP): Performed by: FAMILY MEDICINE

## 2023-02-15 PROCEDURE — 36415 COLL VENOUS BLD VENIPUNCTURE: CPT

## 2023-02-15 PROCEDURE — 96367 TX/PROPH/DG ADDL SEQ IV INF: CPT

## 2023-02-15 PROCEDURE — 700102 HCHG RX REV CODE 250 W/ 637 OVERRIDE(OP): Performed by: HOSPITALIST

## 2023-02-15 PROCEDURE — 80048 BASIC METABOLIC PNL TOTAL CA: CPT

## 2023-02-15 PROCEDURE — 700101 HCHG RX REV CODE 250: Performed by: HOSPITALIST

## 2023-02-15 PROCEDURE — 99232 SBSQ HOSP IP/OBS MODERATE 35: CPT | Performed by: FAMILY MEDICINE

## 2023-02-15 PROCEDURE — 93005 ELECTROCARDIOGRAM TRACING: CPT | Performed by: FAMILY MEDICINE

## 2023-02-15 PROCEDURE — 85027 COMPLETE CBC AUTOMATED: CPT

## 2023-02-15 PROCEDURE — 93010 ELECTROCARDIOGRAM REPORT: CPT | Performed by: INTERNAL MEDICINE

## 2023-02-15 PROCEDURE — 700111 HCHG RX REV CODE 636 W/ 250 OVERRIDE (IP): Performed by: HOSPITALIST

## 2023-02-15 RX ORDER — SODIUM CHLORIDE, SODIUM LACTATE, POTASSIUM CHLORIDE, CALCIUM CHLORIDE 600; 310; 30; 20 MG/100ML; MG/100ML; MG/100ML; MG/100ML
INJECTION, SOLUTION INTRAVENOUS CONTINUOUS
Status: DISCONTINUED | OUTPATIENT
Start: 2023-02-15 | End: 2023-02-16

## 2023-02-15 RX ORDER — PROCHLORPERAZINE 25 MG
25 SUPPOSITORY, RECTAL RECTAL EVERY 6 HOURS PRN
COMMUNITY
End: 2023-08-08

## 2023-02-15 RX ORDER — CIPROFLOXACIN 2 MG/ML
400 INJECTION, SOLUTION INTRAVENOUS EVERY 12 HOURS
Status: DISCONTINUED | OUTPATIENT
Start: 2023-02-15 | End: 2023-02-16 | Stop reason: HOSPADM

## 2023-02-15 RX ORDER — TRAMADOL HYDROCHLORIDE 50 MG/1
50 TABLET ORAL EVERY 4 HOURS PRN
COMMUNITY
End: 2023-02-17 | Stop reason: SDUPTHER

## 2023-02-15 RX ORDER — AMOXICILLIN AND CLAVULANATE POTASSIUM 500; 125 MG/1; MG/1
1 TABLET, FILM COATED ORAL 3 TIMES DAILY
Status: ON HOLD | COMMUNITY
End: 2023-02-16

## 2023-02-15 RX ORDER — OXYCODONE HYDROCHLORIDE 5 MG/1
5 TABLET ORAL EVERY 4 HOURS PRN
Status: ON HOLD | COMMUNITY
End: 2023-02-16

## 2023-02-15 RX ORDER — HEPARIN SODIUM 5000 [USP'U]/ML
5000 INJECTION, SOLUTION INTRAVENOUS; SUBCUTANEOUS EVERY 8 HOURS
Status: DISCONTINUED | OUTPATIENT
Start: 2023-02-15 | End: 2023-02-16 | Stop reason: HOSPADM

## 2023-02-15 RX ADMIN — CEFTRIAXONE SODIUM 2000 MG: 2 INJECTION, POWDER, FOR SOLUTION INTRAMUSCULAR; INTRAVENOUS at 05:23

## 2023-02-15 RX ADMIN — HYDROMORPHONE HYDROCHLORIDE 0.5 MG: 1 INJECTION, SOLUTION INTRAMUSCULAR; INTRAVENOUS; SUBCUTANEOUS at 17:41

## 2023-02-15 RX ADMIN — METRONIDAZOLE 500 MG: 500 INJECTION, SOLUTION INTRAVENOUS at 14:33

## 2023-02-15 RX ADMIN — HYDROMORPHONE HYDROCHLORIDE 0.5 MG: 1 INJECTION, SOLUTION INTRAMUSCULAR; INTRAVENOUS; SUBCUTANEOUS at 11:15

## 2023-02-15 RX ADMIN — HEPARIN SODIUM 5000 UNITS: 5000 INJECTION, SOLUTION INTRAVENOUS; SUBCUTANEOUS at 15:12

## 2023-02-15 RX ADMIN — METRONIDAZOLE 500 MG: 500 INJECTION, SOLUTION INTRAVENOUS at 05:59

## 2023-02-15 RX ADMIN — MORPHINE SULFATE 2 MG: 4 INJECTION, SOLUTION INTRAMUSCULAR; INTRAVENOUS at 16:25

## 2023-02-15 RX ADMIN — METRONIDAZOLE 500 MG: 500 INJECTION, SOLUTION INTRAVENOUS at 22:49

## 2023-02-15 RX ADMIN — HYDROMORPHONE HYDROCHLORIDE 0.5 MG: 1 INJECTION, SOLUTION INTRAMUSCULAR; INTRAVENOUS; SUBCUTANEOUS at 05:23

## 2023-02-15 RX ADMIN — ZIPRASIDONE HYDROCHLORIDE 80 MG: 20 CAPSULE ORAL at 17:42

## 2023-02-15 RX ADMIN — HYDROMORPHONE HYDROCHLORIDE 0.5 MG: 1 INJECTION, SOLUTION INTRAMUSCULAR; INTRAVENOUS; SUBCUTANEOUS at 08:23

## 2023-02-15 RX ADMIN — CIPROFLOXACIN 400 MG: 2 INJECTION, SOLUTION INTRAVENOUS at 17:48

## 2023-02-15 RX ADMIN — MORPHINE SULFATE 2 MG: 4 INJECTION, SOLUTION INTRAMUSCULAR; INTRAVENOUS at 19:27

## 2023-02-15 RX ADMIN — HYDROMORPHONE HYDROCHLORIDE 0.5 MG: 1 INJECTION, SOLUTION INTRAMUSCULAR; INTRAVENOUS; SUBCUTANEOUS at 20:46

## 2023-02-15 RX ADMIN — HYDROMORPHONE HYDROCHLORIDE 0.5 MG: 1 INJECTION, SOLUTION INTRAMUSCULAR; INTRAVENOUS; SUBCUTANEOUS at 23:13

## 2023-02-15 RX ADMIN — SODIUM CHLORIDE, POTASSIUM CHLORIDE, SODIUM LACTATE AND CALCIUM CHLORIDE: 600; 310; 30; 20 INJECTION, SOLUTION INTRAVENOUS at 14:35

## 2023-02-15 RX ADMIN — HYDROMORPHONE HYDROCHLORIDE 0.5 MG: 1 INJECTION, SOLUTION INTRAMUSCULAR; INTRAVENOUS; SUBCUTANEOUS at 14:26

## 2023-02-15 RX ADMIN — HEPARIN SODIUM 5000 UNITS: 5000 INJECTION, SOLUTION INTRAVENOUS; SUBCUTANEOUS at 22:47

## 2023-02-15 RX ADMIN — SODIUM CHLORIDE, POTASSIUM CHLORIDE, SODIUM LACTATE AND CALCIUM CHLORIDE 1000 ML: 600; 310; 30; 20 INJECTION, SOLUTION INTRAVENOUS at 11:14

## 2023-02-15 RX ADMIN — ONDANSETRON 4 MG: 2 INJECTION INTRAMUSCULAR; INTRAVENOUS at 19:26

## 2023-02-15 ASSESSMENT — COGNITIVE AND FUNCTIONAL STATUS - GENERAL
DAILY ACTIVITIY SCORE: 24
SUGGESTED CMS G CODE MODIFIER MOBILITY: CH
MOBILITY SCORE: 24
SUGGESTED CMS G CODE MODIFIER DAILY ACTIVITY: CH

## 2023-02-15 ASSESSMENT — PAIN DESCRIPTION - PAIN TYPE
TYPE: ACUTE PAIN

## 2023-02-15 ASSESSMENT — FIBROSIS 4 INDEX: FIB4 SCORE: 0.73

## 2023-02-15 ASSESSMENT — LIFESTYLE VARIABLES
CONSUMPTION TOTAL: NEGATIVE
HAVE PEOPLE ANNOYED YOU BY CRITICIZING YOUR DRINKING: NO
EVER HAD A DRINK FIRST THING IN THE MORNING TO STEADY YOUR NERVES TO GET RID OF A HANGOVER: NO
ALCOHOL_USE: NO
TOTAL SCORE: 0
AVERAGE NUMBER OF DAYS PER WEEK YOU HAVE A DRINK CONTAINING ALCOHOL: 0
TOTAL SCORE: 0
ON A TYPICAL DAY WHEN YOU DRINK ALCOHOL HOW MANY DRINKS DO YOU HAVE: 0
EVER FELT BAD OR GUILTY ABOUT YOUR DRINKING: NO
HAVE YOU EVER FELT YOU SHOULD CUT DOWN ON YOUR DRINKING: NO
HOW MANY TIMES IN THE PAST YEAR HAVE YOU HAD 5 OR MORE DRINKS IN A DAY: 0
TOTAL SCORE: 0

## 2023-02-15 ASSESSMENT — PATIENT HEALTH QUESTIONNAIRE - PHQ9
1. LITTLE INTEREST OR PLEASURE IN DOING THINGS: NOT AT ALL
SUM OF ALL RESPONSES TO PHQ9 QUESTIONS 1 AND 2: 0
2. FEELING DOWN, DEPRESSED, IRRITABLE, OR HOPELESS: NOT AT ALL

## 2023-02-15 NOTE — ED PROVIDER NOTES
ER Provider Note    Scribed for Sylvester Gunn M.d. by Dereje Puckett. 2023  8:01 PM    Primary Care Provider: SHAY Travis    CHIEF COMPLAINT  Chief Complaint   Patient presents with    LLQ Pain     EXTERNAL RECORDS REVIEWED  Other Patient has had diverticulitis since December, and was discharged 11 days ago.    HPI/ROS  LIMITATION TO HISTORY   Select: : None  OUTSIDE HISTORIAN(S):  Family     Rianna Solis is a 52 y.o. female who presents to the ED complaining of Ongoing and worsening pain to her left lower quadrant. She was discharged 11 days ago, and she was originally getting better, but began worsening two days ago. She finished her Augmentin regimen yesterday. She complains of associated abdominal pain, nausea, vomiting, and diarrhea which started 2 days ago. She denies any fevers or cough. She was meant to follow up tomorrow morning with a GI specialist but her  brought her in [facility] for evaluation.    Her surgical history includes  and cholecystectomy. Her surgeon is Dr. Guzman.  She has a medical history of hashimoto's disease and psychiatric disorders, but denies any heart or lung problems.    PAST MEDICAL HISTORY  Past Medical History:   Diagnosis Date    Abnormal mammogram     nodule in left breast, no follow up    Acromioclavicular joint separation 2012    Anxiety     No [place] mental health; klonopin, coping mechanisms    Bipolar 1 disorder (HCC) 2023    with psychosis    Depression     Former smoker 2019    History of cervical dysplasia     had cryo tx, resolved with nl paps now    History of gestational diabetes 01/15/2018    History of suicidal ideation     Homicidal ideations 2014    [facility] hold    Hyperthyroidism     Hyperthyroidism     since age 16, NL labs 2012    Lisfranc's dislocation 10/2017    right    Migraine     Partial seizure disorder (HCC) 2023    takes dharmesh, pt states they are occipital seizures, no  "convulsions, last seizure 2 weeks ago. Instr pt to call Dr. Guzman's office if experiences any from now until surgery.    Pelvic exam 2008    Dysplasia at age 20s, had cryotherapy    Polysubstance dependence (HCC)     in remission as of 11/26/2014    Schizoaffective disorder (HCC)     Schizoaffective disorder, bipolar type (HCC) 04/07/2011    Seizure ([facility]) 1999    partial complex-no motor seizure; managed by [facility]    Seizure disorder ([facility])     partial complex seizure d/o    Sleep apnea 01/2023    cpap    Toe fracture 10/2017    right 3rd-5th toes    Urinary tract infection     Wrist fracture 2010    casted by [facility]     SURGICAL HISTORY  Past Surgical History:   Procedure Laterality Date    CHOLECYSTECTOMY ROBOTIC XI N/A 1/13/2023    Procedure: ROBOTIC CHOLECYSTECTOMY;  Surgeon: Kayce Guzman M.D.;  Location: SURGERY [facility];  Service: Gen Robotic    FOOT SURGERY Right 10/2017    Lisfranc's dislocation    PRIMARY C SECTION  01/01/2008    CERVICAL CONIZATION  01/01/1990    cryo for abnl pap    EYE SURGERY  1973,1979    to repair \"lazy eye\"    OTHER      eye surgeries as a child     FAMILY HISTORY  Family History   Problem Relation Age of Onset    Cancer Mother         breast cancer    Diabetes Mother     Hypertension Mother     Cancer Maternal Grandmother         stomach    Stroke Maternal Grandfather     Cancer Paternal Grandfather         lung    Hypertension Father      SOCIAL HISTORY   reports that she has been smoking cigarettes. She started smoking about 6 years ago. She has a 15.00 pack-year smoking history. She has never used smokeless tobacco. She reports that she does not currently use alcohol. She reports that she does not use drugs.    CURRENT MEDICATIONS  Previous Medications    ACETAMINOPHEN (TYLENOL) 500 MG TAB    Take 500-1,000 mg by mouth every 6 hours as needed. Indications: Pain    AMOXICILLIN-CLAVULANATE (AUGMENTIN) 875-125 MG TAB    Take 1 Tablet by mouth 2 times " a day for 7 days.    CALCIUM CARB-CHOLECALCIFEROL (CALCIUM 1000 + D PO)    Take 1 Tablet by mouth every day.    CLONAZEPAM (KLONOPIN) 1 MG TAB    Take 1 mg by mouth 3 times a day as needed. Indications: Feeling Anxious    GABAPENTIN (NEURONTIN) 300 MG CAP    Take 300 mg by mouth 3 times a day as needed (pain).    IBUPROFEN (MOTRIN) 200 MG TAB    Take 200 mg by mouth every 6 hours as needed. Indications: Pain    MULTIPLE VITAMINS-MINERALS (MULTIVITAMIN ADULTS) TAB    Take 1 Tablet by mouth every day.    OMEGA-3 FATTY ACIDS (FISH OIL) 1000 MG CAP CAPSULE    Take 1,000 mg by mouth every day.    OXYCODONE IMMEDIATE-RELEASE (ROXICODONE) 5 MG TAB    Take 1 Tablet by mouth every 8 hours as needed for Severe Pain for up to 7 days.    PROCHLORPERAZINE (COMPAZINE) 5 MG TAB    Take 1 Tablet by mouth every 6 hours as needed for Nausea/Vomiting.    VENLAFAXINE XR (EFFEXOR XR) 37.5 MG CAPSULE SR 24 HR    Take 37.5 mg by mouth every morning.    VITAMIN D, CHOLECALCIFEROL, 25 MCG (1000 UT) CAP    Take 1,000 Units by mouth every day.    ZIPRASIDONE (GEODON) 80 MG CAP    Take 80 mg by mouth 2 times a day.     ALLERGIES  Zoloft    PHYSICAL EXAM  /61   Pulse 83   Temp 36.2 °C (97.2 °F) (Temporal)   Resp 18   Ht 1.524 m (5')   Wt 83.5 kg (184 lb 1.4 oz)   SpO2 97%   BMI 35.95 kg/m²   Constitutional: Well developed, Well nourished, mild distress, Non-toxic appearance.   HENT: Normocephalic, Atraumatic, mucous membranes moist, no erythema, exudates, swelling, or masses, nares patent  Eyes: nonicteric  Neck: Supple, no meningismus  Lymphatic: No lymphadenopathy noted.   Cardiovascular: Regular rate and rhythm, no gallops rubs or murmurs  Lungs: Clear bilaterally   Abdomen: Center right lower abdominal tenderness to palpation. No rebound no guarding no distension  Skin: Warm, Dry, no rash  Genitalia: Deferred  Rectal: Deferred  Extremities: No edema  Neurologic: Alert, appropriate, follows commands, moving all extremities,  normal speech   Psychiatric: Affect normal    DIAGNOSTIC STUDIES    Labs:   Results for orders placed or performed during the hospital encounter of 02/14/23   CBC WITH DIFFERENTIAL   Result Value Ref Range    WBC 16.6 (H) 4.8 - 10.8 K/uL    RBC 4.27 4.20 - 5.40 M/uL    Hemoglobin 12.9 12.0 - 16.0 g/dL    Hematocrit 37.8 37.0 - 47.0 %    MCV 88.5 81.4 - 97.8 fL    MCH 30.2 27.0 - 33.0 pg    MCHC 34.1 33.6 - 35.0 g/dL    RDW 40.9 35.9 - 50.0 fL    Platelet Count 361 164 - 446 K/uL    MPV 8.7 (L) 9.0 - 12.9 fL    Neutrophils-Polys 66.80 44.00 - 72.00 %    Lymphocytes 21.90 (L) 22.00 - 41.00 %    Monocytes 8.40 0.00 - 13.40 %    Eosinophils 1.90 0.00 - 6.90 %    Basophils 0.50 0.00 - 1.80 %    Immature Granulocytes 0.50 0.00 - 0.90 %    Nucleated RBC 0.00 /100 WBC    Neutrophils (Absolute) 11.13 (H) 2.00 - 7.15 K/uL    Lymphs (Absolute) 3.64 1.00 - 4.80 K/uL    Monos (Absolute) 1.39 (H) 0.00 - 0.85 K/uL    Eos (Absolute) 0.31 0.00 - 0.51 K/uL    Baso (Absolute) 0.08 0.00 - 0.12 K/uL    Immature Granulocytes (abs) 0.09 0.00 - 0.11 K/uL    NRBC (Absolute) 0.00 K/uL   COMP METABOLIC PANEL   Result Value Ref Range    Sodium 140 135 - 145 mmol/L    Potassium 4.1 3.6 - 5.5 mmol/L    Chloride 105 96 - 112 mmol/L    Co2 21 20 - 33 mmol/L    Anion Gap 14.0 7.0 - 16.0    Glucose 112 (H) 65 - 99 mg/dL    Bun 12 8 - 22 mg/dL    Creatinine 0.64 0.50 - 1.40 mg/dL    Calcium 9.5 8.4 - 10.2 mg/dL    AST(SGOT) 22 12 - 45 U/L    ALT(SGPT) 27 2 - 50 U/L    Alkaline Phosphatase 86 30 - 99 U/L    Total Bilirubin 0.4 0.1 - 1.5 mg/dL    Albumin 4.0 3.2 - 4.9 g/dL    Total Protein 7.2 6.0 - 8.2 g/dL    Globulin 3.2 1.9 - 3.5 g/dL    A-G Ratio 1.3 g/dL   LIPASE   Result Value Ref Range    Lipase 31 7 - 58 U/L   URINALYSIS,CULTURE IF INDICATED    Specimen: Urine   Result Value Ref Range    Color Yellow     Character Clear     Specific Gravity <=1.005 <1.035    Ph 5.5 5.0 - 8.0    Glucose Negative Negative mg/dL    Ketones Negative Negative mg/dL     Protein Negative Negative mg/dL    Bilirubin Negative Negative    Nitrite Negative Negative    Leukocyte Esterase Negative Negative    Occult Blood Negative Negative    Micro Urine Req see below    LACTIC ACID   Result Value Ref Range    Lactic Acid 1.0 0.5 - 2.0 mmol/L   HCG QUAL SERUM   Result Value Ref Range    Beta-Hcg Qualitative Serum Negative Negative   Blood Culture,Hold   Result Value Ref Range    Blood Culture Hold Collected    Blood Culture,Hold   Result Value Ref Range    Blood Culture Hold Collected    CORRECTED CALCIUM   Result Value Ref Range    Correct Calcium 9.5 8.5 - 10.5 mg/dL   ESTIMATED GFR   Result Value Ref Range    GFR (CKD-EPI) 106 >60 mL/min/1.73 m 2     Radiology:   The attending emergency physician has independently interpreted the diagnostic imaging associated with this visit and am waiting the final reading from the radiologist.   Radiologist interpretation:   CT-ABDOMEN-PELVIS WITH   Final Result         1.  Diverticulosis with changes of the sigmoid colon favoring sigmoid diverticulitis. Follow-up CT or colonoscopy recommended following treatment to exclude inflammatory carcinoma which can have radiographically similar appearance.   2.  Hepatomegaly and slight irregular hepatic contour favoring changes of cirrhosis        COURSE & MEDICAL DECISION MAKING     ED Observation Status? Yes; I am placing the patient in to an observation status due to a diagnostic uncertainty as well as therapeutic intensity. Patient placed in observation status at 8:04 PM, 2/14/2023.     Observation plan is as follows: Serial re-evaluation pending lab results.    Upon Reevaluation, the patient's condition has: not improved; and will be escalated to hospitalization.    Patient discharged from ED Observation status at 9:42 PM 02/14/23.    INITIAL ASSESSMENT, COURSE AND PLAN  Care Narrative: This is a 52-year-old with recurrent diverticulitis status post 4 rounds of antibiotics who presents with increasing  lower abdominal pain.  She has evidence of a sigmoid diverticulitis and a white count of 16,000.  No perforation.  Lactate is normal.  The patient was cultured and started on antibiotics here-Zosyn.  She will be admitted and likely would benefit from surgical consultation given the duration of her recurrent infections of her sigmoid.    8:04 PM - Patient seen and examined at bedside. Discussed plan of care, including labs, medication use and imaging. Patient agrees to the plan of care. The patient will be resuscitated with 1L LR IV and medicated with Zofran 4 mg injection and Dilaudid 0.5 mg injection. Ordered for CT-Abdomen-Pelvis, Urinalysis, HCG, Lipase, CMP, CBC w/ Diff, Lactic acid to evaluate her symptoms.     9:38 PM I discussed the patient's case and the above findings with Dr. Harrell (Hospitalist) who agreed to evaluate the patient for admission. I ordered for Zosyn 3.375g IVPB, and a repeat dose of Dilaudid.    9:38 PM - Patient was reevaluated at bedside. Discussed lab and radiology results with the patient and informed them of my plan for admission for further evaluation for potential surgical intervention.    HYDRATION: Based on the patient's presentation of Dehydration the patient was given IV fluids. IV Hydration was used because oral hydration was not adequate alone. Upon recheck following hydration, the patient was improved.    DISPOSITION AND DISCUSSIONS  I have discussed management of the patient with the following physicians and LATESHA's:  Dr. Harrell    Discussion of management with other [facility] or appropriate source(s): None     Patient will be hospitalized by Dr. Harrell    FINAL DIAGNOSIS  1. Diverticulitis      Dereje MICHAEL (Brunilda), am scribing for, and in the presence of, Sylvester Gunn M.D..    Electronically signed by: Dereje Puckett (Brunilda), 2/14/2023    Sylvester MICHAEL M.D. personally performed the services described in this documentation, as scribed by Dereje Puckett in my presence, and it is both accurate  and complete.    The note accurately reflects work and decisions made by me.  Sylvester Gunn M.D.  2/14/2023  9:44 PM

## 2023-02-15 NOTE — H&P
"Hospital Medicine History & Physical Note    Date of Service  2/14/2023    Primary Care Physician  HANK Travis.    Consultants  None    Code Status  Full Code    Chief Complaint  Chief Complaint   Patient presents with    LLQ Pain       History of Presenting Illness  Rianna Solis is a 52 y.o. female who presented to the emergency department on 2/14/2023 with complaints of left lower quadrant abdominal pain.    Patient reports her pain is started really just after [place].  She underwent laparoscopic cholecystectomy by Dr. Rubio on 1/13/2023.  Postoperatively, had ongoing left lower quadrant pain and the CT of the abdomen done on 1/24 showed acute diverticulitis.  She was hospitalized for this from 1/27  -1/29 and received Zosyn and de-escalation to Unasyn however left AMA.  She was readmitted to the hospital on 1/31 - 2/3 with ongoing pain and CT finding increased severity of diverticulitis.  She received once again Zosyn and was de-escalated to Unasyn and discharged on Augmentin.    Today, she returns to the ER with worsening ongoing left lower quadrant pain also associated with nausea but no vomiting.  Denies diarrhea.  Denies fever.  CT results done today in the ED ER showing sigmoid diverticulitis.    Additionally, she reports that had a colonoscopy done with GI see back in December and reportedly had 2 polyps taken out showing \"precancerous cells \"but no additional abnormalities.  [facility] for ongoing left lower quadrant pain and she was referred by her PCP.    Here in the ED, her vital signs are normal but she has elevated white count and 16.6.  -She was a started on Zosyn and also received total of 1 mg Dilaudid, 1 L bolus of LR and 4 mg Zosyn before I was called for admission.    I discussed the plan of care with patient.    Review of Systems  ROS    Past Medical History   has a past medical history of Abnormal mammogram " "(2006), Acromioclavicular joint separation (05/24/2012), Anxiety, Bipolar 1 disorder (HCC) (01/2023), Depression, Former smoker (04/26/2019), History of cervical dysplasia (1990), History of gestational diabetes (01/15/2018), History of suicidal ideation, Homicidal ideations (2014), Hyperthyroidism, Hyperthyroidism, Lisfranc's dislocation (10/2017), Migraine, Partial seizure disorder (HCC) (01/2023), Pelvic exam (2008), Polysubstance dependence (HCC), Schizoaffective disorder ([facility]), Schizoaffective disorder, bipolar type ([facility]) (04/07/2011), Seizure ([facility]) (1999), Seizure disorder ([facility]), Sleep apnea (01/2023), Toe fracture (10/2017), Urinary tract infection, and Wrist fracture (2010).    Surgical History   has a past surgical history that includes other; cervical conization (01/01/1990); eye surgery (1973,1979); primary c section (01/01/2008); foot surgery (Right, 10/2017); and cholecystectomy robotic xi (N/A, 1/13/2023).     Family History  family history includes Cancer in her maternal grandmother, mother, and paternal grandfather; Diabetes in her mother; Hypertension in her father and mother; Stroke in her maternal grandfather.   Family history reviewed with patient. There is no family history that is pertinent to the chief complaint.     Social History   reports that she has been smoking cigarettes. She started smoking about 6 years ago. She has a 15.00 pack-year smoking history. She has never used smokeless tobacco. She reports that she does not currently use alcohol. She reports that she does not use drugs.    Allergies  Allergies   Allergen Reactions    Zoloft Swelling     \"Blows Up\"       Medications  Prior to Admission Medications   Prescriptions Last Dose Informant Patient Reported? Taking?   Calcium Carb-Cholecalciferol (CALCIUM 1000 + D PO)  Patient Yes No   Sig: Take 1 Tablet by mouth every day.   Multiple Vitamins-Minerals (MULTIVITAMIN ADULTS) Tab  Patient Yes No   Sig: Take 1 Tablet by mouth every day. "   Omega-3 Fatty Acids (FISH OIL) 1000 MG Cap capsule  Patient Yes No   Sig: Take 1,000 mg by mouth every day.   Vitamin D, Cholecalciferol, 25 MCG (1000 UT) Cap  Patient Yes No   Sig: Take 1,000 Units by mouth every day.   acetaminophen (TYLENOL) 500 MG Tab  Patient Yes No   Sig: Take 500-1,000 mg by mouth every 6 hours as needed. Indications: Pain   amoxicillin-clavulanate (AUGMENTIN) 875-125 MG Tab   No No   Sig: Take 1 Tablet by mouth 2 times a day for 7 days.   clonazePAM (KLONOPIN) 1 MG Tab  Patient Yes No   Sig: Take 1 mg by mouth 3 times a day as needed. Indications: Feeling Anxious   gabapentin (NEURONTIN) 300 MG Cap  Patient Yes No   Sig: Take 300 mg by mouth 3 times a day as needed (pain).   ibuprofen (MOTRIN) 200 MG Tab  Patient Yes No   Sig: Take 200 mg by mouth every 6 hours as needed. Indications: Pain   oxyCODONE immediate-release (ROXICODONE) 5 MG Tab   No No   Sig: Take 1 Tablet by mouth every 8 hours as needed for Severe Pain for up to 7 days.   prochlorperazine (COMPAZINE) 5 MG Tab   No No   Sig: Take 1 Tablet by mouth every 6 hours as needed for Nausea/Vomiting.   venlafaxine XR (EFFEXOR XR) 37.5 MG CAPSULE SR 24 HR  Patient Yes No   Sig: Take 37.5 mg by mouth every morning.   ziprasidone (GEODON) 80 MG Cap  Patient Yes No   Sig: Take 80 mg by mouth 2 times a day.      Facility-Administered Medications: None       Physical Exam  Temp:  [36.2 °C (97.2 °F)] 36.2 °C (97.2 °F)  Pulse:  [83] 83  Resp:  [18] 18  BP: (113)/(61) 113/61  SpO2:  [97 %] 97 %  Blood Pressure: 113/61   Temperature: 36.2 °C (97.2 °F)   Pulse: 83   Respiration: 18   Pulse Oximetry: 97 %       Physical Exam    Laboratory:  Recent Labs     02/14/23 2020   WBC 16.6*   RBC 4.27   HEMOGLOBIN 12.9   HEMATOCRIT 37.8   MCV 88.5   MCH 30.2   MCHC 34.1   RDW 40.9   PLATELETCT 361   MPV 8.7*     Recent Labs     02/14/23 2020   SODIUM 140   POTASSIUM 4.1   CHLORIDE 105   CO2 21   GLUCOSE 112*   BUN 12   CREATININE 0.64   CALCIUM 9.5      Recent Labs     02/14/23 2020   ALTSGPT 27   ASTSGOT 22   ALKPHOSPHAT 86   TBILIRUBIN 0.4   LIPASE 31   GLUCOSE 112*         No results for input(s): NTPROBNP in the last 72 hours.      No results for input(s): TROPONINT in the last 72 hours.    Imaging:  CT-ABDOMEN-PELVIS WITH   Final Result         1.  Diverticulosis with changes of the sigmoid colon favoring sigmoid diverticulitis. Follow-up CT or colonoscopy recommended following treatment to exclude inflammatory carcinoma which can have radiographically similar appearance.   2.  Hepatomegaly and slight irregular hepatic contour favoring changes of cirrhosis            Assessment/Plan:  Justification for Admission Status  I anticipate this patient will require at least two midnights for appropriate medical management, necessitating inpatient admission because Acute / recurrent Diverticulitis -this is the third admission for this        * Acute diverticulitis- (present on admission)  Assessment & Plan  -Observation status to medical floor.  -Patient with ongoing diverticulitis first seen on CT on 1/24.  -She also has elevated WBC of 16.6 but no other SIRS criteria therefore no sepsis on admission.  -Given Zosyn in the ED.  I will continue with Rocephin and metronidazole.  -Patient was admitted now twice for this problem.  She received Zosyn with subsequent de-escalation to Unasyn during admissions.  If she is not improving, I would recommend considering Zosyn once again.  -CT today showing once again diverticulitis and follow-up CT or colonoscopy recommending following treatment to exclude inflammatory carcinoma.  -There is no evidence of perforation or abscess at this time.  Adryan has a referral to see GI and has appointment for tomorrow.  She also reports having done a colonoscopy with [facility] back in December of last year and only having polyps as abnormal findings.  -If worsening, may need surgery or GI consult.    Leukocytosis  Assessment & Plan  -WBC is  16,000 on admission.  She has no additional sirs criteria therefore she is not septic on admission however she does have underlying unresolved diverticulitis and I will restart her on IV antibiotics.  -This is the third admission for diverticulitis and most times, she came with elevated white count as below.  -See plan above.    Tobacco consumption- (present on admission)  Assessment & Plan  -Patient has quit smoking intermittently multiple times over the last 2 years.  She is now down to 5 cigarettes a day and is currently following up with quit smoking  .  She finds that the  for the phone has been helpful and inspiring her to continue her journey with smoking cessation.  We discussed a few additional things she can do to continue her efforts and quit smoking.  Total amount of time on smoking cessation counseling was 5 minutes.    Partial seizure disorder (HCC)- (present on admission)  Assessment & Plan  -Patient states she has partial complex seizure and is taking Klonopin for this.    Bipolar 1 disorder with psychosis episodes- (present on admission)  Assessment & Plan  -Continue Effexor and Geodon.        VTE prophylaxis: SCDs/TEDs

## 2023-02-15 NOTE — PROGRESS NOTES
1. Acute diverticulitis  - amoxicillin-clavulanate (AUGMENTIN) 875-125 MG Tab; Take 1 Tablet by mouth 2 times a day for 7 days.  Dispense: 14 Tablet; Refill: 0

## 2023-02-15 NOTE — ED NOTES
Report received from [facility].     Rounded on pt. Pt in NAD.  Pt reports no further needs at this time.   Call bell within reach.

## 2023-02-15 NOTE — ED NOTES
Pt medicated per mar for pain, given water.     Rounded on pt. Pt in NAD.  Pt reports no further needs at this time.   Call bell within reach.

## 2023-02-15 NOTE — DISCHARGE PLANNING
ER CM met  with pt at bedside . [address]. Lives with spouse in 1 story home. RX [facility]. PCP Glenda SPICER.  She is independent in adls and ambulation She drives. Brother is other support person.  CPAP at home unsure who her provider is.  Care Transition Team Assessment    Information Source  Orientation Level: Oriented X4  Information Given By: Patient  Informant's Name: Rianna  Who is responsible for making decisions for patient? : Patient         Elopement Risk  Legal Hold: No  Ambulatory or Self Mobile in Wheelchair: No-Not an Elopement Risk    Interdisciplinary Discharge Planning  Primary Care Physician: Glenda Skinner  Lives with - Patient's Self Care Capacity: Spouse  Support Systems: Spouse / Significant Other  Housing / Facility: 1 Story House  Do You Take your Prescribed Medications Regularly: Yes  Able to Return to Previous ADL's: Yes  Mobility Issues: No  Assistance Needed: No  Durable Medical Equipment: Other - Specify (cpap)    Discharge Preparedness  What is your plan after discharge?: Home with help  What are your discharge supports?: Spouse  Prior Functional Level: Ambulatory, Independent with Activities of Daily Living, Independent with Medication Management    Functional Assesment  Prior Functional Level: Ambulatory, Independent with Activities of Daily Living, Independent with Medication Management    Finances  Prescription Coverage: Yes                   Domestic Abuse  Have you ever been the victim of abuse or violence?: No    Psychological Assessment  History of Psychiatric Problems: Yes         Anticipated Discharge Information  Discharge Disposition: Discharged to home/self care (01)

## 2023-02-15 NOTE — TELEPHONE ENCOUNTER
Caller Name: Michelle  Call Back Number: [phone] (home)       How would the patient prefer to be contacted with a response: Phone call OK to leave a detailed message    2. What are the patient's symptoms (location & severity)? Stomach pain     3. Is this a new symptom Yes    4. When did it start? 02/07/2023    5. Action taken per Active Symptom Guide:  Patient schedule for 02/17    Patient would like to know what to do since she took her last antibiotic pill and she is still in pain.    Can you please advise.    Thank you,  Dali Reynolds  Medical Assistant

## 2023-02-15 NOTE — ASSESSMENT & PLAN NOTE
- Inpt status as pt has failed treatment x 2 episodes in the past ~2 months  -Patient with ongoing diverticulitis first seen on CT on 1/24 (sigmoid)  -She also has elevated WBC of 16.6 but no other SIRS criteria therefore no sepsis on admission.  - Cipro/Flagyl given outpt Augmentin failure x 2. Check EKG for QT given home Geodon.  -Patient was admitted now twice for this problem.  She received Zosyn with subsequent de-escalation to Unasyn during admissions.    -CT today showing once again diverticulitis and follow-up CT or colonoscopy recommending following treatment to exclude inflammatory carcinoma.  -There is no evidence of perforation or abscess at this time.  She reports having done a colonoscopy with [facility] back in December of last year and only having polyps as abnormal findings. Confirmed with [facility] who will see her in clinic.  -If worsening, may need surgery or GI consult.

## 2023-02-15 NOTE — ASSESSMENT & PLAN NOTE
-WBC is 16,000 on admission.  She has no additional sirs criteria therefore she is not septic on admission however she does have underlying unresolved diverticulitis and I will restart her on IV antibiotics.  -This is the third admission for diverticulitis and most times, she came with elevated white count as below.  -See plan above.

## 2023-02-15 NOTE — TELEPHONE ENCOUNTER
Phone Number Called: [phone] (home)       Call outcome: Spoke to patient regarding message below.    Message: Patient was asking for pain medication, she was advice that if the pain was severe to go to Urgent care or Emergency Room.

## 2023-02-15 NOTE — ED NOTES
Med rec updated and complete, per pt   Allergies reviewed, per pt  Pt reports that she is not taking HALOPERIDOL 10MG, OFRAN 4MG, QUETIAPINE 400MG, or BENZTROPINE 1MG in the last 30 days.

## 2023-02-15 NOTE — CARE PLAN
The patient is Watcher - Medium risk of patient condition declining or worsening    Shift Goals  Clinical Goals: Control Pain, treat infection  Patient Goals: Control Pain  Family Goals: Tamiko    Progress made toward(s) clinical / shift goals:  IV abx and pain control    Patient is not progressing towards the following goals:      Problem: Pain - Standard  Goal: Alleviation of pain or a reduction in pain to the patient’s comfort goal  Outcome: Progressing     Problem: Knowledge Deficit - Standard  Goal: Patient and family/care givers will demonstrate understanding of plan of care, disease process/condition, diagnostic tests and medications  Outcome: Progressing

## 2023-02-15 NOTE — PROGRESS NOTES
"Hospital Medicine Daily Progress Note    Date of Service  2/15/2023    Chief Complaint  Rianna Solis is a 52 y.o. female admitted 2/14/2023 with recurrent diverticulitis    Hospital Course  This is a 53yo female with a history of recent cholecystectomy in January, tobacco abuse, HERLINDA, bipolar disorder, seizure disorder and recurrent diverticulitis who has had multiple readmissions for diverticulitis. She has been admitted 1/27-1/28 (left AMA), 1/31-2/3, and now again for diverticulitis. On 2/3, she was discharged with a total of 10d of antibiotics with Augmentin. Per H&P, patient had a colonoscopy in December with \"precancerous cells\" and has an outpt appt with [facility].     Interval Problem Update  2/15 - Afebrile, without tachycardia, mild hypotension that is stable. White count trending down to 12.7 from 16.3 last evening. CT 1/24 - descending and sigmoid diverticulitis. CT 1/31- worsening distal descending and sigmoid diverticulitis. CT here - sigmoid diverticulitis. Continue Rocephin/Flagyl, add IVFs. Pt had colonoscopy with [facility] in December, confirmed only polyps on scope. Will treat this as an Augmentin failure as pt received Augmentin at discharge x 2 in the past.  Cipro/Flagyl for now, follow up with [facility] in the near future.    I have discussed this patient's plan of care and discharge plan at IDT rounds today with Case Management, Nursing, Nursing leadership, and other members of the IDT team.    Consultants/Specialty  none    Code Status  Full Code    Disposition  Patient is not medically cleared for discharge.   Anticipate discharge to to home with close outpatient follow-up.  I have placed the appropriate orders for post-discharge needs.    Review of Systems  ROS     Physical Exam  Temp:  [36.2 °C (97.2 °F)] 36.2 °C (97.2 °F)  Pulse:  [64-83] 82  Resp:  [13-26] 18  BP: ()/(54-70) 98/54  SpO2:  [92 %-100 %] 92 %    Physical Exam  Vitals and nursing note reviewed.   Constitutional:       " Appearance: She is ill-appearing. She is not toxic-appearing.   HENT:      Head: Normocephalic and atraumatic.      Right Ear: External ear normal.      Left Ear: External ear normal.   Cardiovascular:      Rate and Rhythm: Normal rate and regular rhythm.      Heart sounds: No murmur heard.  Pulmonary:      Effort: Pulmonary effort is normal.      Breath sounds: Normal breath sounds.   Abdominal:      General: Abdomen is flat. Bowel sounds are normal. There is no distension.      Palpations: Abdomen is soft.      Tenderness: There is abdominal tenderness (LLQ). There is no rebound.   Musculoskeletal:         General: No swelling. Normal range of motion.   Skin:     General: Skin is warm and dry.      Coloration: Skin is not pale.   Neurological:      General: No focal deficit present.      Mental Status: She is alert and oriented to person, place, and time.   Psychiatric:         Mood and Affect: Mood normal.         Behavior: Behavior normal.       Fluids    Intake/Output Summary (Last 24 hours) at 2/15/2023 0932  Last data filed at 2/14/2023 2226  Gross per 24 hour   Intake 1100 ml   Output --   Net 1100 ml       Laboratory  Recent Labs     02/14/23  2020 02/15/23  0334   WBC 16.6* 12.7*   RBC 4.27 3.87*   HEMOGLOBIN 12.9 11.8*   HEMATOCRIT 37.8 35.3*   MCV 88.5 91.2   MCH 30.2 30.5   MCHC 34.1 33.4*   RDW 40.9 41.9   PLATELETCT 361 300   MPV 8.7* 8.6*     Recent Labs     02/14/23  2020 02/15/23  0334   SODIUM 140 139   POTASSIUM 4.1 4.5   CHLORIDE 105 104   CO2 21 24   GLUCOSE 112* 99   BUN 12 10   CREATININE 0.64 0.61   CALCIUM 9.5 9.0                   Imaging  CT-ABDOMEN-PELVIS WITH   Final Result         1.  Diverticulosis with changes of the sigmoid colon favoring sigmoid diverticulitis. Follow-up CT or colonoscopy recommended following treatment to exclude inflammatory carcinoma which can have radiographically similar appearance.   2.  Hepatomegaly and slight irregular hepatic contour favoring changes of  cirrhosis           Assessment/Plan  * Acute diverticulitis- (present on admission)  Assessment & Plan  - Inpt status as pt has failed treatment x 2 episodes in the past ~2 months  -Patient with ongoing diverticulitis first seen on CT on 1/24 (sigmoid)  -She also has elevated WBC of 16.6 but no other SIRS criteria therefore no sepsis on admission.  - Cipro/Flagyl given outpt Augmentin failure x 2. Check EKG for QT given home Geodon.  -Patient was admitted now twice for this problem.  She received Zosyn with subsequent de-escalation to Unasyn during admissions.    -CT today showing once again diverticulitis and follow-up CT or colonoscopy recommending following treatment to exclude inflammatory carcinoma.  -There is no evidence of perforation or abscess at this time.  She reports having done a colonoscopy with [facility] back in December of last year and only having polyps as abnormal findings. Confirmed with [facility] who will see her in clinic.  -If worsening, may need surgery or GI consult.    Leukocytosis  Assessment & Plan  -WBC is 16,000 on admission.  She has no additional sirs criteria therefore she is not septic on admission however she does have underlying unresolved diverticulitis and I will restart her on IV antibiotics.  -This is the third admission for diverticulitis and most times, she came with elevated white count as below.  -See plan above.    Tobacco consumption- (present on admission)  Assessment & Plan  -Patient has quit smoking intermittently multiple times over the last 2 years.  She is now down to 5 cigarettes a day and is currently following up with quit smoking  .  She finds that the  for the phone has been helpful and inspiring her to continue her journey with smoking cessation.  We discussed a few additional things she can do to continue her efforts and quit smoking.  Total amount of time on smoking cessation counseling was 5 minutes.    Partial seizure disorder (HCC)- (present on  admission)  Assessment & Plan  -Patient states she has partial complex seizures and is taking Klonopin for this. Last seizure per PCP notes was 2014    Bipolar 1 disorder with psychosis episodes- (present on admission)  Assessment & Plan  -Continue Effexor and Geodon.         VTE prophylaxis: SCDs/TEDs and heparin ppx    I have performed a physical exam and reviewed and updated ROS and Plan today (2/15/2023). In review of yesterday's note (2/14/2023), there are no changes except as documented above.

## 2023-02-15 NOTE — ED NOTES
0300 blood labs sent to lab.     Rounded on pt. Pt in NAD.  Pt reports no further needs at this time.   Call bell within reach.

## 2023-02-15 NOTE — ASSESSMENT & PLAN NOTE
-Patient states she has partial complex seizures and is taking Klonopin for this. Last seizure per PCP notes was 2014

## 2023-02-15 NOTE — ED TRIAGE NOTES
Pt amb to triage c/o LLQ pain since yesterday; pt has had a chronic diverticulitis condition x several weeks, pt finished course of antibiotics last noc. Pt has had a few different antibiotic course treatments over past few weeks/months without improvement. Pt had cholecystectomy 1-13-23

## 2023-02-16 ENCOUNTER — APPOINTMENT (OUTPATIENT)
Dept: RADIOLOGY | Facility: MEDICAL CENTER | Age: 53
DRG: 392 | End: 2023-02-16
Attending: FAMILY MEDICINE
Payer: MEDICARE

## 2023-02-16 VITALS
HEIGHT: 62 IN | HEART RATE: 69 BPM | RESPIRATION RATE: 16 BRPM | TEMPERATURE: 98 F | DIASTOLIC BLOOD PRESSURE: 67 MMHG | OXYGEN SATURATION: 94 % | WEIGHT: 189.6 LBS | BODY MASS INDEX: 34.89 KG/M2 | SYSTOLIC BLOOD PRESSURE: 118 MMHG

## 2023-02-16 LAB
ALBUMIN SERPL BCP-MCNC: 3.5 G/DL (ref 3.2–4.9)
ALBUMIN/GLOB SERPL: 1.1 G/DL
ALP SERPL-CCNC: 84 U/L (ref 30–99)
ALT SERPL-CCNC: 29 U/L (ref 2–50)
ANION GAP SERPL CALC-SCNC: 12 MMOL/L (ref 7–16)
AST SERPL-CCNC: 28 U/L (ref 12–45)
BASOPHILS # BLD AUTO: 0.7 % (ref 0–1.8)
BASOPHILS # BLD: 0.06 K/UL (ref 0–0.12)
BILIRUB SERPL-MCNC: 0.4 MG/DL (ref 0.1–1.5)
BUN SERPL-MCNC: 10 MG/DL (ref 8–22)
CALCIUM ALBUM COR SERPL-MCNC: 9.3 MG/DL (ref 8.5–10.5)
CALCIUM SERPL-MCNC: 8.9 MG/DL (ref 8.4–10.2)
CHLORIDE SERPL-SCNC: 98 MMOL/L (ref 96–112)
CO2 SERPL-SCNC: 23 MMOL/L (ref 20–33)
CREAT SERPL-MCNC: 0.57 MG/DL (ref 0.5–1.4)
EOSINOPHIL # BLD AUTO: 0.23 K/UL (ref 0–0.51)
EOSINOPHIL NFR BLD: 2.6 % (ref 0–6.9)
ERYTHROCYTE [DISTWIDTH] IN BLOOD BY AUTOMATED COUNT: 41.1 FL (ref 35.9–50)
GFR SERPLBLD CREATININE-BSD FMLA CKD-EPI: 109 ML/MIN/1.73 M 2
GLOBULIN SER CALC-MCNC: 3.2 G/DL (ref 1.9–3.5)
GLUCOSE SERPL-MCNC: 81 MG/DL (ref 65–99)
HCT VFR BLD AUTO: 34.3 % (ref 37–47)
HGB BLD-MCNC: 11.5 G/DL (ref 12–16)
IMM GRANULOCYTES # BLD AUTO: 0.03 K/UL (ref 0–0.11)
IMM GRANULOCYTES NFR BLD AUTO: 0.3 % (ref 0–0.9)
LYMPHOCYTES # BLD AUTO: 3.49 K/UL (ref 1–4.8)
LYMPHOCYTES NFR BLD: 39 % (ref 22–41)
MCH RBC QN AUTO: 30.5 PG (ref 27–33)
MCHC RBC AUTO-ENTMCNC: 33.5 G/DL (ref 33.6–35)
MCV RBC AUTO: 91 FL (ref 81.4–97.8)
MONOCYTES # BLD AUTO: 0.68 K/UL (ref 0–0.85)
MONOCYTES NFR BLD AUTO: 7.6 % (ref 0–13.4)
NEUTROPHILS # BLD AUTO: 4.47 K/UL (ref 2–7.15)
NEUTROPHILS NFR BLD: 49.8 % (ref 44–72)
NRBC # BLD AUTO: 0 K/UL
NRBC BLD-RTO: 0 /100 WBC
PLATELET # BLD AUTO: 312 K/UL (ref 164–446)
PMV BLD AUTO: 9.3 FL (ref 9–12.9)
POTASSIUM SERPL-SCNC: 4.3 MMOL/L (ref 3.6–5.5)
PROT SERPL-MCNC: 6.7 G/DL (ref 6–8.2)
RBC # BLD AUTO: 3.77 M/UL (ref 4.2–5.4)
SODIUM SERPL-SCNC: 133 MMOL/L (ref 135–145)
WBC # BLD AUTO: 9 K/UL (ref 4.8–10.8)

## 2023-02-16 PROCEDURE — 85025 COMPLETE CBC W/AUTO DIFF WBC: CPT

## 2023-02-16 PROCEDURE — 80053 COMPREHEN METABOLIC PANEL: CPT

## 2023-02-16 PROCEDURE — 700105 HCHG RX REV CODE 258: Performed by: FAMILY MEDICINE

## 2023-02-16 PROCEDURE — 700102 HCHG RX REV CODE 250 W/ 637 OVERRIDE(OP): Performed by: FAMILY MEDICINE

## 2023-02-16 PROCEDURE — 700102 HCHG RX REV CODE 250 W/ 637 OVERRIDE(OP): Performed by: HOSPITALIST

## 2023-02-16 PROCEDURE — 99407 BEHAV CHNG SMOKING > 10 MIN: CPT

## 2023-02-16 PROCEDURE — 99239 HOSP IP/OBS DSCHRG MGMT >30: CPT | Performed by: FAMILY MEDICINE

## 2023-02-16 PROCEDURE — A9270 NON-COVERED ITEM OR SERVICE: HCPCS | Performed by: FAMILY MEDICINE

## 2023-02-16 PROCEDURE — 700101 HCHG RX REV CODE 250: Performed by: HOSPITALIST

## 2023-02-16 PROCEDURE — 700111 HCHG RX REV CODE 636 W/ 250 OVERRIDE (IP): Performed by: FAMILY MEDICINE

## 2023-02-16 PROCEDURE — A9270 NON-COVERED ITEM OR SERVICE: HCPCS | Performed by: HOSPITALIST

## 2023-02-16 PROCEDURE — 36415 COLL VENOUS BLD VENIPUNCTURE: CPT

## 2023-02-16 PROCEDURE — 700111 HCHG RX REV CODE 636 W/ 250 OVERRIDE (IP): Performed by: HOSPITALIST

## 2023-02-16 RX ORDER — CIPROFLOXACIN 500 MG/1
500 TABLET, FILM COATED ORAL 2 TIMES DAILY
Qty: 17 TABLET | Refills: 0 | Status: SHIPPED | OUTPATIENT
Start: 2023-02-16 | End: 2023-02-25

## 2023-02-16 RX ORDER — METRONIDAZOLE 500 MG/1
500 TABLET ORAL EVERY 8 HOURS
Qty: 26 TABLET | Refills: 0 | Status: SHIPPED | OUTPATIENT
Start: 2023-02-16 | End: 2023-02-25

## 2023-02-16 RX ORDER — OXYCODONE HYDROCHLORIDE 5 MG/1
5 TABLET ORAL
Status: DISCONTINUED | OUTPATIENT
Start: 2023-02-16 | End: 2023-02-16 | Stop reason: HOSPADM

## 2023-02-16 RX ORDER — SODIUM CHLORIDE 9 MG/ML
INJECTION, SOLUTION INTRAVENOUS CONTINUOUS
Status: DISCONTINUED | OUTPATIENT
Start: 2023-02-16 | End: 2023-02-16 | Stop reason: HOSPADM

## 2023-02-16 RX ADMIN — VENLAFAXINE HYDROCHLORIDE 37.5 MG: 37.5 CAPSULE, EXTENDED RELEASE ORAL at 08:04

## 2023-02-16 RX ADMIN — SODIUM CHLORIDE, POTASSIUM CHLORIDE, SODIUM LACTATE AND CALCIUM CHLORIDE: 600; 310; 30; 20 INJECTION, SOLUTION INTRAVENOUS at 03:21

## 2023-02-16 RX ADMIN — HYDROMORPHONE HYDROCHLORIDE 0.5 MG: 1 INJECTION, SOLUTION INTRAMUSCULAR; INTRAVENOUS; SUBCUTANEOUS at 07:18

## 2023-02-16 RX ADMIN — CIPROFLOXACIN 400 MG: 2 INJECTION, SOLUTION INTRAVENOUS at 05:41

## 2023-02-16 RX ADMIN — ZIPRASIDONE HYDROCHLORIDE 80 MG: 20 CAPSULE ORAL at 05:40

## 2023-02-16 RX ADMIN — METRONIDAZOLE 500 MG: 500 INJECTION, SOLUTION INTRAVENOUS at 06:46

## 2023-02-16 RX ADMIN — OXYCODONE HYDROCHLORIDE 5 MG: 5 TABLET ORAL at 10:13

## 2023-02-16 RX ADMIN — HYDROMORPHONE HYDROCHLORIDE 0.5 MG: 1 INJECTION, SOLUTION INTRAMUSCULAR; INTRAVENOUS; SUBCUTANEOUS at 03:17

## 2023-02-16 RX ADMIN — HEPARIN SODIUM 5000 UNITS: 5000 INJECTION, SOLUTION INTRAVENOUS; SUBCUTANEOUS at 05:40

## 2023-02-16 RX ADMIN — MORPHINE SULFATE 2 MG: 4 INJECTION, SOLUTION INTRAMUSCULAR; INTRAVENOUS at 09:37

## 2023-02-16 ASSESSMENT — FIBROSIS 4 INDEX: FIB4 SCORE: 0.73

## 2023-02-16 NOTE — DISCHARGE SUMMARY
Discharge Summary    CHIEF COMPLAINT ON ADMISSION  Chief Complaint   Patient presents with    LLQ Pain       Reason for Admission  Sent by MD      Admission Date  2/14/2023    CODE STATUS  Full Code    HPI & HOSPITAL COURSE  This is a 53yo female with a history of recent cholecystectomy in January, tobacco abuse, HERLINDA, bipolar disorder, seizure disorder and recurrent diverticulitis who has had multiple readmissions for diverticulitis. She has been admitted 1/27-1/28 (left AMA), 1/31-2/3, and now again for diverticulitis. On 2/3, she was discharged with a total of 10d of antibiotics with Augmentin. The patient repots, and I confirmed, that she had a colonoscopy in December with polyps with [facility].    Patient was admitted for recurrent diverticulitis - she was placed on Cipro/Flagyl due to failure on Augmentin x 2 as an outpatient.  I did confirm with [facility] that she did not have evidence of malignancy on recent colonoscopy.  Today, pt is stating that she would like to be discharged - I discussed with her that if she were to leave, it would be against medical advice, as she had a temp of 100.2 overnight and still having enough pain that she is requiring pain medication. I also advised that she previously failed treatment for past diverticulitis episodes, and is high risk for failure again if she were to leave.  The patient acknowledges understanding that she is at risk for insufficient treatment with associated relapse and worsening, and states she would still like to leave and continue antibiotic treatment at home despite the risks.  Given her continued evidence of diverticulitis as demonstrated by her abdominal pain, I will prescribe antibiotics of Cipro/Flagyl at discharge, but I will not be providing her with pain medication.     Therefore, she is discharged in fair and stable condition against medcial advice.    The patient met 2-midnight criteria for an inpatient stay at the time of discharge.    Discharge  Date  2/16/23 - AMA     FOLLOW UP ITEMS POST DISCHARGE  Pt has an appointment with her PCP tomorrow, and I have advised her to call [facility] for an appt ASAP.     DISCHARGE DIAGNOSES  Principal Problem:    Acute diverticulitis POA: Yes  Active Problems:    Bipolar 1 disorder with psychosis episodes (Chronic) POA: Yes      Overview: psychotic break in 2010, 2014, 2018.       She has been doing quite well recently.  2018 psychiatrist Dr. Mayfield              08/2014  Psych hold at [facility] for suicidal AND homicidal ideation with       auditory hallicinations          Partial seizure disorder (HCC) (Chronic) POA: Yes      Overview: Stopped depakote 500mg BID-prescribed by Dr. Pantera Gibson at [facility] [place]       [facility] 2016.      States partial complex-no motor symptoms      November 2014 last episode            EEG ok 05/2016.  Dr. Reid    Tobacco consumption POA: Yes    Leukocytosis POA: Unknown  Resolved Problems:    * No resolved hospital problems. *      FOLLOW UP  Future Appointments   Date Time Provider Department Center   2/17/2023  2:40 PM JUAREZ TravisP.RJAGJIT [facility]   3/10/2023  8:00 AM Preston Portillo M.D. [facility]   3/13/2023  9:50 AM Marty Elise M.D. [facility]     [facility]  [address] [facility] [id number] [phone]  Follow up  Please call for an appointment ASAP    JUAREZ TravisPElainaRJAGJIT  [address] [facility] [id number] [phone]    Follow up on 2/17/2023        MEDICATIONS ON DISCHARGE     Medication List        START taking these medications        Instructions   ciprofloxacin 500 MG Tabs  Commonly known as: CIPRO   Take 1 Tablet by mouth 2 times a day for 9 days.  Dose: 500 mg     metroNIDAZOLE 500 MG Tabs  Commonly known as: FLAGYL   Take 1 Tablet by mouth every 8 hours for 9 days.  Dose: 500 mg            CONTINUE taking these medications        Instructions   acetaminophen 500 MG Tabs  Commonly known as: TYLENOL    "Take 1,000 mg by mouth every 6 hours as needed. Indications: Pain  Dose: 1,000 mg     CALCIUM 1000 + D PO   Take 1 Tablet by mouth every day.  Dose: 1 Tablet     clonazePAM 1 MG Tabs  Commonly known as: KLONOPIN   Take 1 mg by mouth 2 times a day. Indications: Feeling Anxious  Dose: 1 mg     fish oil 1000 MG Caps capsule   Take 1,000 mg by mouth every day.  Dose: 1,000 mg     gabapentin 300 MG Caps  Commonly known as: NEURONTIN   Take 300 mg by mouth 3 times a day. Pt started on 1/18/2023 for 14 day course  Dose: 300 mg     ibuprofen 800 MG Tabs  Commonly known as: MOTRIN   Take 800 mg by mouth every 6 hours as needed. Indications: Pain  Dose: 800 mg     Multivitamin Adults Tabs   Take 1 Tablet by mouth every day.  Dose: 1 Tablet     prochlorperazine 25 MG Supp  Commonly known as: COMPAZINE   Insert 25 mg into the rectum every 6 hours as needed for Nausea/Vomiting.  Dose: 25 mg     prochlorperazine 5 MG Tabs  Commonly known as: COMPAZINE   Take 1 Tablet by mouth every 6 hours as needed for Nausea/Vomiting.  Dose: 5 mg     traMADol 50 MG Tabs  Commonly known as: Ultram   Take 50 mg by mouth every four hours as needed.  Dose: 50 mg     venlafaxine XR 37.5 MG Cp24  Commonly known as: EFFEXOR XR   Take 37.5 mg by mouth every morning.  Dose: 37.5 mg     VITAMIN D (CHOLECALCIFEROL) PO   Take 1 Capsule by mouth every day.  Dose: 1 Capsule     ziprasidone 80 MG Caps  Commonly known as: GEODON   Take 80 mg by mouth 2 times a day.  Dose: 80 mg            STOP taking these medications      amoxicillin-clavulanate 500-125 MG Tabs  Commonly known as: AUGMENTIN     amoxicillin-clavulanate 875-125 MG Tabs  Commonly known as: AUGMENTIN     oxyCODONE immediate-release 5 MG Tabs  Commonly known as: ROXICODONE              Allergies  Allergies   Allergen Reactions    Zoloft Swelling     \"Blows Up\"       DIET  Orders Placed This Encounter   Procedures    Diet Order Diet: Clear Liquid     Standing Status:   Standing     Number of " Occurrences:   1     Order Specific Question:   Diet:     Answer:   Clear Liquid [10]       ACTIVITY  As tolerated.  Weight bearing as tolerated    CONSULTATIONS  None - discussed outpt colonoscopy results with [facility] but not officially consulted     PROCEDURES  None    LABORATORY  Lab Results   Component Value Date    SODIUM 133 (L) 02/16/2023    POTASSIUM 4.3 02/16/2023    CHLORIDE 98 02/16/2023    CO2 23 02/16/2023    GLUCOSE 81 02/16/2023    BUN 10 02/16/2023    CREATININE 0.57 02/16/2023        Lab Results   Component Value Date    WBC 9.0 02/16/2023    HEMOGLOBIN 11.5 (L) 02/16/2023    HEMATOCRIT 34.3 (L) 02/16/2023    PLATELETCT 312 02/16/2023        Total time of the discharge process exceeds 37 minutes.

## 2023-02-16 NOTE — RESPIRATORY CARE
"   COPD EDUCATION by COPD CLINICAL EDUCATOR  2/16/2023 at 10:08 AM by Guerita Harris, RRT     Patient reviewed by COPD education team. Patient does not have a history or diagnosis of COPD. Patient is a smoker, smoking cessation education done. A comprehensive packet with tips to quit and information on outpatient classes given to patient.    Smoking Cessation Intervention and education completed, 10 minutes spent on smoking cessation education with patient.  Provided smoking cessation packet with \"Tips to Quit\" and brochure for \"Free Smoking Cessation Classes\".      COPD Screen  COPD Risk Screening  Do you have a history of COPD?: No    COPD Assessment  COPD Clinical Specialists ONLY  COPD Education Initiated: Yes--Short Intervention (Smoking Cessation Education and Booklet provided, pt states she is not smoking quit date March 1st is going through quit now program, NO Hx Dx COPD.)  DME Company: none  Physician Name: LAURA BRAY A.P.R  Pulmonologist Name: none  Referrals Initiated: Yes  Pulmonary Rehab: N/A  Smoking Cessation: Yes  $ Smoking Cessation >10 Minutes: Symptomatic  Smoking Pack Years: started at age 18 (34 yr hx)  Hospice: N/A  Home Health Care: N/A  [facility] Outreach: N/A  [facility] Special[facility] Group: N/A  Private In-Home Care Agency: N/A  Is this a COPD exacerbation patient?: No  (OP) Pulmonary Function Testing: Yes    PFT Results    No results found for: PFT    Meds to Beds  Would the patient like to opt in for Bedside Medication Delivery at Discharge?: No     MY COPD ACTION PLAN     It is recommended that patients and physicians /healthcare providers complete this action plan together. This plan should be discussed at each physician visit and updated as needed.    The green, yellow and red zones show groups of symptoms of COPD. This list of symptoms is not comprehensive, and you may experience other symptoms. In the \"Actions\" column, your healthcare provider has recommended actions for " "you to take based on your symptoms.    Patient Name: Rianna Solis   YOB: 1970   Last Updated on:     Green Zone:  I am doing well today Actions     Usual activitiy and exercise level   Take daily medications     Usual amounts of cough and phlegm/mucus   Use oxygen as prescribed     Sleep well at night   Continue regular exercise/diet plan     Appetite is good   At all times avoid cigarette smoke, inhaled irritants     Daily Medications (these medications are taken every day):                Yellow Zone:  I am having a bad day or a COPD flare Actions     More breathless than usual   Continue daily medications     I have less energy for my daily activities   Use quick relief inhaler as ordered     Increased or thicker phlegm/mucus   Use oxygen as prescribed     Using quick relief inhaler/nebulizer more often   Get plenty of rest     Swelling of ankles more than usual   Use pursed lip breathing     More coughing than usual   At all times avoid cigarette smoke, inhaled irritants     I feel like I have a \"chest cold\"     Poor sleep and my symptoms woke me up     My appetite is not good     My medicine is not helping      Call provider immediately if symptoms don’t improve     Continue daily medications, add rescue medications:               Medications to be used during a flare up, (as Discussed with Provider):              Red Zone:  I need urgent medical care Actions     Severe shortness of breath even at rest   Call 911 or seek medical care immediately     Not able to do any activity because of breathing      Fever or shaking chills      Feeling confused or very drowsy       Chest pains      Coughing up blood                  "

## 2023-02-16 NOTE — PROGRESS NOTES
Telemetry Shift Summary     Rhythm SR-ST  HR Range   Ectopy rPVC  Measurements 0.18/0.08/0.40           Normal Values  Rhythm SR  HR Range    Measurements 0.12-0.20 / 0.06-0.10  / 0.30-0.52

## 2023-02-16 NOTE — CARE PLAN
The patient is Stable - Low risk of patient condition declining or worsening    Shift Goals  Clinical Goals: Control pain, Abx  Patient Goals: Control pain  Family Goals: ARTEMIO    Progress made toward(s) clinical / shift goals:  Abx tx and pain control    Patient is not progressing towards the following goals:      Problem: Pain - Standard  Goal: Alleviation of pain or a reduction in pain to the patient’s comfort goal  Outcome: Progressing     Problem: Knowledge Deficit - Standard  Goal: Patient and family/care givers will demonstrate understanding of plan of care, disease process/condition, diagnostic tests and medications  Outcome: Progressing

## 2023-02-16 NOTE — CARE PLAN
The patient is Watcher - Medium risk of patient condition declining or worsening    Shift Goals  Clinical Goals: Control nausea and pain  Patient Goals: Control Pain  Family Goals: Tamiko    Progress made toward(s) clinical / shift goals:    Problem: Knowledge Deficit - Standard  Goal: Patient and family/care givers will demonstrate understanding of plan of care, disease process/condition, diagnostic tests and medications  Outcome: Progressing     Problem: Hemodynamics  Goal: Patient's hemodynamics, fluid balance and neurologic status will be stable or improve  Outcome: Progressing     Problem: Respiratory  Goal: Patient will achieve/maintain optimum respiratory ventilation and gas exchange  Outcome: Progressing       Patient is not progressing towards the following goals:      Problem: Pain - Standard  Goal: Alleviation of pain or a reduction in pain to the patient’s comfort goal  Outcome: Not Progressing  Note: Pt still having copious amounts of pain. Treating per MAR. Had to ask MD to give pain meds earlier than ordered. OK per Dr Harrell.

## 2023-02-16 NOTE — PROGRESS NOTES
Received report from HARSH Avilez. Safety precautions in place. Bed locked and low. Offered assist. Call light and belongings within reach.

## 2023-02-17 ENCOUNTER — PATIENT OUTREACH (OUTPATIENT)
Dept: MEDICAL GROUP | Facility: PHYSICIAN GROUP | Age: 53
End: 2023-02-17
Payer: MEDICARE

## 2023-02-17 ENCOUNTER — OFFICE VISIT (OUTPATIENT)
Dept: MEDICAL GROUP | Facility: PHYSICIAN GROUP | Age: 53
End: 2023-02-17
Payer: MEDICARE

## 2023-02-17 VITALS
TEMPERATURE: 97.6 F | OXYGEN SATURATION: 98 % | HEART RATE: 78 BPM | RESPIRATION RATE: 20 BRPM | DIASTOLIC BLOOD PRESSURE: 76 MMHG | SYSTOLIC BLOOD PRESSURE: 110 MMHG | WEIGHT: 181.13 LBS | BODY MASS INDEX: 33.33 KG/M2 | HEIGHT: 62 IN

## 2023-02-17 DIAGNOSIS — K57.92 DIVERTICULITIS: ICD-10-CM

## 2023-02-17 DIAGNOSIS — Z09 HOSPITAL DISCHARGE FOLLOW-UP: ICD-10-CM

## 2023-02-17 PROCEDURE — 99214 OFFICE O/P EST MOD 30 MIN: CPT | Performed by: NURSE PRACTITIONER

## 2023-02-17 RX ORDER — TRAMADOL HYDROCHLORIDE 50 MG/1
50 TABLET ORAL EVERY 8 HOURS PRN
Qty: 20 TABLET | Refills: 0 | Status: SHIPPED | OUTPATIENT
Start: 2023-02-17 | End: 2023-02-24

## 2023-02-17 ASSESSMENT — FIBROSIS 4 INDEX: FIB4 SCORE: 0.87

## 2023-02-17 NOTE — PROGRESS NOTES
Review of chart, patient has an appointment with PCP scheduled today at 1440.  Patient outreach not required for TCM per CMS guidelines.

## 2023-02-17 NOTE — PROGRESS NOTES
This patient qualifies for a TCM visit, as they are being seen within the required time for CMS of 2 business days from discharge, a phone call by an RN is not required.  You can therefore see them as a TCM visit and charge appropriately.      Coding guide  25162      -face-to-face within 14 days      -moderate medical decision complexity  83214      -face-to-face within 7 days      -high medical decision complexity

## 2023-02-17 NOTE — ASSESSMENT & PLAN NOTE
She went to the ER on February 14, 2023 for a Diverticulitis flare up. She ended up in the hospital and was discharged yesterday on February 16, 2023. She was given IV antibiotics and fluids. The doctor wanted her to stay for another day but she wanted to be discharged because she can not afford the stay. She is currently taking Ciprofloxacin 500 mg BID and Metronidazole 500 mg every 8 hours. She is needing a referral to GI to see them soon.  
Congruent

## 2023-02-17 NOTE — PROGRESS NOTES
Subjective  Chief Complaint  Hospital Follow Up    History of Present Illness  Rianna Solis is a 52 y.o. female. This established patient is here today to follow up on a recent hospital stay.    Hospital discharge follow-up  She went to the ER on February 14, 2023 for a Diverticulitis flare up. She ended up in the hospital and was discharged yesterday on February 16, 2023. She was given IV antibiotics and fluids. The doctor wanted her to stay for another day but she wanted to be discharged because she can not afford the stay. She is currently taking Ciprofloxacin 500 mg BID and Metronidazole 500 mg every 8 hours. She is needing a referral to GI to see them soon.    Past Medical History    Allergies: Zoloft  Past Medical History:   Diagnosis Date    Abnormal mammogram 2006    nodule in left breast, no follow up    Acromioclavicular joint separation 05/24/2012    Anxiety     No [place] mental health; klonopin, coping mechanisms    Bipolar 1 disorder (HCC) 01/2023    with psychosis    Depression     Former smoker 04/26/2019    History of cervical dysplasia 1990    had cryo tx, resolved with nl paps now    History of gestational diabetes 01/15/2018    History of suicidal ideation     Homicidal ideations 2014    [facility] ER hold    Hyperthyroidism     Hyperthyroidism     since age 16, NL labs 03/2012    Lisfranc's dislocation 10/2017    right    Migraine     Partial seizure disorder (HCC) 01/2023    takes dharmesh pt states they are occipital seizures, no convulsions, last seizure 2 weeks ago. Instr pt to call Dr. Guzman's office if experiences any from now until surgery.    Pelvic exam 2008    Dysplasia at age 20s, had cryotherapy    Polysubstance dependence (HCC)     in remission as of 11/26/2014    Schizoaffective disorder (HCC)     Schizoaffective disorder, bipolar type (HCC) 04/07/2011    Seizure (HCC) 1999    partial complex-no motor seizure; managed by [facility]    Seizure disorder (HCC)   "   partial complex seizure d/o    Sleep apnea 01/2023    cpap    Toe fracture 10/2017    right 3rd-5th toes    Urinary tract infection     Wrist fracture 2010    casted by [facility]     Past Surgical History:   Procedure Laterality Date    CHOLECYSTECTOMY ROBOTIC XI N/A 1/13/2023    Procedure: ROBOTIC CHOLECYSTECTOMY;  Surgeon: Kayce Guzman M.D.;  Location: SURGERY [facility];  Service: Gen Robotic    FOOT SURGERY Right 10/2017    Lisfranc's dislocation    PRIMARY C SECTION  01/01/2008    CERVICAL CONIZATION  01/01/1990    cryo for abnl pap    EYE SURGERY  1973,1979    to repair \"lazy eye\"    OTHER      eye surgeries as a child     Current Outpatient Medications Ordered in Epic   Medication Sig Dispense Refill    traMADol (ULTRAM) 50 MG Tab Take 1 Tablet by mouth every 8 hours as needed for Moderate Pain for up to 7 days. 20 Tablet 0    ciprofloxacin (CIPRO) 500 MG Tab Take 1 Tablet by mouth 2 times a day for 9 days. 17 Tablet 0    metroNIDAZOLE (FLAGYL) 500 MG Tab Take 1 Tablet by mouth every 8 hours for 9 days. 26 Tablet 0    prochlorperazine (COMPAZINE) 25 MG Suppos Insert 25 mg into the rectum every 6 hours as needed for Nausea/Vomiting.      prochlorperazine (COMPAZINE) 5 MG Tab Take 1 Tablet by mouth every 6 hours as needed for Nausea/Vomiting. 30 Tablet 0    acetaminophen (TYLENOL) 500 MG Tab Take 1,000 mg by mouth every 6 hours as needed. Indications: Pain      ibuprofen (MOTRIN) 800 MG Tab Take 800 mg by mouth every 6 hours as needed. Indications: Pain      Multiple Vitamins-Minerals (MULTIVITAMIN ADULTS) Tab Take 1 Tablet by mouth every day.      Omega-3 Fatty Acids (FISH OIL) 1000 MG Cap capsule Take 1,000 mg by mouth every day.      VITAMIN D, CHOLECALCIFEROL, PO Take 1 Capsule by mouth every day.      Calcium Carb-Cholecalciferol (CALCIUM 1000 + D PO) Take 1 Tablet by mouth every day.      venlafaxine XR (EFFEXOR XR) 37.5 MG CAPSULE SR 24 HR Take 37.5 mg by mouth every morning.      ziprasidone " "(GEODON) 80 MG Cap Take 80 mg by mouth 2 times a day.      clonazePAM (KLONOPIN) 1 MG Tab Take 1 mg by mouth 2 times a day. Indications: Feeling Anxious      gabapentin (NEURONTIN) 300 MG Cap Take 300 mg by mouth 3 times a day. Pt started on 2023 for 14 day course       No current [facility]-ordered facility-administered medications on file.     Family History:    Family History   Problem Relation Age of Onset    Cancer Mother         breast cancer    Diabetes Mother     Hypertension Mother     Cancer Maternal Grandmother         stomach    Stroke Maternal Grandfather     Cancer Paternal Grandfather         lung    Hypertension Father       Personal/Social History:    Social History     Tobacco Use    Smoking status: Some Days     Packs/day: 0.50     Years: 30.00     Pack years: 15.00     Types: Cigarettes     Start date: 2016     Last attempt to quit: 2022     Years since quittin.4    Smokeless tobacco: Never    Tobacco comments:     started at 18   Vaping Use    Vaping Use: Some days    Substances: Nicotine   Substance Use Topics    Alcohol use: Not Currently    Drug use: No     Social History     Social History Narrative    ** Merged History Encounter **           Review of Systems:   General: Negative for fever/chills and unexpected weight change.   Eyes:  Negative for vision changes, eye pain.  Cardiovascular:  Negative for chest pain and palpitations.  Gastrointestinal:  Negative for nausea/vomiting and changes in bowel habits. Positive for abdominal pain.  Musculoskeletal:  Negative for myalgias.   Skin:  Negative for rash.     Objective  Physical Exam:   /76 (BP Location: Left arm, Patient Position: Sitting, BP Cuff Size: Adult)   Pulse 78   Temp 36.4 °C (97.6 °F) (Temporal)   Resp 20   Ht 1.575 m (5' 2\")   Wt 82.2 kg (181 lb 2 oz)   SpO2 98%  Body mass index is 33.13 kg/m².  General:  Alert and oriented.  Well appearing.  NAD  Neck: Supple without JVD. No lymphadenopathy.  Pulmonary: "  Normal effort.  Clear to ausculation without rales, ronchi, or wheezing.  Cardiovascular:  Regular rate and rhythm without murmur, rubs or gallop.   Gastrointestinal: Hypoactive bowels to all four quadrants.  Skin:  Warm and dry.  No obvious lesions.  Musculoskeletal:  No extremity cyanosis, clubbing, or edema.      Assessment/Plan  1. Hospital discharge follow-up  2. Diverticulitis  Chronic and uncontrolled.  Discussed with her that she needs to be seen by GI as soon as possible, she does have an appointment with [facility] on 3[facility]23 but she would like an urgent referral to [facility] at this time.  Continue to take Cipro and Flagyl as prescribed from hospital stay.  Due for updated labs on 2-24-23.  Discussed Tramadol risks, benefits and side effects, she verbalized understanding.  - Referral to Gastroenterology  - Comp Metabolic Panel; Future  - CBC WITH DIFFERENTIAL; Future  - traMADol (ULTRAM) 50 MG Tab; Take 1 Tablet by mouth every 8 hours as needed for Moderate Pain for up to 7 days.  Dispense: 20 Tablet; Refill: 0    Obtained and reviewed patient utilization report from [facility] pharmacy database on 2/17/2023 at 1439 prior to writing prescription for controlled substance II, III or IV per [place] bill . Based on assessment of the report, the prescription is medically necessary.        Health Maintenance: Completed    Return in about 2 weeks (around 3/3/2023) for Medication F/U.    Discussed that the patient carries some responsibility in management of their health care.    Please note that this dictation was created using voice recognition software. I have made every reasonable attempt to correct obvious errors, but I expect that there are errors of grammar and possibly content that I did not discover before finalizing the note.    JASMEET Galdamez  [facility]

## 2023-02-20 LAB
BACTERIA BLD CULT: NORMAL
BACTERIA BLD CULT: NORMAL
SIGNIFICANT IND 70042: NORMAL
SIGNIFICANT IND 70042: NORMAL
SITE SITE: NORMAL
SITE SITE: NORMAL
SOURCE SOURCE: NORMAL
SOURCE SOURCE: NORMAL

## 2023-02-24 ENCOUNTER — HOSPITAL ENCOUNTER (OUTPATIENT)
Dept: LAB | Facility: MEDICAL CENTER | Age: 53
End: 2023-02-24
Attending: NURSE PRACTITIONER
Payer: MEDICARE

## 2023-02-24 DIAGNOSIS — K57.92 DIVERTICULITIS: ICD-10-CM

## 2023-02-24 LAB
ALBUMIN SERPL BCP-MCNC: 4.6 G/DL (ref 3.2–4.9)
ALBUMIN/GLOB SERPL: 1.4 G/DL
ALP SERPL-CCNC: 76 U/L (ref 30–99)
ALT SERPL-CCNC: 35 U/L (ref 2–50)
ANION GAP SERPL CALC-SCNC: 11 MMOL/L (ref 7–16)
AST SERPL-CCNC: 30 U/L (ref 12–45)
BASOPHILS # BLD AUTO: 0.8 % (ref 0–1.8)
BASOPHILS # BLD: 0.06 K/UL (ref 0–0.12)
BILIRUB SERPL-MCNC: 0.3 MG/DL (ref 0.1–1.5)
BUN SERPL-MCNC: 13 MG/DL (ref 8–22)
CALCIUM ALBUM COR SERPL-MCNC: 9.6 MG/DL (ref 8.5–10.5)
CALCIUM SERPL-MCNC: 10.1 MG/DL (ref 8.5–10.5)
CHLORIDE SERPL-SCNC: 102 MMOL/L (ref 96–112)
CO2 SERPL-SCNC: 28 MMOL/L (ref 20–33)
CREAT SERPL-MCNC: 0.75 MG/DL (ref 0.5–1.4)
EOSINOPHIL # BLD AUTO: 0.17 K/UL (ref 0–0.51)
EOSINOPHIL NFR BLD: 2.3 % (ref 0–6.9)
ERYTHROCYTE [DISTWIDTH] IN BLOOD BY AUTOMATED COUNT: 42.1 FL (ref 35.9–50)
GFR SERPLBLD CREATININE-BSD FMLA CKD-EPI: 95 ML/MIN/1.73 M 2
GLOBULIN SER CALC-MCNC: 3.3 G/DL (ref 1.9–3.5)
GLUCOSE SERPL-MCNC: 117 MG/DL (ref 65–99)
HCT VFR BLD AUTO: 44.2 % (ref 37–47)
HGB BLD-MCNC: 14.8 G/DL (ref 12–16)
IMM GRANULOCYTES # BLD AUTO: 0.04 K/UL (ref 0–0.11)
IMM GRANULOCYTES NFR BLD AUTO: 0.5 % (ref 0–0.9)
LYMPHOCYTES # BLD AUTO: 2.35 K/UL (ref 1–4.8)
LYMPHOCYTES NFR BLD: 31.4 % (ref 22–41)
MCH RBC QN AUTO: 30.1 PG (ref 27–33)
MCHC RBC AUTO-ENTMCNC: 33.5 G/DL (ref 33.6–35)
MCV RBC AUTO: 89.8 FL (ref 81.4–97.8)
MONOCYTES # BLD AUTO: 0.53 K/UL (ref 0–0.85)
MONOCYTES NFR BLD AUTO: 7.1 % (ref 0–13.4)
NEUTROPHILS # BLD AUTO: 4.33 K/UL (ref 2–7.15)
NEUTROPHILS NFR BLD: 57.9 % (ref 44–72)
NRBC # BLD AUTO: 0 K/UL
NRBC BLD-RTO: 0 /100 WBC
PLATELET # BLD AUTO: 331 K/UL (ref 164–446)
PMV BLD AUTO: 8.8 FL (ref 9–12.9)
POTASSIUM SERPL-SCNC: 4.3 MMOL/L (ref 3.6–5.5)
PROT SERPL-MCNC: 7.9 G/DL (ref 6–8.2)
RBC # BLD AUTO: 4.92 M/UL (ref 4.2–5.4)
SODIUM SERPL-SCNC: 141 MMOL/L (ref 135–145)
WBC # BLD AUTO: 7.5 K/UL (ref 4.8–10.8)

## 2023-02-24 PROCEDURE — 85025 COMPLETE CBC W/AUTO DIFF WBC: CPT

## 2023-02-24 PROCEDURE — 36415 COLL VENOUS BLD VENIPUNCTURE: CPT

## 2023-02-24 PROCEDURE — 80053 COMPREHEN METABOLIC PANEL: CPT

## 2023-03-06 ENCOUNTER — HOSPITAL ENCOUNTER (OUTPATIENT)
Dept: LAB | Facility: MEDICAL CENTER | Age: 53
End: 2023-03-06
Attending: INTERNAL MEDICINE
Payer: MEDICARE

## 2023-03-06 DIAGNOSIS — E06.3 CHRONIC AUTOIMMUNE THYROIDITIS: ICD-10-CM

## 2023-03-06 DIAGNOSIS — E05.90 SUBCLINICAL HYPERTHYROIDISM: ICD-10-CM

## 2023-03-06 DIAGNOSIS — E55.9 VITAMIN D DEFICIENCY: ICD-10-CM

## 2023-03-06 LAB
25(OH)D3 SERPL-MCNC: 35 NG/ML (ref 30–100)
ALBUMIN SERPL BCP-MCNC: 4.2 G/DL (ref 3.2–4.9)
ALBUMIN/GLOB SERPL: 1.5 G/DL
ALP SERPL-CCNC: 66 U/L (ref 30–99)
ALT SERPL-CCNC: 19 U/L (ref 2–50)
ANION GAP SERPL CALC-SCNC: 10 MMOL/L (ref 7–16)
AST SERPL-CCNC: 22 U/L (ref 12–45)
BILIRUB SERPL-MCNC: 0.2 MG/DL (ref 0.1–1.5)
BUN SERPL-MCNC: 12 MG/DL (ref 8–22)
CALCIUM ALBUM COR SERPL-MCNC: 9 MG/DL (ref 8.5–10.5)
CALCIUM SERPL-MCNC: 9.2 MG/DL (ref 8.4–10.2)
CHLORIDE SERPL-SCNC: 104 MMOL/L (ref 96–112)
CO2 SERPL-SCNC: 27 MMOL/L (ref 20–33)
CREAT SERPL-MCNC: 0.62 MG/DL (ref 0.5–1.4)
FASTING STATUS PATIENT QL REPORTED: NORMAL
GFR SERPLBLD CREATININE-BSD FMLA CKD-EPI: 107 ML/MIN/1.73 M 2
GLOBULIN SER CALC-MCNC: 2.8 G/DL (ref 1.9–3.5)
GLUCOSE SERPL-MCNC: 112 MG/DL (ref 65–99)
POTASSIUM SERPL-SCNC: 4.5 MMOL/L (ref 3.6–5.5)
PROT SERPL-MCNC: 7 G/DL (ref 6–8.2)
SODIUM SERPL-SCNC: 141 MMOL/L (ref 135–145)
T3FREE SERPL-MCNC: 2.67 PG/ML (ref 2–4.4)
T4 FREE SERPL-MCNC: 1.06 NG/DL (ref 0.93–1.7)
TSH SERPL DL<=0.005 MIU/L-ACNC: 0.29 UIU/ML (ref 0.38–5.33)

## 2023-03-06 PROCEDURE — 36415 COLL VENOUS BLD VENIPUNCTURE: CPT

## 2023-03-06 PROCEDURE — 84443 ASSAY THYROID STIM HORMONE: CPT

## 2023-03-06 PROCEDURE — 84481 FREE ASSAY (FT-3): CPT

## 2023-03-06 PROCEDURE — 82306 VITAMIN D 25 HYDROXY: CPT

## 2023-03-06 PROCEDURE — 84439 ASSAY OF FREE THYROXINE: CPT

## 2023-03-06 PROCEDURE — 80053 COMPREHEN METABOLIC PANEL: CPT

## 2023-03-10 ENCOUNTER — OFFICE VISIT (OUTPATIENT)
Dept: SPORTS MEDICINE | Facility: CLINIC | Age: 53
End: 2023-03-10
Payer: MEDICARE

## 2023-03-10 VITALS
TEMPERATURE: 97.1 F | SYSTOLIC BLOOD PRESSURE: 122 MMHG | BODY MASS INDEX: 33.33 KG/M2 | RESPIRATION RATE: 18 BRPM | HEART RATE: 92 BPM | OXYGEN SATURATION: 95 % | WEIGHT: 181.13 LBS | HEIGHT: 62 IN | DIASTOLIC BLOOD PRESSURE: 78 MMHG

## 2023-03-10 DIAGNOSIS — G57.81 SAPHENOUS NERVE NEUROPATHY, RIGHT: ICD-10-CM
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
## 2023-03-10 DIAGNOSIS — S80.01XA HEMATOMA OF RIGHT KNEE REGION: ICD-10-CM

## 2023-03-10 PROCEDURE — 99213 OFFICE O/P EST LOW 20 MIN: CPT | Performed by: FAMILY MEDICINE

## 2023-03-10 ASSESSMENT — FIBROSIS 4 INDEX: FIB4 SCORE: 0.79
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
## 2023-03-13 ENCOUNTER — APPOINTMENT (OUTPATIENT)
Dept: ENDOCRINOLOGY | Facility: MEDICAL CENTER | Age: 53
End: 2023-03-13
Attending: INTERNAL MEDICINE
Payer: MEDICARE

## 2023-03-29 ENCOUNTER — TELEPHONE (OUTPATIENT)
Dept: HEALTH INFORMATION MANAGEMENT | Facility: OTHER | Age: 53
End: 2023-03-29
Payer: MEDICARE

## 2023-06-12 ENCOUNTER — PROCEDURE VISIT (OUTPATIENT)
Dept: ENDOCRINOLOGY | Facility: MEDICAL CENTER | Age: 53
End: 2023-06-12
Attending: INTERNAL MEDICINE
Payer: MEDICARE

## 2023-06-12 DIAGNOSIS — E55.9 VITAMIN D DEFICIENCY: ICD-10-CM

## 2023-06-12 DIAGNOSIS — E06.3 CHRONIC AUTOIMMUNE THYROIDITIS: ICD-10-CM

## 2023-06-12 DIAGNOSIS — E05.90 SUBCLINICAL HYPERTHYROIDISM: ICD-10-CM

## 2023-06-12 PROCEDURE — 76536 US EXAM OF HEAD AND NECK: CPT | Performed by: INTERNAL MEDICINE

## 2023-06-12 NOTE — PROGRESS NOTES
Thyroid US done on this procedure visit  She has stable subclinical hyperthyroidism that is not from Graves' disease  US today showed no focal nodules ruling out toxic adenoma and toxic MNG  Recommend follow up in 6 mos with labs   Please see dictated POC US report completed by endocrinologist  Patient advised to follow up as planned with labs prior    Marty Elise M.D.

## 2023-08-08 ENCOUNTER — OFFICE VISIT (OUTPATIENT)
Dept: MEDICAL GROUP | Facility: PHYSICIAN GROUP | Age: 53
End: 2023-08-08
Payer: MEDICARE

## 2023-08-08 VITALS
DIASTOLIC BLOOD PRESSURE: 76 MMHG | OXYGEN SATURATION: 94 % | SYSTOLIC BLOOD PRESSURE: 132 MMHG | HEART RATE: 64 BPM | WEIGHT: 211 LBS | BODY MASS INDEX: 41.43 KG/M2 | HEIGHT: 60 IN | TEMPERATURE: 96.8 F

## 2023-08-08 DIAGNOSIS — G43.809 OTHER MIGRAINE WITHOUT STATUS MIGRAINOSUS, NOT INTRACTABLE: ICD-10-CM

## 2023-08-08 DIAGNOSIS — F31.9 BIPOLAR 1 DISORDER (HCC): Chronic | ICD-10-CM

## 2023-08-08 DIAGNOSIS — G89.29 CHRONIC BILATERAL LOW BACK PAIN WITH RIGHT-SIDED SCIATICA: ICD-10-CM

## 2023-08-08 DIAGNOSIS — Z76.89 ENCOUNTER TO ESTABLISH CARE: ICD-10-CM

## 2023-08-08 DIAGNOSIS — E06.3 CHRONIC AUTOIMMUNE THYROIDITIS: Chronic | ICD-10-CM

## 2023-08-08 DIAGNOSIS — E55.9 VITAMIN D DEFICIENCY: ICD-10-CM

## 2023-08-08 DIAGNOSIS — E05.90 SUBCLINICAL HYPERTHYROIDISM: Chronic | ICD-10-CM

## 2023-08-08 DIAGNOSIS — M54.41 CHRONIC BILATERAL LOW BACK PAIN WITH RIGHT-SIDED SCIATICA: ICD-10-CM

## 2023-08-08 DIAGNOSIS — G40.109 PARTIAL SEIZURE DISORDER (HCC): Chronic | ICD-10-CM

## 2023-08-08 PROBLEM — E66.01 MORBID OBESITY WITH BMI OF 40.0-44.9, ADULT (HCC): Chronic | Status: ACTIVE | Noted: 2018-11-05

## 2023-08-08 PROBLEM — M54.50 CHRONIC LOW BACK PAIN: Chronic | Status: ACTIVE | Noted: 2023-08-08

## 2023-08-08 PROBLEM — G43.909 MIGRAINES: Chronic | Status: ACTIVE | Noted: 2023-08-08

## 2023-08-08 PROBLEM — M54.50 CHRONIC LOW BACK PAIN: Status: ACTIVE | Noted: 2023-08-08

## 2023-08-08 PROBLEM — E66.811 CLASS 1 OBESITY DUE TO EXCESS CALORIES WITHOUT SERIOUS COMORBIDITY WITH BODY MASS INDEX (BMI) OF 33.0 TO 33.9 IN ADULT: Chronic | Status: ACTIVE | Noted: 2018-11-05

## 2023-08-08 PROBLEM — Z87.19 HISTORY OF DIVERTICULITIS: Status: ACTIVE | Noted: 2023-02-14

## 2023-08-08 PROBLEM — Z09 HOSPITAL DISCHARGE FOLLOW-UP: Status: RESOLVED | Noted: 2023-02-07 | Resolved: 2023-08-08

## 2023-08-08 PROBLEM — E66.09 CLASS 1 OBESITY DUE TO EXCESS CALORIES WITHOUT SERIOUS COMORBIDITY WITH BODY MASS INDEX (BMI) OF 33.0 TO 33.9 IN ADULT: Chronic | Status: ACTIVE | Noted: 2018-11-05

## 2023-08-08 PROBLEM — G43.909 MIGRAINES: Status: ACTIVE | Noted: 2023-08-08

## 2023-08-08 PROBLEM — R87.810 ASCUS WITH POSITIVE HIGH RISK HPV CERVICAL: Chronic | Status: ACTIVE | Noted: 2019-05-24

## 2023-08-08 PROBLEM — J96.01 ACUTE RESPIRATORY FAILURE WITH HYPOXIA (HCC): Chronic | Status: RESOLVED | Noted: 2023-01-28 | Resolved: 2023-08-08

## 2023-08-08 PROBLEM — D72.829 LEUKOCYTOSIS: Status: RESOLVED | Noted: 2023-02-14 | Resolved: 2023-08-08

## 2023-08-08 PROBLEM — K57.92 ACUTE DIVERTICULITIS: Status: RESOLVED | Noted: 2023-01-27 | Resolved: 2023-08-08

## 2023-08-08 PROBLEM — R87.610 ASCUS WITH POSITIVE HIGH RISK HPV CERVICAL: Chronic | Status: ACTIVE | Noted: 2019-05-24

## 2023-08-08 PROBLEM — J96.01 ACUTE RESPIRATORY FAILURE WITH HYPOXIA (HCC): Chronic | Status: ACTIVE | Noted: 2023-01-28

## 2023-08-08 PROBLEM — E66.01 MORBID OBESITY WITH BMI OF 40.0-44.9, ADULT (HCC): Status: ACTIVE | Noted: 2018-11-05

## 2023-08-08 PROCEDURE — 3078F DIAST BP <80 MM HG: CPT | Performed by: STUDENT IN AN ORGANIZED HEALTH CARE EDUCATION/TRAINING PROGRAM

## 2023-08-08 PROCEDURE — 99214 OFFICE O/P EST MOD 30 MIN: CPT | Performed by: STUDENT IN AN ORGANIZED HEALTH CARE EDUCATION/TRAINING PROGRAM

## 2023-08-08 PROCEDURE — 3075F SYST BP GE 130 - 139MM HG: CPT | Performed by: STUDENT IN AN ORGANIZED HEALTH CARE EDUCATION/TRAINING PROGRAM

## 2023-08-08 RX ORDER — LORAZEPAM 2 MG/1
2 TABLET ORAL 3 TIMES DAILY
COMMUNITY
Start: 2023-07-30

## 2023-08-08 RX ORDER — CARBAMAZEPINE 200 MG/1
200 TABLET ORAL 2 TIMES DAILY
COMMUNITY
Start: 2023-05-22 | End: 2023-09-27

## 2023-08-08 RX ORDER — CARIPRAZINE 6 MG/1
CAPSULE, GELATIN COATED ORAL
COMMUNITY
Start: 2023-05-22 | End: 2023-09-27

## 2023-08-08 RX ORDER — IBUPROFEN 800 MG/1
800 TABLET ORAL EVERY 6 HOURS PRN
Qty: 90 TABLET | Refills: 1 | Status: SHIPPED | OUTPATIENT
Start: 2023-08-08 | End: 2023-10-25

## 2023-08-08 ASSESSMENT — FIBROSIS 4 INDEX: FIB4 SCORE: 0.81

## 2023-08-08 NOTE — ASSESSMENT & PLAN NOTE
Patient reports history of partial complex seizures that are related to psychosis.  Patient reports Neurology saw abnormal activity on left side of brain.  Chart review indicates patient was previously on Depakote but it was discontinued.  Patient was also taken off Klonopin last year by Psychiatry.

## 2023-08-08 NOTE — ASSESSMENT & PLAN NOTE
Follows up with Endocrinology.  Patient had elevated TPO antibodies but labs show endogenous subclinical hyperthyroidism.  June 2023 US showed slightly enlarged heterogeneous non-hypervascular thyroid gland with no focal nodules.

## 2023-08-08 NOTE — ASSESSMENT & PLAN NOTE
Chart review indicates history of psychotic break in 2010, 2014, 2018, 2022  History of suicidal and homicidal ideations, suicidal attempt with benzo OD, visual and auditory hallucinations.  Follows up with psychiatry.  Currently on vraylar 6 mg daily, ativan 2 mg three times daily, geodon 80 mg twice daily, tegretol 200 mg twice daily.

## 2023-08-08 NOTE — PROGRESS NOTES
"Subjective:     CC:  establish care    HISTORY OF THE PRESENT ILLNESS: Patient is a 53 y.o. female here today to establish care. Prior PCP was Glenda Skinner.    Bipolar 1 disorder with psychosis episodes  Chart review indicates history of psychotic break in 2010, 2014, 2018, 2022  History of suicidal and homicidal ideations, suicidal attempt with benzo OD, visual and auditory hallucinations.  Follows up with psychiatry.  Currently on vraylar 6 mg daily, ativan 2 mg three times daily, geodon 80 mg twice daily, tegretol 200 mg twice daily.    Chronic autoimmune thyroiditis  Follows up with Endocrinology.  Patient had elevated TPO antibodies but labs show endogenous subclinical hyperthyroidism.  June 2023 US showed slightly enlarged heterogeneous non-hypervascular thyroid gland with no focal nodules.    Partial seizure disorder (HCC)  Patient reports history of partial complex seizures that are related to psychosis.  Patient reports Neurology saw abnormal activity on left side of brain.  Patient was taken off Klonopin last year by Psychiatry.    Migraines  Well controlled with ibuprofen 800 mg as needed.    Chronic low back pain  Well controlled with ibuprofen 800 mg as needed.      Health Maintenance: Completed    Allergies   Allergen Reactions    Zoloft Swelling     \"Blows Up\"     Patient Active Problem List   Diagnosis    Anxiety    Bipolar 1 disorder with psychosis episodes    Partial seizure disorder (HCC)    Elevated blood pressure (not hypertension)    Hx of amenorrhea    History of suicide attempt    HERLINDA (obstructive sleep apnea)    Morbid obesity with BMI of 40.0-44.9, adult (HCC)    IGT (impaired glucose tolerance)    ASCUS with positive high risk HPV cervical    Left breast mass    Chronic fatigue    Chronic autoimmune thyroiditis    Subclinical hyperthyroidism    Right knee pain    Tobacco consumption    History of diverticulitis    Migraines    Chronic low back pain    Vitamin D deficiency     Current " "Outpatient Medications   Medication Sig Dispense Refill    carBAMazepine (TEGRETOL) 200 MG Tab 200 mg 2 times a day.      LORazepam (ATIVAN) 2 MG tablet Take 2 mg by mouth 3 times a day.      Cariprazine HCl (VRAYLAR) 6 MG Cap       ibuprofen (MOTRIN) 800 MG Tab Take 1 Tablet by mouth every 6 hours as needed for Moderate Pain or Headache. Indications: Pain 90 Tablet 1    Calcium Carbonate (CALCIUM 500 PO) Take  by mouth.      Multiple Vitamins-Minerals (MULTIVITAMIN ADULTS) Tab Take 1 Tablet by mouth every day.      Omega-3 Fatty Acids (FISH OIL) 1000 MG Cap capsule Take 1,000 mg by mouth every day.      VITAMIN D, CHOLECALCIFEROL, PO Take 1 Capsule by mouth every day.      ziprasidone (GEODON) 80 MG Cap Take 80 mg by mouth 2 times a day.       No current facility-administered medications for this visit.     Past Surgical History:   Procedure Laterality Date    CHOLECYSTECTOMY ROBOTIC XI N/A 2023    Procedure: ROBOTIC CHOLECYSTECTOMY;  Surgeon: Kayce Guzman M.D.;  Location: SURGERY [facility];  Service: Gen Robotic    FOOT SURGERY Right 10/2017    Lisfranc's dislocation    PRIMARY C SECTION  2008    CERVICAL CONIZATION  1990    cryo for abnl pap    EYE SURGERY Left     to repair \"lazy eye\"    OTHER      eye surgeries as a child      Social History     Socioeconomic History    Marital status:      Spouse name: Not on file    Number of children: 4    Years of education: Not on file    Highest education level: Associate degree: academic program   Occupational History    Occupation: not working     Employer: OTHER   Tobacco Use    Smoking status: Former     Packs/day: 0.50     Years: 30.00     Pack years: 15.00     Types: Cigarettes     Start date: 2016     Quit date: 2023     Years since quittin.0    Smokeless tobacco: Never    Tobacco comments:     started age 18   Vaping Use    Vaping Use: Former    Substances: Nicotine   Substance and Sexual Activity    " Alcohol use: Not Currently    Drug use: No    Sexual activity: Yes     Partners: Male     Birth control/protection: Post-Menopausal     Comment:    Other Topics Concern    Not on file   Social History Narrative    4 children, 2 stepchildren.  Lives with .     Social Determinants of Health     Financial Resource Strain: Medium Risk (1/25/2023)    Overall Financial Resource Strain (CARDIA)     Difficulty of Paying Living Expenses: Somewhat hard   Food Insecurity: Food Insecurity Present (1/25/2023)    Hunger Vital Sign     Worried About Running Out of Food in the Last Year: Sometimes true     Ran Out of Food in the Last Year: Never true   Transportation Needs: No Transportation Needs (1/25/2023)    PRAPARE - Transportation     Lack of Transportation (Medical): No     Lack of Transportation (Non-Medical): No   Physical Activity: Insufficiently Active (1/25/2023)    Exercise Vital Sign     Days of Exercise per Week: 3 days     Minutes of Exercise per Session: 20 min   Stress: Stress Concern Present (1/25/2023)    [employer] - Occupational Stress Questionnaire     Feeling of Stress : Rather much   Social Connections: Socially Integrated (1/25/2023)    Social Connection and Isolation Panel [NHANES]     Frequency of Communication with Friends and Family: More than three times a week     Frequency of Social Gatherings with Friends and Family: Once a week     Attends [employer] Services: More than 4 times per year     Active Member of Clubs or Organizations: Yes     Attends Club or Organization Meetings: More than 4 times per year     Marital Status:    Intimate Partner Violence: Not on file   Housing Stability: High Risk (1/25/2023)    Housing Stability Vital Sign     Unable to Pay for Housing in the Last Year: Yes     Number of Places Lived in the Last Year: 1     Unstable Housing in the Last Year: No     Family History   Problem Relation Age of Onset    Breast Cancer  Mother     Diabetes Mother     Hypertension Mother     Hyperlipidemia Father     Diabetes Father     Hypertension Father     Hypertension Brother     Cancer Maternal Grandmother         stomach    Stroke Maternal Grandfather     Cancer Paternal Grandfather         lung    Cancer Maternal Uncle         brain    Ovarian Cancer Neg Hx     Tubal Cancer Neg Hx     Peritoneal Cancer Neg Hx     Colorectal Cancer Neg Hx     Heart Disease Neg Hx          ROS:     Constitutional:  Negative for chills, fever, fatigue, weight loss.  HEENT:  Negative for blurred vision, hearing loss, sore throat.    Respiratory:  Negative for cough, sputum production and shortness of breath.  Cardiovascular:  Negative for chest pain, palpitations and leg swelling.  Gastrointestinal:  Negative for abdominal pain, blood in stool, constipation, diarrhea and vomiting.   Musculoskeletal:  Negative for back pain, falls, joint pain and neck pain.   Skin:  Negative for rash.   Neurological:  Negative for dizziness, seizures, weakness and headaches.   Endo/Heme/Allergies:  Does not bruise/bleed easily.   Psychiatric/Behavioral:  Negative for depression, anxiety and suicidal thoughts.      Objective:     Exam: /76 (BP Location: Left arm, Patient Position: Sitting, BP Cuff Size: Adult)   Pulse 64   Temp 36 °C (96.8 °F) (Temporal)   Ht 1.524 m (5')   Wt 95.7 kg (211 lb)   SpO2 94%  Body mass index is 41.21 kg/m².    Gen: Alert and oriented, no acute distress.  Eyes:  PERRL, conjunctivae clear, lids normal.   ENMT: Lips without lesions, good dentition, moist mucous membranes.  Neck: Neck is supple, trachea middle, no thyromegaly.  Lungs: Normal effort, CTAB, no wheezing / rhonchi / rales.  CV: RRR, normal S1 and S2, no murmurs.  GI:  Abdomen soft, non-tender, non-distended with normal bowel sounds.  MSK:  Normal ROM.  Ext: No clubbing, cyanosis, or edema.  Skin:  Warm and dry with no rashes or lesions.  Neuro: AAO x 3, no acute focal  deficits.  Psych: Normal affect and mood.      Assessment & Plan:   53 y.o. female with the following -    1. Encounter to establish care  Patient presents today to establish care.  Chart was reviewed and history was discussed in detail with the patient.  Previous labs reviewed.  UTD with mammogram, Pap smear, colonoscopy.    2. Subclinical hyperthyroidism  3. Chronic autoimmune thyroiditis  Chronic, stable.  Follows up with endocrinology.  March 2023 TSH low with normal T3, T4.  June 2023 ultrasound showed slightly enlarged thyroid with no nodules.    4. Bipolar 1 disorder with psychosis episodes  Chronic, stable.  Follows up with psychiatry.  Continue vraylar 6 mg daily, ativan 2 mg three times daily, geodon 80 mg twice daily, tegretol 200 mg twice daily.    5. Partial seizure disorder (HCC)  Chronic, stable.  Patient saw Neurology in the past and currently follows up with Psychiatry.    6. Other migraine without status migrainosus, not intractable  Chronic, controlled.  Continue ibuprofen 800 mg as needed.  - ibuprofen (MOTRIN) 800 MG Tab; Take 1 Tablet by mouth every 6 hours as needed for Moderate Pain or Headache. Indications: Pain  Dispense: 90 Tablet; Refill: 1    7. Chronic bilateral low back pain with right-sided sciatica  Chronic, controlled.  Continue ibuprofen 800 mg as needed.  - ibuprofen (MOTRIN) 800 MG Tab; Take 1 Tablet by mouth every 6 hours as needed for Moderate Pain or Headache. Indications: Pain  Dispense: 90 Tablet; Refill: 1    8. Vitamin D deficiency  Chronic, controlled.  March 2023 vitamin D WNL.  Continue daily vitamin D supplement.      HCC Gap Form    Assessment and plan: Chronic, stable. BMI 41.  Encouraged healthy diet and physical activity. Follow up at least annually.  Last edited 08/08/23 12:26 PDT by Kaleigh Mares M.D.         I spent a total of 30 minutes with record review, exam, communication with the patient, communication with other providers, and documentation of this  encounter.    Return in about 6 months (around 2/8/2024) for Annual preventive visit.    Please note that this dictation was created using voice recognition software. I have made every reasonable attempt to correct obvious errors, but I expect that there are errors of grammar and possibly content that I did not discover before finalizing the note.

## 2023-09-27 ENCOUNTER — HOSPITAL ENCOUNTER (EMERGENCY)
Facility: MEDICAL CENTER | Age: 53
End: 2023-09-27
Attending: EMERGENCY MEDICINE
Payer: MEDICARE

## 2023-09-27 ENCOUNTER — APPOINTMENT (OUTPATIENT)
Dept: RADIOLOGY | Facility: MEDICAL CENTER | Age: 53
End: 2023-09-27
Attending: EMERGENCY MEDICINE
Payer: MEDICARE

## 2023-09-27 ENCOUNTER — OFFICE VISIT (OUTPATIENT)
Dept: URGENT CARE | Facility: PHYSICIAN GROUP | Age: 53
End: 2023-09-27
Payer: MEDICARE

## 2023-09-27 VITALS
HEART RATE: 92 BPM | BODY MASS INDEX: 38.02 KG/M2 | SYSTOLIC BLOOD PRESSURE: 114 MMHG | TEMPERATURE: 97.9 F | OXYGEN SATURATION: 93 % | RESPIRATION RATE: 16 BRPM | DIASTOLIC BLOOD PRESSURE: 71 MMHG | WEIGHT: 207.89 LBS

## 2023-09-27 VITALS
HEART RATE: 101 BPM | OXYGEN SATURATION: 97 % | DIASTOLIC BLOOD PRESSURE: 58 MMHG | WEIGHT: 208 LBS | BODY MASS INDEX: 38.28 KG/M2 | SYSTOLIC BLOOD PRESSURE: 110 MMHG | HEIGHT: 62 IN | TEMPERATURE: 97.8 F | RESPIRATION RATE: 18 BRPM

## 2023-09-27 DIAGNOSIS — R82.71 BACTERIURIA: ICD-10-CM

## 2023-09-27 DIAGNOSIS — Z87.19 HISTORY OF DIVERTICULITIS: ICD-10-CM

## 2023-09-27 DIAGNOSIS — R10.32 LLQ ABDOMINAL PAIN: ICD-10-CM

## 2023-09-27 DIAGNOSIS — R30.0 DYSURIA: ICD-10-CM

## 2023-09-27 LAB
ALBUMIN SERPL BCP-MCNC: 4.4 G/DL (ref 3.2–4.9)
ALBUMIN/GLOB SERPL: 1.6 G/DL
ALP SERPL-CCNC: 90 U/L (ref 30–99)
ALT SERPL-CCNC: 24 U/L (ref 2–50)
ANION GAP SERPL CALC-SCNC: 12 MMOL/L (ref 7–16)
APPEARANCE UR: CLEAR
APPEARANCE UR: CLEAR
AST SERPL-CCNC: 19 U/L (ref 12–45)
BACTERIA #/AREA URNS HPF: ABNORMAL /HPF
BASOPHILS # BLD AUTO: 0.8 % (ref 0–1.8)
BASOPHILS # BLD: 0.05 K/UL (ref 0–0.12)
BILIRUB SERPL-MCNC: 0.4 MG/DL (ref 0.1–1.5)
BILIRUB UR QL STRIP.AUTO: NEGATIVE
BILIRUB UR STRIP-MCNC: NEGATIVE MG/DL
BUN SERPL-MCNC: 9 MG/DL (ref 8–22)
CALCIUM ALBUM COR SERPL-MCNC: 8.9 MG/DL (ref 8.5–10.5)
CALCIUM SERPL-MCNC: 9.2 MG/DL (ref 8.4–10.2)
CHLORIDE SERPL-SCNC: 104 MMOL/L (ref 96–112)
CO2 SERPL-SCNC: 21 MMOL/L (ref 20–33)
COLOR UR AUTO: YELLOW
COLOR UR: YELLOW
CREAT SERPL-MCNC: 0.56 MG/DL (ref 0.5–1.4)
EOSINOPHIL # BLD AUTO: 0.11 K/UL (ref 0–0.51)
EOSINOPHIL NFR BLD: 1.7 % (ref 0–6.9)
EPI CELLS #/AREA URNS HPF: ABNORMAL /HPF
ERYTHROCYTE [DISTWIDTH] IN BLOOD BY AUTOMATED COUNT: 38.8 FL (ref 35.9–50)
GFR SERPLBLD CREATININE-BSD FMLA CKD-EPI: 109 ML/MIN/1.73 M 2
GLOBULIN SER CALC-MCNC: 2.7 G/DL (ref 1.9–3.5)
GLUCOSE SERPL-MCNC: 138 MG/DL (ref 65–99)
GLUCOSE UR STRIP.AUTO-MCNC: NEGATIVE MG/DL
GLUCOSE UR STRIP.AUTO-MCNC: NEGATIVE MG/DL
HCT VFR BLD AUTO: 40.8 % (ref 37–47)
HGB BLD-MCNC: 14.4 G/DL (ref 12–16)
IMM GRANULOCYTES # BLD AUTO: 0.05 K/UL (ref 0–0.11)
IMM GRANULOCYTES NFR BLD AUTO: 0.8 % (ref 0–0.9)
KETONES UR STRIP.AUTO-MCNC: NEGATIVE MG/DL
KETONES UR STRIP.AUTO-MCNC: NEGATIVE MG/DL
LEUKOCYTE ESTERASE UR QL STRIP.AUTO: ABNORMAL
LEUKOCYTE ESTERASE UR QL STRIP.AUTO: NORMAL
LYMPHOCYTES # BLD AUTO: 2.59 K/UL (ref 1–4.8)
LYMPHOCYTES NFR BLD: 39.4 % (ref 22–41)
MCH RBC QN AUTO: 31.9 PG (ref 27–33)
MCHC RBC AUTO-ENTMCNC: 35.3 G/DL (ref 32.2–35.5)
MCV RBC AUTO: 90.5 FL (ref 81.4–97.8)
MICRO URNS: ABNORMAL
MONOCYTES # BLD AUTO: 0.52 K/UL (ref 0–0.85)
MONOCYTES NFR BLD AUTO: 7.9 % (ref 0–13.4)
NEUTROPHILS # BLD AUTO: 3.26 K/UL (ref 1.82–7.42)
NEUTROPHILS NFR BLD: 49.4 % (ref 44–72)
NITRITE UR QL STRIP.AUTO: NEGATIVE
NITRITE UR QL STRIP.AUTO: NEGATIVE
NRBC # BLD AUTO: 0 K/UL
NRBC BLD-RTO: 0 /100 WBC (ref 0–0.2)
PH UR STRIP.AUTO: 6.5 [PH] (ref 5–8)
PH UR STRIP.AUTO: 7 [PH] (ref 5–8)
PLATELET # BLD AUTO: 314 K/UL (ref 164–446)
PMV BLD AUTO: 8.9 FL (ref 9–12.9)
POTASSIUM SERPL-SCNC: 4.3 MMOL/L (ref 3.6–5.5)
PROT SERPL-MCNC: 7.1 G/DL (ref 6–8.2)
PROT UR QL STRIP: NEGATIVE MG/DL
PROT UR QL STRIP: NEGATIVE MG/DL
RBC # BLD AUTO: 4.51 M/UL (ref 4.2–5.4)
RBC # URNS HPF: ABNORMAL /HPF
RBC UR QL AUTO: NEGATIVE
RBC UR QL AUTO: NORMAL
SODIUM SERPL-SCNC: 137 MMOL/L (ref 135–145)
SP GR UR STRIP.AUTO: 1.01
SP GR UR STRIP.AUTO: <=1.005
UROBILINOGEN UR STRIP-MCNC: 0.2 MG/DL
WBC # BLD AUTO: 6.6 K/UL (ref 4.8–10.8)
WBC #/AREA URNS HPF: ABNORMAL /HPF

## 2023-09-27 PROCEDURE — 81002 URINALYSIS NONAUTO W/O SCOPE: CPT | Performed by: PHYSICIAN ASSISTANT

## 2023-09-27 PROCEDURE — 74177 CT ABD & PELVIS W/CONTRAST: CPT

## 2023-09-27 PROCEDURE — 87086 URINE CULTURE/COLONY COUNT: CPT

## 2023-09-27 PROCEDURE — 85025 COMPLETE CBC W/AUTO DIFF WBC: CPT

## 2023-09-27 PROCEDURE — 700102 HCHG RX REV CODE 250 W/ 637 OVERRIDE(OP): Performed by: EMERGENCY MEDICINE

## 2023-09-27 PROCEDURE — 3074F SYST BP LT 130 MM HG: CPT | Performed by: PHYSICIAN ASSISTANT

## 2023-09-27 PROCEDURE — 700117 HCHG RX CONTRAST REV CODE 255: Performed by: EMERGENCY MEDICINE

## 2023-09-27 PROCEDURE — A9270 NON-COVERED ITEM OR SERVICE: HCPCS | Performed by: EMERGENCY MEDICINE

## 2023-09-27 PROCEDURE — 96375 TX/PRO/DX INJ NEW DRUG ADDON: CPT

## 2023-09-27 PROCEDURE — 80053 COMPREHEN METABOLIC PANEL: CPT

## 2023-09-27 PROCEDURE — 3078F DIAST BP <80 MM HG: CPT | Performed by: PHYSICIAN ASSISTANT

## 2023-09-27 PROCEDURE — 99285 EMERGENCY DEPT VISIT HI MDM: CPT

## 2023-09-27 PROCEDURE — 99214 OFFICE O/P EST MOD 30 MIN: CPT | Performed by: PHYSICIAN ASSISTANT

## 2023-09-27 PROCEDURE — 96374 THER/PROPH/DIAG INJ IV PUSH: CPT | Mod: XU

## 2023-09-27 PROCEDURE — 81001 URINALYSIS AUTO W/SCOPE: CPT

## 2023-09-27 PROCEDURE — 36415 COLL VENOUS BLD VENIPUNCTURE: CPT

## 2023-09-27 PROCEDURE — 700111 HCHG RX REV CODE 636 W/ 250 OVERRIDE (IP): Mod: JZ | Performed by: EMERGENCY MEDICINE

## 2023-09-27 RX ORDER — CIPROFLOXACIN 500 MG/1
500 TABLET, FILM COATED ORAL 2 TIMES DAILY
Qty: 14 TABLET | Refills: 0 | Status: ACTIVE | OUTPATIENT
Start: 2023-09-27 | End: 2023-10-04

## 2023-09-27 RX ORDER — METRONIDAZOLE 500 MG/1
500 TABLET ORAL ONCE
Status: COMPLETED | OUTPATIENT
Start: 2023-09-27 | End: 2023-09-27

## 2023-09-27 RX ORDER — ONDANSETRON 2 MG/ML
4 INJECTION INTRAMUSCULAR; INTRAVENOUS ONCE
Status: COMPLETED | OUTPATIENT
Start: 2023-09-27 | End: 2023-09-27

## 2023-09-27 RX ORDER — ONDANSETRON 4 MG/1
4 TABLET, ORALLY DISINTEGRATING ORAL EVERY 6 HOURS PRN
Qty: 10 TABLET | Refills: 0 | Status: SHIPPED | OUTPATIENT
Start: 2023-09-27

## 2023-09-27 RX ORDER — METRONIDAZOLE 500 MG/1
500 TABLET ORAL 2 TIMES DAILY
Qty: 14 TABLET | Refills: 0 | Status: ACTIVE | OUTPATIENT
Start: 2023-09-27 | End: 2023-10-04

## 2023-09-27 RX ORDER — CIPROFLOXACIN 500 MG/1
500 TABLET, FILM COATED ORAL ONCE
Status: COMPLETED | OUTPATIENT
Start: 2023-09-27 | End: 2023-09-27

## 2023-09-27 RX ADMIN — FENTANYL CITRATE 50 MCG: 50 INJECTION, SOLUTION INTRAMUSCULAR; INTRAVENOUS at 11:39

## 2023-09-27 RX ADMIN — CIPROFLOXACIN 500 MG: 500 TABLET, FILM COATED ORAL at 13:02

## 2023-09-27 RX ADMIN — ONDANSETRON 4 MG: 2 INJECTION INTRAMUSCULAR; INTRAVENOUS at 11:39

## 2023-09-27 RX ADMIN — IOHEXOL 100 ML: 350 INJECTION, SOLUTION INTRAVENOUS at 11:45

## 2023-09-27 RX ADMIN — METRONIDAZOLE 500 MG: 500 TABLET ORAL at 13:02

## 2023-09-27 ASSESSMENT — FIBROSIS 4 INDEX
FIB4 SCORE: 0.81
FIB4 SCORE: 0.81

## 2023-09-27 ASSESSMENT — ENCOUNTER SYMPTOMS
COUGH: 0
EYE DISCHARGE: 0
SHORTNESS OF BREATH: 0
ABDOMINAL PAIN: 1
FEVER: 0
NAUSEA: 1
EYE REDNESS: 0
VOMITING: 0
CONSTIPATION: 1
BLOOD IN STOOL: 0
CHANGE IN BOWEL HABIT: 1
DIARRHEA: 1

## 2023-09-27 ASSESSMENT — PAIN DESCRIPTION - PAIN TYPE: TYPE: ACUTE PAIN

## 2023-09-27 NOTE — ED NOTES
Pt provided with DC paper work and follow up care instructions. Pt educated on new medications. Pt declines questions and ambulated out of ER.

## 2023-09-27 NOTE — DISCHARGE INSTRUCTIONS
Follow-up with primary care this week for reevaluation, medication management and referral back to GI for recurrent symptoms and history of diverticulitis.    Ciprofloxacin twice daily for 7 days.  Flagyl twice daily for 7 days.  Zofran, oral dissolving tablet, every 4-6 hours as needed for nausea or vomiting.  Tylenol or ibuprofen as needed for discomfort.    Continue any other home medications as previously indicated.    Encourage oral fluid hydration.  Clear liquid diet for 24 to 48 hours, then advance to bland foods as tolerated.    Return to the emergency department for persistent or worsening abdominal pain, flank pain, bloody stools, fever, vomiting or other new concerns.

## 2023-09-27 NOTE — ED NOTES
Medication history reviewed with pt. Med rec is complete.  Allergies reviewed, per pt  Interviewed pt with mother in law at bedside with permission from pt.    Patient has not had any outpatient antibiotics in the last 30 days.    Pt is not on any anticoagulants

## 2023-09-27 NOTE — ED PROVIDER NOTES
ED Provider Note    CHIEF COMPLAINT  Chief Complaint   Patient presents with    Abdominal Pain     C/o LLQ pain, nausea, and dysuria; pt reports hx of diverticulitis       EXTERNAL RECORDS REVIEWED  Inpatient Notes discharge summary 12/16/2023 after admission for left lower quadrant abdominal pain.  History of recent cholecystectomy in January 2023, tobacco abuse, HERLINDA, bipolar, seizure disorder, recurrent diverticulitis who has had multiple readmissions for diverticulitis.  Admitted 1/27 through 28 (left AMA), 131 through 2 3, and now again for diverticulitis on this visit.  On 2/3 discharged with a total of 10 days of antibiotics with Augmentin.  On readmission placed on Cipro/Flagyl due to failure on Augmentin x2 as an outpatient.  Confirmation made with GI see that she did not have evidence of malignancy on recent colonoscopy.  Patient left AMA on this date despite persistent low-grade fever and need for narcotic pain control. Today, referred from urgent care for further work-up after urinalysis was unrevealing.      HPI/ROS  LIMITATION TO HISTORY   Select: : None  OUTSIDE HISTORIAN(S):  Family      Rianna Solis is a 53 y.o. female who presents to the emergency department through triage with a family member who does not contribute to this history, however.  Patient describes urinary discomfort over the weekend, and no left lower quadrant abdominal pain since Monday, 2 days ago.  Worse over the last 24 hours, 6-10 out of 10, sharp and cramping.  Nonradiating otherwise.  Similar symptoms previously with diverticulitis.  Patient describes complicated course of diverticulitis earlier this year which almost resulted in surgery/colectomy, but states she finally got better with ciprofloxacin and Flagyl.  Nausea without vomiting.  Constipation and diarrhea without blood or mucus.  No fever or chills.    PAST MEDICAL HISTORY   has a past medical history of Abnormal mammogram (2006), Acromioclavicular joint  separation (2012), Anxiety, Bipolar 1 disorder (HCC) (2023), Depression, Former smoker (2019), History of cervical dysplasia (), History of gestational diabetes (01/15/2018), History of suicidal ideation, Homicidal ideations (), Hyperthyroidism, Hyperthyroidism, Lisfranc's dislocation (10/2017), Migraine, Partial seizure disorder ([facility]) (2023), Pelvic exam (), Polysubstance dependence (), Schizoaffective disorder ([facility]), Schizoaffective disorder, bipolar type ([facility]) (2011), Seizure ([facility]) (), Seizure disorder ([facility]), Sleep apnea (2023), Toe fracture (10/2017), Urinary tract infection, and Wrist fracture ().    SURGICAL HISTORY   has a past surgical history that includes other; cervical conization (1990); eye surgery (Left, ,); primary c section (2008); foot surgery (Right, 10/2017); and cholecystectomy robotic xi (N/A, 2023).    FAMILY HISTORY  Family History   Problem Relation Age of Onset    Breast Cancer Mother     Diabetes Mother     Hypertension Mother     Hyperlipidemia Father     Diabetes Father     Hypertension Father     Hypertension Brother     Cancer Maternal Grandmother         stomach    Stroke Maternal Grandfather     Cancer Paternal Grandfather         lung    Cancer Maternal Uncle         brain    Ovarian Cancer Neg Hx     Tubal Cancer Neg Hx     Peritoneal Cancer Neg Hx     Colorectal Cancer Neg Hx     Heart Disease Neg Hx        SOCIAL HISTORY  Social History     Tobacco Use    Smoking status: Former     Current packs/day: 0.00     Average packs/day: 0.5 packs/day for 30.0 years (15.0 ttl pk-yrs)     Types: Cigarettes     Start date: 2016     Quit date: 2023     Years since quittin.1    Smokeless tobacco: Never    Tobacco comments:     started age 18   Vaping Use    Vaping Use: Former    Substances: Nicotine   Substance and Sexual Activity    Alcohol use: Not Currently    Drug use: No    Sexual activity: Yes      "Partners: Male     Birth control/protection: Post-Menopausal     Comment:        CURRENT MEDICATIONS  Home Medications       Reviewed by Tra Rodriguez (Pharmacy Tech) on 09/27/23 at 1235  Med List Status: Complete     Medication Last Dose Status   CALCIUM PO 9/26/2023 Active   Cholecalciferol (D3 PO) 9/26/2023 Active   Cranberry-Vitamin C-Probiotic (AZO CRANBERRY PO) 9/23/2023 Active   ibuprofen (MOTRIN) 800 MG Tab 9/27/2023 Active   LORazepam (ATIVAN) 2 MG tablet 9/27/2023 Active   Multiple Vitamins-Minerals (MULTIVITAMIN ADULTS) Tab 9/26/2023 Active   Omega-3 Fatty Acids (FISH OIL PO) 9/26/2023 Active   ziprasidone (GEODON) 80 MG Cap 9/26/2023 Active                    ALLERGIES  Allergies   Allergen Reactions    Zoloft Swelling     \"Blows Up\"       PHYSICAL EXAM  VITAL SIGNS: /71   Pulse 92   Temp 36.6 °C (97.9 °F) (Temporal)   Resp 20   Wt 94.3 kg (207 lb 14.3 oz)   SpO2 93%   BMI 38.02 kg/m²    Pulse ox interpretation: I interpret this pulse ox as normal.  Constitutional: Alert in no apparent distress.  HENT: Normocephalic, atraumatic. Bilateral external ears normal, Nose normal.   Eyes: Pupils are equal and reactive, Conjunctiva normal.   Neck: Normal range of motion  Lymphatic: No lymphadenopathy noted.   Cardiovascular: Regular rate and rhythm, no murmurs. Distal pulses intact.    Thorax & Lungs: Normal breath sounds.  No wheezing/rales/ronchi. No increased work of breathing  Abdomen: Obese, soft, nondistended.  Tender to palpation left lower quadrant without rebound, guarding or peritonitis.  No palpable mass, hernia or lymphadenopathy.    Skin: Warm, Dry, No erythema, No rash.   Musculoskeletal: Good range of motion in all major joints.   Neurologic: Alert and orient x4.  Speech clear and cohesive.  Moves 4 extremity spontaneously.  Psychiatric: Tearful, anxious otherwise affect normal, Judgment normal, Mood normal.       DIAGNOSTIC STUDIES / PROCEDURES    LABS  Results for " orders placed or performed during the hospital encounter of 09/27/23   CBC WITH DIFFERENTIAL   Result Value Ref Range    WBC 6.6 4.8 - 10.8 K/uL    RBC 4.51 4.20 - 5.40 M/uL    Hemoglobin 14.4 12.0 - 16.0 g/dL    Hematocrit 40.8 37.0 - 47.0 %    MCV 90.5 81.4 - 97.8 fL    MCH 31.9 27.0 - 33.0 pg    MCHC 35.3 32.2 - 35.5 g/dL    RDW 38.8 35.9 - 50.0 fL    Platelet Count 314 164 - 446 K/uL    MPV 8.9 (L) 9.0 - 12.9 fL    Neutrophils-Polys 49.40 44.00 - 72.00 %    Lymphocytes 39.40 22.00 - 41.00 %    Monocytes 7.90 0.00 - 13.40 %    Eosinophils 1.70 0.00 - 6.90 %    Basophils 0.80 0.00 - 1.80 %    Immature Granulocytes 0.80 0.00 - 0.90 %    Nucleated RBC 0.00 0.00 - 0.20 /100 WBC    Neutrophils (Absolute) 3.26 1.82 - 7.42 K/uL    Lymphs (Absolute) 2.59 1.00 - 4.80 K/uL    Monos (Absolute) 0.52 0.00 - 0.85 K/uL    Eos (Absolute) 0.11 0.00 - 0.51 K/uL    Baso (Absolute) 0.05 0.00 - 0.12 K/uL    Immature Granulocytes (abs) 0.05 0.00 - 0.11 K/uL    NRBC (Absolute) 0.00 K/uL   COMP METABOLIC PANEL   Result Value Ref Range    Sodium 137 135 - 145 mmol/L    Potassium 4.3 3.6 - 5.5 mmol/L    Chloride 104 96 - 112 mmol/L    Co2 21 20 - 33 mmol/L    Anion Gap 12.0 7.0 - 16.0    Glucose 138 (H) 65 - 99 mg/dL    Bun 9 8 - 22 mg/dL    Creatinine 0.56 0.50 - 1.40 mg/dL    Calcium 9.2 8.4 - 10.2 mg/dL    Correct Calcium 8.9 8.5 - 10.5 mg/dL    AST(SGOT) 19 12 - 45 U/L    ALT(SGPT) 24 2 - 50 U/L    Alkaline Phosphatase 90 30 - 99 U/L    Total Bilirubin 0.4 0.1 - 1.5 mg/dL    Albumin 4.4 3.2 - 4.9 g/dL    Total Protein 7.1 6.0 - 8.2 g/dL    Globulin 2.7 1.9 - 3.5 g/dL    A-G Ratio 1.6 g/dL   URINALYSIS (UA)    Specimen: Urine   Result Value Ref Range    Color Yellow     Character Clear     Specific Gravity <=1.005 <1.035    Ph 7.0 5.0 - 8.0    Glucose Negative Negative mg/dL    Ketones Negative Negative mg/dL    Protein Negative Negative mg/dL    Bilirubin Negative Negative    Nitrite Negative Negative    Leukocyte Esterase Trace (A)  Negative    Occult Blood Negative Negative    Micro Urine Req Microscopic    URINE MICROSCOPIC (W/UA)   Result Value Ref Range    WBC 10-20 (A) /hpf    RBC 0-2 /hpf    Bacteria Few (A) None /hpf    Epithelial Cells Few Few /hpf   ESTIMATED GFR   Result Value Ref Range    GFR (CKD-EPI) 109 >60 mL/min/1.73 m 2     RADIOLOGY  I have independently interpreted the diagnostic imaging associated with this visit and am waiting the final reading from the radiologist.     Radiologist interpretation:   CT-ABDOMEN-PELVIS WITH   Final Result      1.  No acute abnormality within the abdomen or pelvis      2.  Left colon diverticulosis without diverticulitis      3.  Normal appendix          COURSE & MEDICAL DECISION MAKING    ED Observation Status? Yes; I am placing the patient in to an observation status due to a diagnostic uncertainty as well as therapeutic intensity. Patient placed in observation status at 11:31 AM, 9/27/2023.     Observation plan is as follows: Labs, imaging, symptom control before final disposition can be made.    Upon Reevaluation, the patient's condition has: Stable for outpatient treatment    Patient discharged from ED Observation status at 1:00 PM  (Time) 9/27/23 (Date).     INITIAL ASSESSMENT, COURSE AND PLAN  Care Narrative:   Patient for left lower quadrant abdominal pain with history of recurrent diverticulitis, as well as intermittent dysuria is mostly unrevealing.  No leukocytosis, left shift or bandemia.  No anemia.  No electrolyte derangement.  Urinalysis does have trace leukocyte esterase, 10-20 WBCs and few bacteria.  Culture has been added.  CT without acute abnormality in the abdomen pelvis, diverticulosis without diverticulitis.  However, given patient's history of complicated diverticulitis and symptoms presenting similar to prior, she will be treated empirically for early or developing diverticulitis.  Given prior antibiotic course and failed treatment with Augmentin she will be treated  with ciprofloxacin and Flagyl.  This should cover any identified bacterial urinary infection as well.  Otherwise hemodynamically stable without fever, tachycardia, hypotension or hypoxia.  Clinically well-appearing and nontoxic.  Tolerates oral medications without difficulty.  Patient is quite agreeable to this recommendation, disposition and plan.    ADDITIONAL PROBLEM LIST  Bipolar -tearful and anxious but redirectable with normal affect.  Compliant with home medication    DISPOSITION AND DISCUSSIONS    Escalation of care considered, and ultimately not performed:acute inpatient care management, however at this time, the patient is most appropriate for outpatient management    The patient is stable for discharge home, anticipatory guidance provided, continue any home medications as previously indicated, add ciprofloxacin and Flagyl for 7 days,, close follow-up is encouraged and strict return instructions have been discussed. Patient is agreeable to the disposition and plan.      FINAL DIAGNOSIS  1. LLQ abdominal pain    2. Bacteriuria    3. History of diverticulitis           Electronically signed by: Shivani Guerra D.O., 9/27/2023 11:27 AM

## 2023-09-27 NOTE — ED TRIAGE NOTES
Chief Complaint   Patient presents with    Abdominal Pain     C/o LLQ pain, nausea, and dysuria; pt reports hx of diverticulitis     BP (!) 153/88   Pulse 96   Temp 36.6 °C (97.9 °F) (Temporal)   Resp 20   Wt 94.3 kg (207 lb 14.3 oz)   SpO2 96%   BMI 38.02 kg/m²     Pt arrived w/ above concern; s/s started last Sunday - seen at  this AM and sent to ER for further care, UA done at

## 2023-09-27 NOTE — PROGRESS NOTES
Subjective     Rianna Solis is a 53 y.o. female who presents with UTI (X 2 days Poss UTI or pos diverticulitis, burning urination, increase frequency, and hurt to pee)          This is a new problem.  The patient presents to clinic complaining of a possible UTI x5 days.  The patient is also concerned she may be experiencing an acute flare of diverticulitis.  The patient states she developed UTI-like symptoms over the weekend with associated dysuria and urinary frequency.  The patient reports no hematuria.  She also reports no flank pain.  The patient states she subsequently developed severe pain to her left lower abdomen x3 days ago.  The patient reports a history of diverticulitis.  The patient's most recent diverticulitis flare was in February 2023 requiring hospitalization.  The patient states she almost had to have a portion of her colon removed due to the severity of her acute diverticulitis flare.  The patient states her current left lower quadrant abdominal pain is similar to her previous flare of diverticulitis.  The patient reports no associated fever.  The patient states that she is experiencing nausea, but denies vomiting.  The patient states her bowel movements have been fluctuating between diarrhea and constipation.  She reports no bloody stools.  The patient has taken OTC ibuprofen for her current symptoms.  The patient reports no improvement of her abdominal pain with her prescription strength ibuprofen.  The patient states her abdominal pain is worse with movement and certain positions.  The patient's last colonoscopy was in January 2023.    UTI  Associated symptoms include abdominal pain, a change in bowel habit, nausea and urinary symptoms. Pertinent negatives include no chest pain, congestion, coughing, fever or vomiting. She has tried NSAIDs for the symptoms.     PMH:  has a past medical history of Abnormal mammogram (2006), Acromioclavicular joint separation (05/24/2012), Anxiety,  "Bipolar 1 disorder ([facility]) (01/2023), Depression, Former smoker (04/26/2019), History of cervical dysplasia (1990), History of gestational diabetes (01/15/2018), History of suicidal ideation, Homicidal ideations (2014), Hyperthyroidism, Hyperthyroidism, Lisfranc's dislocation (10/2017), Migraine, Partial seizure disorder ([facility]) (01/2023), Pelvic exam (2008), Polysubstance dependence ([facility]), Schizoaffective disorder ([facility]), Schizoaffective disorder, bipolar type ([facility]) (04/07/2011), Seizure ([facility]) (1999), Seizure disorder ([facility]), Sleep apnea (01/2023), Toe fracture (10/2017), Urinary tract infection, and Wrist fracture (2010).    She has no past medical history of Ulcer.  MEDS:   Current Outpatient Medications:     LORazepam (ATIVAN) 2 MG tablet, Take 2 mg by mouth 3 times a day., Disp: , Rfl:     ibuprofen (MOTRIN) 800 MG Tab, Take 1 Tablet by mouth every 6 hours as needed for Moderate Pain or Headache. Indications: Pain, Disp: 90 Tablet, Rfl: 1    Calcium Carbonate (CALCIUM 500 PO), Take  by mouth., Disp: , Rfl:     Multiple Vitamins-Minerals (MULTIVITAMIN ADULTS) Tab, Take 1 Tablet by mouth every day., Disp: , Rfl:     Omega-3 Fatty Acids (FISH OIL) 1000 MG Cap capsule, Take 1,000 mg by mouth every day., Disp: , Rfl:     VITAMIN D, CHOLECALCIFEROL, PO, Take 1 Capsule by mouth every day., Disp: , Rfl:     ziprasidone (GEODON) 80 MG Cap, Take 80 mg by mouth 2 times a day., Disp: , Rfl:     carBAMazepine (TEGRETOL) 200 MG Tab, 200 mg 2 times a day. (Patient not taking: Reported on 9/27/2023), Disp: , Rfl:     Cariprazine HCl (VRAYLAR) 6 MG Cap, , Disp: , Rfl:   ALLERGIES:   Allergies   Allergen Reactions    Zoloft Swelling     \"Blows Up\"     SURGHX:   Past Surgical History:   Procedure Laterality Date    CHOLECYSTECTOMY ROBOTIC XI N/A 01/13/2023    Procedure: ROBOTIC CHOLECYSTECTOMY;  Surgeon: Kayce Guzman M.D.;  Location: SURGERY [facility];  Service: Gen Robotic    FOOT SURGERY Right 10/2017    Kaylah" "dislocation    PRIMARY C SECTION  01/01/2008    CERVICAL CONIZATION  01/01/1990    cryo for abnl pap    EYE SURGERY Left 1973,1979    to repair \"lazy eye\"    OTHER      eye surgeries as a child     SOCHX:  reports that she quit smoking about 8 weeks ago. Her smoking use included cigarettes. She started smoking about 7 years ago. She has a 15.0 pack-year smoking history. She has never used smokeless tobacco. She reports that she does not currently use alcohol. She reports that she does not use drugs.  FH: Family history was reviewed, no pertinent findings to report    Review of Systems   Constitutional:  Negative for fever.   HENT:  Negative for congestion.    Eyes:  Negative for discharge and redness.   Respiratory:  Negative for cough and shortness of breath.    Cardiovascular:  Negative for chest pain.   Gastrointestinal:  Positive for abdominal pain, change in bowel habit, constipation, diarrhea and nausea. Negative for blood in stool and vomiting.   Genitourinary:  Positive for dysuria and frequency.              Objective     /58   Pulse (!) 101   Temp 36.6 °C (97.8 °F) (Temporal)   Resp 18   Ht 1.575 m (5' 2\")   Wt 94.3 kg (208 lb)   SpO2 97%   BMI 38.04 kg/m²      Physical Exam  Constitutional:       General: She is not in acute distress.     Appearance: Normal appearance. She is well-developed. She is not ill-appearing.      Comments:   The patient appears in pain and is crying throughout the examination.   HENT:      Head: Normocephalic and atraumatic.      Right Ear: External ear normal.      Left Ear: External ear normal.   Eyes:      Extraocular Movements: Extraocular movements intact.      Conjunctiva/sclera: Conjunctivae normal.   Cardiovascular:      Rate and Rhythm: Normal rate and regular rhythm.      Heart sounds: Normal heart sounds.   Pulmonary:      Effort: Pulmonary effort is normal. No respiratory distress.      Breath sounds: Normal breath sounds. No wheezing.   Abdominal:      " General: Bowel sounds are normal.      Palpations: Abdomen is soft.      Tenderness: There is abdominal tenderness in the left lower quadrant. There is left CVA tenderness. There is no right CVA tenderness.   Musculoskeletal:         General: Normal range of motion.      Cervical back: Normal range of motion and neck supple.   Skin:     General: Skin is warm and dry.   Neurological:      Mental Status: She is alert and oriented to person, place, and time.               Progress:  Results for orders placed or performed in visit on 09/27/23   POCT Urinalysis   Result Value Ref Range    POC Color yellow Negative    POC Appearance clear Negative    POC Glucose negative Negative mg/dL    POC Bilirubin negative Negative mg/dL    POC Ketones negative Negative mg/dL    POC Specific Gravity 1.010 <1.005 - >1.030    POC Blood trace-intact Negative    POC Urine PH 6.5 5.0 - 8.0    POC Protein negative Negative mg/dL    POC Urobiligen 0.2 Negative (0.2) mg/dL    POC Nitrites negative Negative    POC Leukocyte Esterase trace Negative              Assessment & Plan          1. LLQ abdominal pain    2. Dysuria  - POCT Urinalysis    The patient's presenting symptoms and physical exam findings are consistent with left lower quadrant abdominal pain.  The patient is also experiencing dysuria.  On physical exam, patient had significant localized tenderness to the left lower quadrant without guarding or rebound.  The patient's vital signs are within normal limits.  The patient also had mild left-sided CVA tenderness.  The remainder the patient's physical exam today in clinic was normal.  The patient appears in pain and is crying throughout the examination.  The patient's vital signs are stable and within normal limits.  She is afebrile today in clinic.  The patient's POCT urinalysis today in clinic showed trace leukocyte esterase and trace blood with negative nitrates.  Based on the patient's presenting symptoms and physical exam  findings, I am concerned about an acute diverticulitis flare.  Based on the patient's presenting symptoms, physical exam findings, and level of pain/distress today in clinic, I believe the patient would benefit from a higher level of care.  Therefore, I recommend the patient transferred to the [facility] ED for further evaluation and management.  The patient agrees with this plan.  The patient is stable for transfer via private vehicle at this time.  Advised the patient of the associated risks of not seeking further care for her current symptoms, and she verbalized understanding.    Plan:  Transfer patient to the [facility] for further evaluation management    Please note that this dictation was created using voice recognition software. I have made every reasonable attempt to correct obvious errors, but I expect that there may be errors of grammar and possibly content that I did not discover before finalizing the note.     This note was electronically signed by Berenice Chong PA-C

## 2023-09-29 LAB
BACTERIA UR CULT: NORMAL
SIGNIFICANT IND 70042: NORMAL
SITE SITE: NORMAL
SOURCE SOURCE: NORMAL

## 2023-10-20 PROBLEM — K76.0 FATTY LIVER: Status: ACTIVE | Noted: 2023-10-20

## 2023-10-20 PROBLEM — Z87.19 HISTORY OF DIVERTICULITIS: Chronic | Status: ACTIVE | Noted: 2023-02-14

## 2023-12-15 DIAGNOSIS — M54.41 CHRONIC BILATERAL LOW BACK PAIN WITH RIGHT-SIDED SCIATICA: ICD-10-CM

## 2023-12-15 DIAGNOSIS — G89.29 CHRONIC BILATERAL LOW BACK PAIN WITH RIGHT-SIDED SCIATICA: ICD-10-CM

## 2023-12-15 DIAGNOSIS — G43.809 OTHER MIGRAINE WITHOUT STATUS MIGRAINOSUS, NOT INTRACTABLE: ICD-10-CM

## 2023-12-20 RX ORDER — IBUPROFEN 800 MG/1
800 TABLET ORAL EVERY 8 HOURS PRN
Qty: 90 TABLET | Refills: 1 | Status: SHIPPED | OUTPATIENT
Start: 2023-12-20

## 2023-12-27 ENCOUNTER — TELEPHONE (OUTPATIENT)
Dept: ENDOCRINOLOGY | Facility: MEDICAL CENTER | Age: 53
End: 2023-12-27

## 2023-12-28 ENCOUNTER — HOSPITAL ENCOUNTER (OUTPATIENT)
Dept: LAB | Facility: MEDICAL CENTER | Age: 53
End: 2023-12-28
Attending: INTERNAL MEDICINE
Payer: MEDICARE

## 2023-12-28 DIAGNOSIS — E55.9 VITAMIN D DEFICIENCY: ICD-10-CM

## 2023-12-28 DIAGNOSIS — E05.90 SUBCLINICAL HYPERTHYROIDISM: ICD-10-CM

## 2023-12-28 DIAGNOSIS — E06.3 CHRONIC AUTOIMMUNE THYROIDITIS: ICD-10-CM

## 2023-12-28 LAB
25(OH)D3 SERPL-MCNC: 43 NG/ML (ref 30–100)
ALBUMIN SERPL BCP-MCNC: 4.4 G/DL (ref 3.2–4.9)
ALBUMIN SERPL BCP-MCNC: 4.5 G/DL (ref 3.2–4.9)
ALBUMIN/GLOB SERPL: 1.5 G/DL
ALBUMIN/GLOB SERPL: 1.6 G/DL
ALP SERPL-CCNC: 69 U/L (ref 30–99)
ALP SERPL-CCNC: 69 U/L (ref 30–99)
ALT SERPL-CCNC: 40 U/L (ref 2–50)
ALT SERPL-CCNC: 40 U/L (ref 2–50)
ANION GAP SERPL CALC-SCNC: 14 MMOL/L (ref 7–16)
ANION GAP SERPL CALC-SCNC: 14 MMOL/L (ref 7–16)
AST SERPL-CCNC: 30 U/L (ref 12–45)
AST SERPL-CCNC: 31 U/L (ref 12–45)
BASOPHILS # BLD AUTO: 0.3 % (ref 0–1.8)
BASOPHILS # BLD: 0.02 K/UL (ref 0–0.12)
BILIRUB SERPL-MCNC: 0.5 MG/DL (ref 0.1–1.5)
BILIRUB SERPL-MCNC: 0.5 MG/DL (ref 0.1–1.5)
BUN SERPL-MCNC: 16 MG/DL (ref 8–22)
BUN SERPL-MCNC: 16 MG/DL (ref 8–22)
CALCIUM ALBUM COR SERPL-MCNC: 9.3 MG/DL (ref 8.5–10.5)
CALCIUM ALBUM COR SERPL-MCNC: 9.4 MG/DL (ref 8.5–10.5)
CALCIUM SERPL-MCNC: 9.7 MG/DL (ref 8.4–10.2)
CALCIUM SERPL-MCNC: 9.7 MG/DL (ref 8.4–10.2)
CHLORIDE SERPL-SCNC: 100 MMOL/L (ref 96–112)
CHLORIDE SERPL-SCNC: 101 MMOL/L (ref 96–112)
CO2 SERPL-SCNC: 24 MMOL/L (ref 20–33)
CO2 SERPL-SCNC: 24 MMOL/L (ref 20–33)
CREAT SERPL-MCNC: 0.64 MG/DL (ref 0.5–1.4)
CREAT SERPL-MCNC: 0.64 MG/DL (ref 0.5–1.4)
EOSINOPHIL # BLD AUTO: 0.04 K/UL (ref 0–0.51)
EOSINOPHIL NFR BLD: 0.6 % (ref 0–6.9)
ERYTHROCYTE [DISTWIDTH] IN BLOOD BY AUTOMATED COUNT: 36.8 FL (ref 35.9–50)
GFR SERPLBLD CREATININE-BSD FMLA CKD-EPI: 105 ML/MIN/1.73 M 2
GFR SERPLBLD CREATININE-BSD FMLA CKD-EPI: 105 ML/MIN/1.73 M 2
GLOBULIN SER CALC-MCNC: 2.9 G/DL (ref 1.9–3.5)
GLOBULIN SER CALC-MCNC: 3 G/DL (ref 1.9–3.5)
GLUCOSE SERPL-MCNC: 140 MG/DL (ref 65–99)
GLUCOSE SERPL-MCNC: 144 MG/DL (ref 65–99)
HCT VFR BLD AUTO: 43.2 % (ref 37–47)
HGB BLD-MCNC: 15.3 G/DL (ref 12–16)
IMM GRANULOCYTES # BLD AUTO: 0.01 K/UL (ref 0–0.11)
IMM GRANULOCYTES NFR BLD AUTO: 0.2 % (ref 0–0.9)
LYMPHOCYTES # BLD AUTO: 2.39 K/UL (ref 1–4.8)
LYMPHOCYTES NFR BLD: 36.8 % (ref 22–41)
MCH RBC QN AUTO: 30.9 PG (ref 27–33)
MCHC RBC AUTO-ENTMCNC: 35.4 G/DL (ref 32.2–35.5)
MCV RBC AUTO: 87.3 FL (ref 81.4–97.8)
MONOCYTES # BLD AUTO: 0.35 K/UL (ref 0–0.85)
MONOCYTES NFR BLD AUTO: 5.4 % (ref 0–13.4)
NEUTROPHILS # BLD AUTO: 3.68 K/UL (ref 1.82–7.42)
NEUTROPHILS NFR BLD: 56.7 % (ref 44–72)
NRBC # BLD AUTO: 0 K/UL
NRBC BLD-RTO: 0 /100 WBC (ref 0–0.2)
PLATELET # BLD AUTO: 279 K/UL (ref 164–446)
PMV BLD AUTO: 9.2 FL (ref 9–12.9)
POTASSIUM SERPL-SCNC: 3.8 MMOL/L (ref 3.6–5.5)
POTASSIUM SERPL-SCNC: 3.8 MMOL/L (ref 3.6–5.5)
PROT SERPL-MCNC: 7.4 G/DL (ref 6–8.2)
PROT SERPL-MCNC: 7.4 G/DL (ref 6–8.2)
RBC # BLD AUTO: 4.95 M/UL (ref 4.2–5.4)
SODIUM SERPL-SCNC: 138 MMOL/L (ref 135–145)
SODIUM SERPL-SCNC: 139 MMOL/L (ref 135–145)
T3FREE SERPL-MCNC: 2.79 PG/ML (ref 2–4.4)
T4 FREE SERPL-MCNC: 1.53 NG/DL (ref 0.93–1.7)
TSH SERPL DL<=0.005 MIU/L-ACNC: 0.13 UIU/ML (ref 0.38–5.33)
WBC # BLD AUTO: 6.5 K/UL (ref 4.8–10.8)

## 2023-12-28 PROCEDURE — 84481 FREE ASSAY (FT-3): CPT

## 2023-12-28 PROCEDURE — 36415 COLL VENOUS BLD VENIPUNCTURE: CPT

## 2023-12-28 PROCEDURE — 82306 VITAMIN D 25 HYDROXY: CPT

## 2023-12-28 PROCEDURE — 80053 COMPREHEN METABOLIC PANEL: CPT

## 2023-12-28 PROCEDURE — 80053 COMPREHEN METABOLIC PANEL: CPT | Mod: 91

## 2023-12-28 PROCEDURE — 84439 ASSAY OF FREE THYROXINE: CPT

## 2023-12-28 PROCEDURE — 85025 COMPLETE CBC W/AUTO DIFF WBC: CPT

## 2023-12-28 PROCEDURE — 84443 ASSAY THYROID STIM HORMONE: CPT

## 2024-01-11 DIAGNOSIS — E05.90 SUBCLINICAL HYPERTHYROIDISM: Chronic | ICD-10-CM

## 2024-01-11 DIAGNOSIS — E06.3 CHRONIC AUTOIMMUNE THYROIDITIS: Chronic | ICD-10-CM

## 2024-01-11 NOTE — PROGRESS NOTES
1. Subclinical hyperthyroidism  2. Chronic autoimmune thyroiditis  PCP- Dr Mares; message received requesting referral to continue care with Dr Elise  - Referral to Endocrinology

## 2024-01-22 ENCOUNTER — APPOINTMENT (OUTPATIENT)
Dept: RADIOLOGY | Facility: MEDICAL CENTER | Age: 54
End: 2024-01-22
Attending: INTERNAL MEDICINE

## 2024-02-20 ENCOUNTER — TELEPHONE (OUTPATIENT)
Dept: MEDICAL GROUP | Facility: PHYSICIAN GROUP | Age: 54
End: 2024-02-20
Payer: MEDICARE

## 2024-02-20 NOTE — LETTER
2/20/2024            Rianna Solis  [address] [place] [address]              Dear Rianna,    Your care is very important to us, and we have noticed that on 02/12/2024, you missed your appointment with Kaleigh Mares M.D. at [facility]       We’re committed to providing you with the best care possible. Your appointment time is reserved for you and your provider to discuss any current or new health concerns and, together, determine the best plan of care for you. Please call [phone] to reschedule at your earliest convenience.        In some cases, [facility] offers additional resources to make your healthcare more accessible, including transportation assistance, financial assistance and virtual visits. To learn more about these resources, please call [phone].       In order to keep you as informed as possible, below is a brief summary of our policy regarding missed appointments:        If a patient “No Shows”??three (3) or more appointments within a rolling 12-month           period, they may be dismissed from the practice for failure to follow clinician      recommendations.     If you have any concerns regarding the care you are receiving, please talk with your provider or call the office at [phone] and request to speak with the Practice . We’re committed to providing excellent care, and your feedback is invaluable.          Sincerely,   Kaleigh Mares M.D.

## 2024-03-05 ENCOUNTER — TELEPHONE (OUTPATIENT)
Dept: MEDICAL GROUP | Facility: PHYSICIAN GROUP | Age: 54
End: 2024-03-05
Payer: MEDICARE

## 2024-03-05 NOTE — LETTER
3/5/2024            Rianna Solis  [address] [place] [address]              Dear Rianna,    Your care is very important to us, and we have noticed that on 02/1202024 and 02/29/2024, you missed your appointments with Kaleigh Mares M.D. at [facility]       We’re committed to providing you with the best care possible. Your appointment time is reserved for you and your provider to discuss any current or new health concerns and, together, determine the best plan of care for you. Please call [phone] to reschedule at your earliest convenience.        In some cases, [facility] offers additional resources to make your healthcare more accessible, including transportation assistance, financial assistance and virtual visits. To learn more about these resources, please call [phone].       In order to keep you as informed as possible, below is a brief summary of our policy regarding missed appointments:        If a patient “No Shows”??three (3) or more appointments within a rolling 12-month           period, they may be dismissed from the practice for failure to follow clinician      recommendations.     If you have any concerns regarding the care you are receiving, please talk with your provider or call the office at [phone] and request to speak with the Practice . We’re committed to providing excellent care, and your feedback is invaluable.          Sincerely,     Kaleigh Mares M.D.

## 2024-03-06 NOTE — TELEPHONE ENCOUNTER
Phone Number Called: [phone] (home)       Call outcome:  unsuccessful    Message: Voicemail Box was full and could not leave a message. Will send out a second no show letter to address on file.

## 2024-03-27 NOTE — DISCHARGE INSTRUCTIONS
Discharge Education for patients on HERLINDA (Obstructive Sleep Apnea) Protocol    Prior to receiving sedation or anesthesia, we screen all patients for Obstructive Sleep Apnea.  During your screening, you were identified as having Obstructive Sleep Apnea(HERLINDA).    What is Obstructive Sleep Apnea?  Sleep apnea (AP-ne-ah) is a common disorder which involves breathing pauses that occur during sleep.  These can last from 10 seconds to a minute or longer.  Normal breathing resumes often with a loud snort or choking sound.    Sleep apnea occurs in all age groups and both genders but is more common in men and people over 40 years of age.  It has been estimated that as many as 18 million Americans have sleep apnea.  Most people who have sleep apnea don’t know they have it because it only occurs during sleep.  A family member and/or bed partner may first notice the signs of sleep apnea.  Sleep apnea is a chronic (ongoing) condition that disrupts the quality and quantity of your sleep repeatedly throughout the night.  This often results in excessive daytime sleepiness or fatigue during the day.  It may also contribute to high blood pressure, heart problems, and complications following medications used for surgery and procedures.     We recommend that you should be with an adult observer for at least 24 hours after your sedation/anesthesia.  If you have a CPAP machine, you should wear it during any sleep period (day or night) for the week following your procedure.  We encourage you to sleep on your side or in a sitting position, even with napping.  Lying flat on your back increases the risk of apnea and airway obstruction during your post procedure recovery period.    It is important to prevent over-sedation that could increase your risk for apnea.  Please take all pain medication as directed by your physician.  If you are not getting pain relief, please contact your physician to discuss possible approaches to relieving pain while  minimizing medications that can affect your breathing and oxygen levels.      ACTIVITY: Rest and take it easy for the first 24 hours.  A responsible adult is recommended to remain with you during that time.  It is normal to feel sleepy.  We encourage you to not do anything that requires balance, judgment or coordination.    MILD FLU-LIKE SYMPTOMS ARE NORMAL. YOU MAY EXPERIENCE GENERALIZED MUSCLE ACHES, THROAT IRRITATION, HEADACHE AND/OR SOME NAUSEA.    FOR 24 HOURS DO NOT:  Drive, operate machinery or run household appliances.  Drink beer or alcoholic beverages.   Make important decisions or sign legal documents.    SPECIAL INSTRUCTIONS: Instructions from Dr Aleman:     1. DIET: Upon discharge from the hospital you may resume your normal preoperative diet. Depending on how you are feeling and whether you have nausea or not, you may wish to stay with a bland diet for the first few days. However, you can advance this as quickly as you feel ready      2. ACTIVITIES: After discharge from the hospital, you may resume full routine activities. However, there should be no heavy lifting (greater than 15 pounds) and no strenuous activities until after your follow-up visit. Otherwise, routine activities of daily living are acceptable.     3. DRIVING: You may drive whenever you are off pain medications and are able to perform the activities needed to drive, i.e. turning, bending, twisting, etc.     4. BATHING: You may get the wound wet at any time after leaving the hospital. You may shower, but do not submerge in a bath for at least a week. Dressings may come off after 48 hours, but will peel off by themselves in the next 7-10 days.     5. BOWEL FUNCTION: Constipation is common after an operation, especially with pain medications. The combination of pain medication and decreased activity level can cause constipation in otherwise normal patients. If you feel this is occurring, take a laxative (Milk of Magnesia, Ex-Lax,  Senokot, etc.) until the problem has resolved.     6. PAIN MEDICATION: You will be given a prescription for pain medication at discharge. Please take these as directed. It is important to remember not to take medications on an empty stomach as this may cause nausea.     7.CALL IF YOU HAVE: (1) Fevers to more than 101F, (2) Unusual chest or leg pain, (3) Drainage or fluid from incision that may be foul smelling, increased tenderness or soreness at the wound or the wound edges are no longer together, redness or swelling at the incision site. Please do not hesitate to call with any other questions.     8. APPOINTMENT: Contact our office at [phone] for a follow-up appointment in 1 week following your procedure. If you have any additional questions, please do not hesitate to call the office and speak to either myself or the physician on call.     Office address: [facility] Kayce Land MD [facility] [phone]     You should CALL YOUR PHYSICIAN if you develop:  Fever greater than 101 degrees F.  Pain not relieved by medication, or persistent nausea or vomiting.  Excessive bleeding (blood soaking through dressing) or unexpected drainage from the wound.  Extreme redness or swelling around the incision site, drainage of pus or foul smelling drainage.  Inability to urinate or empty your bladder within 8 hours.  Problems with breathing or chest pain.    You should call 911 if you develop problems with breathing or chest pain.  If you are unable to contact your doctor or surgical center, you should go to the nearest emergency room or urgent care center.      If any questions arise, call your doctor.  If your doctor is not available, please feel free to call the Surgical Center at [phone].     A registered nurse may call you a few days after your surgery to see how you are doing after your procedure.    MEDICATIONS: Resume taking daily medication.  Take prescribed pain  medication with food.  If no medication is prescribed, you may take non-aspirin pain medication if needed.  PAIN MEDICATION CAN BE VERY CONSTIPATING.  Take a stool softener or laxative such as senokot, pericolace, or milk of magnesia if needed.    Last pain medication given at 11:09 a.m. (10mg oxycodone).    If your physician has prescribed pain medication that includes Acetaminophen (Tylenol), do not take additional Acetaminophen (Tylenol) while taking the prescribed medication.         Render Risk Assessment In Note?: no Other (Free Text): refills provided for flaring, they are happy with treatment and wish to continue Detail Level: Simple Note Text (......Xxx Chief Complaint.): This diagnosis correlates with the

## 2024-04-17 ENCOUNTER — OFFICE VISIT (OUTPATIENT)
Dept: URGENT CARE | Facility: PHYSICIAN GROUP | Age: 54
End: 2024-04-17
Payer: MEDICARE

## 2024-04-17 VITALS
SYSTOLIC BLOOD PRESSURE: 120 MMHG | OXYGEN SATURATION: 97 % | HEIGHT: 62 IN | RESPIRATION RATE: 22 BRPM | WEIGHT: 198.97 LBS | DIASTOLIC BLOOD PRESSURE: 62 MMHG | TEMPERATURE: 96.9 F | HEART RATE: 95 BPM | BODY MASS INDEX: 36.61 KG/M2

## 2024-04-17 DIAGNOSIS — R19.7 DIARRHEA, UNSPECIFIED TYPE: ICD-10-CM

## 2024-04-17 DIAGNOSIS — R10.32 LLQ ABDOMINAL PAIN: ICD-10-CM

## 2024-04-17 PROCEDURE — 3074F SYST BP LT 130 MM HG: CPT | Performed by: STUDENT IN AN ORGANIZED HEALTH CARE EDUCATION/TRAINING PROGRAM

## 2024-04-17 PROCEDURE — 3078F DIAST BP <80 MM HG: CPT | Performed by: STUDENT IN AN ORGANIZED HEALTH CARE EDUCATION/TRAINING PROGRAM

## 2024-04-17 PROCEDURE — 99215 OFFICE O/P EST HI 40 MIN: CPT | Performed by: STUDENT IN AN ORGANIZED HEALTH CARE EDUCATION/TRAINING PROGRAM

## 2024-04-17 ASSESSMENT — FIBROSIS 4 INDEX: FIB4 SCORE: 0.57

## 2024-04-17 NOTE — PROGRESS NOTES
"Subjective:   Rianna Solis is a 53 y.o. female who presents for Other (Believes she has diverticulitis again, in severe pain x last night.)      HPI:  53-year-old female presents to the urgent care for acute onset left lower quadrant abdominal pain that started yesterday afternoon.  States she has had loose stool but no watery diarrhea.  Has not noticed any blood or melena.  Has had nausea and episode of vomiting.  No vomiting today.  She does report history of diverticulitis with the last episode being in January 2024.  States that she was told last year that they want to remove part of her colon due to \"pockets of stuff being present\".  States the pain has been pretty much constant and worsening.  Reports the pain as an 8 out of 10.  Does have reduced appetite.  Has not noticed any fever, chills, constipation, chest pain, shortness of breath, lower leg swelling, dizziness, or headache.  She is still passing gas.  Does have a history of cholecystectomy.  Denies any urinary tract symptoms such as dysuria, hematuria, increased urinary frequency.      Medications:    AZO CRANBERRY PO  CALCIUM PO  D3 PO  FISH OIL PO  ibuprofen Tabs  LORazepam  Multivitamin Adults Tabs  ondansetron Tbdp  ziprasidone Caps    Allergies: Zoloft    Problem List: Rianna oSlis does not have any pertinent problems on file.    Surgical History:  Past Surgical History:   Procedure Laterality Date    CHOLECYSTECTOMY ROBOTIC XI N/A 01/13/2023    Procedure: ROBOTIC CHOLECYSTECTOMY;  Surgeon: Kayce Guzman M.D.;  Location: SURGERY [facility];  Service: Gen Robotic    FOOT SURGERY Right 10/2017    Lisfranc's dislocation    PRIMARY C SECTION  01/01/2008    CERVICAL CONIZATION  01/01/1990    cryo for abnl pap    EYE SURGERY Left 1973,1979    to repair \"lazy eye\"    OTHER      eye surgeries as a child       Past Social Hx: Rianna Solis  reports that she quit smoking about 8 months ago. Her smoking use included " "cigarettes. She started smoking about 7 years ago. She has a 15 pack-year smoking history. She has never used smokeless tobacco. She reports that she does not currently use alcohol. She reports that she does not use drugs.     Past Family Hx:  Rianna Solis family history includes Breast Cancer in her mother; Cancer in her maternal grandmother, maternal uncle, and paternal grandfather; Diabetes in her father and mother; Hyperlipidemia in her father; Hypertension in her brother, father, and mother; Stroke in her maternal grandfather.     Problem list, medications, and allergies reviewed by myself today in Epic.     Objective:     /62 (BP Location: Left arm, Patient Position: Sitting, BP Cuff Size: Adult long)   Pulse 95   Temp 36.1 °C (96.9 °F) (Temporal)   Resp (!) 22   Ht 1.575 m (5' 2\")   Wt 90.2 kg (198 lb 15.4 oz)   SpO2 97%   BMI 36.39 kg/m²     Physical Exam  Vitals reviewed.   Cardiovascular:      Rate and Rhythm: Normal rate and regular rhythm.      Pulses: Normal pulses.      Heart sounds: Normal heart sounds. No murmur heard.  Pulmonary:      Effort: Pulmonary effort is normal. No respiratory distress.      Breath sounds: Normal breath sounds. No stridor. No wheezing, rhonchi or rales.   Abdominal:      General: Bowel sounds are normal. There is no distension.      Palpations: Abdomen is soft.      Tenderness: There is abdominal tenderness in the left lower quadrant. There is no right CVA tenderness, left CVA tenderness, guarding or rebound.      Comments: No erythema or vesicular rash to the abdomen or back.  No overlying skin changes.   Skin:     Findings: No rash.   Neurological:      Mental Status: She is alert.         Assessment/Plan:     Diagnosis and associated orders:     1. LLQ abdominal pain        2. Diarrhea, unspecified type           Comments/MDM:     Patient presents with 24 hours of constant left lower quadrant abdominal pain that is worsening.  Rates her pain as an " 8 out of 10.  History of diverticulitis.  She does have moderate amount of tenderness to the right lower quadrant without rebound or guarding.  No tenderness anywhere else in the abdomen.  Is having loose stool without melena or bright blood.  No urinary tract symptoms.  With patient's history, diverticulitis high on the differential.  Discussed additional differential with the patient including acute pancreatitis, SBO, acute cystitis, pyelonephritis, kidney stone, or appendicitis.  Discussed outpatient stat CT which could be performed but would take several hours.  Discussed this with patient.  Given the patient's pain level she does not feel that she can wait for outpatient CT imaging.  Given this, patient was referred to [facility] for further evaluation/management.  Patient will travel via private vehicle.  Stable at time of leaving urgent care.  Vitals are stable.         Differential diagnosis, natural history, supportive care, and indications for immediate follow-up discussed.    Advised the patient to follow-up with the primary care physician for recheck, reevaluation, and consideration of further management.    Please note that this dictation was created using voice recognition software. I have made a reasonable attempt to correct obvious errors, but I expect that there are errors of grammar and possibly content that I did not discover before finalizing the note.    Electronically signed by Ozzy Jacobson PA-C.

## 2024-05-01 ENCOUNTER — HOSPITAL ENCOUNTER (OUTPATIENT)
Dept: LAB | Facility: MEDICAL CENTER | Age: 54
End: 2024-05-01
Attending: STUDENT IN AN ORGANIZED HEALTH CARE EDUCATION/TRAINING PROGRAM
Payer: MEDICARE

## 2024-05-01 ENCOUNTER — APPOINTMENT (OUTPATIENT)
Dept: MEDICAL GROUP | Facility: PHYSICIAN GROUP | Age: 54
End: 2024-05-01
Payer: MEDICARE

## 2024-05-01 VITALS
DIASTOLIC BLOOD PRESSURE: 74 MMHG | SYSTOLIC BLOOD PRESSURE: 110 MMHG | OXYGEN SATURATION: 98 % | BODY MASS INDEX: 36.53 KG/M2 | TEMPERATURE: 97.3 F | HEART RATE: 104 BPM | WEIGHT: 199.7 LBS

## 2024-05-01 DIAGNOSIS — F31.9 BIPOLAR 1 DISORDER (HCC): Chronic | ICD-10-CM

## 2024-05-01 DIAGNOSIS — R73.03 PREDIABETES: ICD-10-CM

## 2024-05-01 DIAGNOSIS — G89.29 CHRONIC BILATERAL LOW BACK PAIN WITH RIGHT-SIDED SCIATICA: ICD-10-CM

## 2024-05-01 DIAGNOSIS — E03.9 HYPOTHYROIDISM, UNSPECIFIED TYPE: ICD-10-CM

## 2024-05-01 DIAGNOSIS — G43.809 OTHER MIGRAINE WITHOUT STATUS MIGRAINOSUS, NOT INTRACTABLE: ICD-10-CM

## 2024-05-01 DIAGNOSIS — G47.09 OTHER INSOMNIA: ICD-10-CM

## 2024-05-01 DIAGNOSIS — M54.41 CHRONIC BILATERAL LOW BACK PAIN WITH RIGHT-SIDED SCIATICA: ICD-10-CM

## 2024-05-01 DIAGNOSIS — K14.0 TONGUE ULCER: ICD-10-CM

## 2024-05-01 DIAGNOSIS — E66.9 OBESITY (BMI 30-39.9): ICD-10-CM

## 2024-05-01 DIAGNOSIS — G47.33 OSA (OBSTRUCTIVE SLEEP APNEA): Chronic | ICD-10-CM

## 2024-05-01 DIAGNOSIS — E06.3 CHRONIC AUTOIMMUNE THYROIDITIS: Chronic | ICD-10-CM

## 2024-05-01 DIAGNOSIS — F41.9 ANXIETY: Chronic | ICD-10-CM

## 2024-05-01 LAB
EST. AVERAGE GLUCOSE BLD GHB EST-MCNC: 160 MG/DL
HBA1C MFR BLD: 7.2 % (ref 4–5.6)

## 2024-05-01 PROCEDURE — 3074F SYST BP LT 130 MM HG: CPT | Performed by: STUDENT IN AN ORGANIZED HEALTH CARE EDUCATION/TRAINING PROGRAM

## 2024-05-01 PROCEDURE — 3078F DIAST BP <80 MM HG: CPT | Performed by: STUDENT IN AN ORGANIZED HEALTH CARE EDUCATION/TRAINING PROGRAM

## 2024-05-01 PROCEDURE — 99215 OFFICE O/P EST HI 40 MIN: CPT | Performed by: STUDENT IN AN ORGANIZED HEALTH CARE EDUCATION/TRAINING PROGRAM

## 2024-05-01 RX ORDER — BUPROPION HYDROCHLORIDE 150 MG/1
1 TABLET, EXTENDED RELEASE ORAL EVERY MORNING
COMMUNITY
Start: 2024-02-06

## 2024-05-01 RX ORDER — IBUPROFEN 800 MG/1
800 TABLET ORAL EVERY 8 HOURS PRN
Qty: 90 TABLET | Refills: 1 | Status: SHIPPED | OUTPATIENT
Start: 2024-05-01

## 2024-05-01 RX ORDER — RAMELTEON 8 MG/1
8 TABLET ORAL NIGHTLY
Qty: 30 TABLET | Refills: 1 | Status: SHIPPED | OUTPATIENT
Start: 2024-05-01 | End: 2024-05-29

## 2024-05-01 RX ORDER — LEVOTHYROXINE SODIUM 175 UG/1
175 TABLET ORAL
COMMUNITY
End: 2024-05-08

## 2024-05-01 RX ORDER — LEVOTHYROXINE SODIUM 0.07 MG/1
75 TABLET ORAL
COMMUNITY
End: 2024-05-01

## 2024-05-01 RX ORDER — ALPRAZOLAM 1 MG/1
TABLET ORAL
COMMUNITY
Start: 2024-04-12

## 2024-05-01 ASSESSMENT — FIBROSIS 4 INDEX: FIB4 SCORE: 0.99

## 2024-05-01 NOTE — PROGRESS NOTES
Subjective:     Chief Complaint   Patient presents with    Medication Follow-up     Levothyroxine follow up, needs labs         HPI:   Rianna presents today with    Chronic autoimmune thyroiditis  Follows up with Endocrinology.  Patient had elevated TPO antibodies but labs show endogenous subclinical hyperthyroidism.  June 2023 US showed slightly enlarged heterogeneous non-hypervascular thyroid gland with no focal nodules.  February 2024 TSH 0.14.  December 2023 T3 and T4 WNL.  Currently on levothyroxine 175 mcg daily.    Bipolar 1 disorder with psychosis episodes  Chart review indicates history of psychotic break in 2010, 2014, 2018, 2022.  History of suicidal and homicidal ideations, suicidal attempt with benzo OD, visual and auditory hallucinations.  Follows up with psychiatry.  Currently on wellbutrin 150 mg daily, xanax 1 mg three times daily as needed, geodon 80 mg at bedtime.    Other insomnia  Patient complains of insomnia for the past 3 weeks.  Patient reports she is able to fall asleep but wakes up after 2-3 hours with headaches.  Patient tried melatonin and she thinks that is causing her headaches.  In the past patient is also tried Ambien, Belsomra, trazodone, doxepin.  Patient reports the medications initially work but then do not keep her asleep.  Patient is also tried Benadryl.    Obesity (BMI 30-39.9)  BMI 36.53.  Patient requested ozempic for weight loss.  Patient reports topiramate has not helped in the past and she is on wellbutrin currently which isn't helping.    Prediabetes  Feb 2023 A1c 5.9    HERLINDA (obstructive sleep apnea)  Patient has not used CPAP recently.    Ulcer of tongue  Patient reports recent dental work and she completed amoxicillin 3 weeks ago followed by 1 week of dexamethasone.  Patient reports 2 weeks ago she noticed a cut on her tongue with associated pain and bleeding.  Patient has been rinsing her mouth with chlorhexidine and reports the pain is improving.      Health  Maintenance: Completed    ROS:  Negative except as stated above.      Objective:     Exam:  /74   Pulse (!) 104   Temp 36.3 °C (97.3 °F) (Temporal)   Wt 90.6 kg (199 lb 11.2 oz)   SpO2 98%   BMI 36.53 kg/m²  Body mass index is 36.53 kg/m².    Physical Exam    Gen: Alert and oriented, no acute distress.  Mouth: Shallow ulcer on mouth appears to be healing with no signs of infection or bleeding.  No visible thrush or other ulcers in mouth.  Lungs: Normal effort, CTAB, no wheezing / rhonchi / rales.  CV: RRR, normal S1 and S2, no murmurs.      Assessment & Plan:     53 y.o. female with the following -     1. Other migraine without status migrainosus, not intractable  Chronic, controlled.  Continue ibuprofen as needed.  We discussed risks associated with chronic NSAID use.  - ibuprofen (MOTRIN) 800 MG Tab; Take 1 Tablet by mouth every 8 hours as needed for Moderate Pain or Headache. Indications: Pain  Dispense: 90 Tablet; Refill: 1    2. Chronic bilateral low back pain with right-sided sciatica  Chronic, controlled.  Continue ibuprofen as needed.  We discussed risks associated with chronic NSAID use.  - ibuprofen (MOTRIN) 800 MG Tab; Take 1 Tablet by mouth every 8 hours as needed for Moderate Pain or Headache. Indications: Pain  Dispense: 90 Tablet; Refill: 1    3. Chronic autoimmune thyroiditis  Chronic, uncontrolled.  Patient has not seen endocrinology recently.  Feb 2024 TSH 0.14.  December 2023 T3 and T4 WNL.  Continue levothyroxine 175 mcg daily and repeat labs ordered.  Unclear if patient was previously on medication or it was recently started.    4. Obesity (BMI 30-39.9)  Chronic, uncontrolled.  BMI 36.53.  Topiramate and wellbutrin have not helped with weight loss.  Prescribed ozempic 0.25 mg weekly.  Side effects of medication were discussed.  - Semaglutide,0.25 or 0.5MG/DOS, 2 MG/3ML Solution Pen-injector; Inject 0.25 mg under the skin every 7 days.  Dispense: 3 mL; Refill: 0    5. HERLINDA (obstructive  sleep apnea)  Chronic, uncontrolled.  Discussed the importance of compliance with CPAP.    6. Prediabetes  Chronic, uncontrolled.  Feb 2023 A1c 5.9 and repeat A1c ordered.  Prescribed ozempic 0.25 mg weekly.  Side effects of medication were discussed.  - Semaglutide,0.25 or 0.5MG/DOS, 2 MG/3ML Solution Pen-injector; Inject 0.25 mg under the skin every 7 days.  Dispense: 3 mL; Refill: 0  - HEMOGLOBIN A1C; Future    7. Other insomnia  Chronic, uncontrolled.  Patient has tried benadryl, ambien, belsomra, trazodone, doxepin.  Prescribed ramelteon 8 mg at bedtime.  Side effects of medication were discussed.  - ramelteon (ROZEREM) 8 MG tablet; Take 1 Tablet by mouth every evening.  Dispense: 30 Tablet; Refill: 1    8. Bipolar 1 disorder with psychosis episodes  9. Anxiety  Chronic, stable.  Follows up with psychiatry.  Continue wellbutrin 150 mg daily, xanax 1 mg three times daily as needed, geodon 80 mg at bedtime.    11. Tongue ulcer  Acute.  Continue rinsing with chlorhexidine twice daily and avoid irritants.        I spent a total of 45 minutes with record review, exam, communication with the patient, communication with other providers, and documentation of this encounter.      Return in about 1 month (around 6/1/2024) for Discuss medication, Discuss labs.    Please note that this dictation was created using voice recognition software. I have made every reasonable attempt to correct obvious errors, but I expect that there are errors of grammar and possibly content that I did not discover before finalizing the note.

## 2024-05-01 NOTE — ASSESSMENT & PLAN NOTE
Follows up with Endocrinology.  Patient had elevated TPO antibodies but labs show endogenous subclinical hyperthyroidism.  June 2023 US showed slightly enlarged heterogeneous non-hypervascular thyroid gland with no focal nodules.  February 2024 TSH 0.14.  December 2023 T3 and T4 WNL.  Currently on levothyroxine 175 mcg daily.

## 2024-05-01 NOTE — ASSESSMENT & PLAN NOTE
Patient complains of insomnia for the past 3 weeks.  Patient reports she is able to fall asleep but wakes up after 2-3 hours with headaches.  Patient tried melatonin and she thinks that is causing her headaches.  In the past patient is also tried Ambien, Belsomra, trazodone, doxepin.  Patient reports the medications initially work but then do not keep her asleep.  Patient is also tried Benadryl.

## 2024-05-01 NOTE — ASSESSMENT & PLAN NOTE
Chart review indicates history of psychotic break in 2010, 2014, 2018, 2022.  History of suicidal and homicidal ideations, suicidal attempt with benzo OD, visual and auditory hallucinations.  Follows up with psychiatry.  Currently on wellbutrin 150 mg daily, xanax 1 mg three times daily as needed, geodon 80 mg at bedtime.

## 2024-05-01 NOTE — LETTER
[facility]  Kaleigh Mares M.D.  [address]  [place] [address]  Fax: [phone]   Authorization for Release/Disclosure of   Protected Health Information   Name: RIANNA SOLIS : 1970 SSN: [id number]   Address: [facility] [address] [address] Phone:    There are no phone numbers on file.   I authorize the entity listed below to release/disclose the PHI below to:   [facility]/Kaleigh Mares M.D. and Kaleigh Mares M.D.   Provider or Entity Name:     Address   City, State, Zip   Phone:      Fax:     Reason for request: continuity of care   Information to be released:    [  ] LAST COLONOSCOPY,  including any PATH REPORT and follow-up  [  ] LAST FIT/COLOGUARD RESULT [  ] LAST DEXA  [  ] LAST MAMMOGRAM  [  ] LAST PAP  [  ] LAST LABS [  ] RETINA EXAM REPORT  [  ] IMMUNIZATION RECORDS  [  ] Release all info      [  ] Check here and initial the line next to each item to release ALL health information INCLUDING  _____ Care and treatment for drug and / or alcohol abuse  _____ HIV testing, infection status, or AIDS  _____ Genetic Testing    DATES OF SERVICE OR TIME PERIOD TO BE DISCLOSED: _____________  I understand and acknowledge that:  * This Authorization may be revoked at any time by you in writing, except if your health information has already been used or disclosed.  * Your health information that will be used or disclosed as a result of you signing this authorization could be re-disclosed by the recipient. If this occurs, your re-disclosed health information may no longer be protected by State or Federal laws.  * You may refuse to sign this Authorization. Your refusal will not affect your ability to obtain treatment.  * This Authorization becomes effective upon signing and will  on (date) __________.      If no date is indicated, this Authorization will  one (1) year from the signature date.    Name: Rianna Solis  Signature: Date:   2024     PLEASE FAX  REQUESTED RECORDS BACK TO: [phone]

## 2024-05-02 LAB
T4 FREE SERPL-MCNC: 1.8 NG/DL (ref 0.93–1.7)
TSH SERPL DL<=0.005 MIU/L-ACNC: 0.01 UIU/ML (ref 0.38–5.33)

## 2024-05-02 NOTE — ASSESSMENT & PLAN NOTE
BMI 36.53.  Patient requested ozempic for weight loss.  Patient reports topiramate has not helped in the past and she is on wellbutrin currently which isn't helping.

## 2024-05-07 ENCOUNTER — TELEPHONE (OUTPATIENT)
Dept: MEDICAL GROUP | Facility: PHYSICIAN GROUP | Age: 54
End: 2024-05-07
Payer: MEDICARE

## 2024-05-07 NOTE — TELEPHONE ENCOUNTER
Patient called and stated she was told to follow up on her thyroid labs at last visit on 5/1/24. Patient states was told medication may need to be adjusted after completing labs. Patient would like to know if medication is being switched or adjusted as she is completely out of medication.

## 2024-05-08 DIAGNOSIS — E03.9 HYPOTHYROIDISM, UNSPECIFIED TYPE: ICD-10-CM

## 2024-05-08 DIAGNOSIS — E06.3 CHRONIC AUTOIMMUNE THYROIDITIS: Chronic | ICD-10-CM

## 2024-05-08 RX ORDER — LEVOTHYROXINE SODIUM 0.15 MG/1
150 TABLET ORAL
Qty: 30 TABLET | Refills: 1 | Status: SHIPPED | OUTPATIENT
Start: 2024-05-08

## 2024-05-28 DIAGNOSIS — G47.09 OTHER INSOMNIA: ICD-10-CM

## 2024-05-29 RX ORDER — RAMELTEON 8 MG/1
8 TABLET ORAL EVERY EVENING
Qty: 90 TABLET | Refills: 0 | Status: SHIPPED | OUTPATIENT
Start: 2024-05-29

## 2024-05-29 NOTE — TELEPHONE ENCOUNTER
Received request via: Patient    Was the patient seen in the last year in this department? Yes    Does the patient have an active prescription (recently filled or refills available) for medication(s) requested? No    Pharmacy Name: [facility]    Does the patient have [facility] and need 100 day supply (blood pressure, diabetes and cholesterol meds only)? Patient does not have SCP

## 2024-05-31 DIAGNOSIS — E06.3 CHRONIC AUTOIMMUNE THYROIDITIS: Chronic | ICD-10-CM

## 2024-06-03 NOTE — TELEPHONE ENCOUNTER
Received request via: Patient    Was the patient seen in the last year in this department? Yes    Does the patient have an active prescription (recently filled or refills available) for medication(s) requested? No    [facility]     Does the patient have group home [facility] and need 100 day supply (blood pressure, diabetes and cholesterol meds only)? Patient does not have SCP

## 2024-06-06 RX ORDER — LEVOTHYROXINE SODIUM 0.15 MG/1
150 TABLET ORAL
Qty: 90 TABLET | Refills: 0 | Status: SHIPPED | OUTPATIENT
Start: 2024-06-06

## 2024-06-18 ENCOUNTER — HOSPITAL ENCOUNTER (OUTPATIENT)
Dept: RADIOLOGY | Facility: MEDICAL CENTER | Age: 54
End: 2024-06-18
Attending: STUDENT IN AN ORGANIZED HEALTH CARE EDUCATION/TRAINING PROGRAM
Payer: MEDICARE

## 2024-06-18 DIAGNOSIS — Z12.31 SCREENING MAMMOGRAM, ENCOUNTER FOR: ICD-10-CM

## 2024-06-18 PROCEDURE — 77063 BREAST TOMOSYNTHESIS BI: CPT

## 2024-07-07 DIAGNOSIS — M54.41 CHRONIC BILATERAL LOW BACK PAIN WITH RIGHT-SIDED SCIATICA: ICD-10-CM

## 2024-07-07 DIAGNOSIS — G43.809 OTHER MIGRAINE WITHOUT STATUS MIGRAINOSUS, NOT INTRACTABLE: ICD-10-CM

## 2024-07-07 DIAGNOSIS — G89.29 CHRONIC BILATERAL LOW BACK PAIN WITH RIGHT-SIDED SCIATICA: ICD-10-CM

## 2024-07-09 RX ORDER — IBUPROFEN 800 MG/1
800 TABLET ORAL EVERY 8 HOURS PRN
Qty: 90 TABLET | Refills: 0 | Status: SHIPPED | OUTPATIENT
Start: 2024-07-09

## 2024-07-24 ENCOUNTER — OFFICE VISIT (OUTPATIENT)
Dept: MEDICAL GROUP | Facility: PHYSICIAN GROUP | Age: 54
End: 2024-07-24
Payer: MEDICARE

## 2024-07-24 VITALS
HEIGHT: 62 IN | DIASTOLIC BLOOD PRESSURE: 70 MMHG | HEART RATE: 105 BPM | WEIGHT: 202.4 LBS | TEMPERATURE: 97.4 F | BODY MASS INDEX: 37.25 KG/M2 | OXYGEN SATURATION: 98 % | SYSTOLIC BLOOD PRESSURE: 132 MMHG

## 2024-07-24 DIAGNOSIS — G47.09 OTHER INSOMNIA: ICD-10-CM

## 2024-07-24 DIAGNOSIS — E06.3 CHRONIC AUTOIMMUNE THYROIDITIS: Chronic | ICD-10-CM

## 2024-07-24 DIAGNOSIS — E11.9 TYPE 2 DIABETES MELLITUS WITHOUT COMPLICATION, WITHOUT LONG-TERM CURRENT USE OF INSULIN (HCC): Chronic | ICD-10-CM

## 2024-07-24 DIAGNOSIS — E66.9 OBESITY (BMI 30-39.9): Chronic | ICD-10-CM

## 2024-07-24 PROBLEM — E03.9 ACQUIRED HYPOTHYROIDISM: Status: ACTIVE | Noted: 2022-11-09

## 2024-07-24 PROBLEM — E03.8 OTHER SPECIFIED HYPOTHYROIDISM: Status: ACTIVE | Noted: 2022-11-09

## 2024-07-24 PROBLEM — E03.9 ACQUIRED HYPOTHYROIDISM: Chronic | Status: ACTIVE | Noted: 2022-11-09

## 2024-07-24 PROCEDURE — 99214 OFFICE O/P EST MOD 30 MIN: CPT | Performed by: STUDENT IN AN ORGANIZED HEALTH CARE EDUCATION/TRAINING PROGRAM

## 2024-07-24 PROCEDURE — 3078F DIAST BP <80 MM HG: CPT | Performed by: STUDENT IN AN ORGANIZED HEALTH CARE EDUCATION/TRAINING PROGRAM

## 2024-07-24 PROCEDURE — 3075F SYST BP GE 130 - 139MM HG: CPT | Performed by: STUDENT IN AN ORGANIZED HEALTH CARE EDUCATION/TRAINING PROGRAM

## 2024-07-24 RX ORDER — ROSUVASTATIN CALCIUM 5 MG/1
5 TABLET, COATED ORAL EVERY EVENING
Qty: 90 TABLET | Refills: 3 | Status: SHIPPED | OUTPATIENT
Start: 2024-07-24

## 2024-07-24 ASSESSMENT — FIBROSIS 4 INDEX: FIB4 SCORE: 1.01

## 2024-08-09 DIAGNOSIS — G89.29 CHRONIC BILATERAL LOW BACK PAIN WITH RIGHT-SIDED SCIATICA: ICD-10-CM

## 2024-08-09 DIAGNOSIS — M54.41 CHRONIC BILATERAL LOW BACK PAIN WITH RIGHT-SIDED SCIATICA: ICD-10-CM

## 2024-08-09 DIAGNOSIS — G43.809 OTHER MIGRAINE WITHOUT STATUS MIGRAINOSUS, NOT INTRACTABLE: ICD-10-CM

## 2024-08-09 RX ORDER — IBUPROFEN 800 MG/1
TABLET, FILM COATED ORAL
Qty: 90 TABLET | Refills: 0 | Status: SHIPPED | OUTPATIENT
Start: 2024-08-09

## 2024-08-09 NOTE — TELEPHONE ENCOUNTER
Received request via: Patient    Was the patient seen in the last year in this department? Yes    Does the patient have an active prescription (recently filled or refills available) for medication(s) requested? No    Ph[facility]    Does the patient have [facility] and need 100-day supply? (This applies to ALL medications) Patient does not have SCP

## 2024-08-22 ENCOUNTER — HOSPITAL ENCOUNTER (OUTPATIENT)
Dept: LAB | Facility: MEDICAL CENTER | Age: 54
End: 2024-08-22
Attending: STUDENT IN AN ORGANIZED HEALTH CARE EDUCATION/TRAINING PROGRAM
Payer: MEDICARE

## 2024-08-22 DIAGNOSIS — E11.9 TYPE 2 DIABETES MELLITUS WITHOUT COMPLICATION, WITHOUT LONG-TERM CURRENT USE OF INSULIN (HCC): Chronic | ICD-10-CM

## 2024-08-22 LAB
ALBUMIN SERPL BCP-MCNC: 4.5 G/DL (ref 3.2–4.9)
ALBUMIN/GLOB SERPL: 1.7 G/DL
ALP SERPL-CCNC: 77 U/L (ref 30–99)
ALT SERPL-CCNC: 20 U/L (ref 2–50)
ANION GAP SERPL CALC-SCNC: 11 MMOL/L (ref 7–16)
AST SERPL-CCNC: 16 U/L (ref 12–45)
BILIRUB SERPL-MCNC: 0.2 MG/DL (ref 0.1–1.5)
BUN SERPL-MCNC: 11 MG/DL (ref 8–22)
CALCIUM ALBUM COR SERPL-MCNC: 9.4 MG/DL (ref 8.5–10.5)
CALCIUM SERPL-MCNC: 9.8 MG/DL (ref 8.5–10.5)
CHLORIDE SERPL-SCNC: 105 MMOL/L (ref 96–112)
CHOLEST SERPL-MCNC: 153 MG/DL (ref 100–199)
CO2 SERPL-SCNC: 23 MMOL/L (ref 20–33)
CREAT SERPL-MCNC: 0.49 MG/DL (ref 0.5–1.4)
CREAT UR-MCNC: 22.33 MG/DL
ERYTHROCYTE [DISTWIDTH] IN BLOOD BY AUTOMATED COUNT: 39.6 FL (ref 35.9–50)
EST. AVERAGE GLUCOSE BLD GHB EST-MCNC: 128 MG/DL
FASTING STATUS PATIENT QL REPORTED: NORMAL
GFR SERPLBLD CREATININE-BSD FMLA CKD-EPI: 112 ML/MIN/1.73 M 2
GLOBULIN SER CALC-MCNC: 2.6 G/DL (ref 1.9–3.5)
GLUCOSE SERPL-MCNC: 111 MG/DL (ref 65–99)
HBA1C MFR BLD: 6.1 % (ref 4–5.6)
HCT VFR BLD AUTO: 43.5 % (ref 37–47)
HDLC SERPL-MCNC: 39 MG/DL
HGB BLD-MCNC: 15.1 G/DL (ref 12–16)
LDLC SERPL CALC-MCNC: 83 MG/DL
MCH RBC QN AUTO: 31.4 PG (ref 27–33)
MCHC RBC AUTO-ENTMCNC: 34.7 G/DL (ref 32.2–35.5)
MCV RBC AUTO: 90.4 FL (ref 81.4–97.8)
MICROALBUMIN UR-MCNC: <1.2 MG/DL
MICROALBUMIN/CREAT UR: NORMAL MG/G (ref 0–30)
PLATELET # BLD AUTO: 358 K/UL (ref 164–446)
PMV BLD AUTO: 9.6 FL (ref 9–12.9)
POTASSIUM SERPL-SCNC: 4.5 MMOL/L (ref 3.6–5.5)
PROT SERPL-MCNC: 7.1 G/DL (ref 6–8.2)
RBC # BLD AUTO: 4.81 M/UL (ref 4.2–5.4)
SODIUM SERPL-SCNC: 139 MMOL/L (ref 135–145)
T4 FREE SERPL-MCNC: 2.42 NG/DL (ref 0.93–1.7)
TRIGL SERPL-MCNC: 155 MG/DL (ref 0–149)
TSH SERPL-ACNC: 0.01 UIU/ML (ref 0.35–5.5)
WBC # BLD AUTO: 7.8 K/UL (ref 4.8–10.8)

## 2024-08-22 PROCEDURE — 84443 ASSAY THYROID STIM HORMONE: CPT

## 2024-08-22 PROCEDURE — 80061 LIPID PANEL: CPT

## 2024-08-22 PROCEDURE — 85027 COMPLETE CBC AUTOMATED: CPT

## 2024-08-22 PROCEDURE — 83036 HEMOGLOBIN GLYCOSYLATED A1C: CPT | Mod: GA

## 2024-08-22 PROCEDURE — 36415 COLL VENOUS BLD VENIPUNCTURE: CPT

## 2024-08-22 PROCEDURE — 82570 ASSAY OF URINE CREATININE: CPT

## 2024-08-22 PROCEDURE — 82043 UR ALBUMIN QUANTITATIVE: CPT

## 2024-08-22 PROCEDURE — 84439 ASSAY OF FREE THYROXINE: CPT

## 2024-08-22 PROCEDURE — 80053 COMPREHEN METABOLIC PANEL: CPT

## 2024-08-29 DIAGNOSIS — E06.3 CHRONIC AUTOIMMUNE THYROIDITIS: Chronic | ICD-10-CM

## 2024-08-29 RX ORDER — LEVOTHYROXINE SODIUM 125 UG/1
150 TABLET ORAL
Qty: 90 TABLET | Refills: 0 | Status: SHIPPED | OUTPATIENT
Start: 2024-08-29

## 2024-08-30 ENCOUNTER — APPOINTMENT (OUTPATIENT)
Dept: MEDICAL GROUP | Facility: PHYSICIAN GROUP | Age: 54
End: 2024-08-30
Payer: MEDICARE

## 2024-09-13 ENCOUNTER — APPOINTMENT (OUTPATIENT)
Dept: MEDICAL GROUP | Facility: PHYSICIAN GROUP | Age: 54
End: 2024-09-13
Payer: MEDICARE

## 2024-09-13 VITALS
TEMPERATURE: 97.5 F | SYSTOLIC BLOOD PRESSURE: 118 MMHG | WEIGHT: 196.2 LBS | BODY MASS INDEX: 36.1 KG/M2 | DIASTOLIC BLOOD PRESSURE: 88 MMHG | HEIGHT: 62 IN | HEART RATE: 104 BPM | OXYGEN SATURATION: 96 %

## 2024-09-13 DIAGNOSIS — E11.69 DYSLIPIDEMIA ASSOCIATED WITH TYPE 2 DIABETES MELLITUS (HCC): ICD-10-CM

## 2024-09-13 DIAGNOSIS — E11.9 TYPE 2 DIABETES MELLITUS WITHOUT COMPLICATION, WITHOUT LONG-TERM CURRENT USE OF INSULIN (HCC): Chronic | ICD-10-CM

## 2024-09-13 DIAGNOSIS — E66.9 OBESITY (BMI 30-39.9): Chronic | ICD-10-CM

## 2024-09-13 DIAGNOSIS — E78.5 DYSLIPIDEMIA ASSOCIATED WITH TYPE 2 DIABETES MELLITUS (HCC): ICD-10-CM

## 2024-09-13 DIAGNOSIS — E06.3 CHRONIC AUTOIMMUNE THYROIDITIS: Chronic | ICD-10-CM

## 2024-09-13 PROCEDURE — 3079F DIAST BP 80-89 MM HG: CPT | Performed by: STUDENT IN AN ORGANIZED HEALTH CARE EDUCATION/TRAINING PROGRAM

## 2024-09-13 PROCEDURE — 99214 OFFICE O/P EST MOD 30 MIN: CPT | Performed by: STUDENT IN AN ORGANIZED HEALTH CARE EDUCATION/TRAINING PROGRAM

## 2024-09-13 PROCEDURE — 3074F SYST BP LT 130 MM HG: CPT | Performed by: STUDENT IN AN ORGANIZED HEALTH CARE EDUCATION/TRAINING PROGRAM

## 2024-09-13 RX ORDER — METFORMIN HCL 500 MG
500 TABLET, EXTENDED RELEASE 24 HR ORAL DAILY
Qty: 90 TABLET | Refills: 3 | Status: SHIPPED | OUTPATIENT
Start: 2024-09-13

## 2024-09-13 ASSESSMENT — FIBROSIS 4 INDEX: FIB4 SCORE: 0.55

## 2024-09-13 NOTE — PROGRESS NOTES
"Subjective:     Chief Complaint   Patient presents with    Diabetes Follow-up         History of Present Illness  The patient presents to discuss labs.    August 2024 TSH 0.008 and T4 2.42.  Levothyroxine was decreased to 125 mcg daily and she has been on the new dose for about a week, taken on an empty stomach.    She is considering starting Ozempic for weight loss. BMI 35.89 today.  She maintains a healthy diet, does not snack, and continues to walk 2 miles daily. She began gym workouts last week but injured her hip. Additionally, she practices [employer] and yoga.  She has tried Wellbutrin and topiramate in the past.    August 2024 A1c 6.1. She is currently on metformin 500 mg twice daily.        Health Maintenance: Completed    ROS:  Negative except as stated above.      Objective:     Exam:  /88 (BP Location: Left arm, Patient Position: Sitting, BP Cuff Size: Adult)   Pulse (!) 104   Temp 36.4 °C (97.5 °F) (Temporal)   Ht 1.575 m (5' 2\")   Wt 89 kg (196 lb 3.2 oz)   SpO2 96%   BMI 35.89 kg/m²  Body mass index is 35.89 kg/m².    Physical Exam    Gen: Alert and oriented, no acute distress.  Lungs: Normal effort, CTAB, no wheezing / rhonchi / rales.  CV: RRR, normal S1 and S2, no murmurs.      Labs:   Hospital Outpatient Visit on 08/22/2024   Component Date Value Ref Range Status    Creatinine, Urine 08/22/2024 22.33  mg/dL Final    Microalbumin, Urine Random 08/22/2024 <1.2  mg/dL Final    Micro Alb Creat Ratio 08/22/2024 see below  0 - 30 mg/g Final    Comment: Unable to calculate the microalbumin/creatinine ratio due to  the microalbumin result or the urine creatinine result being  outside the measurement range of the analyzer.      Cholesterol,Tot 08/22/2024 153  100 - 199 mg/dL Final    Triglycerides 08/22/2024 155 (H)  0 - 149 mg/dL Final    HDL 08/22/2024 39 (A)  >=40 mg/dL Final    LDL 08/22/2024 83  <100 mg/dL Final    Glycohemoglobin 08/22/2024 6.1 (H)  4.0 - 5.6 % Final    Comment: Increased " risk for diabetes:  5.7 -6.4%  Diabetes:  >6.4%  Glycemic control for adults with diabetes:  <7.0%    The above interpretations are per ADA guidelines.  Diagnosis  of diabetes mellitus on the basis of elevated Hemoglobin A1c  should be confirmed by repeating the Hb A1c test.      Est Avg Glucose 08/22/2024 128  mg/dL Final    Comment: The eAG calculation is based on the A1c-Derived Daily Glucose  (ADAG) study.  See the ADA's website for additional information.      Sodium 08/22/2024 139  135 - 145 mmol/L Final    Potassium 08/22/2024 4.5  3.6 - 5.5 mmol/L Final    Chloride 08/22/2024 105  96 - 112 mmol/L Final    Co2 08/22/2024 23  20 - 33 mmol/L Final    Anion Gap 08/22/2024 11.0  7.0 - 16.0 Final    Glucose 08/22/2024 111 (H)  65 - 99 mg/dL Final    Bun 08/22/2024 11  8 - 22 mg/dL Final    Creatinine 08/22/2024 0.49 (L)  0.50 - 1.40 mg/dL Final    Calcium 08/22/2024 9.8  8.5 - 10.5 mg/dL Final    Correct Calcium 08/22/2024 9.4  8.5 - 10.5 mg/dL Final    AST(SGOT) 08/22/2024 16  12 - 45 U/L Final    ALT(SGPT) 08/22/2024 20  2 - 50 U/L Final    Alkaline Phosphatase 08/22/2024 77  30 - 99 U/L Final    Total Bilirubin 08/22/2024 0.2  0.1 - 1.5 mg/dL Final    Albumin 08/22/2024 4.5  3.2 - 4.9 g/dL Final    Total Protein 08/22/2024 7.1  6.0 - 8.2 g/dL Final    Globulin 08/22/2024 2.6  1.9 - 3.5 g/dL Final    A-G Ratio 08/22/2024 1.7  g/dL Final    WBC 08/22/2024 7.8  4.8 - 10.8 K/uL Final    RBC 08/22/2024 4.81  4.20 - 5.40 M/uL Final    Hemoglobin 08/22/2024 15.1  12.0 - 16.0 g/dL Final    Hematocrit 08/22/2024 43.5  37.0 - 47.0 % Final    MCV 08/22/2024 90.4  81.4 - 97.8 fL Final    MCH 08/22/2024 31.4  27.0 - 33.0 pg Final    MCHC 08/22/2024 34.7  32.2 - 35.5 g/dL Final    RDW 08/22/2024 39.6  35.9 - 50.0 fL Final    Platelet Count 08/22/2024 358  164 - 446 K/uL Final    MPV 08/22/2024 9.6  9.0 - 12.9 fL Final    Fasting Status 08/22/2024 Fasting   Final    TSH 08/22/2024 0.008 (L)  0.350 - 5.500 uIU/mL Final    Free  T-4 08/22/2024 2.42 (H)  0.93 - 1.70 ng/dL Final    GFR (CKD-EPI) 08/22/2024 112  >60 mL/min/1.73 m 2 Final    Comment: Estimated Glomerular Filtration Rate is calculated using  race neutral CKD-EPI 2021 equation per NKF-ASN recommendations.           Assessment & Plan:     54 y.o. female with the following -     1. Type 2 diabetes mellitus without complication, without long-term current use of insulin (HCC)  Chronic, controlled.  A1c 6.1 and microalbumin < 1.2.  Metformin 500 mg twice daily switched to  mg daily.  Patient also requested to start Ozempic to help with the weight loss and side effects were discussed.  Monofilament foot exam and retinal screening will be done at the next visit.  - Semaglutide,0.25 or 0.5MG/DOS, 2 MG/3ML Solution Pen-injector; Inject 0.25 mg under the skin every 7 days. After 4 weeks inject 0.5 mg under the skin every 7 days.  After 4 weeks of 0.5 mg inject 1 mg weekly.  Dispense: 9 mL; Refill: 0  - metFORMIN ER (GLUCOPHAGE XR) 500 MG TABLET SR 24 HR; Take 1 Tablet by mouth every day.  Dispense: 90 Tablet; Refill: 3    2. Dyslipidemia associated with type 2 diabetes mellitus (HCC)  Chronic, controlled.  Lipid panel WNL.  Continue rosuvastatin 5 mg daily.    3. Chronic autoimmune thyroiditis  Chronic, uncontrolled.  TSH 0.008 and T4 2.42.  Levothyroxine was already decreased to 125 mcg daily.  Repeat labs in 1 month.  - TSH+FREE T4    4. Obesity (BMI 30-39.9)  Chronic, uncontrolled.  BMI 35.89 today.  Patient has tried Wellbutrin and topiramate in the past.  Despite healthy diet and regular exercise she has been unable to lose weight.  Prescribed Ozempic 0.25 mg weekly and she will gradually increase the dose to 1 mg weekly.  Side effects of medication were discussed.  - Semaglutide,0.25 or 0.5MG/DOS, 2 MG/3ML Solution Pen-injector; Inject 0.25 mg under the skin every 7 days. After 4 weeks inject 0.5 mg under the skin every 7 days.  After 4 weeks of 0.5 mg inject 1 mg weekly.   Dispense: 9 mL; Refill: 0        Return in about 3 months (around 12/13/2024).    Verbal consent was acquired by the patient to use [employer] ambient listening note generation during this visit: Yes.    Please note that this dictation was created using voice recognition software. I have made every reasonable attempt to correct obvious errors, but I expect that there are errors of grammar and possibly content that I did not discover before finalizing the note.

## 2024-10-01 DIAGNOSIS — E06.3 CHRONIC AUTOIMMUNE THYROIDITIS: Chronic | ICD-10-CM

## 2024-10-01 RX ORDER — LEVOTHYROXINE SODIUM 150 UG/1
150 TABLET ORAL
Qty: 90 TABLET | Refills: 0 | OUTPATIENT
Start: 2024-10-01

## 2024-10-07 RX ORDER — LEVOTHYROXINE SODIUM 125 UG/1
150 TABLET ORAL
Qty: 90 TABLET | Refills: 0 | Status: SHIPPED | OUTPATIENT
Start: 2024-10-07

## 2024-10-28 ENCOUNTER — APPOINTMENT (OUTPATIENT)
Dept: RADIOLOGY | Facility: MEDICAL CENTER | Age: 54
End: 2024-10-28
Attending: EMERGENCY MEDICINE
Payer: MEDICARE

## 2024-10-28 ENCOUNTER — HOSPITAL ENCOUNTER (EMERGENCY)
Facility: MEDICAL CENTER | Age: 54
End: 2024-10-28
Attending: EMERGENCY MEDICINE
Payer: MEDICARE

## 2024-10-28 VITALS
DIASTOLIC BLOOD PRESSURE: 84 MMHG | OXYGEN SATURATION: 89 % | SYSTOLIC BLOOD PRESSURE: 138 MMHG | WEIGHT: 201.94 LBS | HEART RATE: 96 BPM | RESPIRATION RATE: 20 BRPM | TEMPERATURE: 97.6 F | BODY MASS INDEX: 36.94 KG/M2

## 2024-10-28 DIAGNOSIS — R10.32 LEFT LOWER QUADRANT ABDOMINAL PAIN: ICD-10-CM

## 2024-10-28 DIAGNOSIS — K59.00 CONSTIPATION, UNSPECIFIED CONSTIPATION TYPE: ICD-10-CM

## 2024-10-28 LAB
ALBUMIN SERPL BCP-MCNC: 4.6 G/DL (ref 3.2–4.9)
ALBUMIN/GLOB SERPL: 1.7 G/DL
ALP SERPL-CCNC: 80 U/L (ref 30–99)
ALT SERPL-CCNC: 19 U/L (ref 2–50)
ANION GAP SERPL CALC-SCNC: 11 MMOL/L (ref 7–16)
APPEARANCE UR: CLEAR
AST SERPL-CCNC: 20 U/L (ref 12–45)
BASOPHILS # BLD AUTO: 0.3 % (ref 0–1.8)
BASOPHILS # BLD: 0.02 K/UL (ref 0–0.12)
BILIRUB SERPL-MCNC: 0.3 MG/DL (ref 0.1–1.5)
BILIRUB UR QL STRIP.AUTO: NEGATIVE
BUN SERPL-MCNC: 13 MG/DL (ref 8–22)
CALCIUM ALBUM COR SERPL-MCNC: 10.7 MG/DL (ref 8.5–10.5)
CALCIUM SERPL-MCNC: 11.2 MG/DL (ref 8.5–10.5)
CHLORIDE SERPL-SCNC: 101 MMOL/L (ref 96–112)
CO2 SERPL-SCNC: 28 MMOL/L (ref 20–33)
COLOR UR: YELLOW
CREAT SERPL-MCNC: 0.78 MG/DL (ref 0.5–1.4)
EOSINOPHIL # BLD AUTO: 0.03 K/UL (ref 0–0.51)
EOSINOPHIL NFR BLD: 0.5 % (ref 0–6.9)
ERYTHROCYTE [DISTWIDTH] IN BLOOD BY AUTOMATED COUNT: 38.6 FL (ref 35.9–50)
GFR SERPLBLD CREATININE-BSD FMLA CKD-EPI: 90 ML/MIN/1.73 M 2
GLOBULIN SER CALC-MCNC: 2.7 G/DL (ref 1.9–3.5)
GLUCOSE SERPL-MCNC: 99 MG/DL (ref 65–99)
GLUCOSE UR STRIP.AUTO-MCNC: NEGATIVE MG/DL
HCG SERPL QL: NEGATIVE
HCT VFR BLD AUTO: 41.8 % (ref 37–47)
HGB BLD-MCNC: 14.7 G/DL (ref 12–16)
IMM GRANULOCYTES # BLD AUTO: 0.03 K/UL (ref 0–0.11)
IMM GRANULOCYTES NFR BLD AUTO: 0.5 % (ref 0–0.9)
KETONES UR STRIP.AUTO-MCNC: NEGATIVE MG/DL
LEUKOCYTE ESTERASE UR QL STRIP.AUTO: NEGATIVE
LIPASE SERPL-CCNC: 19 U/L (ref 11–82)
LYMPHOCYTES # BLD AUTO: 2.27 K/UL (ref 1–4.8)
LYMPHOCYTES NFR BLD: 36.1 % (ref 22–41)
MCH RBC QN AUTO: 31.5 PG (ref 27–33)
MCHC RBC AUTO-ENTMCNC: 35.2 G/DL (ref 32.2–35.5)
MCV RBC AUTO: 89.5 FL (ref 81.4–97.8)
MICRO URNS: NORMAL
MONOCYTES # BLD AUTO: 0.55 K/UL (ref 0–0.85)
MONOCYTES NFR BLD AUTO: 8.7 % (ref 0–13.4)
NEUTROPHILS # BLD AUTO: 3.39 K/UL (ref 1.82–7.42)
NEUTROPHILS NFR BLD: 53.9 % (ref 44–72)
NITRITE UR QL STRIP.AUTO: NEGATIVE
NRBC # BLD AUTO: 0 K/UL
NRBC BLD-RTO: 0 /100 WBC (ref 0–0.2)
PH UR STRIP.AUTO: 7 [PH] (ref 5–8)
PLATELET # BLD AUTO: 258 K/UL (ref 164–446)
PMV BLD AUTO: 9.6 FL (ref 9–12.9)
POTASSIUM SERPL-SCNC: 4.1 MMOL/L (ref 3.6–5.5)
PROT SERPL-MCNC: 7.3 G/DL (ref 6–8.2)
PROT UR QL STRIP: NEGATIVE MG/DL
RBC # BLD AUTO: 4.67 M/UL (ref 4.2–5.4)
RBC UR QL AUTO: NEGATIVE
SODIUM SERPL-SCNC: 140 MMOL/L (ref 135–145)
SP GR UR STRIP.AUTO: 1.01
UROBILINOGEN UR STRIP.AUTO-MCNC: 1 EU/DL
WBC # BLD AUTO: 6.3 K/UL (ref 4.8–10.8)

## 2024-10-28 PROCEDURE — 81003 URINALYSIS AUTO W/O SCOPE: CPT

## 2024-10-28 PROCEDURE — 99285 EMERGENCY DEPT VISIT HI MDM: CPT

## 2024-10-28 PROCEDURE — 700117 HCHG RX CONTRAST REV CODE 255: Performed by: EMERGENCY MEDICINE

## 2024-10-28 PROCEDURE — 36415 COLL VENOUS BLD VENIPUNCTURE: CPT

## 2024-10-28 PROCEDURE — 700101 HCHG RX REV CODE 250: Performed by: EMERGENCY MEDICINE

## 2024-10-28 PROCEDURE — 74177 CT ABD & PELVIS W/CONTRAST: CPT

## 2024-10-28 PROCEDURE — 96376 TX/PRO/DX INJ SAME DRUG ADON: CPT

## 2024-10-28 PROCEDURE — 96375 TX/PRO/DX INJ NEW DRUG ADDON: CPT

## 2024-10-28 PROCEDURE — 700111 HCHG RX REV CODE 636 W/ 250 OVERRIDE (IP): Performed by: EMERGENCY MEDICINE

## 2024-10-28 PROCEDURE — 84703 CHORIONIC GONADOTROPIN ASSAY: CPT

## 2024-10-28 PROCEDURE — 80053 COMPREHEN METABOLIC PANEL: CPT

## 2024-10-28 PROCEDURE — 83690 ASSAY OF LIPASE: CPT

## 2024-10-28 PROCEDURE — 96374 THER/PROPH/DIAG INJ IV PUSH: CPT | Mod: XU

## 2024-10-28 PROCEDURE — 85025 COMPLETE CBC W/AUTO DIFF WBC: CPT

## 2024-10-28 RX ORDER — DOCUSATE SODIUM 100 MG/1
100 CAPSULE, LIQUID FILLED ORAL 2 TIMES DAILY
Qty: 60 CAPSULE | Refills: 0 | Status: SHIPPED | OUTPATIENT
Start: 2024-10-28

## 2024-10-28 RX ORDER — ONDANSETRON 2 MG/ML
4 INJECTION INTRAMUSCULAR; INTRAVENOUS ONCE
Status: COMPLETED | OUTPATIENT
Start: 2024-10-28 | End: 2024-10-28

## 2024-10-28 RX ORDER — POLYETHYLENE GLYCOL 3350 17 G/17G
17 POWDER, FOR SOLUTION ORAL DAILY
Qty: 255 G | Refills: 0 | Status: SHIPPED | OUTPATIENT
Start: 2024-10-28

## 2024-10-28 RX ORDER — SODIUM PHOSPHATE,MONO-DIBASIC 19G-7G/118
1 ENEMA (ML) RECTAL ONCE
Status: COMPLETED | OUTPATIENT
Start: 2024-10-28 | End: 2024-10-28

## 2024-10-28 RX ORDER — MORPHINE SULFATE 4 MG/ML
4 INJECTION INTRAVENOUS ONCE
Status: COMPLETED | OUTPATIENT
Start: 2024-10-28 | End: 2024-10-28

## 2024-10-28 RX ADMIN — IOHEXOL 100 ML: 350 INJECTION, SOLUTION INTRAVENOUS at 19:25

## 2024-10-28 RX ADMIN — ONDANSETRON 4 MG: 2 INJECTION INTRAMUSCULAR; INTRAVENOUS at 17:30

## 2024-10-28 RX ADMIN — SODIUM PHOSPHATE 1 ENEMA: 7; 19 ENEMA RECTAL at 21:18

## 2024-10-28 RX ADMIN — FENTANYL CITRATE 50 MCG: 50 INJECTION, SOLUTION INTRAMUSCULAR; INTRAVENOUS at 20:00

## 2024-10-28 RX ADMIN — MORPHINE SULFATE 4 MG: 4 INJECTION INTRAVENOUS at 18:54

## 2024-10-28 RX ADMIN — MORPHINE SULFATE 4 MG: 4 INJECTION, SOLUTION INTRAMUSCULAR; INTRAVENOUS at 17:30

## 2024-10-28 ASSESSMENT — FIBROSIS 4 INDEX: FIB4 SCORE: 0.55

## 2024-10-30 ENCOUNTER — APPOINTMENT (OUTPATIENT)
Dept: RADIOLOGY | Facility: MEDICAL CENTER | Age: 54
End: 2024-10-30
Attending: EMERGENCY MEDICINE
Payer: MEDICARE

## 2024-10-30 ENCOUNTER — HOSPITAL ENCOUNTER (EMERGENCY)
Facility: MEDICAL CENTER | Age: 54
End: 2024-10-30
Attending: EMERGENCY MEDICINE
Payer: MEDICARE

## 2024-10-30 ENCOUNTER — APPOINTMENT (OUTPATIENT)
Dept: URGENT CARE | Facility: PHYSICIAN GROUP | Age: 54
End: 2024-10-30
Payer: MEDICARE

## 2024-10-30 VITALS
RESPIRATION RATE: 20 BRPM | WEIGHT: 201.06 LBS | SYSTOLIC BLOOD PRESSURE: 131 MMHG | HEIGHT: 62 IN | BODY MASS INDEX: 37 KG/M2 | HEART RATE: 95 BPM | DIASTOLIC BLOOD PRESSURE: 93 MMHG | OXYGEN SATURATION: 91 % | TEMPERATURE: 99.1 F

## 2024-10-30 DIAGNOSIS — R11.2 NAUSEA AND VOMITING, UNSPECIFIED VOMITING TYPE: ICD-10-CM

## 2024-10-30 DIAGNOSIS — K59.00 CONSTIPATION, UNSPECIFIED CONSTIPATION TYPE: ICD-10-CM

## 2024-10-30 DIAGNOSIS — R10.30 LOWER ABDOMINAL PAIN: ICD-10-CM

## 2024-10-30 LAB
ALBUMIN SERPL BCP-MCNC: 4.8 G/DL (ref 3.2–4.9)
ALBUMIN/GLOB SERPL: 1.8 G/DL
ALP SERPL-CCNC: 82 U/L (ref 30–99)
ALT SERPL-CCNC: 23 U/L (ref 2–50)
ANION GAP SERPL CALC-SCNC: 13 MMOL/L (ref 7–16)
APPEARANCE UR: CLEAR
AST SERPL-CCNC: 19 U/L (ref 12–45)
BASOPHILS # BLD AUTO: 0.4 % (ref 0–1.8)
BASOPHILS # BLD: 0.03 K/UL (ref 0–0.12)
BILIRUB SERPL-MCNC: 0.4 MG/DL (ref 0.1–1.5)
BILIRUB UR QL STRIP.AUTO: NEGATIVE
BUN SERPL-MCNC: 11 MG/DL (ref 8–22)
CALCIUM ALBUM COR SERPL-MCNC: 9.1 MG/DL (ref 8.5–10.5)
CALCIUM SERPL-MCNC: 9.7 MG/DL (ref 8.4–10.2)
CHLORIDE SERPL-SCNC: 104 MMOL/L (ref 96–112)
CO2 SERPL-SCNC: 24 MMOL/L (ref 20–33)
COLOR UR: YELLOW
CREAT SERPL-MCNC: 0.73 MG/DL (ref 0.5–1.4)
EOSINOPHIL # BLD AUTO: 0.09 K/UL (ref 0–0.51)
EOSINOPHIL NFR BLD: 1.2 % (ref 0–6.9)
ERYTHROCYTE [DISTWIDTH] IN BLOOD BY AUTOMATED COUNT: 37.7 FL (ref 35.9–50)
GFR SERPLBLD CREATININE-BSD FMLA CKD-EPI: 97 ML/MIN/1.73 M 2
GLOBULIN SER CALC-MCNC: 2.7 G/DL (ref 1.9–3.5)
GLUCOSE SERPL-MCNC: 117 MG/DL (ref 65–99)
GLUCOSE UR STRIP.AUTO-MCNC: NEGATIVE MG/DL
HCT VFR BLD AUTO: 42.8 % (ref 37–47)
HGB BLD-MCNC: 15 G/DL (ref 12–16)
IMM GRANULOCYTES # BLD AUTO: 0.03 K/UL (ref 0–0.11)
IMM GRANULOCYTES NFR BLD AUTO: 0.4 % (ref 0–0.9)
KETONES UR STRIP.AUTO-MCNC: NEGATIVE MG/DL
LEUKOCYTE ESTERASE UR QL STRIP.AUTO: NEGATIVE
LYMPHOCYTES # BLD AUTO: 2.86 K/UL (ref 1–4.8)
LYMPHOCYTES NFR BLD: 38.3 % (ref 22–41)
MCH RBC QN AUTO: 31 PG (ref 27–33)
MCHC RBC AUTO-ENTMCNC: 35 G/DL (ref 32.2–35.5)
MCV RBC AUTO: 88.4 FL (ref 81.4–97.8)
MICRO URNS: NORMAL
MONOCYTES # BLD AUTO: 0.57 K/UL (ref 0–0.85)
MONOCYTES NFR BLD AUTO: 7.6 % (ref 0–13.4)
NEUTROPHILS # BLD AUTO: 3.88 K/UL (ref 1.82–7.42)
NEUTROPHILS NFR BLD: 52.1 % (ref 44–72)
NITRITE UR QL STRIP.AUTO: NEGATIVE
NRBC # BLD AUTO: 0 K/UL
NRBC BLD-RTO: 0 /100 WBC (ref 0–0.2)
PH UR STRIP.AUTO: 6 [PH] (ref 5–8)
PLATELET # BLD AUTO: 304 K/UL (ref 164–446)
PMV BLD AUTO: 8.8 FL (ref 9–12.9)
POTASSIUM SERPL-SCNC: 3.8 MMOL/L (ref 3.6–5.5)
PROT SERPL-MCNC: 7.5 G/DL (ref 6–8.2)
PROT UR QL STRIP: NEGATIVE MG/DL
RBC # BLD AUTO: 4.84 M/UL (ref 4.2–5.4)
RBC UR QL AUTO: NEGATIVE
SODIUM SERPL-SCNC: 141 MMOL/L (ref 135–145)
SP GR UR STRIP.AUTO: 1.01
WBC # BLD AUTO: 7.5 K/UL (ref 4.8–10.8)

## 2024-10-30 PROCEDURE — 700111 HCHG RX REV CODE 636 W/ 250 OVERRIDE (IP): Mod: JZ | Performed by: EMERGENCY MEDICINE

## 2024-10-30 PROCEDURE — 74022 RADEX COMPL AQT ABD SERIES: CPT

## 2024-10-30 PROCEDURE — 80053 COMPREHEN METABOLIC PANEL: CPT

## 2024-10-30 PROCEDURE — 81003 URINALYSIS AUTO W/O SCOPE: CPT

## 2024-10-30 PROCEDURE — 96374 THER/PROPH/DIAG INJ IV PUSH: CPT

## 2024-10-30 PROCEDURE — 700102 HCHG RX REV CODE 250 W/ 637 OVERRIDE(OP): Performed by: EMERGENCY MEDICINE

## 2024-10-30 PROCEDURE — A9270 NON-COVERED ITEM OR SERVICE: HCPCS | Performed by: EMERGENCY MEDICINE

## 2024-10-30 PROCEDURE — 85025 COMPLETE CBC W/AUTO DIFF WBC: CPT

## 2024-10-30 PROCEDURE — 96375 TX/PRO/DX INJ NEW DRUG ADDON: CPT

## 2024-10-30 PROCEDURE — 96372 THER/PROPH/DIAG INJ SC/IM: CPT

## 2024-10-30 PROCEDURE — 99285 EMERGENCY DEPT VISIT HI MDM: CPT

## 2024-10-30 PROCEDURE — 36415 COLL VENOUS BLD VENIPUNCTURE: CPT

## 2024-10-30 PROCEDURE — 700101 HCHG RX REV CODE 250: Performed by: EMERGENCY MEDICINE

## 2024-10-30 RX ORDER — SODIUM PHOSPHATE,MONO-DIBASIC 19G-7G/118
1 ENEMA (ML) RECTAL ONCE
Status: COMPLETED | OUTPATIENT
Start: 2024-10-30 | End: 2024-10-30

## 2024-10-30 RX ORDER — MORPHINE SULFATE 4 MG/ML
4 INJECTION INTRAVENOUS ONCE
Status: COMPLETED | OUTPATIENT
Start: 2024-10-30 | End: 2024-10-30

## 2024-10-30 RX ORDER — PROCHLORPERAZINE EDISYLATE 5 MG/ML
10 INJECTION INTRAMUSCULAR; INTRAVENOUS ONCE
Status: COMPLETED | OUTPATIENT
Start: 2024-10-30 | End: 2024-10-30

## 2024-10-30 RX ORDER — BISACODYL 5 MG
10 TABLET, DELAYED RELEASE (ENTERIC COATED) ORAL ONCE
Status: COMPLETED | OUTPATIENT
Start: 2024-10-30 | End: 2024-10-30

## 2024-10-30 RX ORDER — ONDANSETRON 4 MG/1
4 TABLET, ORALLY DISINTEGRATING ORAL EVERY 6 HOURS PRN
Qty: 10 TABLET | Refills: 0 | Status: SHIPPED | OUTPATIENT
Start: 2024-10-30

## 2024-10-30 RX ORDER — ONDANSETRON 2 MG/ML
4 INJECTION INTRAMUSCULAR; INTRAVENOUS ONCE
Status: COMPLETED | OUTPATIENT
Start: 2024-10-30 | End: 2024-10-30

## 2024-10-30 RX ORDER — DICYCLOMINE HYDROCHLORIDE 10 MG/ML
20 INJECTION INTRAMUSCULAR ONCE
Status: COMPLETED | OUTPATIENT
Start: 2024-10-30 | End: 2024-10-30

## 2024-10-30 RX ADMIN — MAJOR MAGNESIUM CITRATE ORAL SOLUTION - LEMON 296 ML: 1.75 LIQUID ORAL at 20:39

## 2024-10-30 RX ADMIN — MORPHINE SULFATE 4 MG: 4 INJECTION, SOLUTION INTRAMUSCULAR; INTRAVENOUS at 17:26

## 2024-10-30 RX ADMIN — BISACODYL 10 MG: 5 TABLET, COATED ORAL at 19:52

## 2024-10-30 RX ADMIN — ONDANSETRON 4 MG: 2 INJECTION INTRAMUSCULAR; INTRAVENOUS at 17:26

## 2024-10-30 RX ADMIN — DICYCLOMINE HYDROCHLORIDE 20 MG: 10 INJECTION INTRAMUSCULAR at 18:15

## 2024-10-30 RX ADMIN — SODIUM PHOSPHATE 1 ENEMA: 7; 19 ENEMA RECTAL at 19:15

## 2024-10-30 RX ADMIN — PROCHLORPERAZINE EDISYLATE 10 MG: 5 INJECTION INTRAMUSCULAR; INTRAVENOUS at 18:15

## 2024-10-30 ASSESSMENT — FIBROSIS 4 INDEX: FIB4 SCORE: 0.96

## 2024-11-05 ENCOUNTER — OFFICE VISIT (OUTPATIENT)
Dept: MEDICAL GROUP | Facility: PHYSICIAN GROUP | Age: 54
End: 2024-11-05
Payer: MEDICARE

## 2024-11-05 VITALS
DIASTOLIC BLOOD PRESSURE: 72 MMHG | OXYGEN SATURATION: 97 % | BODY MASS INDEX: 36.35 KG/M2 | TEMPERATURE: 97 F | HEART RATE: 91 BPM | SYSTOLIC BLOOD PRESSURE: 124 MMHG | HEIGHT: 62 IN | WEIGHT: 197.53 LBS

## 2024-11-05 DIAGNOSIS — E66.9 OBESITY (BMI 30-39.9): Chronic | ICD-10-CM

## 2024-11-05 DIAGNOSIS — E11.9 TYPE 2 DIABETES MELLITUS WITHOUT COMPLICATION, WITHOUT LONG-TERM CURRENT USE OF INSULIN (HCC): Chronic | ICD-10-CM

## 2024-11-05 DIAGNOSIS — K59.03 DRUG-INDUCED CONSTIPATION: ICD-10-CM

## 2024-11-05 PROCEDURE — 92250 FUNDUS PHOTOGRAPHY W/I&R: CPT | Mod: 26 | Performed by: STUDENT IN AN ORGANIZED HEALTH CARE EDUCATION/TRAINING PROGRAM

## 2024-11-05 PROCEDURE — 3074F SYST BP LT 130 MM HG: CPT | Performed by: STUDENT IN AN ORGANIZED HEALTH CARE EDUCATION/TRAINING PROGRAM

## 2024-11-05 PROCEDURE — 99214 OFFICE O/P EST MOD 30 MIN: CPT | Performed by: STUDENT IN AN ORGANIZED HEALTH CARE EDUCATION/TRAINING PROGRAM

## 2024-11-05 PROCEDURE — 3078F DIAST BP <80 MM HG: CPT | Performed by: STUDENT IN AN ORGANIZED HEALTH CARE EDUCATION/TRAINING PROGRAM

## 2024-11-05 RX ORDER — DICYCLOMINE HYDROCHLORIDE 10 MG/1
10 CAPSULE ORAL
Qty: 120 CAPSULE | Refills: 0 | Status: SHIPPED | OUTPATIENT
Start: 2024-11-05

## 2024-11-05 ASSESSMENT — FIBROSIS 4 INDEX: FIB4 SCORE: 0.7

## 2024-11-05 NOTE — PROGRESS NOTES
"Subjective:     Chief Complaint   Patient presents with    Medication Follow-up       HPI:   Rianna presents today for ER follow-up and ozempic refill.      Health Maintenance: Completed    ROS:  Negative except as stated above.      Objective:     Exam:  /72   Pulse 91   Temp 36.1 °C (97 °F)   Ht 1.575 m (5' 2\")   Wt 89.6 kg (197 lb 8.5 oz)   LMP 06/16/2022   SpO2 97%   BMI 36.13 kg/m²  Body mass index is 36.13 kg/m².    Physical Exam    Gen: Alert and oriented, no acute distress.  Lungs: Normal effort, CTAB, no wheezing / rhonchi / rales.  CV: RRR, normal S1 and S2, no murmurs.      Monofilament testing with a 10 gram force:  Sensation intact: intact bilaterally  Visual Inspection: Feet without maceration, ulcers, fissures.  Pedal pulses: intact bilaterally      Assessment & Plan:     54 y.o. female with the following -     1. Type 2 diabetes mellitus without complication, without long-term current use of insulin ([facility])  Chronic, controlled.  August 2024 A1c 6.1.  Continue Metformin  mg daily.  Despite constipation patient wishes to continue ozempic for weight loss and dose increased to 1 mg weekly.  Monofilament foot exam and retinal screening done today.   - Semaglutide, 1 MG/DOSE, 4 MG/3ML Solution Pen-injector; Inject 1 mg under the skin every 7 days. After 4 weeks increase from 1 to 2 mg weekly.  Dispense: 9 mL; Refill: 1  - Diabetic Monofilament LE Exam  - POCT Retinal Eye Exam  - dicyclomine (BENTYL) 10 MG Cap; Take 1 Capsule by mouth 4 Times a Day,Before Meals and at Bedtime.  Dispense: 120 Capsule; Refill: 0    2. Obesity (BMI 30-39.9)  Chronic, uncontrolled.  BMI 36.13 today.  Ozempic increased to 1 mg weekly.  Discussed the importance of healthy diet and regular exercise.  - Semaglutide, 1 MG/DOSE, 4 MG/3ML Solution Pen-injector; Inject 1 mg under the skin every 7 days. After 4 weeks increase from 1 to 2 mg weekly.  Dispense: 9 mL; Refill: 1    3. Drug-induced constipation  This is a " new problem.  She went to the ER on 10/28 and 10/30 for constipation.  Most likely due to Ozempic but patient does not want to stop medication at this time.  Continue docusate daily, MiraLAX daily, Bentyl as needed.  - dicyclomine (BENTYL) 10 MG Cap; Take 1 Capsule by mouth 4 Times a Day,Before Meals and at Bedtime.  Dispense: 120 Capsule; Refill: 0      Return in about 3 months (around 2/5/2025) for Follow-up of chronic conditions, POC A1c.      Please note that this dictation was created using voice recognition software. I have made every reasonable attempt to correct obvious errors, but I expect that there are errors of grammar and possibly content that I did not discover before finalizing the note.

## 2024-11-07 DIAGNOSIS — M54.41 CHRONIC BILATERAL LOW BACK PAIN WITH RIGHT-SIDED SCIATICA: ICD-10-CM

## 2024-11-07 DIAGNOSIS — G89.29 CHRONIC BILATERAL LOW BACK PAIN WITH RIGHT-SIDED SCIATICA: ICD-10-CM

## 2024-11-07 DIAGNOSIS — G43.809 OTHER MIGRAINE WITHOUT STATUS MIGRAINOSUS, NOT INTRACTABLE: ICD-10-CM

## 2024-11-07 RX ORDER — IBUPROFEN 800 MG/1
800 TABLET, FILM COATED ORAL EVERY 8 HOURS PRN
Qty: 90 TABLET | Refills: 0 | Status: SHIPPED | OUTPATIENT
Start: 2024-11-07

## 2024-11-11 LAB — RETINAL SCREEN: NEGATIVE

## 2024-11-28 DIAGNOSIS — E11.9 TYPE 2 DIABETES MELLITUS WITHOUT COMPLICATION, WITHOUT LONG-TERM CURRENT USE OF INSULIN (HCC): Chronic | ICD-10-CM

## 2024-11-28 DIAGNOSIS — K59.03 DRUG-INDUCED CONSTIPATION: ICD-10-CM

## 2024-12-03 NOTE — TELEPHONE ENCOUNTER
Received request via: Patient    Was the patient seen in the last year in this department? Yes    Does the patient have an active prescription (recently filled or refills available) for medication(s) requested? No    [facility]    Does the patient have [facility] Plus and need 100-day supply? (This applies to ALL medications) Patient does not have SCP

## 2024-12-05 DIAGNOSIS — G43.809 OTHER MIGRAINE WITHOUT STATUS MIGRAINOSUS, NOT INTRACTABLE: ICD-10-CM

## 2024-12-05 DIAGNOSIS — G89.29 CHRONIC BILATERAL LOW BACK PAIN WITH RIGHT-SIDED SCIATICA: ICD-10-CM

## 2024-12-05 DIAGNOSIS — M54.41 CHRONIC BILATERAL LOW BACK PAIN WITH RIGHT-SIDED SCIATICA: ICD-10-CM

## 2024-12-05 NOTE — TELEPHONE ENCOUNTER
Received request via: Patient    Was the patient seen in the last year in this department? Yes    Does the patient have an active prescription (recently filled or refills available) for medication(s) requested? No    [facility]    Does the patient have [facility] Plus and need 100-day supply? (This applies to ALL medications) Patient does not have SCP    Patient was sent a 90 day fill of medication 30 days ago but it is prn/ please advise.

## 2024-12-08 RX ORDER — IBUPROFEN 800 MG/1
800 TABLET, FILM COATED ORAL EVERY 8 HOURS PRN
Qty: 90 TABLET | Refills: 0 | Status: SHIPPED | OUTPATIENT
Start: 2024-12-08

## 2024-12-08 RX ORDER — DICYCLOMINE HYDROCHLORIDE 10 MG/1
10 CAPSULE ORAL
Qty: 360 CAPSULE | Refills: 0 | Status: SHIPPED | OUTPATIENT
Start: 2024-12-08

## 2024-12-13 NOTE — ED NOTES
Pt called to ask if she was still ok to come in for surgery on Monday 12/16. She was recently in the ED on 12/12 for abdominal pain and dark stools. Per ED physician note, no acute findings, H & H stable, and patient recommended to follow up with her PCP. Discussed again with patient and will be here for surgery with Dr Magana on Monday.    Report from April, RN.  Patient awake, ambulatory around room, no needs at this time.

## 2024-12-18 ENCOUNTER — OFFICE VISIT (OUTPATIENT)
Dept: MEDICAL GROUP | Facility: PHYSICIAN GROUP | Age: 54
End: 2024-12-18
Payer: MEDICARE

## 2024-12-18 VITALS
HEIGHT: 62 IN | DIASTOLIC BLOOD PRESSURE: 70 MMHG | SYSTOLIC BLOOD PRESSURE: 110 MMHG | TEMPERATURE: 97.2 F | WEIGHT: 189.5 LBS | OXYGEN SATURATION: 97 % | BODY MASS INDEX: 34.87 KG/M2 | HEART RATE: 69 BPM

## 2024-12-18 DIAGNOSIS — M54.41 CHRONIC BILATERAL LOW BACK PAIN WITH RIGHT-SIDED SCIATICA: Chronic | ICD-10-CM

## 2024-12-18 DIAGNOSIS — G89.29 CHRONIC BILATERAL LOW BACK PAIN WITH RIGHT-SIDED SCIATICA: Chronic | ICD-10-CM

## 2024-12-18 DIAGNOSIS — G56.03 BILATERAL CARPAL TUNNEL SYNDROME: ICD-10-CM

## 2024-12-18 DIAGNOSIS — R11.2 NAUSEA AND VOMITING, UNSPECIFIED VOMITING TYPE: ICD-10-CM

## 2024-12-18 PROCEDURE — 3074F SYST BP LT 130 MM HG: CPT | Performed by: STUDENT IN AN ORGANIZED HEALTH CARE EDUCATION/TRAINING PROGRAM

## 2024-12-18 PROCEDURE — 3078F DIAST BP <80 MM HG: CPT | Performed by: STUDENT IN AN ORGANIZED HEALTH CARE EDUCATION/TRAINING PROGRAM

## 2024-12-18 PROCEDURE — 99214 OFFICE O/P EST MOD 30 MIN: CPT | Performed by: STUDENT IN AN ORGANIZED HEALTH CARE EDUCATION/TRAINING PROGRAM

## 2024-12-18 RX ORDER — CYCLOBENZAPRINE HCL 10 MG
10 TABLET ORAL 3 TIMES DAILY PRN
Qty: 60 TABLET | Refills: 0 | Status: SHIPPED | OUTPATIENT
Start: 2024-12-18

## 2024-12-18 RX ORDER — ONDANSETRON 4 MG/1
4 TABLET, ORALLY DISINTEGRATING ORAL EVERY 6 HOURS PRN
Qty: 10 TABLET | Refills: 1 | Status: SHIPPED | OUTPATIENT
Start: 2024-12-18

## 2024-12-18 RX ORDER — PREDNISONE 20 MG/1
40 TABLET ORAL DAILY
Qty: 10 TABLET | Refills: 0 | Status: SHIPPED | OUTPATIENT
Start: 2024-12-18 | End: 2024-12-23

## 2024-12-18 ASSESSMENT — FIBROSIS 4 INDEX: FIB4 SCORE: 0.7

## 2024-12-18 NOTE — PROGRESS NOTES
Subjective:     Chief Complaint   Patient presents with    Neck Pain     3 weeks       History of Present Illness  The patient presents for evaluation of carpal tunnel syndrome, back pain, and nausea.    She has a history of carpal tunnel syndrome, narrowly avoiding surgery in 2000. The condition has been recurrent, particularly after each childbirth, and now recurs every few years. She is currently experiencing severe pain in both hands, with the right hand being more affected than the left. Her symptoms have escalated to the point where she is unable to move and frequently drops objects from her hands. She also reports difficulty sleeping due to the pain, which radiates from her hands to her arms. She has been experiencing insomnia for the past 6 weeks, which she attributes to her pain. Despite cleaning her CPAP machine, which provided some relief, she continues to be awakened by pain in her neck, back, and hands, followed by shooting pains up her arms and forearms. She has been managing her symptoms with braces, acetaminophen, and NSAIDs.    She has a known diagnosis of degenerative disc disease and sciatica, which she manages with ibuprofen. She prefers to avoid pain medication unless absolutely necessary. She has been experiencing a flare-up of her back pain for the past 3 weeks, which she describes as excruciating. She also reports pain radiating down her right leg. She has been using hot showers, baths, and a hot tub in the middle of the night to alleviate her neck pain. She has tried Biofreeze, muscle rub, and wintergreen for her neck pain. She has found Flexeril to be effective in the past and is requesting a prescription today.    She has been engaging in physical activity, including biking and using weighted machines, and has lost 3 pounds. She is currently taking Ozempic and reports that her diabetes is well-controlled. She does not believe her nausea is a side effect of the Ozempic, as she did not  "experience it when she first started the medication. She had constipation with Ozempic, but she is passing stool now.         Health Maintenance: Completed    ROS:  Negative except as stated above.      Objective:     Exam:  /70   Pulse 69   Temp 36.2 °C (97.2 °F) (Temporal)   Ht 1.575 m (5' 2\")   Wt 86 kg (189 lb 8 oz)   LMP 06/16/2022   SpO2 97%   BMI 34.66 kg/m²  Body mass index is 34.66 kg/m².    Physical Exam    Gen: Alert and oriented, no acute distress.  Lungs: Normal effort, CTAB, no wheezing / rhonchi / rales.  CV: RRR, normal S1 and S2, no murmurs.      Assessment & Plan:     54 y.o. female with the following -     1. Bilateral carpal tunnel syndrome  Chronic, uncontrolled.  Severe pain in both wrists but worse on the right. She has tried conservative management with braces and OTC treatments. Given referral to Orthopedics for potential cortisone injection or surgical release.  - Referral to Orthopedics    2. Chronic bilateral low back pain with right-sided sciatica  Chronic with acute exacerbation.  She has tried several OTC treatments but no relief.  Prescribed short course of prednisone and flexeril as needed. She was advised to avoid NSAIDs while on prednisone.  - cyclobenzaprine (FLEXERIL) 10 mg Tab; Take 1 Tablet by mouth 3 times a day as needed for Moderate Pain or Muscle Spasms.  Dispense: 60 Tablet; Refill: 0  - predniSONE (DELTASONE) 20 MG Tab; Take 2 Tablets by mouth every day for 5 days.  Dispense: 10 Tablet; Refill: 0    3. Nausea and vomiting, unspecified vomiting type  - ondansetron (ZOFRAN ODT) 4 MG TABLET DISPERSIBLE; Take 1 Tablet by mouth every 6 hours as needed for Nausea/Vomiting.  Dispense: 10 Tablet; Refill: 1      Return in about 7 weeks (around 2/7/2025).    Verbal consent was acquired by the patient to use [employer] ambient listening note generation during this visit: Yes.    Please note that this dictation was created using voice recognition software. I have made " every reasonable attempt to correct obvious errors, but I expect that there are errors of grammar and possibly content that I did not discover before finalizing the note.

## 2024-12-24 ENCOUNTER — TELEPHONE (OUTPATIENT)
Dept: MEDICAL GROUP | Facility: PHYSICIAN GROUP | Age: 54
End: 2024-12-24
Payer: MEDICARE

## 2024-12-24 NOTE — TELEPHONE ENCOUNTER
Patient is requesting a refill on prednisone, states Dr Mares knows what is going on, unable to request medication, not on active medication list.    Please advise.

## 2025-01-08 DIAGNOSIS — G89.29 CHRONIC BILATERAL LOW BACK PAIN WITH RIGHT-SIDED SCIATICA: Chronic | ICD-10-CM

## 2025-01-08 DIAGNOSIS — M54.41 CHRONIC BILATERAL LOW BACK PAIN WITH RIGHT-SIDED SCIATICA: Chronic | ICD-10-CM

## 2025-01-09 NOTE — TELEPHONE ENCOUNTER
Received request via: Patient    Was the patient seen in the last year in this department? Yes    Does the patient have an active prescription (recently filled or refills available) for medication(s) requested? No    [facility]    Does the patient have assisted Plus and need 100-day supply? (This applies to ALL medications) Patient does not have SCP

## 2025-01-13 DIAGNOSIS — G43.809 OTHER MIGRAINE WITHOUT STATUS MIGRAINOSUS, NOT INTRACTABLE: ICD-10-CM

## 2025-01-13 DIAGNOSIS — M54.41 CHRONIC BILATERAL LOW BACK PAIN WITH RIGHT-SIDED SCIATICA: ICD-10-CM

## 2025-01-13 DIAGNOSIS — G89.29 CHRONIC BILATERAL LOW BACK PAIN WITH RIGHT-SIDED SCIATICA: ICD-10-CM

## 2025-01-13 RX ORDER — CYCLOBENZAPRINE HCL 10 MG
10 TABLET ORAL 3 TIMES DAILY PRN
Qty: 60 TABLET | Refills: 0 | Status: SHIPPED | OUTPATIENT
Start: 2025-01-13 | End: 2025-01-30

## 2025-01-15 ENCOUNTER — OFFICE VISIT (OUTPATIENT)
Dept: MEDICAL GROUP | Facility: PHYSICIAN GROUP | Age: 55
End: 2025-01-15
Payer: MEDICARE

## 2025-01-15 ENCOUNTER — HOSPITAL ENCOUNTER (OUTPATIENT)
Dept: LAB | Facility: MEDICAL CENTER | Age: 55
End: 2025-01-15
Attending: STUDENT IN AN ORGANIZED HEALTH CARE EDUCATION/TRAINING PROGRAM
Payer: MEDICARE

## 2025-01-15 VITALS
DIASTOLIC BLOOD PRESSURE: 70 MMHG | TEMPERATURE: 97.7 F | BODY MASS INDEX: 35.21 KG/M2 | SYSTOLIC BLOOD PRESSURE: 152 MMHG | HEART RATE: 104 BPM | OXYGEN SATURATION: 96 % | HEIGHT: 62 IN | WEIGHT: 191.36 LBS

## 2025-01-15 DIAGNOSIS — E06.3 CHRONIC AUTOIMMUNE THYROIDITIS: Chronic | ICD-10-CM

## 2025-01-15 DIAGNOSIS — E66.9 OBESITY (BMI 30-39.9): Chronic | ICD-10-CM

## 2025-01-15 DIAGNOSIS — K21.9 GASTROESOPHAGEAL REFLUX DISEASE WITHOUT ESOPHAGITIS: ICD-10-CM

## 2025-01-15 DIAGNOSIS — E11.9 TYPE 2 DIABETES MELLITUS WITHOUT COMPLICATION, WITHOUT LONG-TERM CURRENT USE OF INSULIN (HCC): Chronic | ICD-10-CM

## 2025-01-15 DIAGNOSIS — G56.03 BILATERAL CARPAL TUNNEL SYNDROME: ICD-10-CM

## 2025-01-15 LAB
T4 FREE SERPL-MCNC: 1.68 NG/DL (ref 0.93–1.7)
TSH SERPL-ACNC: 0.01 UIU/ML (ref 0.35–5.5)

## 2025-01-15 PROCEDURE — 99214 OFFICE O/P EST MOD 30 MIN: CPT | Performed by: STUDENT IN AN ORGANIZED HEALTH CARE EDUCATION/TRAINING PROGRAM

## 2025-01-15 PROCEDURE — 36415 COLL VENOUS BLD VENIPUNCTURE: CPT

## 2025-01-15 PROCEDURE — 3077F SYST BP >= 140 MM HG: CPT | Performed by: STUDENT IN AN ORGANIZED HEALTH CARE EDUCATION/TRAINING PROGRAM

## 2025-01-15 PROCEDURE — 3078F DIAST BP <80 MM HG: CPT | Performed by: STUDENT IN AN ORGANIZED HEALTH CARE EDUCATION/TRAINING PROGRAM

## 2025-01-15 PROCEDURE — 84439 ASSAY OF FREE THYROXINE: CPT

## 2025-01-15 PROCEDURE — 84443 ASSAY THYROID STIM HORMONE: CPT

## 2025-01-15 RX ORDER — SEMAGLUTIDE 2.68 MG/ML
2 INJECTION, SOLUTION SUBCUTANEOUS
Qty: 9 ML | Refills: 3 | Status: SHIPPED | OUTPATIENT
Start: 2025-01-15

## 2025-01-15 RX ORDER — IBUPROFEN 800 MG/1
800 TABLET, FILM COATED ORAL EVERY 8 HOURS PRN
Qty: 90 TABLET | Refills: 0 | OUTPATIENT
Start: 2025-01-15

## 2025-01-15 RX ORDER — PANTOPRAZOLE SODIUM 20 MG/1
20 TABLET, DELAYED RELEASE ORAL DAILY
Qty: 90 TABLET | Refills: 0 | Status: SHIPPED | OUTPATIENT
Start: 2025-01-15

## 2025-01-15 RX ORDER — PREDNISONE 20 MG/1
20 TABLET ORAL DAILY
Qty: 10 TABLET | Refills: 0 | Status: SHIPPED | OUTPATIENT
Start: 2025-01-15

## 2025-01-15 ASSESSMENT — FIBROSIS 4 INDEX: FIB4 SCORE: 0.7

## 2025-01-15 NOTE — PROGRESS NOTES
Subjective:     Chief Complaint   Patient presents with    Heartburn    Carpal Tunnel Syndrome     NEEDS STEROID       History of Present Illness  The patient presents for evaluation of carpal tunnel syndrome, heartburn, diabetes mellitus, Hashimoto's disease.    She has a history of carpal tunnel surgery and is currently under the care of a specialist to identify the specific nerves involved. She experiences bilateral carpal tunnel syndrome, with numbness, tingling, and pain in her all 5 fingers. The pain is severe enough to disrupt her sleep. She has found relief through alternating between ice and heat therapy for approximately 15 minutes, which allows her to return to sleep. She is seeking another course of prednisone, which she reports as beneficial in managing her symptoms. She is awaiting a call from her specialist, expected within 3 to 7 days.    She reports experiencing heartburn, particularly in the morning and at night. She has been using Tums daily and has previously taken Pepcid.    She has a history of sciatica on the right side, which has shown improvement; however, she continues to experience neck and back pain. She is taking muscle relaxers at night before going to bed and usually wakes up feeling okay. She was having difficulty getting into bed. She is requesting a refill of her ibuprofen prescription.    She has a history of Hashimoto's disease and is currently on levothyroxine 125 mcg, having previously been on a higher dose of 150 mcg. She is requesting a refill of her levothyroxine prescription. She has not completed her repeat thyroid labs.    She has been experiencing cramps for the past 2 days. She has been managing with Bentyl, Colace, MiraLAX powder, and Metamucil every morning. She is also taking vitamins.    She has a history of diabetes that was diagnosed 8 months ago and last A1c in August was 6.1. She is currently on Ozempic 1 mg weekly and metformin  mg daily. She has not  "experienced significant weight loss with Ozempic, noting a weight of 183 pounds on her home scale compared to 191 pounds on the clinic scale. She is requesting an increase in her Ozempic dosage to 2 mg.    She has recently started a new job and is frequently washing her hands, leading to cracked skin on her hands. She uses alcohol to clean the cracks and applies Aquaphor to prevent infection.          Health Maintenance: Completed    ROS:  Negative except as stated above.      Objective:     Exam:  BP (!) 152/70 (BP Location: Left arm, Patient Position: Sitting, BP Cuff Size: Adult)   Pulse (!) 104   Temp 36.5 °C (97.7 °F) (Temporal)   Ht 1.575 m (5' 2\")   Wt 86.8 kg (191 lb 5.8 oz)   LMP 06/16/2022   SpO2 96%   BMI 35.00 kg/m²  Body mass index is 35 kg/m².    Physical Exam    Gen: Alert and oriented, no acute distress.  Lungs: Normal effort, CTAB, no wheezing / rhonchi / rales.  CV: RRR, normal S1 and S2, no murmurs.      Assessment & Plan:     54 y.o. female with the following -     1. Bilateral carpal tunnel syndrome  Chronic, uncontrolled.  Upcoming appointment with orthopedics to discuss surgery.  She has numbness/tingling in all 5 fingers indicating possible involvement of both median and ulnar nerve.  Symptoms are affecting sleep and she was given another short course of prednisone.  Side effects of medication were discussed.  Continue use of wrist braces.  - predniSONE (DELTASONE) 20 MG Tab; Take 1 Tablet by mouth every day.  Dispense: 10 Tablet; Refill: 0    2. Gastroesophageal reflux disease without esophagitis  Chronic, uncontrolled.  No improvement with Tums.  Prescribed pantoprazole 20 mg 1-2 times daily.  - pantoprazole (PROTONIX) 20 MG tablet; Take 1 Tablet by mouth every day.  Dispense: 90 Tablet; Refill: 0    3. Type 2 diabetes mellitus without complication, without long-term current use of insulin (HCC)  Chronic, controlled.  August 2024 A1c 6.1.  Continue Metformin  mg daily.  BMI 35 " and Ozempic increased to 2 mg weekly.  UTD with monofilament foot exam and retinal screening.  - Semaglutide, 2 MG/DOSE, (OZEMPIC, 2 MG/DOSE,) 8 MG/3ML Solution Pen-injector; Inject 2 mg under the skin every 7 days.  Dispense: 9 mL; Refill: 3    4. Obesity (BMI 30-39.9)  Chronic, uncontrolled.  BMI 35 today.  Ozempic increased to 2 mg weekly.  Encouraged healthy diet and regular exercise.  - Semaglutide, 2 MG/DOSE, (OZEMPIC, 2 MG/DOSE,) 8 MG/3ML Solution Pen-injector; Inject 2 mg under the skin every 7 days.  Dispense: 9 mL; Refill: 3    5. Chronic autoimmune thyroiditis  Chronic, uncontrolled.  August 2024 T4 2.42 and levothyroxine was decreased to 125 mcg daily.  She still needs to complete repeat labs.   - TSH+FREE T4        Return in about 3 months (around 4/15/2025).    Verbal consent was acquired by the patient to use [employer] ambient listening note generation during this visit: Yes.    Please note that this dictation was created using voice recognition software. I have made every reasonable attempt to correct obvious errors, but I expect that there are errors of grammar and possibly content that I did not discover before finalizing the note.

## 2025-01-16 NOTE — TELEPHONE ENCOUNTER
Received request via: Patient    Was the patient seen in the last year in this department? Yes    Does the patient have an active prescription (recently filled or refills available) for medication(s) requested? No    [facility]    Does the patient have [facility] Plus and need 100-day supply? (This applies to ALL medications) Patient does not have SCP

## 2025-01-17 DIAGNOSIS — E06.3 CHRONIC AUTOIMMUNE THYROIDITIS: Chronic | ICD-10-CM

## 2025-01-21 RX ORDER — LEVOTHYROXINE SODIUM 125 UG/1
150 TABLET ORAL
Qty: 90 TABLET | Refills: 3 | Status: SHIPPED | OUTPATIENT
Start: 2025-01-21

## 2025-01-28 ENCOUNTER — APPOINTMENT (OUTPATIENT)
Dept: RADIOLOGY | Facility: MEDICAL CENTER | Age: 55
End: 2025-01-28
Attending: INTERNAL MEDICINE
Payer: MEDICARE

## 2025-01-29 DIAGNOSIS — G89.29 CHRONIC BILATERAL LOW BACK PAIN WITH RIGHT-SIDED SCIATICA: Chronic | ICD-10-CM

## 2025-01-29 DIAGNOSIS — M54.41 CHRONIC BILATERAL LOW BACK PAIN WITH RIGHT-SIDED SCIATICA: Chronic | ICD-10-CM

## 2025-01-30 RX ORDER — CYCLOBENZAPRINE HCL 10 MG
10 TABLET ORAL 3 TIMES DAILY PRN
Qty: 90 TABLET | Refills: 0 | Status: SHIPPED | OUTPATIENT
Start: 2025-01-30

## 2025-01-30 NOTE — TELEPHONE ENCOUNTER
Received request via: Patient    Was the patient seen in the last year in this department? Yes    Does the patient have an active prescription (recently filled or refills available) for medication(s) requested? No    Pharmacy Name: [facility]    Does the patient have [facility] Plus and need 100-day supply? (This applies to ALL medications) Patient does not have SCP

## 2025-02-11 ENCOUNTER — OFFICE VISIT (OUTPATIENT)
Dept: OBGYN | Facility: CLINIC | Age: 55
End: 2025-02-11
Payer: MEDICARE

## 2025-02-11 ENCOUNTER — HOSPITAL ENCOUNTER (OUTPATIENT)
Facility: MEDICAL CENTER | Age: 55
End: 2025-02-11
Attending: NURSE PRACTITIONER
Payer: MEDICARE

## 2025-02-11 VITALS
SYSTOLIC BLOOD PRESSURE: 128 MMHG | HEIGHT: 62 IN | BODY MASS INDEX: 36.44 KG/M2 | DIASTOLIC BLOOD PRESSURE: 78 MMHG | HEART RATE: 78 BPM | WEIGHT: 198 LBS

## 2025-02-11 DIAGNOSIS — Z11.51 SCREENING FOR HPV (HUMAN PAPILLOMAVIRUS): ICD-10-CM

## 2025-02-11 DIAGNOSIS — Z86.19 HISTORY OF HPV INFECTION: ICD-10-CM

## 2025-02-11 DIAGNOSIS — Z71.85 HPV VACCINE COUNSELING: ICD-10-CM

## 2025-02-11 DIAGNOSIS — N63.22 MASS OF UPPER INNER QUADRANT OF LEFT BREAST: ICD-10-CM

## 2025-02-11 DIAGNOSIS — Z12.4 ENCOUNTER FOR PAPANICOLAOU SMEAR FOR CERVICAL CANCER SCREENING: ICD-10-CM

## 2025-02-11 DIAGNOSIS — Z12.31 ENCOUNTER FOR SCREENING MAMMOGRAM FOR BREAST CANCER: ICD-10-CM

## 2025-02-11 DIAGNOSIS — Z80.3 FAMILY HISTORY OF BREAST CANCER IN FIRST DEGREE RELATIVE: ICD-10-CM

## 2025-02-11 DIAGNOSIS — Z23 NEED FOR HPV VACCINE: ICD-10-CM

## 2025-02-11 DIAGNOSIS — Z01.419 WELL WOMAN EXAM WITH ROUTINE GYNECOLOGICAL EXAM: ICD-10-CM

## 2025-02-11 PROCEDURE — 90471 IMMUNIZATION ADMIN: CPT | Performed by: NURSE PRACTITIONER

## 2025-02-11 PROCEDURE — G0101 CA SCREEN;PELVIC/BREAST EXAM: HCPCS | Performed by: NURSE PRACTITIONER

## 2025-02-11 PROCEDURE — 87624 HPV HI-RISK TYP POOLED RSLT: CPT

## 2025-02-11 PROCEDURE — 87625 HPV TYPES 16 & 18 ONLY: CPT | Mod: XU

## 2025-02-11 PROCEDURE — 3074F SYST BP LT 130 MM HG: CPT | Performed by: NURSE PRACTITIONER

## 2025-02-11 PROCEDURE — 88142 CYTOPATH C/V THIN LAYER: CPT

## 2025-02-11 PROCEDURE — 90651 9VHPV VACCINE 2/3 DOSE IM: CPT | Mod: JZ | Performed by: NURSE PRACTITIONER

## 2025-02-11 PROCEDURE — 3078F DIAST BP <80 MM HG: CPT | Performed by: NURSE PRACTITIONER

## 2025-02-11 ASSESSMENT — FIBROSIS 4 INDEX: FIB4 SCORE: 0.7
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
## 2025-02-12 ENCOUNTER — TELEMEDICINE (OUTPATIENT)
Dept: MEDICAL GROUP | Facility: PHYSICIAN GROUP | Age: 55
End: 2025-02-12
Payer: MEDICARE

## 2025-02-12 VITALS — BODY MASS INDEX: 34.41 KG/M2 | HEIGHT: 62 IN | WEIGHT: 187 LBS
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
## 2025-02-12 PROCEDURE — 99213 OFFICE O/P EST LOW 20 MIN: CPT

## 2025-02-12 RX ORDER — HYDROCODONE BITARTRATE AND ACETAMINOPHEN 5; 325 MG/1; MG/1
1 TABLET ORAL EVERY 6 HOURS PRN
Qty: 28 TABLET | Refills: 0 | Status: SHIPPED | OUTPATIENT
Start: 2025-02-12 | End: 2025-02-18

## 2025-02-12 ASSESSMENT — FIBROSIS 4 INDEX: FIB4 SCORE: 0.7

## 2025-02-14 DIAGNOSIS — M54.41 CHRONIC BILATERAL LOW BACK PAIN WITH RIGHT-SIDED SCIATICA: ICD-10-CM

## 2025-02-14 DIAGNOSIS — G43.809 OTHER MIGRAINE WITHOUT STATUS MIGRAINOSUS, NOT INTRACTABLE: ICD-10-CM

## 2025-02-14 DIAGNOSIS — G89.29 CHRONIC BILATERAL LOW BACK PAIN WITH RIGHT-SIDED SCIATICA: ICD-10-CM

## 2025-02-18 ENCOUNTER — RESULTS FOLLOW-UP (OUTPATIENT)
Dept: GYNECOLOGY | Facility: CLINIC | Age: 55
End: 2025-02-18

## 2025-02-18 ENCOUNTER — OFFICE VISIT (OUTPATIENT)
Dept: MEDICAL GROUP | Facility: PHYSICIAN GROUP | Age: 55
End: 2025-02-18
Payer: MEDICARE

## 2025-02-18 VITALS
BODY MASS INDEX: 35.86 KG/M2 | HEART RATE: 89 BPM | TEMPERATURE: 97.5 F | OXYGEN SATURATION: 98 % | SYSTOLIC BLOOD PRESSURE: 124 MMHG | HEIGHT: 62 IN | WEIGHT: 194.89 LBS | DIASTOLIC BLOOD PRESSURE: 80 MMHG

## 2025-02-18 DIAGNOSIS — Z23 NEED FOR VACCINATION: ICD-10-CM

## 2025-02-18 DIAGNOSIS — E06.3 CHRONIC AUTOIMMUNE THYROIDITIS: Chronic | ICD-10-CM

## 2025-02-18 DIAGNOSIS — M54.2 ACUTE NECK PAIN: ICD-10-CM
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
## 2025-02-18 DIAGNOSIS — S20.211S CHEST WALL CONTUSION, RIGHT, SEQUELA: ICD-10-CM

## 2025-02-18 DIAGNOSIS — M25.572 ACUTE LEFT ANKLE PAIN: ICD-10-CM

## 2025-02-18 DIAGNOSIS — M25.552 ACUTE HIP PAIN, LEFT: ICD-10-CM

## 2025-02-18 LAB
HPV I/H RISK 1 DNA SPEC QL NAA+PROBE: DETECTED
HPV16 DNA CVX QL PROBE+SIG AMP: NOT DETECTED
HPV18 DNA CVX QL PROBE+SIG AMP: NOT DETECTED
SPECIMEN SOURCE: ABNORMAL
SPECIMEN SOURCE: NORMAL
THINPREP PAP, CYTOLOGY NL11781: NORMAL

## 2025-02-18 PROCEDURE — 99214 OFFICE O/P EST MOD 30 MIN: CPT | Mod: 25 | Performed by: STUDENT IN AN ORGANIZED HEALTH CARE EDUCATION/TRAINING PROGRAM

## 2025-02-18 PROCEDURE — 90677 PCV20 VACCINE IM: CPT | Performed by: STUDENT IN AN ORGANIZED HEALTH CARE EDUCATION/TRAINING PROGRAM

## 2025-02-18 PROCEDURE — 3079F DIAST BP 80-89 MM HG: CPT | Performed by: STUDENT IN AN ORGANIZED HEALTH CARE EDUCATION/TRAINING PROGRAM

## 2025-02-18 PROCEDURE — G0009 ADMIN PNEUMOCOCCAL VACCINE: HCPCS | Performed by: STUDENT IN AN ORGANIZED HEALTH CARE EDUCATION/TRAINING PROGRAM

## 2025-02-18 PROCEDURE — 3074F SYST BP LT 130 MM HG: CPT | Performed by: STUDENT IN AN ORGANIZED HEALTH CARE EDUCATION/TRAINING PROGRAM

## 2025-02-18 RX ORDER — TRAMADOL HYDROCHLORIDE 50 MG/1
50 TABLET ORAL EVERY 4 HOURS PRN
Qty: 84 TABLET | Refills: 0 | Status: SHIPPED | OUTPATIENT
Start: 2025-02-18 | End: 2025-02-26

## 2025-02-18 ASSESSMENT — FIBROSIS 4 INDEX: FIB4 SCORE: 0.7
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
## 2025-02-18 NOTE — LETTER
February 18, 2025    To Whom It May Concern:         This is confirmation that Rianna Solis attended her scheduled appointment with Kaleigh Mares M.D. on 2/18/25.  She may return to work on 2/22/25 but is unable to stand for longer than 45 minutes.  Please allow her to sit or rest if she is standing for a long period of time.         If you have any questions please do not hesitate to call me at the phone number listed below.    Sincerely,          Kaleigh Mares M.D.  [phone]

## 2025-02-19 ASSESSMENT — ENCOUNTER SYMPTOMS
DEPRESSION: 0
INSOMNIA: 0
BACK PAIN: 1
COUGH: 0
HEADACHES: 0
LOSS OF CONSCIOUSNESS: 0
NECK PAIN: 1
DIARRHEA: 0
CONSTIPATION: 0
SENSORY CHANGE: 0
HEARTBURN: 0
WEIGHT LOSS: 0
TINGLING: 0
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
## 2025-02-20 RX ORDER — IBUPROFEN 800 MG/1
800 TABLET, FILM COATED ORAL EVERY 8 HOURS PRN
Qty: 90 TABLET | Refills: 0 | Status: SHIPPED | OUTPATIENT
Start: 2025-02-20

## 2025-02-26 PROBLEM — G56.00 CARPAL TUNNEL SYNDROME: Status: ACTIVE | Noted: 2025-02-26

## 2025-02-28 ENCOUNTER — APPOINTMENT (OUTPATIENT)
Dept: RADIOLOGY | Facility: MEDICAL CENTER | Age: 55
End: 2025-02-28
Attending: NURSE PRACTITIONER
Payer: MEDICARE

## 2025-03-04 ENCOUNTER — APPOINTMENT (OUTPATIENT)
Dept: MEDICAL GROUP | Facility: PHYSICIAN GROUP | Age: 55
End: 2025-03-04
Payer: MEDICARE

## 2025-03-04 DIAGNOSIS — M54.2 NECK PAIN: ICD-10-CM

## 2025-03-04 DIAGNOSIS — M54.50 CHRONIC BILATERAL LOW BACK PAIN WITHOUT SCIATICA: Chronic | ICD-10-CM
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
## 2025-03-04 DIAGNOSIS — G89.29 CHRONIC BILATERAL LOW BACK PAIN WITHOUT SCIATICA: Chronic | ICD-10-CM

## 2025-03-04 DIAGNOSIS — G56.03 BILATERAL CARPAL TUNNEL SYNDROME: ICD-10-CM

## 2025-03-04 PROCEDURE — 99214 OFFICE O/P EST MOD 30 MIN: CPT | Performed by: STUDENT IN AN ORGANIZED HEALTH CARE EDUCATION/TRAINING PROGRAM

## 2025-03-04 RX ORDER — TRAMADOL HYDROCHLORIDE 50 MG/1
50 TABLET ORAL EVERY 4 HOURS PRN
Qty: 30 TABLET | Refills: 0 | Status: SHIPPED | OUTPATIENT
Start: 2025-03-04 | End: 2025-03-18

## 2025-03-04 RX ORDER — CYCLOBENZAPRINE HCL 10 MG
10 TABLET ORAL 3 TIMES DAILY PRN
Qty: 84 TABLET | Refills: 0 | Status: SHIPPED | OUTPATIENT
Start: 2025-03-04 | End: 2025-03-18
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
## 2025-03-18 ENCOUNTER — HOSPITAL ENCOUNTER (OUTPATIENT)
Dept: RADIOLOGY | Facility: MEDICAL CENTER | Age: 55
End: 2025-03-18
Attending: NURSE PRACTITIONER
Payer: MEDICARE

## 2025-03-18 ENCOUNTER — RESULTS FOLLOW-UP (OUTPATIENT)
Dept: MEDICAL GROUP | Facility: PHYSICIAN GROUP | Age: 55
End: 2025-03-18

## 2025-03-18 ENCOUNTER — RESULTS FOLLOW-UP (OUTPATIENT)
Dept: GYNECOLOGY | Facility: CLINIC | Age: 55
End: 2025-03-18

## 2025-03-18 DIAGNOSIS — N63.22 MASS OF UPPER INNER QUADRANT OF LEFT BREAST: ICD-10-CM

## 2025-03-18 DIAGNOSIS — R92.1 BREAST CALCIFICATION, LEFT: ICD-10-CM

## 2025-03-18 DIAGNOSIS — N63.21 MASS OF UPPER OUTER QUADRANT OF LEFT BREAST: ICD-10-CM

## 2025-03-18 DIAGNOSIS — Z80.3 FAMILY HISTORY OF BREAST CANCER IN FIRST DEGREE RELATIVE: ICD-10-CM

## 2025-03-18 PROCEDURE — G0279 TOMOSYNTHESIS, MAMMO: HCPCS

## 2025-03-18 PROCEDURE — 76642 ULTRASOUND BREAST LIMITED: CPT | Mod: LT

## 2025-03-18 NOTE — PROGRESS NOTES
Recent breast diagnostic imaging and ultrasound recommend repeating a diagnostic mammogram in 6 months due to likely a benign calcification in the left breast at 1:00.  Orders placed.  Patient notified via [employer].    HANK Rizo.

## 2025-03-19 ENCOUNTER — APPOINTMENT (OUTPATIENT)
Dept: MEDICAL GROUP | Facility: PHYSICIAN GROUP | Age: 55
End: 2025-03-19
Payer: MEDICARE

## 2025-04-12 DIAGNOSIS — K21.9 GASTROESOPHAGEAL REFLUX DISEASE WITHOUT ESOPHAGITIS: ICD-10-CM

## 2025-04-17 RX ORDER — PANTOPRAZOLE SODIUM 20 MG/1
20 TABLET, DELAYED RELEASE ORAL DAILY
Qty: 90 TABLET | Refills: 3 | Status: SHIPPED | OUTPATIENT
Start: 2025-04-17

## 2025-04-18 ENCOUNTER — TELEPHONE (OUTPATIENT)
Dept: MEDICAL GROUP | Facility: PHYSICIAN GROUP | Age: 55
End: 2025-04-18
Payer: MEDICARE

## 2025-04-18 NOTE — TELEPHONE ENCOUNTER
Called patient and left VM to go over the No Show policy. Patient has 4 No Shows as of todays appt. Left Patient my direct number. Thank you

## 2025-04-23 ENCOUNTER — OFFICE VISIT (OUTPATIENT)
Dept: MEDICAL GROUP | Facility: PHYSICIAN GROUP | Age: 55
End: 2025-04-23
Payer: MEDICARE

## 2025-04-23 VITALS
HEIGHT: 63 IN | TEMPERATURE: 97.2 F | WEIGHT: 179.5 LBS | DIASTOLIC BLOOD PRESSURE: 84 MMHG | HEART RATE: 87 BPM | OXYGEN SATURATION: 92 % | BODY MASS INDEX: 31.8 KG/M2 | RESPIRATION RATE: 16 BRPM | SYSTOLIC BLOOD PRESSURE: 126 MMHG

## 2025-04-23 DIAGNOSIS — M99.00 SOMATIC DYSFUNCTION OF OCCIPITOCERVICAL REGION: ICD-10-CM

## 2025-04-23 PROCEDURE — 98925 OSTEOPATH MANJ 1-2 REGIONS: CPT

## 2025-04-23 ASSESSMENT — FIBROSIS 4 INDEX: FIB4 SCORE: 0.7

## 2025-04-23 NOTE — PROCEDURES
Osteopathic Manipulative Treatment (OMT) was performed as follows:       1. Suboccipital Release:     - The patient was placed in a supine position with the head supported.       - Gentle pressure was applied with fingertips to the suboccipital musculature bilaterally, maintaining sustained traction until a release of tension was palpated (approximately 2-3 minutes).       - Post-treatment, improved tissue texture and decreased tenderness were noted in the suboccipital region.       2. Soft Tissue Technique:      - Kneading and stretching were applied to the paraspinal muscles of the cervical spine and upper trapezius bilaterally.       - Moderate resistance was encountered initially, followed by relaxation of tissues after approximately 3-4 minutes of treatment.       - Focus was placed on relieving hypertonicity and improving circulation to the affected areas.       3. Muscle Energy Technique (MET):       - Targeted somatic dysfunction at the OA junction and C1-C2.       - Patient was instructed to gently resist against my hand pressure in specific vectors (e.g., flexion and rotation) for 3-5 seconds, followed by relaxation. This was repeated for 3 cycles.       - Post-isometric relaxation resulted in improved range of motion and symmetry at the OA junction and cervical segments.       Post-Procedure:  - Tolerance Level: Patient reported tenderness during the procedure but noted improvement in muscle tension and range of motion.  - Home Care Instructions: Advised to continue taking ibuprofen as needed and to consider bringing a partner to future sessions for instruction on home techniques.     Plan:    - The patient was advised to maintain proper posture and perform gentle neck stretches daily as demonstrated.    - Follow-up in 1 month to reassess symptoms and determine if additional OMT sessions are indicated.    - Return sooner if headaches worsen or new symptoms arise.
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
## 2025-05-16 ENCOUNTER — APPOINTMENT (OUTPATIENT)
Dept: MEDICAL GROUP | Facility: PHYSICIAN GROUP | Age: 55
End: 2025-05-16
Payer: MEDICARE

## 2025-06-27 DIAGNOSIS — M54.41 CHRONIC BILATERAL LOW BACK PAIN WITH RIGHT-SIDED SCIATICA: ICD-10-CM

## 2025-06-27 DIAGNOSIS — G89.29 CHRONIC BILATERAL LOW BACK PAIN WITH RIGHT-SIDED SCIATICA: ICD-10-CM

## 2025-06-27 DIAGNOSIS — G43.809 OTHER MIGRAINE WITHOUT STATUS MIGRAINOSUS, NOT INTRACTABLE: ICD-10-CM

## 2025-07-01 DIAGNOSIS — E11.9 TYPE 2 DIABETES MELLITUS WITHOUT COMPLICATION, WITHOUT LONG-TERM CURRENT USE OF INSULIN (HCC): Chronic | ICD-10-CM

## 2025-07-03 RX ORDER — IBUPROFEN 800 MG/1
800 TABLET, FILM COATED ORAL EVERY 8 HOURS PRN
Qty: 90 TABLET | Refills: 0 | Status: SHIPPED | OUTPATIENT
Start: 2025-07-03

## 2025-07-05 RX ORDER — METFORMIN HYDROCHLORIDE 500 MG/1
500 TABLET, EXTENDED RELEASE ORAL DAILY
Qty: 90 TABLET | Refills: 3 | Status: SHIPPED | OUTPATIENT
Start: 2025-07-05

## 2025-07-10 DIAGNOSIS — E11.9 TYPE 2 DIABETES MELLITUS WITHOUT COMPLICATION, WITHOUT LONG-TERM CURRENT USE OF INSULIN (HCC): Chronic | ICD-10-CM

## 2025-07-10 NOTE — TELEPHONE ENCOUNTER
Received request via: Pharmacy    Was the patient seen in the last year in this department? Yes    Does the patient have an active prescription (recently filled or refills available) for medication(s) requested? No    Pharmacy Name: [facility]    Does the patient have detention Plus and need 100-day supply? (This applies to ALL medications) Patient does not have SCP

## 2025-07-12 RX ORDER — ROSUVASTATIN CALCIUM 5 MG/1
5 TABLET, COATED ORAL EVERY EVENING
Qty: 90 TABLET | Refills: 2 | Status: SHIPPED | OUTPATIENT
Start: 2025-07-12

## 2025-08-09 ENCOUNTER — HOSPITAL ENCOUNTER (EMERGENCY)
Facility: MEDICAL CENTER | Age: 55
End: 2025-08-10
Attending: EMERGENCY MEDICINE
Payer: MEDICARE

## 2025-08-09 ENCOUNTER — APPOINTMENT (OUTPATIENT)
Dept: RADIOLOGY | Facility: MEDICAL CENTER | Age: 55
End: 2025-08-09
Attending: EMERGENCY MEDICINE
Payer: MEDICARE

## 2025-08-09 DIAGNOSIS — R10.32 LEFT LOWER QUADRANT ABDOMINAL PAIN: Primary | ICD-10-CM

## 2025-08-09 DIAGNOSIS — T50.904A MEDICATION OVERDOSE, UNDETERMINED INTENT, INITIAL ENCOUNTER: ICD-10-CM

## 2025-08-09 LAB
ALBUMIN SERPL BCP-MCNC: 4.3 G/DL (ref 3.2–4.9)
ALBUMIN/GLOB SERPL: 1.7 G/DL
ALP SERPL-CCNC: 81 U/L (ref 30–99)
ALT SERPL-CCNC: 19 U/L (ref 2–50)
AMPHET UR QL SCN: NEGATIVE
ANION GAP SERPL CALC-SCNC: 13 MMOL/L (ref 7–16)
APAP SERPL-MCNC: <5 UG/ML (ref 10–30)
APPEARANCE UR: CLEAR
AST SERPL-CCNC: 16 U/L (ref 12–45)
BARBITURATES UR QL SCN: NEGATIVE
BASOPHILS # BLD AUTO: 0.4 % (ref 0–1.8)
BASOPHILS # BLD: 0.03 K/UL (ref 0–0.12)
BENZODIAZ UR QL SCN: POSITIVE
BILIRUB SERPL-MCNC: <0.2 MG/DL (ref 0.1–1.5)
BILIRUB UR QL STRIP.AUTO: NEGATIVE
BUN SERPL-MCNC: 11 MG/DL (ref 8–22)
BZE UR QL SCN: NEGATIVE
CALCIUM ALBUM COR SERPL-MCNC: 9.4 MG/DL (ref 8.5–10.5)
CALCIUM SERPL-MCNC: 9.6 MG/DL (ref 8.5–10.5)
CANNABINOIDS UR QL SCN: NEGATIVE
CHLORIDE SERPL-SCNC: 105 MMOL/L (ref 96–112)
CO2 SERPL-SCNC: 22 MMOL/L (ref 20–33)
COLOR UR: YELLOW
CREAT SERPL-MCNC: 0.75 MG/DL (ref 0.5–1.4)
EOSINOPHIL # BLD AUTO: 0.03 K/UL (ref 0–0.51)
EOSINOPHIL NFR BLD: 0.4 % (ref 0–6.9)
ERYTHROCYTE [DISTWIDTH] IN BLOOD BY AUTOMATED COUNT: 37.9 FL (ref 35.9–50)
FENTANYL UR QL: NEGATIVE
FLUAV RNA SPEC QL NAA+PROBE: NEGATIVE
FLUBV RNA SPEC QL NAA+PROBE: NEGATIVE
GFR SERPLBLD CREATININE-BSD FMLA CKD-EPI: 94 ML/MIN/1.73 M 2
GLOBULIN SER CALC-MCNC: 2.6 G/DL (ref 1.9–3.5)
GLUCOSE SERPL-MCNC: 115 MG/DL (ref 65–99)
GLUCOSE UR STRIP.AUTO-MCNC: NEGATIVE MG/DL
HCT VFR BLD AUTO: 39.3 % (ref 37–47)
HGB BLD-MCNC: 14 G/DL (ref 12–16)
IMM GRANULOCYTES # BLD AUTO: 0.02 K/UL (ref 0–0.11)
IMM GRANULOCYTES NFR BLD AUTO: 0.2 % (ref 0–0.9)
KETONES UR STRIP.AUTO-MCNC: NEGATIVE MG/DL
LEUKOCYTE ESTERASE UR QL STRIP.AUTO: NEGATIVE
LIPASE SERPL-CCNC: 44 U/L (ref 11–82)
LYMPHOCYTES # BLD AUTO: 3.55 K/UL (ref 1–4.8)
LYMPHOCYTES NFR BLD: 41.4 % (ref 22–41)
MCH RBC QN AUTO: 31.2 PG (ref 27–33)
MCHC RBC AUTO-ENTMCNC: 35.6 G/DL (ref 32.2–35.5)
MCV RBC AUTO: 87.5 FL (ref 81.4–97.8)
METHADONE UR QL SCN: NEGATIVE
MICRO URNS: NORMAL
MONOCYTES # BLD AUTO: 0.58 K/UL (ref 0–0.85)
MONOCYTES NFR BLD AUTO: 6.8 % (ref 0–13.4)
NEUTROPHILS # BLD AUTO: 4.36 K/UL (ref 1.82–7.42)
NEUTROPHILS NFR BLD: 50.8 % (ref 44–72)
NITRITE UR QL STRIP.AUTO: NEGATIVE
NRBC # BLD AUTO: 0 K/UL
NRBC BLD-RTO: 0 /100 WBC (ref 0–0.2)
OPIATES UR QL SCN: NEGATIVE
OXYCODONE UR QL SCN: NEGATIVE
PCP UR QL SCN: NEGATIVE
PH UR STRIP.AUTO: 6 [PH] (ref 5–8)
PLATELET # BLD AUTO: 288 K/UL (ref 164–446)
PMV BLD AUTO: 8.9 FL (ref 9–12.9)
POTASSIUM SERPL-SCNC: 3.6 MMOL/L (ref 3.6–5.5)
PROPOXYPH UR QL SCN: NEGATIVE
PROT SERPL-MCNC: 6.9 G/DL (ref 6–8.2)
PROT UR QL STRIP: NEGATIVE MG/DL
RBC # BLD AUTO: 4.49 M/UL (ref 4.2–5.4)
RBC UR QL AUTO: NEGATIVE
RSV RNA SPEC QL NAA+PROBE: NEGATIVE
SALICYLATES SERPL-MCNC: <1 MG/DL (ref 15–25)
SARS-COV-2 RNA RESP QL NAA+PROBE: NOTDETECTED
SODIUM SERPL-SCNC: 140 MMOL/L (ref 135–145)
SP GR UR STRIP.AUTO: 1.01
SPECIMEN SOURCE: NORMAL
UROBILINOGEN UR STRIP.AUTO-MCNC: 0.2 EU/DL
WBC # BLD AUTO: 8.6 K/UL (ref 4.8–10.8)

## 2025-08-09 PROCEDURE — 700117 HCHG RX CONTRAST REV CODE 255: Performed by: EMERGENCY MEDICINE

## 2025-08-09 PROCEDURE — 80053 COMPREHEN METABOLIC PANEL: CPT

## 2025-08-09 PROCEDURE — 80143 DRUG ASSAY ACETAMINOPHEN: CPT

## 2025-08-09 PROCEDURE — 83690 ASSAY OF LIPASE: CPT

## 2025-08-09 PROCEDURE — 36415 COLL VENOUS BLD VENIPUNCTURE: CPT

## 2025-08-09 PROCEDURE — 74177 CT ABD & PELVIS W/CONTRAST: CPT

## 2025-08-09 PROCEDURE — 81003 URINALYSIS AUTO W/O SCOPE: CPT | Mod: XU

## 2025-08-09 PROCEDURE — 99285 EMERGENCY DEPT VISIT HI MDM: CPT

## 2025-08-09 PROCEDURE — 87637 SARSCOV2&INF A&B&RSV AMP PRB: CPT

## 2025-08-09 PROCEDURE — 80307 DRUG TEST PRSMV CHEM ANLYZR: CPT

## 2025-08-09 PROCEDURE — 80179 DRUG ASSAY SALICYLATE: CPT

## 2025-08-09 PROCEDURE — 85025 COMPLETE CBC W/AUTO DIFF WBC: CPT

## 2025-08-09 RX ORDER — NICOTINE 21 MG/24HR
1 PATCH, TRANSDERMAL 24 HOURS TRANSDERMAL ONCE
Status: DISCONTINUED | OUTPATIENT
Start: 2025-08-09 | End: 2025-08-10 | Stop reason: HOSPADM

## 2025-08-09 RX ADMIN — IOHEXOL 100 ML: 350 INJECTION, SOLUTION INTRAVENOUS at 22:50

## 2025-08-09 ASSESSMENT — FIBROSIS 4 INDEX: FIB4 SCORE: 0.72

## 2025-08-10 VITALS
TEMPERATURE: 97.6 F | WEIGHT: 177.69 LBS | OXYGEN SATURATION: 97 % | BODY MASS INDEX: 32.7 KG/M2 | HEIGHT: 62 IN | SYSTOLIC BLOOD PRESSURE: 129 MMHG | DIASTOLIC BLOOD PRESSURE: 68 MMHG | RESPIRATION RATE: 18 BRPM | HEART RATE: 79 BPM

## 2025-08-10 LAB — POC BREATHALIZER: 0 PERCENT (ref 0–0.01)

## 2025-08-10 PROCEDURE — 302970 POC BREATHALIZER: Performed by: EMERGENCY MEDICINE

## 2025-08-10 PROCEDURE — 36415 COLL VENOUS BLD VENIPUNCTURE: CPT

## 2025-08-10 PROCEDURE — 99285 EMERGENCY DEPT VISIT HI MDM: CPT

## 2025-08-10 RX ORDER — ZOLPIDEM TARTRATE 10 MG/1
10 TABLET ORAL NIGHTLY PRN
Status: SHIPPED | COMMUNITY
End: 2025-08-20

## 2025-08-10 RX ORDER — ALPRAZOLAM 1 MG/1
1 TABLET ORAL 2 TIMES DAILY
COMMUNITY

## 2025-08-10 RX ORDER — ASCORBIC ACID 500 MG
1000 TABLET ORAL DAILY
COMMUNITY

## 2025-08-10 RX ORDER — QUETIAPINE FUMARATE 400 MG/1
400 TABLET, FILM COATED ORAL
Status: SHIPPED | COMMUNITY
End: 2025-08-20

## 2025-08-20 ENCOUNTER — HOSPITAL ENCOUNTER (OUTPATIENT)
Facility: MEDICAL CENTER | Age: 55
End: 2025-08-20
Attending: STUDENT IN AN ORGANIZED HEALTH CARE EDUCATION/TRAINING PROGRAM
Payer: MEDICARE

## 2025-08-20 ENCOUNTER — OFFICE VISIT (OUTPATIENT)
Dept: MEDICAL GROUP | Facility: PHYSICIAN GROUP | Age: 55
End: 2025-08-20
Payer: MEDICARE

## 2025-08-20 VITALS
BODY MASS INDEX: 32.22 KG/M2 | HEART RATE: 88 BPM | DIASTOLIC BLOOD PRESSURE: 68 MMHG | WEIGHT: 175.1 LBS | OXYGEN SATURATION: 95 % | SYSTOLIC BLOOD PRESSURE: 118 MMHG | TEMPERATURE: 96.8 F | HEIGHT: 62 IN

## 2025-08-20 DIAGNOSIS — E11.69 DYSLIPIDEMIA ASSOCIATED WITH TYPE 2 DIABETES MELLITUS (HCC): Chronic | ICD-10-CM

## 2025-08-20 DIAGNOSIS — M54.2 CHRONIC NECK PAIN: ICD-10-CM

## 2025-08-20 DIAGNOSIS — G43.809 OTHER MIGRAINE WITHOUT STATUS MIGRAINOSUS, NOT INTRACTABLE: ICD-10-CM

## 2025-08-20 DIAGNOSIS — Z91.51 HISTORY OF SUICIDE ATTEMPT: Chronic | ICD-10-CM

## 2025-08-20 DIAGNOSIS — E11.9 TYPE 2 DIABETES MELLITUS WITHOUT COMPLICATION, WITHOUT LONG-TERM CURRENT USE OF INSULIN (HCC): Chronic | ICD-10-CM

## 2025-08-20 DIAGNOSIS — F31.9 BIPOLAR 1 DISORDER (HCC): Chronic | ICD-10-CM

## 2025-08-20 DIAGNOSIS — M54.41 CHRONIC BILATERAL LOW BACK PAIN WITH RIGHT-SIDED SCIATICA: ICD-10-CM

## 2025-08-20 DIAGNOSIS — E06.3 CHRONIC AUTOIMMUNE THYROIDITIS: Chronic | ICD-10-CM
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
## 2025-08-20 DIAGNOSIS — Z09 HOSPITAL DISCHARGE FOLLOW-UP: Primary | ICD-10-CM

## 2025-08-20 DIAGNOSIS — F25.1 SCHIZOAFFECTIVE DISORDER, DEPRESSIVE TYPE (HCC): ICD-10-CM

## 2025-08-20 DIAGNOSIS — E78.5 DYSLIPIDEMIA ASSOCIATED WITH TYPE 2 DIABETES MELLITUS (HCC): Chronic | ICD-10-CM

## 2025-08-20 DIAGNOSIS — G89.29 CHRONIC BILATERAL LOW BACK PAIN WITH RIGHT-SIDED SCIATICA: ICD-10-CM

## 2025-08-20 DIAGNOSIS — G89.29 CHRONIC NECK PAIN: ICD-10-CM

## 2025-08-20 PROBLEM — G56.00 CARPAL TUNNEL SYNDROME: Status: RESOLVED | Noted: 2025-02-26 | Resolved: 2025-08-20

## 2025-08-20 LAB
HBA1C MFR BLD: 5.6 % (ref ?–5.8)
POCT INT CON NEG: NEGATIVE
POCT INT CON POS: POSITIVE

## 2025-08-20 PROCEDURE — 83036 HEMOGLOBIN GLYCOSYLATED A1C: CPT | Performed by: STUDENT IN AN ORGANIZED HEALTH CARE EDUCATION/TRAINING PROGRAM

## 2025-08-20 PROCEDURE — 3078F DIAST BP <80 MM HG: CPT | Performed by: STUDENT IN AN ORGANIZED HEALTH CARE EDUCATION/TRAINING PROGRAM

## 2025-08-20 PROCEDURE — 3074F SYST BP LT 130 MM HG: CPT | Performed by: STUDENT IN AN ORGANIZED HEALTH CARE EDUCATION/TRAINING PROGRAM

## 2025-08-20 PROCEDURE — G2211 COMPLEX E/M VISIT ADD ON: HCPCS | Performed by: STUDENT IN AN ORGANIZED HEALTH CARE EDUCATION/TRAINING PROGRAM

## 2025-08-20 PROCEDURE — 82570 ASSAY OF URINE CREATININE: CPT

## 2025-08-20 PROCEDURE — 99214 OFFICE O/P EST MOD 30 MIN: CPT | Performed by: STUDENT IN AN ORGANIZED HEALTH CARE EDUCATION/TRAINING PROGRAM

## 2025-08-20 PROCEDURE — 82043 UR ALBUMIN QUANTITATIVE: CPT

## 2025-08-20 RX ORDER — PAROXETINE 10 MG/1
10 TABLET, FILM COATED ORAL DAILY
COMMUNITY
Start: 2025-08-14 | End: 2025-11-12

## 2025-08-20 RX ORDER — BUPROPION HYDROCHLORIDE 150 MG/1
150 TABLET, EXTENDED RELEASE ORAL 2 TIMES DAILY
COMMUNITY
Start: 2025-08-14 | End: 2025-11-12

## 2025-08-20 RX ORDER — IBUPROFEN 800 MG/1
800 TABLET, FILM COATED ORAL EVERY 8 HOURS PRN
Qty: 90 TABLET | Refills: 0 | Status: SHIPPED | OUTPATIENT
Start: 2025-08-20

## 2025-08-20 RX ORDER — IBUPROFEN 800 MG/1
800 TABLET, FILM COATED ORAL EVERY 8 HOURS PRN
Qty: 90 TABLET | Refills: 0 | Status: CANCELLED | OUTPATIENT
Start: 2025-08-20

## 2025-08-20 ASSESSMENT — FIBROSIS 4 INDEX: FIB4 SCORE: 0.7

## 2025-08-21 LAB
CREAT UR-MCNC: 48.3 MG/DL
MICROALBUMIN UR-MCNC: <1.2 MG/DL
MICROALBUMIN/CREAT UR: NORMAL MG/G (ref 0–30)

## (undated) DEVICE — GLOVE BIOGEL PI INDICATOR SZ 7.0 SURGICAL PF LF - (50/BX 4BX/CA)

## (undated) DEVICE — BAG RETRIEVAL 5MM (10EA/BX)

## (undated) DEVICE — DRAPE COLUMN  BOX OF 20

## (undated) DEVICE — ROBOTIC SURGERY SERVICES

## (undated) DEVICE — ELECTRODE DUAL RETURN W/ CORD - (50/PK)

## (undated) DEVICE — NEEDLE INSFL 120MM 14GA VRRS - (20/BX)

## (undated) DEVICE — SODIUM CHL IRRIGATION 0.9% 1000ML (12EA/CA)

## (undated) DEVICE — SLEEVE, VASO, THIGH, MED

## (undated) DEVICE — Device

## (undated) DEVICE — FORCEPS PROGRASP (18UN/EA)

## (undated) DEVICE — SUTURE 4-0 MONOCRYL PLUS PS-2 - 27 INCH (36/BX)

## (undated) DEVICE — CHLORAPREP 26 ML APPLICATOR - ORANGE TINT(25/CA)

## (undated) DEVICE — HOOK PERMANENT CAUTERY DA VINCI 10X'S REUSABLE

## (undated) DEVICE — COVER MAYO STAND X-LG - (22EA/CA)

## (undated) DEVICE — SEAL 5MM-8MM UNIVERSAL  BOX OF 10

## (undated) DEVICE — SUTURE 0 VICRYL PLUS CT-2 - 27 INCH (36/BX)

## (undated) DEVICE — DRAPE ARM  BOX OF 20

## (undated) DEVICE — SUTURE GENERAL

## (undated) DEVICE — TOWEL STOP TIMEOUT SAFETY FLAG (40EA/CA)

## (undated) DEVICE — SYSTEM CLEARIFY VISUALIZATION (10EA/PK)

## (undated) DEVICE — GOWN SURGEONS X-LARGE - DISP. (30/CA)

## (undated) DEVICE — TUBE CONNECTING SUCTION - CLEAR PLASTIC STERILE 72 IN (50EA/CA)

## (undated) DEVICE — DRAPESURG STERI-DRAPE LONG - (10/BX 4BX/CA)

## (undated) DEVICE — TUBING FILTER STRYKER

## (undated) DEVICE — OBTURATOR BLADELESS STANDARD 8MM (6EA/BX)

## (undated) DEVICE — CLIP APPLIER LARGE DA VINCI 100X'S REUSABLE

## (undated) DEVICE — GLOVE BIOGEL SZ 6.5 SURGICAL PF LTX (50PR/BX 4BX/CA)